# Patient Record
Sex: MALE | Race: OTHER | NOT HISPANIC OR LATINO | URBAN - METROPOLITAN AREA
[De-identification: names, ages, dates, MRNs, and addresses within clinical notes are randomized per-mention and may not be internally consistent; named-entity substitution may affect disease eponyms.]

---

## 2024-03-04 ENCOUNTER — INPATIENT (INPATIENT)
Facility: HOSPITAL | Age: 64
LOS: 21 days | Discharge: HOME HEALTH | DRG: 273 | End: 2024-03-26
Attending: HOSPITALIST | Admitting: STUDENT IN AN ORGANIZED HEALTH CARE EDUCATION/TRAINING PROGRAM
Payer: MEDICAID

## 2024-03-04 VITALS
RESPIRATION RATE: 22 BRPM | HEART RATE: 115 BPM | DIASTOLIC BLOOD PRESSURE: 56 MMHG | OXYGEN SATURATION: 100 % | SYSTOLIC BLOOD PRESSURE: 125 MMHG | WEIGHT: 113.54 LBS | TEMPERATURE: 97 F | HEIGHT: 68 IN

## 2024-03-04 DIAGNOSIS — R07.9 CHEST PAIN, UNSPECIFIED: ICD-10-CM

## 2024-03-04 LAB
ADD ON TEST-SPECIMEN IN LAB: SIGNIFICANT CHANGE UP
ALBUMIN SERPL ELPH-MCNC: 3.2 G/DL — LOW (ref 3.3–5)
ALP SERPL-CCNC: 484 U/L — HIGH (ref 40–120)
ALT FLD-CCNC: 627 U/L — HIGH (ref 10–45)
ANION GAP SERPL CALC-SCNC: 16 MMOL/L — SIGNIFICANT CHANGE UP (ref 5–17)
ANISOCYTOSIS BLD QL: SLIGHT — SIGNIFICANT CHANGE UP
APTT BLD: 35.7 SEC — HIGH (ref 24.5–35.6)
AST SERPL-CCNC: 603 U/L — HIGH (ref 10–40)
BASE EXCESS BLDMV CALC-SCNC: 1 MMOL/L — SIGNIFICANT CHANGE UP (ref -3–3)
BASE EXCESS BLDV CALC-SCNC: 0.5 MMOL/L — SIGNIFICANT CHANGE UP (ref -2–3)
BASE EXCESS BLDV CALC-SCNC: 0.8 MMOL/L — SIGNIFICANT CHANGE UP (ref -2–3)
BASOPHILS # BLD AUTO: 0 K/UL — SIGNIFICANT CHANGE UP (ref 0–0.2)
BASOPHILS NFR BLD AUTO: 0 % — SIGNIFICANT CHANGE UP (ref 0–2)
BILIRUB SERPL-MCNC: 1.2 MG/DL — SIGNIFICANT CHANGE UP (ref 0.2–1.2)
BLD GP AB SCN SERPL QL: NEGATIVE — SIGNIFICANT CHANGE UP
BUN SERPL-MCNC: 69 MG/DL — HIGH (ref 7–23)
BURR CELLS BLD QL SMEAR: PRESENT — SIGNIFICANT CHANGE UP
CA-I SERPL-SCNC: 1.09 MMOL/L — LOW (ref 1.15–1.33)
CA-I SERPL-SCNC: 1.13 MMOL/L — LOW (ref 1.15–1.33)
CALCIUM SERPL-MCNC: 8.3 MG/DL — LOW (ref 8.4–10.5)
CHLORIDE BLDV-SCNC: 101 MMOL/L — SIGNIFICANT CHANGE UP (ref 96–108)
CHLORIDE BLDV-SCNC: 101 MMOL/L — SIGNIFICANT CHANGE UP (ref 96–108)
CHLORIDE SERPL-SCNC: 99 MMOL/L — SIGNIFICANT CHANGE UP (ref 96–108)
CO2 BLDMV-SCNC: 27 MMOL/L — SIGNIFICANT CHANGE UP (ref 21–29)
CO2 BLDV-SCNC: 28 MMOL/L — HIGH (ref 22–26)
CO2 BLDV-SCNC: 28 MMOL/L — HIGH (ref 22–26)
CO2 SERPL-SCNC: 25 MMOL/L — SIGNIFICANT CHANGE UP (ref 22–31)
CREAT SERPL-MCNC: 2.15 MG/DL — HIGH (ref 0.5–1.3)
DACRYOCYTES BLD QL SMEAR: SLIGHT — SIGNIFICANT CHANGE UP
EGFR: 34 ML/MIN/1.73M2 — LOW
EOSINOPHIL # BLD AUTO: 0 K/UL — SIGNIFICANT CHANGE UP (ref 0–0.5)
EOSINOPHIL NFR BLD AUTO: 0 % — SIGNIFICANT CHANGE UP (ref 0–6)
GAS PNL BLDMV: SIGNIFICANT CHANGE UP
GAS PNL BLDV: 136 MMOL/L — SIGNIFICANT CHANGE UP (ref 136–145)
GAS PNL BLDV: 137 MMOL/L — SIGNIFICANT CHANGE UP (ref 136–145)
GAS PNL BLDV: SIGNIFICANT CHANGE UP
GLUCOSE BLDV-MCNC: 104 MG/DL — HIGH (ref 70–99)
GLUCOSE BLDV-MCNC: 109 MG/DL — HIGH (ref 70–99)
GLUCOSE SERPL-MCNC: 118 MG/DL — HIGH (ref 70–99)
HCO3 BLDMV-SCNC: 26 MMOL/L — SIGNIFICANT CHANGE UP (ref 20–28)
HCO3 BLDV-SCNC: 26 MMOL/L — SIGNIFICANT CHANGE UP (ref 22–29)
HCO3 BLDV-SCNC: 27 MMOL/L — SIGNIFICANT CHANGE UP (ref 22–29)
HCT VFR BLD CALC: 28.2 % — LOW (ref 39–50)
HCT VFR BLDA CALC: 29 % — LOW (ref 39–51)
HCT VFR BLDA CALC: 32 % — LOW (ref 39–51)
HGB BLD CALC-MCNC: 10.5 G/DL — LOW (ref 12.6–17.4)
HGB BLD CALC-MCNC: 9.5 G/DL — LOW (ref 12.6–17.4)
HGB BLD-MCNC: 9.2 G/DL — LOW (ref 13–17)
HOROWITZ INDEX BLDMV+IHG-RTO: 36 — SIGNIFICANT CHANGE UP
HOROWITZ INDEX BLDV+IHG-RTO: 32 — SIGNIFICANT CHANGE UP
HOROWITZ INDEX BLDV+IHG-RTO: 36 — SIGNIFICANT CHANGE UP
INR BLD: 4.26 RATIO — HIGH (ref 0.85–1.18)
INR BLD: 4.51 RATIO — HIGH (ref 0.85–1.18)
LACTATE BLDV-MCNC: 2.2 MMOL/L — HIGH (ref 0.5–2)
LACTATE BLDV-MCNC: 2.8 MMOL/L — HIGH (ref 0.5–2)
LACTATE BLDV-MCNC: 2.9 MMOL/L — HIGH (ref 0.5–2)
LYMPHOCYTES # BLD AUTO: 0.49 K/UL — LOW (ref 1–3.3)
LYMPHOCYTES # BLD AUTO: 6.1 % — LOW (ref 13–44)
MACROCYTES BLD QL: SLIGHT — SIGNIFICANT CHANGE UP
MAGNESIUM SERPL-MCNC: 2.8 MG/DL — HIGH (ref 1.6–2.6)
MANUAL SMEAR VERIFICATION: SIGNIFICANT CHANGE UP
MCHC RBC-ENTMCNC: 21.5 PG — LOW (ref 27–34)
MCHC RBC-ENTMCNC: 32.6 GM/DL — SIGNIFICANT CHANGE UP (ref 32–36)
MCV RBC AUTO: 65.9 FL — LOW (ref 80–100)
MICROCYTES BLD QL: SLIGHT — SIGNIFICANT CHANGE UP
MONOCYTES # BLD AUTO: 0.64 K/UL — SIGNIFICANT CHANGE UP (ref 0–0.9)
MONOCYTES NFR BLD AUTO: 7.9 % — SIGNIFICANT CHANGE UP (ref 2–14)
NEUTROPHILS # BLD AUTO: 6.95 K/UL — SIGNIFICANT CHANGE UP (ref 1.8–7.4)
NEUTROPHILS NFR BLD AUTO: 86 % — HIGH (ref 43–77)
NRBC # BLD: 2 /100 WBCS — HIGH (ref 0–0)
NT-PROBNP SERPL-SCNC: 3002 PG/ML — HIGH (ref 0–300)
O2 CT VFR BLD CALC: 41 MMHG — SIGNIFICANT CHANGE UP (ref 30–65)
OVALOCYTES BLD QL SMEAR: SLIGHT — SIGNIFICANT CHANGE UP
PCO2 BLDMV: 42 MMHG — SIGNIFICANT CHANGE UP (ref 30–65)
PCO2 BLDV: 47 MMHG — SIGNIFICANT CHANGE UP (ref 42–55)
PCO2 BLDV: 48 MMHG — SIGNIFICANT CHANGE UP (ref 42–55)
PH BLDMV: 7.4 — SIGNIFICANT CHANGE UP (ref 7.32–7.45)
PH BLDV: 7.35 — SIGNIFICANT CHANGE UP (ref 7.32–7.43)
PH BLDV: 7.36 — SIGNIFICANT CHANGE UP (ref 7.32–7.43)
PHOSPHATE SERPL-MCNC: 4.4 MG/DL — SIGNIFICANT CHANGE UP (ref 2.5–4.5)
PLAT MORPH BLD: ABNORMAL
PLATELET # BLD AUTO: 185 K/UL — SIGNIFICANT CHANGE UP (ref 150–400)
PO2 BLDV: 30 MMHG — SIGNIFICANT CHANGE UP (ref 25–45)
PO2 BLDV: 36 MMHG — SIGNIFICANT CHANGE UP (ref 25–45)
POIKILOCYTOSIS BLD QL AUTO: SLIGHT — SIGNIFICANT CHANGE UP
POLYCHROMASIA BLD QL SMEAR: SLIGHT — SIGNIFICANT CHANGE UP
POTASSIUM BLDV-SCNC: 3.3 MMOL/L — LOW (ref 3.5–5.1)
POTASSIUM BLDV-SCNC: 3.4 MMOL/L — LOW (ref 3.5–5.1)
POTASSIUM SERPL-MCNC: 3.5 MMOL/L — SIGNIFICANT CHANGE UP (ref 3.5–5.3)
POTASSIUM SERPL-SCNC: 3.5 MMOL/L — SIGNIFICANT CHANGE UP (ref 3.5–5.3)
PROT SERPL-MCNC: 6.8 G/DL — SIGNIFICANT CHANGE UP (ref 6–8.3)
PROTHROM AB SERPL-ACNC: 42.9 SEC — HIGH (ref 9.5–13)
PROTHROM AB SERPL-ACNC: 45.3 SEC — HIGH (ref 9.5–13)
RBC # BLD: 4.28 M/UL — SIGNIFICANT CHANGE UP (ref 4.2–5.8)
RBC # FLD: 14.7 % — HIGH (ref 10.3–14.5)
RBC BLD AUTO: ABNORMAL
RH IG SCN BLD-IMP: POSITIVE — SIGNIFICANT CHANGE UP
SAO2 % BLDMV: 63.6 — SIGNIFICANT CHANGE UP (ref 60–90)
SAO2 % BLDV: 37.3 % — LOW (ref 67–88)
SAO2 % BLDV: 52.1 % — LOW (ref 67–88)
SODIUM SERPL-SCNC: 140 MMOL/L — SIGNIFICANT CHANGE UP (ref 135–145)
TARGETS BLD QL SMEAR: SLIGHT — SIGNIFICANT CHANGE UP
TROPONIN T, HIGH SENSITIVITY RESULT: 93 NG/L — HIGH (ref 0–51)
WBC # BLD: 8.08 K/UL — SIGNIFICANT CHANGE UP (ref 3.8–10.5)
WBC # FLD AUTO: 8.08 K/UL — SIGNIFICANT CHANGE UP (ref 3.8–10.5)

## 2024-03-04 PROCEDURE — 36556 INSERT NON-TUNNEL CV CATH: CPT | Mod: 59

## 2024-03-04 PROCEDURE — 99291 CRITICAL CARE FIRST HOUR: CPT | Mod: 25

## 2024-03-04 PROCEDURE — 93503 INSERT/PLACE HEART CATHETER: CPT

## 2024-03-04 PROCEDURE — 71045 X-RAY EXAM CHEST 1 VIEW: CPT | Mod: 26

## 2024-03-04 RX ORDER — HYDRALAZINE HCL 50 MG
10 TABLET ORAL THREE TIMES A DAY
Refills: 0 | Status: DISCONTINUED | OUTPATIENT
Start: 2024-03-04 | End: 2024-03-05

## 2024-03-04 RX ORDER — POTASSIUM CHLORIDE 20 MEQ
40 PACKET (EA) ORAL ONCE
Refills: 0 | Status: COMPLETED | OUTPATIENT
Start: 2024-03-04 | End: 2024-03-04

## 2024-03-04 RX ORDER — ASPIRIN/CALCIUM CARB/MAGNESIUM 324 MG
81 TABLET ORAL DAILY
Refills: 0 | Status: DISCONTINUED | OUTPATIENT
Start: 2024-03-04 | End: 2024-03-07

## 2024-03-04 RX ORDER — POTASSIUM CHLORIDE 20 MEQ
40 PACKET (EA) ORAL ONCE
Refills: 0 | Status: DISCONTINUED | OUTPATIENT
Start: 2024-03-04 | End: 2024-03-04

## 2024-03-04 RX ORDER — DOBUTAMINE HCL 250MG/20ML
5 VIAL (ML) INTRAVENOUS
Qty: 500 | Refills: 0 | Status: DISCONTINUED | OUTPATIENT
Start: 2024-03-04 | End: 2024-03-05

## 2024-03-04 RX ORDER — BUMETANIDE 0.25 MG/ML
2 INJECTION INTRAMUSCULAR; INTRAVENOUS ONCE
Refills: 0 | Status: COMPLETED | OUTPATIENT
Start: 2024-03-04 | End: 2024-03-04

## 2024-03-04 RX ADMIN — Medication 10 MILLIGRAM(S): at 23:42

## 2024-03-04 RX ADMIN — Medication 40 MILLIEQUIVALENT(S): at 23:43

## 2024-03-04 RX ADMIN — Medication 7.73 MICROGRAM(S)/KG/MIN: at 21:42

## 2024-03-04 RX ADMIN — BUMETANIDE 2 MILLIGRAM(S): 0.25 INJECTION INTRAMUSCULAR; INTRAVENOUS at 23:16

## 2024-03-04 NOTE — H&P ADULT - NSHPPHYSICALEXAM_GEN_ALL_CORE
Physical Exam:   Constitutional: NAD, well-groomed, well-developed  HEENT: PERRLA, EOMI, no drainage or redness  Neck: supple,  No JVD  Respiratory: Breath Sounds equal & clear bilaterally to auscultation, no rales/rhonchi/wheezing, no accessory muscle use noted  Cardiovascular: Regular rate, regular rhythm, normal S1, S2; no murmurs or rub  Gastrointestinal: Soft, non-tender, non distended, + bowel sounds  Extremities: YEH x 4, no peripheral edema, no cyanosis, no clubbing. +periperal pulses.   Neurological: A+O x 3; speech clear and intact; no sensory, motor  deficits, normal reflexes. Nonfocal.   Skin: warm, dry, well perfused  Lines: Physical Exam:   Constitutional: in no acute distress  HEENT: PERRLA, EOMI, no drainage or redness  Neck: supple,  + JVD  Respiratory: Breath Sounds equal & clear bilaterally to auscultation  Cardiovascular: Regular rate, tachycardic, regular rhythm, normal S1, S2   Gastrointestinal: Soft, non-tender, non distended, + bowel sounds  Extremities: YEH x 4, no peripheral edema, no cyanosis, no clubbing. +peripheral pulses.   Neurological: A+O x 3; speech clear and intact; no sensory, motor  deficits, normal reflexes. Nonfocal.   Skin: warm, dry, well perfused Physical Exam:   Constitutional: in no acute distress  HEENT: PERRLA, EOMI, no drainage or redness  Neck: supple,  + JVD  Respiratory: Breath Sounds equal & clear bilaterally to auscultation  Cardiovascular: Regular rate, tachycardic, regular rhythm, normal S1, S2   Gastrointestinal: Soft, non-tender, non distended, + bowel sounds  Extremities: YEH x 4, no peripheral edema, no cyanosis, no clubbing. +peripheral pulses.   Neurological: A+O x 3; speech clear and intact; no sensory, motor  deficits, normal reflexes. Nonfocal.   Skin: warm, dry, well perfused. RLE dressing intact

## 2024-03-04 NOTE — H&P ADULT - HISTORY OF PRESENT ILLNESS
63 year old male with past medical history of HIV, CVA, HTN, HLD, prediabetes, A.fib, cocaine/heroin abuse, CAD w/ stent, CHF (EF  25-30% in 12/23, now 10 - 15 % as of TTE on 3/3),  RLE compartment syndrome s/p fasciotomy 12/23 and recent hospitalization for CHF exacerbation with bilateral pleural effusions requiring chest tube placement initially presenting [facility] on 3/1 with chest pain and shortness of breath. He states having intermittent 8/10 midsternal chest pain for the past few months since his leg operation. He was admitted for management of CHF exacerbation. 63 year old male with past medical history of HIV, CVA, HTN, HLD, prediabetes, A.fib, cocaine/heroin abuse, CAD w/ stent, CHF (EF  25-30% in 12/23),  RLE compartment syndrome s/p fasciotomy 12/23 and recent hospitalization for CHF exacerbation with bilateral pleural effusions requiring chest tube placement initially presenting [facility] on 3/1 with chest pain and shortness of breath. He states having intermittent 8/10 midsternal chest pain for the past few months since his leg operation. He was admitted to the outside hospital for management of CHF exacerbation, found to have a further reduced EF of 10 - 15% on 3/1. While admitted he was hypotensive requiring dopamine infusion with worsening perfusion indices and ELIE. He was transferred to [facility] for cardiogenic shock management.     On admission to CICU, he is A&O x3.  sinus tach, / 78, saturating well on 4L nasal cannula with dopamine gtt infusing at 10 mcg/kg/min. He denies chest pain, shortness of breath, nausea and vomiting on admission. 63 year old male with past medical history of HIV, CVA, HTN, HLD, prediabetes, A.fib, cocaine/heroin abuse, CAD w/ stent, CHF (EF  25-30% in 12/23),  RLE compartment syndrome s/p fasciotomy 12/23 and recent hospitalization for CHF exacerbation with bilateral pleural effusions requiring chest tube placement initially presenting to [facility] on 3/1 with chest pain and shortness of breath. He states having intermittent 8/10 midsternal chest pain for the past few months since his leg operation. He was admitted to the outside hospital for management of CHF exacerbation, found to have a further reduced EF of 10 - 15% on 3/1. While admitted, he was hypotensive requiring dopamine infusion with worsening perfusion indices and ELIE. He was transferred to [facility] for cardiogenic shock management.     On admission to CICU, he is A&O x3.  sinus tach, / 78, saturating well on 4L nasal cannula with dopamine gtt infusing at 10 mcg/kg/min. He denies chest pain, shortness of breath, nausea and vomiting on admission.

## 2024-03-04 NOTE — H&P ADULT - ASSESSMENT
Assessment:  63 year old male with past medical history of HIV, CVA, HTN, HLD, prediabetes, A.fib, cocaine/heroin abuse, CAD w/ stent, CHF (EF  25-30% in 12/23, now 10 - 15 % as of TTE on 3/3),  RLE compartment syndrome s/p fasciotomy 12/23    Plan:    Neuro  #CVA    Respiratory  #      Cardiovascular  #Cardiogenic shock  -       #HTN      #HLD      GI  - diet as tolerated, monitor for BM    /Renal  #ELIE  - admitted from outside hospital with mitchell catheter  - continue strict I&O, electrolyte monitoring    Endo      Heme      ID      ======================= DISPOSITION  =====================  [X] Critical   [ ] Guarded    [ ] Stable    [X] Maintain in CICU  [ ] Downgrade to Telemtry  [ ] Discharge Home    Patient requires continuous monitoring with bedside rhythm monitoring, pulse ox monitoring, and intermittent blood gas analysis. Care plan discussed with ICU care team. Patient remained critical and at risk for life threatening decompensation.  Patient seen, examined and plan discussed with CCU team during rounds.     I have personally provided 75 minutes of critical care time excluding time spent on separate procedures, in addition to initial critical care time provided by the CICU Attending, Dr. Izquierdo.     Flor Henderson, [facility] Assessment:  63 year old male with past medical history of HIV, CVA, HTN, HLD, prediabetes, A.fib, cocaine/heroin abuse, CAD w/ stent, CHF (EF  25-30% in 12/23, now 10 - 15 % as of TTE on 3/3),  RLE compartment syndrome s/p fasciotomy 12/23 presented to outside hospital with chest pain and shortness of breath, admitted for CHF exacerbation, transferred to [facility] for cardiogenic shock management     Plan:    Neuro  #hx CVA  - A&O x3, poor historian with medical history and medications  - continue to monitor per protocol    Respiratory  #hypoxia/pleural effusions  - noted to have pleural effusions at outside hospital, likely in the setting of volume overload s/t chf  - saturating well on 4L, wean as tolerated with diuresis  - continue to monitor sp02    Cardiovascular  #Cardiogenic shock  - i/s/o CHF exacerbation  - TTE 3/1 EF 10 - 15%, previously 25 - 30% 12/23  - dopamine gtt transitioned to dobutamine 5 mcg, continue  - swan to be place, f/u mv02, CVP  - diuresis as needed  - trend perfusion indices, mv02    - f/u am TTE    #CAD  - w/ history of stents in december as per patient  - previously on aspirin, continue    #HTN  - continue to trend BP    #HLD  - holding statin i/s/o liver dysfunction    GI  #transaminitis  - worsening liver ezymes at outside hospital  - hold hepatotoxins, continue to trend    - diet as tolerated, monitor for BM    /Renal  #ELIE  - admitted from outside hospital with mitchell catheter  - continue strict I&O, electrolyte monitoring    Endo  #preDM  - f.u a1c  - trend glucose, ISS while inpatient    Heme  - elevated INR report ou  - H/H and platelets stable at outside hospital  - f/u admission labs    ID  - afebrile on admission  - continue to trend wbc and fever curve    #full code  #lines: PIV      ======================= DISPOSITION  =====================  [X] Critical   [ ] Guarded    [ ] Stable    [X] Maintain in CICU  [ ] Downgrade to Telemetry  [ ] Discharge Home    Patient requires continuous monitoring with bedside rhythm monitoring, pulse ox monitoring, and intermittent blood gas analysis. Care plan discussed with ICU care team. Patient remained critical and at risk for life threatening decompensation.  Patient seen, examined and plan discussed with CCU team during rounds.     I have personally provided 75 minutes of critical care time excluding time spent on separate procedures, in addition to initial critical care time provided by the CICU Attending, Dr. Izquierdo.     Flor Henderson, [facility] Assessment:  63 year old male with past medical history of HIV, CVA, HTN, HLD, prediabetes, A.fib, cocaine/heroin abuse, CAD w/ stent, CHF (EF  25-30% in 12/23, now 10 - 15 % as of TTE on 3/1),  RLE compartment syndrome s/p fasciotomy 12/23 presented to outside hospital with chest pain and shortness of breath, admitted for CHF exacerbation, transferred to [facility] for cardiogenic shock management     Plan:    Neuro  #hx CVA  - A&O x3, poor historian with medical history and medications  - continue to monitor per protocol    Respiratory  #hypoxia/pleural effusions  - noted to have pleural effusions at outside hospital, likely in the setting of volume overload s/t chf  - saturating well on 4L, wean as tolerated with diuresis  - continue to monitor sp02    Cardiovascular  #Cardiogenic shock  - i/s/o CHF exacerbation  - TTE 3/1 EF 10 - 15%, previously 25 - 30% 12/23  - dopamine gtt transitioned to dobutamine 5 mcg, continue  - swan to be place, f/u mv02, CVP  - diuresis as needed  - trend perfusion indices, mv02    - f/u am TTE    #CAD  - w/ history of stents in december as per patient  - previously on aspirin, continue    #HTN  - continue to trend BP    #HLD  - holding statin i/s/o liver dysfunction    GI  #transaminitis  - worsening liver ezymes at outside hospital  - hold hepatotoxins, continue to trend    - diet as tolerated, monitor for BM    /Renal  #ELIE  - admitted from outside hospital with mitchell catheter  - continue strict I&O, electrolyte monitoring    Endo  #preDM  - f.u a1c  - trend glucose, ISS while inpatient    Heme  - elevated INR report ou  - H/H and platelets stable at outside hospital  - f/u admission labs    ID  - afebrile on admission  - continue to trend wbc and fever curve    #full code  #lines: PIV      ======================= DISPOSITION  =====================  [X] Critical   [ ] Guarded    [ ] Stable    [X] Maintain in CICU  [ ] Downgrade to Telemetry  [ ] Discharge Home    Patient requires continuous monitoring with bedside rhythm monitoring, pulse ox monitoring, and intermittent blood gas analysis. Care plan discussed with ICU care team. Patient remained critical and at risk for life threatening decompensation.  Patient seen, examined and plan discussed with CCU team during rounds.     I have personally provided 75 minutes of critical care time excluding time spent on separate procedures, in addition to initial critical care time provided by the CICU Attending, Dr. Izquierdo.     Flor Henderson, [facility] Assessment:  63 year old male with past medical history of HIV, CVA, HTN, HLD, prediabetes, A.fib, cocaine/heroin abuse, CAD w/ stent, CHF (EF  25-30% in 12/23, now 10 - 15 % as of TTE on 3/1),  RLE compartment syndrome s/p fasciotomy 12/23 presented to outside hospital with chest pain and shortness of breath, admitted for CHF exacerbation, transferred to [facility] for cardiogenic shock management     Plan:    Neuro  #hx CVA  - A&O x3, poor historian with medical history and medications  - continue to monitor per protocol    Respiratory  #hypoxia/pleural effusions  - noted to have pleural effusions at outside hospital, likely in the setting of volume overload s/t chf  - saturating well on 4L, wean as tolerated with diuresis  - continue to monitor sp02    Cardiovascular  #Cardiogenic shock  - i/s/o CHF exacerbation  - TTE 3/1 EF 10 - 15%, previously 25 - 30% 12/23  - dopamine gtt transitioned to dobutamine 5 mcg, continue  - swan to be place, f/u mv02, CVP  - diuresis as needed  - trend perfusion indices, mv02    - f/u am TTE    #CAD  - w/ history of stents in december as per patient  - previously on aspirin, continue    #pAF  - ? hx a.fib  - sinus on admission  - not on blood thinner or rate control agent as per patient, only aspirin & plavix  - anticoagulation on hold with INR > 2    #HTN  - continue to trend BP    #HLD  - holding statin i/s/o liver dysfunction    GI  #transaminitis  - worsening liver ezymes at outside hospital  - hold hepatotoxins, continue to trend    - diet as tolerated, monitor for BM    /Renal  #ELIE  - admitted from outside hospital with mitchell catheter  - continue strict I&O, electrolyte monitoring    Endo  #preDM  - f.u a1c  - trend glucose, ISS while inpatient    Heme  - elevated INR on admission, i/s/o liver dsyfunction, continue to trend  - H/H and platelets stable at outside hospital  - f/u admission labs    ID  - afebrile on admission  - continue to trend wbc and fever curve    #HIV  - hx of HIV, not on medication as per patient    #RLE wound  - admitted from outside hospital with ace wrap in place  - noted to have wounds on lateral and medial aspect of calf  - f/u wound consult    #full code  #lines: PIV    ======================= DISPOSITION  =====================  [X] Critical   [ ] Guarded    [ ] Stable    [X] Maintain in CICU  [ ] Downgrade to Telemetry  [ ] Discharge Home    Patient requires continuous monitoring with bedside rhythm monitoring, pulse ox monitoring, and intermittent blood gas analysis. Care plan discussed with ICU care team. Patient remained critical and at risk for life threatening decompensation.  Patient seen, examined and plan discussed with CCU team during rounds.     I have personally provided 75 minutes of critical care time excluding time spent on separate procedures, in addition to initial critical care time provided by the CICU Attending, Dr. Izquierdo.     Flor Henderson, [facility]

## 2024-03-04 NOTE — PROCEDURE NOTE - ADDITIONAL PROCEDURE DETAILS
Under sterile conditions and with proper draping of the patient, a pulmonary artery catheter was floated through the introducer catheter in the right internal jugular vein. With the balloon inflated with 1.5ml of air, the PA catheter was advanced while monitoring the pressure tracing from the central venous system to the right ventricle and to the pulmonary artery. The balloon was deflated, PA pressure tracing was noted again. The position of the catheter was verified by CXR. The initial readings are:  CVP: 11 mmHg  PA S/D: 49/19 mmHg    Complications: None

## 2024-03-04 NOTE — PATIENT PROFILE ADULT - FALL HARM RISK - HARM RISK INTERVENTIONS

## 2024-03-05 ENCOUNTER — RESULT REVIEW (OUTPATIENT)
Age: 64
End: 2024-03-05

## 2024-03-05 DIAGNOSIS — I51.3 INTRACARDIAC THROMBOSIS, NOT ELSEWHERE CLASSIFIED: ICD-10-CM

## 2024-03-05 DIAGNOSIS — Z98.890 OTHER SPECIFIED POSTPROCEDURAL STATES: ICD-10-CM

## 2024-03-05 DIAGNOSIS — I50.23 ACUTE ON CHRONIC SYSTOLIC (CONGESTIVE) HEART FAILURE: ICD-10-CM

## 2024-03-05 DIAGNOSIS — I48.0 PAROXYSMAL ATRIAL FIBRILLATION: ICD-10-CM

## 2024-03-05 DIAGNOSIS — F19.10 OTHER PSYCHOACTIVE SUBSTANCE ABUSE, UNCOMPLICATED: ICD-10-CM

## 2024-03-05 DIAGNOSIS — Z11.4 ENCOUNTER FOR SCREENING FOR HUMAN IMMUNODEFICIENCY VIRUS [HIV]: ICD-10-CM

## 2024-03-05 LAB
A1C WITH ESTIMATED AVERAGE GLUCOSE RESULT: 6.2 % — HIGH (ref 4–5.6)
ALBUMIN SERPL ELPH-MCNC: 3 G/DL — LOW (ref 3.3–5)
ALBUMIN SERPL ELPH-MCNC: 3 G/DL — LOW (ref 3.3–5)
ALBUMIN SERPL ELPH-MCNC: 3.1 G/DL — LOW (ref 3.3–5)
ALP SERPL-CCNC: 393 U/L — HIGH (ref 40–120)
ALP SERPL-CCNC: 399 U/L — HIGH (ref 40–120)
ALP SERPL-CCNC: 406 U/L — HIGH (ref 40–120)
ALT FLD-CCNC: 491 U/L — HIGH (ref 10–45)
ALT FLD-CCNC: 528 U/L — HIGH (ref 10–45)
ALT FLD-CCNC: 553 U/L — HIGH (ref 10–45)
ANION GAP SERPL CALC-SCNC: 11 MMOL/L — SIGNIFICANT CHANGE UP (ref 5–17)
ANION GAP SERPL CALC-SCNC: 12 MMOL/L — SIGNIFICANT CHANGE UP (ref 5–17)
ANION GAP SERPL CALC-SCNC: 13 MMOL/L — SIGNIFICANT CHANGE UP (ref 5–17)
APTT BLD: 29.6 SEC — SIGNIFICANT CHANGE UP (ref 24.5–35.6)
AST SERPL-CCNC: 343 U/L — HIGH (ref 10–40)
AST SERPL-CCNC: 406 U/L — HIGH (ref 10–40)
AST SERPL-CCNC: 451 U/L — HIGH (ref 10–40)
BASE EXCESS BLDMV CALC-SCNC: 2.6 MMOL/L — SIGNIFICANT CHANGE UP (ref -3–3)
BASE EXCESS BLDMV CALC-SCNC: 3.2 MMOL/L — HIGH (ref -3–3)
BASE EXCESS BLDMV CALC-SCNC: 3.2 MMOL/L — HIGH (ref -3–3)
BASE EXCESS BLDMV CALC-SCNC: 3.9 MMOL/L — HIGH (ref -3–3)
BASE EXCESS BLDMV CALC-SCNC: 4.7 MMOL/L — HIGH (ref -3–3)
BASE EXCESS BLDMV CALC-SCNC: 4.9 MMOL/L — HIGH (ref -3–3)
BASE EXCESS BLDV CALC-SCNC: -1.5 MMOL/L — SIGNIFICANT CHANGE UP (ref -2–3)
BASOPHILS # BLD AUTO: 0 K/UL — SIGNIFICANT CHANGE UP (ref 0–0.2)
BASOPHILS NFR BLD AUTO: 0 % — SIGNIFICANT CHANGE UP (ref 0–2)
BILIRUB SERPL-MCNC: 0.8 MG/DL — SIGNIFICANT CHANGE UP (ref 0.2–1.2)
BUN SERPL-MCNC: 59 MG/DL — HIGH (ref 7–23)
BUN SERPL-MCNC: 64 MG/DL — HIGH (ref 7–23)
BUN SERPL-MCNC: 66 MG/DL — HIGH (ref 7–23)
CA-I SERPL-SCNC: 1.04 MMOL/L — LOW (ref 1.15–1.33)
CALCIUM SERPL-MCNC: 8 MG/DL — LOW (ref 8.4–10.5)
CALCIUM SERPL-MCNC: 8.1 MG/DL — LOW (ref 8.4–10.5)
CALCIUM SERPL-MCNC: 8.4 MG/DL — SIGNIFICANT CHANGE UP (ref 8.4–10.5)
CHLORIDE BLDV-SCNC: 103 MMOL/L — SIGNIFICANT CHANGE UP (ref 96–108)
CHLORIDE SERPL-SCNC: 101 MMOL/L — SIGNIFICANT CHANGE UP (ref 96–108)
CHLORIDE SERPL-SCNC: 102 MMOL/L — SIGNIFICANT CHANGE UP (ref 96–108)
CHLORIDE SERPL-SCNC: 102 MMOL/L — SIGNIFICANT CHANGE UP (ref 96–108)
CHOLEST SERPL-MCNC: 138 MG/DL — SIGNIFICANT CHANGE UP
CO2 BLDMV-SCNC: 28 MMOL/L — SIGNIFICANT CHANGE UP (ref 21–29)
CO2 BLDMV-SCNC: 29 MMOL/L — SIGNIFICANT CHANGE UP (ref 21–29)
CO2 BLDMV-SCNC: 30 MMOL/L — HIGH (ref 21–29)
CO2 BLDMV-SCNC: 30 MMOL/L — HIGH (ref 21–29)
CO2 BLDV-SCNC: 25 MMOL/L — SIGNIFICANT CHANGE UP (ref 22–26)
CO2 SERPL-SCNC: 26 MMOL/L — SIGNIFICANT CHANGE UP (ref 22–31)
CO2 SERPL-SCNC: 27 MMOL/L — SIGNIFICANT CHANGE UP (ref 22–31)
CO2 SERPL-SCNC: 28 MMOL/L — SIGNIFICANT CHANGE UP (ref 22–31)
CREAT SERPL-MCNC: 1.94 MG/DL — HIGH (ref 0.5–1.3)
CREAT SERPL-MCNC: 2.1 MG/DL — HIGH (ref 0.5–1.3)
CREAT SERPL-MCNC: 2.13 MG/DL — HIGH (ref 0.5–1.3)
EGFR: 34 ML/MIN/1.73M2 — LOW
EGFR: 35 ML/MIN/1.73M2 — LOW
EGFR: 38 ML/MIN/1.73M2 — LOW
EOSINOPHIL # BLD AUTO: 0 K/UL — SIGNIFICANT CHANGE UP (ref 0–0.5)
EOSINOPHIL NFR BLD AUTO: 0 % — SIGNIFICANT CHANGE UP (ref 0–6)
ESTIMATED AVERAGE GLUCOSE: 131 MG/DL — HIGH (ref 68–114)
GAS PNL BLDMV: SIGNIFICANT CHANGE UP
GAS PNL BLDV: 135 MMOL/L — LOW (ref 136–145)
GAS PNL BLDV: SIGNIFICANT CHANGE UP
GAS PNL BLDV: SIGNIFICANT CHANGE UP
GLUCOSE BLDC GLUCOMTR-MCNC: 123 MG/DL — HIGH (ref 70–99)
GLUCOSE BLDC GLUCOMTR-MCNC: 126 MG/DL — HIGH (ref 70–99)
GLUCOSE BLDC GLUCOMTR-MCNC: 135 MG/DL — HIGH (ref 70–99)
GLUCOSE BLDC GLUCOMTR-MCNC: 137 MG/DL — HIGH (ref 70–99)
GLUCOSE BLDV-MCNC: 146 MG/DL — HIGH (ref 70–99)
GLUCOSE SERPL-MCNC: 125 MG/DL — HIGH (ref 70–99)
GLUCOSE SERPL-MCNC: 129 MG/DL — HIGH (ref 70–99)
GLUCOSE SERPL-MCNC: 161 MG/DL — HIGH (ref 70–99)
HCO3 BLDMV-SCNC: 27 MMOL/L — SIGNIFICANT CHANGE UP (ref 20–28)
HCO3 BLDMV-SCNC: 28 MMOL/L — SIGNIFICANT CHANGE UP (ref 20–28)
HCO3 BLDMV-SCNC: 29 MMOL/L — HIGH (ref 20–28)
HCO3 BLDMV-SCNC: 29 MMOL/L — HIGH (ref 20–28)
HCO3 BLDV-SCNC: 24 MMOL/L — SIGNIFICANT CHANGE UP (ref 22–29)
HCT VFR BLD CALC: 25.1 % — LOW (ref 39–50)
HCT VFR BLDA CALC: 26 % — LOW (ref 39–51)
HCV AB S/CO SERPL IA: 0.22 S/CO — SIGNIFICANT CHANGE UP (ref 0–0.99)
HCV AB SERPL-IMP: SIGNIFICANT CHANGE UP
HDLC SERPL-MCNC: 67 MG/DL — SIGNIFICANT CHANGE UP
HEPARINASE TEG R TIME: 4.3 MIN — SIGNIFICANT CHANGE UP (ref 4.3–8.3)
HGB BLD CALC-MCNC: 8.6 G/DL — LOW (ref 12.6–17.4)
HGB BLD-MCNC: 8.5 G/DL — LOW (ref 13–17)
HOROWITZ INDEX BLDMV+IHG-RTO: 21 — SIGNIFICANT CHANGE UP
HOROWITZ INDEX BLDMV+IHG-RTO: SIGNIFICANT CHANGE UP
HOROWITZ INDEX BLDV+IHG-RTO: 36 — SIGNIFICANT CHANGE UP
IMM GRANULOCYTES NFR BLD AUTO: 0.4 % — SIGNIFICANT CHANGE UP (ref 0–0.9)
INR BLD: 3.61 RATIO — HIGH (ref 0.85–1.18)
LACTATE BLDV-MCNC: 1.8 MMOL/L — SIGNIFICANT CHANGE UP (ref 0.5–2)
LACTATE BLDV-MCNC: 1.9 MMOL/L — SIGNIFICANT CHANGE UP (ref 0.5–2)
LACTATE BLDV-MCNC: 1.9 MMOL/L — SIGNIFICANT CHANGE UP (ref 0.5–2)
LACTATE BLDV-MCNC: 2.1 MMOL/L — HIGH (ref 0.5–2)
LACTATE SERPL-SCNC: 2.1 MMOL/L — HIGH (ref 0.5–2)
LIPID PNL WITH DIRECT LDL SERPL: 61 MG/DL — SIGNIFICANT CHANGE UP
LYMPHOCYTES # BLD AUTO: 0.45 K/UL — LOW (ref 1–3.3)
LYMPHOCYTES # BLD AUTO: 6.1 % — LOW (ref 13–44)
MAGNESIUM SERPL-MCNC: 2.2 MG/DL — SIGNIFICANT CHANGE UP (ref 1.6–2.6)
MAGNESIUM SERPL-MCNC: 2.4 MG/DL — SIGNIFICANT CHANGE UP (ref 1.6–2.6)
MAGNESIUM SERPL-MCNC: 2.5 MG/DL — SIGNIFICANT CHANGE UP (ref 1.6–2.6)
MCHC RBC-ENTMCNC: 21.9 PG — LOW (ref 27–34)
MCHC RBC-ENTMCNC: 33.9 GM/DL — SIGNIFICANT CHANGE UP (ref 32–36)
MCV RBC AUTO: 64.7 FL — LOW (ref 80–100)
MONOCYTES # BLD AUTO: 0.96 K/UL — HIGH (ref 0–0.9)
MONOCYTES NFR BLD AUTO: 13 % — SIGNIFICANT CHANGE UP (ref 2–14)
MRSA PCR RESULT.: SIGNIFICANT CHANGE UP
NEUTROPHILS # BLD AUTO: 5.94 K/UL — SIGNIFICANT CHANGE UP (ref 1.8–7.4)
NEUTROPHILS NFR BLD AUTO: 80.5 % — HIGH (ref 43–77)
NON HDL CHOLESTEROL: 71 MG/DL — SIGNIFICANT CHANGE UP
NRBC # BLD: 1 /100 WBCS — HIGH (ref 0–0)
O2 CT VFR BLD CALC: 31 MMHG — SIGNIFICANT CHANGE UP (ref 30–65)
O2 CT VFR BLD CALC: 31 MMHG — SIGNIFICANT CHANGE UP (ref 30–65)
O2 CT VFR BLD CALC: 32 MMHG — SIGNIFICANT CHANGE UP (ref 30–65)
O2 CT VFR BLD CALC: 35 MMHG — SIGNIFICANT CHANGE UP (ref 30–65)
O2 CT VFR BLD CALC: 38 MMHG — SIGNIFICANT CHANGE UP (ref 30–65)
O2 CT VFR BLD CALC: 38 MMHG — SIGNIFICANT CHANGE UP (ref 30–65)
PCO2 BLDMV: 38 MMHG — SIGNIFICANT CHANGE UP (ref 30–65)
PCO2 BLDMV: 41 MMHG — SIGNIFICANT CHANGE UP (ref 30–65)
PCO2 BLDMV: 41 MMHG — SIGNIFICANT CHANGE UP (ref 30–65)
PCO2 BLDMV: 42 MMHG — SIGNIFICANT CHANGE UP (ref 30–65)
PCO2 BLDV: 41 MMHG — LOW (ref 42–55)
PH BLDMV: 7.43 — SIGNIFICANT CHANGE UP (ref 7.32–7.45)
PH BLDMV: 7.45 — SIGNIFICANT CHANGE UP (ref 7.32–7.45)
PH BLDMV: 7.46 — HIGH (ref 7.32–7.45)
PH BLDMV: 7.47 — HIGH (ref 7.32–7.45)
PH BLDV: 7.37 — SIGNIFICANT CHANGE UP (ref 7.32–7.43)
PHOSPHATE SERPL-MCNC: 2.7 MG/DL — SIGNIFICANT CHANGE UP (ref 2.5–4.5)
PHOSPHATE SERPL-MCNC: 3.1 MG/DL — SIGNIFICANT CHANGE UP (ref 2.5–4.5)
PHOSPHATE SERPL-MCNC: 3.7 MG/DL — SIGNIFICANT CHANGE UP (ref 2.5–4.5)
PLATELET # BLD AUTO: 155 K/UL — SIGNIFICANT CHANGE UP (ref 150–400)
PO2 BLDV: 37 MMHG — SIGNIFICANT CHANGE UP (ref 25–45)
POTASSIUM BLDV-SCNC: 2.9 MMOL/L — CRITICAL LOW (ref 3.5–5.1)
POTASSIUM SERPL-MCNC: 3.4 MMOL/L — LOW (ref 3.5–5.3)
POTASSIUM SERPL-MCNC: 3.5 MMOL/L — SIGNIFICANT CHANGE UP (ref 3.5–5.3)
POTASSIUM SERPL-MCNC: 4 MMOL/L — SIGNIFICANT CHANGE UP (ref 3.5–5.3)
POTASSIUM SERPL-SCNC: 3.4 MMOL/L — LOW (ref 3.5–5.3)
POTASSIUM SERPL-SCNC: 3.5 MMOL/L — SIGNIFICANT CHANGE UP (ref 3.5–5.3)
POTASSIUM SERPL-SCNC: 4 MMOL/L — SIGNIFICANT CHANGE UP (ref 3.5–5.3)
PROT SERPL-MCNC: 6.1 G/DL — SIGNIFICANT CHANGE UP (ref 6–8.3)
PROT SERPL-MCNC: 6.2 G/DL — SIGNIFICANT CHANGE UP (ref 6–8.3)
PROT SERPL-MCNC: 6.5 G/DL — SIGNIFICANT CHANGE UP (ref 6–8.3)
PROTHROM AB SERPL-ACNC: 36.5 SEC — HIGH (ref 9.5–13)
RAPIDTEG MAXIMUM AMPLITUDE: 64.1 MM — SIGNIFICANT CHANGE UP (ref 52–70)
RBC # BLD: 3.88 M/UL — LOW (ref 4.2–5.8)
RBC # FLD: 14.6 % — HIGH (ref 10.3–14.5)
S AUREUS DNA NOSE QL NAA+PROBE: DETECTED
SAO2 % BLDMV: 42.5 — LOW (ref 60–90)
SAO2 % BLDMV: 45 — LOW (ref 60–90)
SAO2 % BLDMV: 49.1 — LOW (ref 60–90)
SAO2 % BLDMV: 58.3 — LOW (ref 60–90)
SAO2 % BLDMV: 59.5 — LOW (ref 60–90)
SAO2 % BLDMV: 60.9 — SIGNIFICANT CHANGE UP (ref 60–90)
SAO2 % BLDV: 56.4 % — LOW (ref 67–88)
SODIUM SERPL-SCNC: 139 MMOL/L — SIGNIFICANT CHANGE UP (ref 135–145)
SODIUM SERPL-SCNC: 141 MMOL/L — SIGNIFICANT CHANGE UP (ref 135–145)
SODIUM SERPL-SCNC: 142 MMOL/L — SIGNIFICANT CHANGE UP (ref 135–145)
T3 SERPL-MCNC: 55 NG/DL — LOW (ref 80–200)
TEG FUNCTIONAL FIBRINOGEN: 26.2 MM — SIGNIFICANT CHANGE UP (ref 15–32)
TEG MAXIMUM AMPLITUDE: 63.4 MM — SIGNIFICANT CHANGE UP (ref 52–69)
TEG REACTION TIME: 4 MIN — LOW (ref 4.6–9.1)
TRIGL SERPL-MCNC: 41 MG/DL — SIGNIFICANT CHANGE UP
TSH SERPL-MCNC: 8.44 UIU/ML — HIGH (ref 0.27–4.2)
WBC # BLD: 7.38 K/UL — SIGNIFICANT CHANGE UP (ref 3.8–10.5)
WBC # FLD AUTO: 7.38 K/UL — SIGNIFICANT CHANGE UP (ref 3.8–10.5)

## 2024-03-05 PROCEDURE — 99292 CRITICAL CARE ADDL 30 MIN: CPT

## 2024-03-05 PROCEDURE — 99291 CRITICAL CARE FIRST HOUR: CPT

## 2024-03-05 PROCEDURE — 99222 1ST HOSP IP/OBS MODERATE 55: CPT

## 2024-03-05 PROCEDURE — 93923 UPR/LXTR ART STDY 3+ LVLS: CPT | Mod: 26

## 2024-03-05 PROCEDURE — 93306 TTE W/DOPPLER COMPLETE: CPT | Mod: 26

## 2024-03-05 PROCEDURE — 93010 ELECTROCARDIOGRAM REPORT: CPT

## 2024-03-05 PROCEDURE — 93925 LOWER EXTREMITY STUDY: CPT | Mod: 26

## 2024-03-05 RX ORDER — DOBUTAMINE HCL 250MG/20ML
2.5 VIAL (ML) INTRAVENOUS
Qty: 500 | Refills: 0 | Status: DISCONTINUED | OUTPATIENT
Start: 2024-03-05 | End: 2024-03-05

## 2024-03-05 RX ORDER — INSULIN LISPRO 100/ML
VIAL (ML) SUBCUTANEOUS
Refills: 0 | Status: DISCONTINUED | OUTPATIENT
Start: 2024-03-05 | End: 2024-03-26

## 2024-03-05 RX ORDER — ROSUVASTATIN CALCIUM 5 MG/1
1 TABLET ORAL
Refills: 0 | DISCHARGE

## 2024-03-05 RX ORDER — HYDRALAZINE HCL 50 MG
10 TABLET ORAL ONCE
Refills: 0 | Status: COMPLETED | OUTPATIENT
Start: 2024-03-05 | End: 2024-03-05

## 2024-03-05 RX ORDER — LOSARTAN POTASSIUM 100 MG/1
12.5 TABLET, FILM COATED ORAL DAILY
Refills: 0 | Status: DISCONTINUED | OUTPATIENT
Start: 2024-03-05 | End: 2024-03-05

## 2024-03-05 RX ORDER — POTASSIUM CHLORIDE 20 MEQ
40 PACKET (EA) ORAL ONCE
Refills: 0 | Status: COMPLETED | OUTPATIENT
Start: 2024-03-05 | End: 2024-03-05

## 2024-03-05 RX ORDER — CHLORHEXIDINE GLUCONATE 213 G/1000ML
1 SOLUTION TOPICAL
Refills: 0 | Status: DISCONTINUED | OUTPATIENT
Start: 2024-03-05 | End: 2024-03-05

## 2024-03-05 RX ORDER — INSULIN LISPRO 100/ML
VIAL (ML) SUBCUTANEOUS AT BEDTIME
Refills: 0 | Status: DISCONTINUED | OUTPATIENT
Start: 2024-03-05 | End: 2024-03-26

## 2024-03-05 RX ORDER — PETROLATUM,WHITE
1 JELLY (GRAM) TOPICAL EVERY 12 HOURS
Refills: 0 | Status: DISCONTINUED | OUTPATIENT
Start: 2024-03-05 | End: 2024-03-26

## 2024-03-05 RX ORDER — CHLORHEXIDINE GLUCONATE 213 G/1000ML
1 SOLUTION TOPICAL
Refills: 0 | Status: DISCONTINUED | OUTPATIENT
Start: 2024-03-05 | End: 2024-03-26

## 2024-03-05 RX ORDER — BUMETANIDE 0.25 MG/ML
2 INJECTION INTRAMUSCULAR; INTRAVENOUS ONCE
Refills: 0 | Status: COMPLETED | OUTPATIENT
Start: 2024-03-05 | End: 2024-03-05

## 2024-03-05 RX ORDER — POTASSIUM CHLORIDE 20 MEQ
40 PACKET (EA) ORAL EVERY 4 HOURS
Refills: 0 | Status: COMPLETED | OUTPATIENT
Start: 2024-03-05 | End: 2024-03-06

## 2024-03-05 RX ORDER — LOSARTAN POTASSIUM 100 MG/1
12.5 TABLET, FILM COATED ORAL ONCE
Refills: 0 | Status: COMPLETED | OUTPATIENT
Start: 2024-03-05 | End: 2024-03-05

## 2024-03-05 RX ORDER — SODIUM CHLORIDE 9 MG/ML
10 INJECTION INTRAMUSCULAR; INTRAVENOUS; SUBCUTANEOUS
Refills: 0 | Status: DISCONTINUED | OUTPATIENT
Start: 2024-03-05 | End: 2024-03-26

## 2024-03-05 RX ORDER — HYDRALAZINE HCL 50 MG
25 TABLET ORAL THREE TIMES A DAY
Refills: 0 | Status: DISCONTINUED | OUTPATIENT
Start: 2024-03-05 | End: 2024-03-06

## 2024-03-05 RX ORDER — HEPARIN SODIUM 5000 [USP'U]/ML
1000 INJECTION INTRAVENOUS; SUBCUTANEOUS
Qty: 25000 | Refills: 0 | Status: DISCONTINUED | OUTPATIENT
Start: 2024-03-05 | End: 2024-03-09

## 2024-03-05 RX ORDER — CLOPIDOGREL BISULFATE 75 MG/1
1 TABLET, FILM COATED ORAL
Refills: 0 | DISCHARGE

## 2024-03-05 RX ADMIN — HEPARIN SODIUM 10 UNIT(S)/HR: 5000 INJECTION INTRAVENOUS; SUBCUTANEOUS at 20:27

## 2024-03-05 RX ADMIN — Medication 25 MILLIGRAM(S): at 13:07

## 2024-03-05 RX ADMIN — Medication 10 MILLIGRAM(S): at 08:08

## 2024-03-05 RX ADMIN — Medication 10 MILLIGRAM(S): at 05:34

## 2024-03-05 RX ADMIN — CHLORHEXIDINE GLUCONATE 1 APPLICATION(S): 213 SOLUTION TOPICAL at 22:10

## 2024-03-05 RX ADMIN — Medication 40 MILLIEQUIVALENT(S): at 04:27

## 2024-03-05 RX ADMIN — Medication 3.86 MICROGRAM(S)/KG/MIN: at 08:13

## 2024-03-05 RX ADMIN — Medication 25 MILLIGRAM(S): at 21:18

## 2024-03-05 RX ADMIN — BUMETANIDE 2 MILLIGRAM(S): 0.25 INJECTION INTRAMUSCULAR; INTRAVENOUS at 23:15

## 2024-03-05 RX ADMIN — LOSARTAN POTASSIUM 12.5 MILLIGRAM(S): 100 TABLET, FILM COATED ORAL at 20:24

## 2024-03-05 RX ADMIN — Medication 81 MILLIGRAM(S): at 11:33

## 2024-03-05 RX ADMIN — BUMETANIDE 2 MILLIGRAM(S): 0.25 INJECTION INTRAMUSCULAR; INTRAVENOUS at 06:48

## 2024-03-05 RX ADMIN — Medication 40 MILLIEQUIVALENT(S): at 06:46

## 2024-03-05 RX ADMIN — BUMETANIDE 2 MILLIGRAM(S): 0.25 INJECTION INTRAMUSCULAR; INTRAVENOUS at 14:35

## 2024-03-05 NOTE — CONSULT NOTE ADULT - ATTENDING COMMENTS
63/M with CAD s/p PCI few months back,  poly substance use alcohol (4 beers daily/ Half pint of liquor 2-3/weekly), SMoker for 30 years , cocaine use, reports he has stopped after last hospitalization, with h/o HFrEf ef 25%, with worsening EF and admitted with Cardiogenic shock to [facility] who was transferred to [facility] for further management.     Off note he has had PAD s/p procedure (pt doesn't remenber) followed by compartment syndrome s/p fasciotomy. 63/M with CAD s/p PCI few months back,  poly substance use alcohol (4 beers daily/ Half pint of liquor 2-3/weekly), SMoker for 30 years , cocaine use, reports he has stopped after last hospitalization, with h/o HFrEf ef 25%, with worsening EF and admitted with Cardiogenic shock to [facility] who was transferred to [facility] for further management.     Off note he has had PAD s/p procedure (pt doesn't remember) followed by compartment syndrome s/p fasciotomy. reports ~20lbs weight loss in last few months.    was transferred to CCU yesterday and Dopamine was changed to  5 and swan was placed. with weaning  pt dropped MVo2. was started on afterload and  weaned from 5--> 2.5 this AM.     Hemodynamics: RA 15, PA: 49/23(33), PCEP 20 , PA sat:61%  Lemuel CO/CI: 4.8/2.8   , BP: 103/58 on  2.5mcg/kg/min    will cont bumex 2mg IV BID, goal u/o >4lits  monitor MVo2, perfusion labs  cont  2.5mcg/kg/min  agree with Hydral/isordil given renal function    [facility].     discussed at length with pt regarding possible end stage HFrEF and it prognosis.   given recent polysubstance use, cardiac cachexia, deconditioning  and non complaince/multiple hospitalization, he is not a Advanced therapy candidate at this time.   Will get palliative care consult'  if unable to wean inotrope can consider palliative inotrope.

## 2024-03-05 NOTE — CONSULT NOTE ADULT - ASSESSMENT
A/P: 63 year old male with past medical history of HIV, CVA, HTN, HLD, prediabetes, A.fib, cocaine/heroin abuse, CAD w/ stent, CHF (EF  25-30% in 12/23, now 10 - 15 % as of TTE on 3/1),  RLE compartment syndrome s/p fasciotomy 12/23 presented to outside hospital with chest pain and shortness of breath, admitted for CHF exacerbation, transferred to [facility] for cardiogenic shock management     Wound Consult requested to assist w/ management of RLE wounds  BLE PAD    RLE - Medihoney + Adaptic dressing QD  BLE elevation & Compression  Arterial US BLE noted  Abx per Medicine/ ID  Moisturize intact skin w/ Aquaphor cream BID  Nutrition Consult for optimization        encourage high quality protein, jovana/ prosource, MVI & Vit C to promote wound healing  Continue turning and positioning w/ offloading assistive devices as per protocol  Buttocks/ Sacrum Hilda BID and prn soiling        Continue w/ attends under pads and Pericare w/ mitchell cath maintenance as per protocol  Waffle Cushion to chair when oob to chair  Continue w/ low air loss pressure redistribution bed surface   Pt will need Group 2 mattress on hospital bed and ROHO cushion for wheel chair upon discharge home  Care as per medicine, will follow w/ you  Upon discharge f/u as outpatient at Wound Center [address] [place] [address] [phone]  Seen w/ attng and D/w team & RN  Thank you for this consult  Bebe Bradford PA-C CWS [id number]  Nights/ Weekends/ Holidays please call:  General Surgery Consult pager ([phone]) for emergencies  Wound PT for multilayer leg wrapping or VAC issues ([phone])   I spent 55minutes face to face w/ this pt of which more than 50% of the time was spent counseling & coordinating care of this pt.  A/P: 63 year old male with past medical history of HIV, CVA, HTN, HLD, prediabetes, A.fib, cocaine/heroin abuse, CAD w/ stent, CHF (EF  25-30% in 12/23, now 10 - 15 % as of TTE on 3/1),  RLE compartment syndrome s/p fasciotomy 12/23 presented to outside hospital with chest pain and shortness of breath, admitted for CHF exacerbation, transferred to [facility] for cardiogenic shock management     Wound Consult requested to assist w/ management of:  RLE wounds  BLE PAD  xerosis    RLE - Medihoney + Adaptic dressing QD  BLE elevation & Compression  Arterial US BLE noted  Abx per Medicine/ ID  Moisturize intact skin w/ Aquaphor cream BID  Nutrition Consult for optimization        encourage high quality protein, jovana/ prosource, MVI & Vit C to promote wound healing  Continue turning and positioning w/ offloading assistive devices as per protocol  Buttocks/ Sacrum Hilda BID and prn soiling        Continue w/ attends under pads and Pericare w/ mitchell cath maintenance as per protocol  Waffle Cushion to chair when oob to chair  Continue w/ low air loss pressure redistribution bed surface   Care as per medicine, will follow w/ you  Upon discharge f/u as outpatient at Wound Center [address] [place] [address] [phone]  Seen w/ attng and D/w team & RN  Thank you for this consult  Bebe Bradford PA-C CWS [id number]  Nights/ Weekends/ Holidays please call:  General Surgery Consult pager ([phone]) for emergencies  Wound PT for multilayer leg wrapping or VAC issues ([phone])

## 2024-03-05 NOTE — PROGRESS NOTE ADULT - SUBJECTIVE AND OBJECTIVE BOX
PATIENT:  CARMENCITA GIBSON  [id number]    CHIEF COMPLAINT:  Patient is a 63y old  Male who presents with a chief complaint of cardiogenic shock (05 Mar 2024 18:45)      INTERVAL HISTORYOVERNIGHT EVENTS:      REVIEW OF SYSTEMS:    Constitutional:     [ ] negative [ ] fevers [ ] chills [ ] weight loss [ ] weight gain  HEENT:                  [ ] negative [ ] dry eyes [ ] eye irritation [ ] postnasal drip [ ] nasal congestion  CV:                         [ ] negative  [ ] chest pain [ ] orthopnea [ ] palpitations [ ] murmur  Resp:                     [ ] negative [ ] cough [ ] shortness of breath [ ] dyspnea [ ] wheezing [ ] sputum [ ] hemoptysis  GI:                          [ ] negative [ ] nausea [ ] vomiting [ ] diarrhea [ ] constipation [ ] abd pain [ ] dysphagia   :                        [ ] negative [ ] dysuria [ ] nocturia [ ] hematuria [ ] increased urinary frequency  Musculoskeletal: [ ] negative [ ] back pain [ ] myalgias [ ] arthralgias [ ] fracture  Skin:                       [ ] negative [ ] rash [ ] itch  Neurological:        [ ] negative [ ] headache [ ] dizziness [ ] syncope [ ] weakness [ ] numbness  Psychiatric:           [ ] negative [ ] anxiety [ ] depression  Endocrine:            [ ] negative [ ] diabetes [ ] thyroid problem  Heme/Lymph:      [ ] negative [ ] anemia [ ] bleeding problem  Allergic/Immune: [ ] negative [ ] itchy eyes [ ] nasal discharge [ ] hives [ ] angioedema    [ ] All other systems negative  [ ] Unable to assess ROS because ________.    MEDICATIONS:  MEDICATIONS  (STANDING):  AQUAPHOR (petrolatum Ointment) 1 Application(s) Topical every 12 hours  aspirin enteric coated 81 milliGRAM(s) Oral daily  chlorhexidine 2% Cloths 1 Application(s) Topical <User Schedule>  heparin  Infusion 1000 Unit(s)/Hr (10 mL/Hr) IV Continuous <Continuous>  hydrALAZINE 25 milliGRAM(s) Oral three times a day  insulin lispro (ADMELOG) corrective regimen sliding scale   SubCutaneous three times a day before meals  insulin lispro (ADMELOG) corrective regimen sliding scale   SubCutaneous at bedtime  losartan 12.5 milliGRAM(s) Oral once    MEDICATIONS  (PRN):  sodium chloride 0.9% lock flush 10 milliLiter(s) IV Push every 1 hour PRN Pre/post blood products, medications, blood draw, and to maintain line patency      ALLERGIES:  Allergies    No Known Allergies    Intolerances        OBJECTIVE:  ICU Vital Signs Last 24 Hrs  T(C): 36.3 (05 Mar 2024 15:00), Max: 36.3 (04 Mar 2024 19:45)  T(F): 97.3 (05 Mar 2024 15:00), Max: 97.4 (04 Mar 2024 19:45)  HR: 113 (05 Mar 2024 18:00) (106 - 115)  BP: 91/60 (05 Mar 2024 17:00) (91/60 - 133/83)  BP(mean): 71 (05 Mar 2024 17:00) (70 - 103)  ABP: --  ABP(mean): --  RR: 22 (05 Mar 2024 18:00) (15 - 29)  SpO2: 100% (05 Mar 2024 18:00) (80% - 100%)    O2 Parameters below as of 05 Mar 2024 18:00  Patient On (Oxygen Delivery Method): room air            Adult Advanced Hemodynamics Last 24 Hrs  CVP(mm Hg): 14 (05 Mar 2024 18:00) (2 - 25)  CVP(cm H2O): --  CO: 3.5 (05 Mar 2024 14:00) (3.5 - 3.5)  CI: 2.1 (05 Mar 2024 14:00) (2.1 - 2.1)  PA: 44/20 (05 Mar 2024 18:00) (35/10 - 55/25)  PA(mean): 30 (05 Mar 2024 18:00) (19 - 39)  PCWP: --  SVR: 1483 (05 Mar 2024 14:00) (1483 - 1483)  SVRI: 2473 (05 Mar 2024 14:00) (2473 - 2473)  PVR: --  PVRI: --  CAPILLARY BLOOD GLUCOSE      POCT Blood Glucose.: 123 mg/dL (05 Mar 2024 16:21)  POCT Blood Glucose.: 126 mg/dL (05 Mar 2024 10:07)  POCT Blood Glucose.: 137 mg/dL (05 Mar 2024 08:11)    CAPILLARY BLOOD GLUCOSE      POCT Blood Glucose.: 123 mg/dL (05 Mar 2024 16:21)    I&O's Summary    04 Mar 2024 07:01  -  05 Mar 2024 07:00  --------------------------------------------------------  IN: 77.4 mL / OUT: 2520 mL / NET: -2442.6 mL    05 Mar 2024 07:01  -  05 Mar 2024 19:29  --------------------------------------------------------  IN: 11.7 mL / OUT: 1300 mL / NET: -1288.3 mL      Daily Height in cm: 172.72 (04 Mar 2024 19:45)    Daily     PHYSICAL EXAMINATION:  General: WN/WD NAD  HEENT: PERRLA, EOMI, moist mucous membranes  Neurology: A&Ox3, nonfocal, YEH x 4  Respiratory: CTA B/L, normal respiratory effort, no wheezes, crackles, rales  CV: RRR, S1S2, no murmurs, rubs or gallops  Abdominal: Soft, NT, ND +BS, Last BM  Extremities: No edema, + peripheral pulses  Incisions:   Tubes:    LABS:                          8.5    7.38  )-----------( 155      ( 05 Mar 2024 04:52 )             25.1     03-05    139  |  101  |  64<H>  ----------------------------<  125<H>  4.0   |  27  |  2.10<H>    Ca    8.0<L>      05 Mar 2024 10:22  Phos  3.1     03-05  Mg     2.4     03-05    TPro  6.2  /  Alb  3.1<L>  /  TBili  0.8  /  DBili  x   /  AST  406<H>  /  ALT  528<H>  /  AlkPhos  393<H>  03-05    LIVER FUNCTIONS - ( 05 Mar 2024 10:22 )  Alb: 3.1 g/dL / Pro: 6.2 g/dL / ALK PHOS: 393 U/L / ALT: 528 U/L / AST: 406 U/L / GGT: x           PT/INR - ( 05 Mar 2024 04:52 )   PT: 36.5 sec;   INR: 3.61 ratio         PTT - ( 05 Mar 2024 04:52 )  PTT:29.6 sec        Urinalysis Basic - ( 05 Mar 2024 10:22 )    Color: x / Appearance: x / SG: x / pH: x  Gluc: 125 mg/dL / Ketone: x  / Bili: x / Urobili: x   Blood: x / Protein: x / Nitrite: x   Leuk Esterase: x / RBC: x / WBC x   Sq Epi: x / Non Sq Epi: x / Bacteria: x        TELEMETRY:     EKG:     IMAGING:    < from: TTE W or WO Ultrasound Enhancing Agent (03.05.24 @ 07:31) >  CONCLUSIONS:      1. Left ventricular cavity is severely dilated. Left ventricular systolic function is severely decreased. Global left ventricular hypokinesis.   2. Severely enlarged right ventricular cavity size and severely reduced systolic function.   3. Moderate mitral regurgitation. Mechanism of mitral regurgitation: Andrei Type IIIb (restricted leaflet motion secondary to left atrial or left ventricular dilatation).   4. Severe tricuspid regurgitation.   5. Mild to moderate pulmonary hypertension.   6. 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   7. Small pericardial effusion with no evidence of hemodynamic compromise (or echocardiographic evidence of cardiac tamponade).    < end of copied text >   PATIENT:  CARMENCITA GIBSON  [id number]    CHIEF COMPLAINT:  Patient is a 63y old  Male who presents with a chief complaint of cardiogenic shock (05 Mar 2024 18:45)      INTERVAL HISTORY: hydralazine increased, weaned off dobutamine      REVIEW OF SYSTEMS:  Constitutional:     [x ] negative [ ] fevers [ ] chills [ ] weight loss [ ] weight gain  HEENT:                  [x ] negative [ ] dry eyes [ ] eye irritation [ ] postnasal drip [ ] nasal congestion  CV:                         [ x] negative  [ ] chest pain [ ] orthopnea [ ] palpitations [ ] murmur  Resp:                     [ x] negative [ ] cough [ ] shortness of breath [ ] dyspnea [ ] wheezing [ ] sputum [ ] hemoptysis  GI:                          [x ] negative [ ] nausea [ ] vomiting [ ] diarrhea [ ] constipation [ ] abd pain [ ] dysphagia   :                        [ x] negative [ ] dysuria [ ] nocturia [ ] hematuria [ ] increased urinary frequency  Musculoskeletal: [x ] negative [ ] back pain [ ] myalgias [ ] arthralgias [ ] fracture  Skin:                       [x ] negative [ ] rash [ ] itch  Neurological:        [x ] negative [ ] headache [ ] dizziness [ ] syncope [ ] weakness [ ] numbness    MEDICATIONS:  MEDICATIONS  (STANDING):  AQUAPHOR (petrolatum Ointment) 1 Application(s) Topical every 12 hours  aspirin enteric coated 81 milliGRAM(s) Oral daily  chlorhexidine 2% Cloths 1 Application(s) Topical <User Schedule>  heparin  Infusion 1000 Unit(s)/Hr (10 mL/Hr) IV Continuous <Continuous>  hydrALAZINE 25 milliGRAM(s) Oral three times a day  insulin lispro (ADMELOG) corrective regimen sliding scale   SubCutaneous three times a day before meals  insulin lispro (ADMELOG) corrective regimen sliding scale   SubCutaneous at bedtime  losartan 12.5 milliGRAM(s) Oral once    MEDICATIONS  (PRN):  sodium chloride 0.9% lock flush 10 milliLiter(s) IV Push every 1 hour PRN Pre/post blood products, medications, blood draw, and to maintain line patency      ALLERGIES:  Allergies    No Known Allergies    Intolerances        OBJECTIVE:  ICU Vital Signs Last 24 Hrs  T(C): 36.3 (05 Mar 2024 15:00), Max: 36.3 (04 Mar 2024 19:45)  T(F): 97.3 (05 Mar 2024 15:00), Max: 97.4 (04 Mar 2024 19:45)  HR: 113 (05 Mar 2024 18:00) (106 - 115)  BP: 91/60 (05 Mar 2024 17:00) (91/60 - 133/83)  BP(mean): 71 (05 Mar 2024 17:00) (70 - 103)  ABP: --  ABP(mean): --  RR: 22 (05 Mar 2024 18:00) (15 - 29)  SpO2: 100% (05 Mar 2024 18:00) (80% - 100%)    O2 Parameters below as of 05 Mar 2024 18:00  Patient On (Oxygen Delivery Method): room air            Adult Advanced Hemodynamics Last 24 Hrs  CVP(mm Hg): 14 (05 Mar 2024 18:00) (2 - 25)  CVP(cm H2O): --  CO: 3.5 (05 Mar 2024 14:00) (3.5 - 3.5)  CI: 2.1 (05 Mar 2024 14:00) (2.1 - 2.1)  PA: 44/20 (05 Mar 2024 18:00) (35/10 - 55/25)  PA(mean): 30 (05 Mar 2024 18:00) (19 - 39)  PCWP: --  SVR: 1483 (05 Mar 2024 14:00) (1483 - 1483)  SVRI: 2473 (05 Mar 2024 14:00) (2473 - 2473)  PVR: --  PVRI: --  CAPILLARY BLOOD GLUCOSE      POCT Blood Glucose.: 123 mg/dL (05 Mar 2024 16:21)  POCT Blood Glucose.: 126 mg/dL (05 Mar 2024 10:07)  POCT Blood Glucose.: 137 mg/dL (05 Mar 2024 08:11)    CAPILLARY BLOOD GLUCOSE      POCT Blood Glucose.: 123 mg/dL (05 Mar 2024 16:21)    I&O's Summary    04 Mar 2024 07:01  -  05 Mar 2024 07:00  --------------------------------------------------------  IN: 77.4 mL / OUT: 2520 mL / NET: -2442.6 mL    05 Mar 2024 07:01  -  05 Mar 2024 19:29  --------------------------------------------------------  IN: 11.7 mL / OUT: 1300 mL / NET: -1288.3 mL      Daily Height in cm: 172.72 (04 Mar 2024 19:45)    Daily     PHYSICAL EXAMINATION:  General: WN/WD NAD  HEENT: PERRLA, EOMI, moist mucous membranes  Neurology: A&Ox3, nonfocal, YEH x 4  Respiratory: CTA B/L, normal respiratory effort, no wheezes, crackles, rales  CV: RRR, S1S2, no murmurs, rubs or gallops  Abdominal: Soft, NT, ND +BS, Last BM  Extremities: No edema, + peripheral pulses  Incisions:   Tubes:    LABS:                          8.5    7.38  )-----------( 155      ( 05 Mar 2024 04:52 )             25.1     03-05    139  |  101  |  64<H>  ----------------------------<  125<H>  4.0   |  27  |  2.10<H>    Ca    8.0<L>      05 Mar 2024 10:22  Phos  3.1     03-05  Mg     2.4     03-05    TPro  6.2  /  Alb  3.1<L>  /  TBili  0.8  /  DBili  x   /  AST  406<H>  /  ALT  528<H>  /  AlkPhos  393<H>  03-05    LIVER FUNCTIONS - ( 05 Mar 2024 10:22 )  Alb: 3.1 g/dL / Pro: 6.2 g/dL / ALK PHOS: 393 U/L / ALT: 528 U/L / AST: 406 U/L / GGT: x           PT/INR - ( 05 Mar 2024 04:52 )   PT: 36.5 sec;   INR: 3.61 ratio         PTT - ( 05 Mar 2024 04:52 )  PTT:29.6 sec        Urinalysis Basic - ( 05 Mar 2024 10:22 )    Color: x / Appearance: x / SG: x / pH: x  Gluc: 125 mg/dL / Ketone: x  / Bili: x / Urobili: x   Blood: x / Protein: x / Nitrite: x   Leuk Esterase: x / RBC: x / WBC x   Sq Epi: x / Non Sq Epi: x / Bacteria: x        TELEMETRY: reviewed    EKG: reviewed    IMAGING: reviewed    < from: TTE W or WO Ultrasound Enhancing Agent (03.05.24 @ 07:31) >  CONCLUSIONS:      1. Left ventricular cavity is severely dilated. Left ventricular systolic function is severely decreased. Global left ventricular hypokinesis.   2. Severely enlarged right ventricular cavity size and severely reduced systolic function.   3. Moderate mitral regurgitation. Mechanism of mitral regurgitation: Andrei Type IIIb (restricted leaflet motion secondary to left atrial or left ventricular dilatation).   4. Severe tricuspid regurgitation.   5. Mild to moderate pulmonary hypertension.   6. 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   7. Small pericardial effusion with no evidence of hemodynamic compromise (or echocardiographic evidence of cardiac tamponade).    < end of copied text >   PATIENT:  CARMENCITA GIBSON  [id number]    CHIEF COMPLAINT:  Patient is a 63y old  Male who presents with a chief complaint of cardiogenic shock (05 Mar 2024 18:45)      INTERVAL HISTORY: hydralazine increased, weaned off dobutamine      REVIEW OF SYSTEMS:  Constitutional:     [x ] negative [ ] fevers [ ] chills [ ] weight loss [ ] weight gain  HEENT:                  [x ] negative [ ] dry eyes [ ] eye irritation [ ] postnasal drip [ ] nasal congestion  CV:                         [ x] negative  [ ] chest pain [ ] orthopnea [ ] palpitations [ ] murmur  Resp:                     [ x] negative [ ] cough [ ] shortness of breath [ ] dyspnea [ ] wheezing [ ] sputum [ ] hemoptysis  GI:                          [x ] negative [ ] nausea [ ] vomiting [ ] diarrhea [ ] constipation [ ] abd pain [ ] dysphagia   :                        [ x] negative [ ] dysuria [ ] nocturia [ ] hematuria [ ] increased urinary frequency  Musculoskeletal: [x ] negative [ ] back pain [ ] myalgias [ ] arthralgias [ ] fracture  Skin:                       [x ] negative [ ] rash [ ] itch  Neurological:        [x ] negative [ ] headache [ ] dizziness [ ] syncope [ ] weakness [ ] numbness    MEDICATIONS:  MEDICATIONS  (STANDING):  AQUAPHOR (petrolatum Ointment) 1 Application(s) Topical every 12 hours  aspirin enteric coated 81 milliGRAM(s) Oral daily  chlorhexidine 2% Cloths 1 Application(s) Topical <User Schedule>  heparin  Infusion 1000 Unit(s)/Hr (10 mL/Hr) IV Continuous <Continuous>  hydrALAZINE 25 milliGRAM(s) Oral three times a day  insulin lispro (ADMELOG) corrective regimen sliding scale   SubCutaneous three times a day before meals  insulin lispro (ADMELOG) corrective regimen sliding scale   SubCutaneous at bedtime  losartan 12.5 milliGRAM(s) Oral once    MEDICATIONS  (PRN):  sodium chloride 0.9% lock flush 10 milliLiter(s) IV Push every 1 hour PRN Pre/post blood products, medications, blood draw, and to maintain line patency      ALLERGIES:  Allergies    No Known Allergies    Intolerances        OBJECTIVE:  ICU Vital Signs Last 24 Hrs  T(C): 36.3 (05 Mar 2024 15:00), Max: 36.3 (04 Mar 2024 19:45)  T(F): 97.3 (05 Mar 2024 15:00), Max: 97.4 (04 Mar 2024 19:45)  HR: 113 (05 Mar 2024 18:00) (106 - 115)  BP: 91/60 (05 Mar 2024 17:00) (91/60 - 133/83)  BP(mean): 71 (05 Mar 2024 17:00) (70 - 103)  ABP: --  ABP(mean): --  RR: 22 (05 Mar 2024 18:00) (15 - 29)  SpO2: 100% (05 Mar 2024 18:00) (80% - 100%)    O2 Parameters below as of 05 Mar 2024 18:00  Patient On (Oxygen Delivery Method): room air            Adult Advanced Hemodynamics Last 24 Hrs  CVP(mm Hg): 14 (05 Mar 2024 18:00) (2 - 25)  CVP(cm H2O): --  CO: 3.5 (05 Mar 2024 14:00) (3.5 - 3.5)  CI: 2.1 (05 Mar 2024 14:00) (2.1 - 2.1)  PA: 44/20 (05 Mar 2024 18:00) (35/10 - 55/25)  PA(mean): 30 (05 Mar 2024 18:00) (19 - 39)  PCWP: --  SVR: 1483 (05 Mar 2024 14:00) (1483 - 1483)  SVRI: 2473 (05 Mar 2024 14:00) (2473 - 2473)  PVR: --  PVRI: --  CAPILLARY BLOOD GLUCOSE      POCT Blood Glucose.: 123 mg/dL (05 Mar 2024 16:21)  POCT Blood Glucose.: 126 mg/dL (05 Mar 2024 10:07)  POCT Blood Glucose.: 137 mg/dL (05 Mar 2024 08:11)    CAPILLARY BLOOD GLUCOSE      POCT Blood Glucose.: 123 mg/dL (05 Mar 2024 16:21)    I&O's Summary    04 Mar 2024 07:01  -  05 Mar 2024 07:00  --------------------------------------------------------  IN: 77.4 mL / OUT: 2520 mL / NET: -2442.6 mL    05 Mar 2024 07:01  -  05 Mar 2024 19:29  --------------------------------------------------------  IN: 11.7 mL / OUT: 1300 mL / NET: -1288.3 mL      Daily Height in cm: 172.72 (04 Mar 2024 19:45)    Daily     PHYSICAL EXAMINATION:  General: WN/WD NAD  HEENT: PERRLA, EOMI, moist mucous membranes  Neurology: A&Ox3, nonfocal, YEH x 4  Respiratory: CTA B/L, normal respiratory effort, no wheezes, crackles, rales  CV: RRR, S1S2, no murmurs, rubs or gallops  Abdominal: Soft, NT, ND +BS, Last BM  Extremities: No edema, + peripheral pulses  Skin: warm, dry. RLE dressing c/d/i  [facility]    LABS:                          8.5    7.38  )-----------( 155      ( 05 Mar 2024 04:52 )             25.1     03-05    139  |  101  |  64<H>  ----------------------------<  125<H>  4.0   |  27  |  2.10<H>    Ca    8.0<L>      05 Mar 2024 10:22  Phos  3.1     03-05  Mg     2.4     03-05    TPro  6.2  /  Alb  3.1<L>  /  TBili  0.8  /  DBili  x   /  AST  406<H>  /  ALT  528<H>  /  AlkPhos  393<H>  03-05    LIVER FUNCTIONS - ( 05 Mar 2024 10:22 )  Alb: 3.1 g/dL / Pro: 6.2 g/dL / ALK PHOS: 393 U/L / ALT: 528 U/L / AST: 406 U/L / GGT: x           PT/INR - ( 05 Mar 2024 04:52 )   PT: 36.5 sec;   INR: 3.61 ratio         PTT - ( 05 Mar 2024 04:52 )  PTT:29.6 sec        Urinalysis Basic - ( 05 Mar 2024 10:22 )    Color: x / Appearance: x / SG: x / pH: x  Gluc: 125 mg/dL / Ketone: x  / Bili: x / Urobili: x   Blood: x / Protein: x / Nitrite: x   Leuk Esterase: x / RBC: x / WBC x   Sq Epi: x / Non Sq Epi: x / Bacteria: x        TELEMETRY: reviewed    EKG: reviewed    IMAGING: reviewed    < from: TTE W or WO Ultrasound Enhancing Agent (03.05.24 @ 07:31) >  CONCLUSIONS:      1. Left ventricular cavity is severely dilated. Left ventricular systolic function is severely decreased. Global left ventricular hypokinesis.   2. Severely enlarged right ventricular cavity size and severely reduced systolic function.   3. Moderate mitral regurgitation. Mechanism of mitral regurgitation: Andrei Type IIIb (restricted leaflet motion secondary to left atrial or left ventricular dilatation).   4. Severe tricuspid regurgitation.   5. Mild to moderate pulmonary hypertension.   6. 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   7. Small pericardial effusion with no evidence of hemodynamic compromise (or echocardiographic evidence of cardiac tamponade).    < end of copied text >

## 2024-03-05 NOTE — PROGRESS NOTE ADULT - ASSESSMENT
63 year old male with past medical history of HIV, CVA, HTN, HLD, prediabetes, A.fib, cocaine/heroin abuse, CAD w/ stent, CHF (EF  25-30% in 12/23, now 10 - 15 % as of TTE on 3/1),  RLE compartment syndrome s/p fasciotomy 12/23 presented to outside hospital with chest pain and shortness of breath, admitted for CHF exacerbation, transferred to [facility] for cardiogenic shock management     Plan:    Neuro  #hx CVA  - A&O x3, poor historian with medical history and medications  - continue to monitor per protocol    Respiratory  #hypoxia/pleural effusions  - noted to have pleural effusions at outside hospital, likely in the setting of volume overload s/t chf  - weaned with diuresis, saturating well on room air  - continue to monitor sp02    Cardiovascular  #Cardiogenic shock  - i/s/o CHF exacerbation  - TTE 3/1 EF 10 - 15%, previously 25 - 30% 12/23  - TTE 3/5: severely dilated LV, severely decreased function, enlarged RV and sev. reduced function, severe TR, + LV thrombus  - given bumex during the day, continue CVP trending  - dopamine gtt transitioned to dobutamine 5 mcg, weaned off during day shift  - hydralazine uptitrated to 25 for afterload reduction  - losartan added for gdmt  - f/u mv02, cvp and perfusion indices  - continue strict I&O, daily weights    #CAD/PAD  - w/ history of stents in december as per patient  - previously on aspirin, continue  - f/u LE duplex    #LV thrombus  - start heparin gtt     #pAF  - ? hx a.fib  - sinus on admission  - not on blood thinner or rate control agent as per patient, only aspirin & plavix  - anticoagulation w/ heparin gtt    #HTN  - GDMT as above, continue to trend BP    #HLD  - holding statin i/s/o liver dysfunction    GI  #transaminitis  - worsening liver ezymes at outside hospital i/s/o shock, now downtrending  - hold hepatotoxins, continue to trend    - diet as tolerated, monitor for BM    /Renal  #ELIE  - mitchell d/c, passed TOV  - continue strict I&O, electrolyte monitoring    Endo  #preDM  - f.u a1c  - trend glucose, ISS while inpatient    Heme  - elevated INR on admission, i/s/o liver dsyfunction, continue to trend  - H/H and platelets stable at outside hospital  - trend daily  DVT ppx: heparin gtt    ID  - afebrile on admission  - continue to trend wbc and fever curve    #HIV  - previously documented hx of HIV  - negative HIV CMIA on 12/4/23 and 1/16/2024 and negative HIV viral loads on 12/5/23, 1/11/2024, 3/2/2024 as per ID  - no concern for HIV at this time    #RLE wound  - admitted from outside hospital with ace wrap in place  - noted to have wounds on lateral and medial aspect of calf  - f/u wound consult recommendations    #full code  #lines: PIV  R IJ intro & swan (3/4 -    ======================= DISPOSITION  =====================  [X] Critical   [ ] Guarded    [ ] Stable    [X] Maintain in CICU  [ ] Downgrade to Telemetry  [ ] Discharge Home    Patient requires continuous monitoring with bedside rhythm monitoring, pulse ox monitoring, and intermittent blood gas analysis. Care plan discussed with ICU care team. Patient remained critical and at risk for life threatening decompensation.  Patient seen, examined and plan discussed with CCU team during rounds.     I have personally provided 30 minutes of critical care time excluding time spent on separate procedures, in addition to initial critical care time provided by the CICU Attending, Dr. Izquierdo.     Flor Henderson, [facility]     63 year old male with past medical history of HIV, CVA, HTN, HLD, prediabetes, A.fib, cocaine/heroin abuse, CAD w/ stent, CHF (EF  25-30% in 12/23, now 10 - 15 % as of TTE on 3/1),  RLE compartment syndrome s/p fasciotomy 12/23 presented to outside hospital with chest pain and shortness of breath, admitted for CHF exacerbation, transferred to [facility] for cardiogenic shock management     Plan:    Neuro  #hx CVA  - A&O x3, poor historian with medical history and medications  - continue to monitor per protocol    Respiratory  #hypoxia/pleural effusions  - noted to have pleural effusions at outside hospital, likely in the setting of volume overload s/t chf  - weaned with diuresis, saturating well on room air  - continue to monitor sp02    Cardiovascular  #Cardiogenic shock  - i/s/o CHF exacerbation  - TTE 3/1 EF 10 - 15%, previously 25 - 30% 12/23  - TTE 3/5: severely dilated LV, severely decreased function, enlarged RV and sev. reduced function, severe TR, + LV thrombus  - given bumex during the day, continue CVP trending  - dopamine gtt transitioned to dobutamine 5 mcg, weaned off during day shift  - hydralazine uptitrated to 25 for afterload reduction  - losartan added for gdmt  - f/u mv02, cvp and perfusion indices  - continue strict I&O, daily weights    #CAD/PAD  - w/ history of stents in december as per patient  - previously on aspirin and plavix, continue ASA  - f/u LE duplex    #LV thrombus  - start heparin gtt     #pAF  - ? hx a.fib, not on blood thinner or rate control agent as per patient, only aspirin & plavix  - sinus on admission  - anticoagulation w/ heparin gtt    #HTN  - GDMT as above, continue to trend BP    #HLD  - holding statin i/s/o liver dysfunction    GI  #transaminitis  - worsening liver ezymes at outside hospital i/s/o shock, now downtrending  - hold hepatotoxins, continue to trend    - diet as tolerated, monitor for BM    /Renal  #ELIE, nonoliguric, stable  - mitchell d/c, passed TOV  - continue strict I&O, electrolyte monitoring    Endo  #preDM  - f.u a1c  - trend glucose, ISS while inpatient    Heme  - elevated INR on admission, i/s/o liver dsyfunction, continue to trend  - H/H and platelets stable   - trend daily  DVT ppx: heparin gtt    ID  - afebrile on admission  - continue to trend wbc and fever curve    #HIV  - previously documented hx of HIV  - negative HIV CMIA on 12/4/23 and 1/16/2024 and negative HIV viral loads on 12/5/23, 1/11/2024, 3/2/2024 as per ID  - no concern for HIV at this time    #RLE wound  - admitted from outside hospital with ace wrap in place  - noted to have wounds on lateral and medial aspect of calf  - f/u wound consult recommendations    #full code  #lines: PIV  R IJ intro & swan (3/4 -    ======================= DISPOSITION  =====================  [X] Critical   [ ] Guarded    [ ] Stable    [X] Maintain in CICU  [ ] Downgrade to Telemetry  [ ] Discharge Home    Patient requires continuous monitoring with bedside rhythm monitoring, pulse ox monitoring, and intermittent blood gas analysis. Care plan discussed with ICU care team. Patient remained critical and at risk for life threatening decompensation.  Patient seen, examined and plan discussed with CCU team during rounds.     I have personally provided 30 minutes of critical care time excluding time spent on separate procedures, in addition to initial critical care time provided by the CICU Attending, Dr. Izquierdo.     Flor Henderson, [facility]     63 year old male with past medical history of HIV, CVA, HTN, HLD, prediabetes, A.fib, cocaine/heroin abuse, CAD w/ stent, CHF (EF  25-30% in 12/23, now 10 - 15 % as of TTE on 3/1),  RLE compartment syndrome s/p fasciotomy 12/23 presented to outside hospital with chest pain and shortness of breath, admitted for CHF exacerbation, transferred to [facility] for cardiogenic shock management     Plan:    Neuro  #hx CVA  - A&O x3, poor historian with medical history and medications  - continue to monitor per protocol    Respiratory  #hypoxia/pleural effusions  - noted to have pleural effusions at outside hospital, likely in the setting of volume overload s/t chf  - weaned with diuresis, saturating well on room air  - continue to monitor sp02    Cardiovascular  #Cardiogenic shock  - i/s/o CHF exacerbation  - TTE 3/1 EF 10 - 15%, previously 25 - 30% 12/23  - TTE 3/5: severely dilated LV, severely decreased function, enlarged RV and sev. reduced function, severe TR, + LV thrombus  - given bumex during the day, continue CVP trending  - dopamine gtt transitioned to dobutamine 5 mcg, weaned off during day shift  - hydralazine uptitrated to 25 for afterload reduction  - losartan added for gdmt  - f/u mv02, cvp and perfusion indices  - continue strict I&O, daily weights    #CAD/PAD  - w/ history of stents in december as per patient  - previously on aspirin and plavix, continue ASA  - f/u LE duplex    #LV thrombus  - start heparin gtt     #pAF  - ? hx a.fib, not on blood thinner or rate control agent as per patient, only aspirin & plavix  - sinus on admission  - anticoagulation w/ heparin gtt    #HTN  - GDMT as above, continue to trend BP    #HLD  - holding statin i/s/o liver dysfunction    GI  #transaminitis  - worsening liver ezymes at outside hospital i/s/o shock, now downtrending  - hold hepatotoxins, continue to trend    - diet as tolerated, monitor for BM    /Renal  #ELIE, nonoliguric, stable  - mitchell d/c, passed TOV  - continue strict I&O, electrolyte monitoring    Endo  #preDM  - f.u a1c  - trend glucose, ISS while inpatient    - TSH elevated, f/u t3, t4    Heme  - elevated INR on admission, i/s/o liver dsyfunction, continue to trend  - H/H and platelets stable   - trend daily  DVT ppx: heparin gtt    ID  - afebrile on admission  - continue to trend wbc and fever curve    #HIV  - previously documented hx of HIV  - negative HIV CMIA on 12/4/23 and 1/16/2024 and negative HIV viral loads on 12/5/23, 1/11/2024, 3/2/2024 as per ID  - no concern for HIV at this time    #RLE wound  - admitted from outside hospital with ace wrap in place  - noted to have wounds on lateral and medial aspect of calf  - f/u wound consult recommendations    #full code  #lines: PIV  R IJ intro & swan (3/4 -    ======================= DISPOSITION  =====================  [X] Critical   [ ] Guarded    [ ] Stable    [X] Maintain in CICU  [ ] Downgrade to Telemetry  [ ] Discharge Home    Patient requires continuous monitoring with bedside rhythm monitoring, pulse ox monitoring, and intermittent blood gas analysis. Care plan discussed with ICU care team. Patient remained critical and at risk for life threatening decompensation.  Patient seen, examined and plan discussed with CCU team during rounds.     I have personally provided 30 minutes of critical care time excluding time spent on separate procedures, in addition to initial critical care time provided by the CICU Attending, Dr. Izquierdo.     Flor Henderson, [facility]

## 2024-03-05 NOTE — CONSULT NOTE ADULT - SUBJECTIVE AND OBJECTIVE BOX
CARDIOLOGY FELLOW CONSULT NOTE    HPI:  63 year old male with past medical history of HIV, CVA, HTN, HLD, prediabetes, A.fib, cocaine/heroin abuse, CAD w/ stent, CHF (EF  25-30% in ),  RLE compartment syndrome s/p fasciotomy  and recent hospitalization for CHF exacerbation with bilateral pleural effusions requiring chest tube placement initially presenting to [facility] on 3/1 with chest pain and shortness of breath. He states having intermittent 8/10 midsternal chest pain for the past few months since his leg operation. He was admitted to the outside hospital for management of CHF exacerbation, found to have a further reduced EF of 10 - 15% on 3/1. While admitted, he was hypotensive requiring dopamine infusion with worsening perfusion indices and ELIE. He was transferred to [facility] for cardiogenic shock management.   On admission to CICU, he is A&O x3.  sinus tach, / 78, saturating well on 4L nasal cannula with dopamine gtt infusing at 10 mcg/kg/min (w/ SVR 2000s). He denies chest pain, shortness of breath, nausea and vomiting on admission. (04 Mar 2024 20:16)    AF, HR 110s, BP 90s-130s/50s-80s satting 92-94% Ra  Bedside swan numbers; CVP 15, PAP 45/21, wedge 20  XR with congestion, bilateral effusions, swan ronaldo catheter   SEen at bedside where patient is on  2.5. Net neg 688.3cc  Tele: , with pACs  139/4 | 91527 | 64/2.1 < 125   406/528 | 393 | 0.8 | 6.2/3.1  7.38 < 8.5 > 155 MCV 64.7  MvO2 45   TTE: severe RV dilation, moderate MR (3b), severe TR, LV thrombus, small effusion. EF 25%, LVID 5.2    Allergies:  No Known Allergies    Current Medications:   aspirin enteric coated 81 milliGRAM(s) Oral daily  chlorhexidine 2% Cloths 1 Application(s) Topical <User Schedule>  hydrALAZINE 25 milliGRAM(s) Oral three times a day  insulin lispro (ADMELOG) corrective regimen sliding scale   SubCutaneous three times a day before meals  insulin lispro (ADMELOG) corrective regimen sliding scale   SubCutaneous at bedtime  sodium chloride 0.9% lock flush 10 milliLiter(s) IV Push every 1 hour PRN    Social History: Lives by himself. Helps serve food to the homeless. Brother lives in [place]  Significant etoh abuse. REcent cocaine in the past month     REVIEW OF SYSTEMS:  CONSTITUTIONAL: No weakness, fevers or chills  EYES/ENT: No visual changes;  No dysphagia  NECK: No pain or stiffness  RESPIRATORY: No cough, wheezing, hemoptysis; No shortness of breath  CARDIOVASCULAR: No chest pain or palpitations; No lower extremity edema  GASTROINTESTINAL: No abdominal or epigastric pain. No nausea, vomiting, or hematemesis; No diarrhea or constipation. No melena or hematochezia.  BACK: No back pain  GENITOURINARY: No dysuria, frequency or hematuria  NEUROLOGICAL: No numbness or weakness  SKIN: No itching, burning, rashes, or lesions   All other review of systems is negative unless indicated above.    Physical Exam:  T(F): 97.4 (-), Max: 97.4 ()  HR: 110 () (106 - 115)  BP: 103/63 () (96/62 - 133/83)  RR: 26 (-)  SpO2: 94% ()  GENERAL: frail appearing, hypophonic   HEAD:  Atraumatic, Normocephalic  ENT: EOMI, PERRLA, conjunctiva and sclera clear, Neck supple, JVD to above the ear   CHEST/LUNG: Clear to auscultation bilaterally; No wheeze, equal breath sounds bilaterally   BACK: No spinal tenderness  HEART: Regular rate and rhythm; No murmurs, rubs, or gallops  ABDOMEN: Soft, Nontender, Nondistended; Bowel sounds present  EXTREMITIES:  No clubbing, cyanosis, or edema  PSYCH: Nl behavior, nl affect  NEUROLOGY: AAOx3, non-focal, cranial nerves intact  SKIN: Normal color, No rashes or lesions    Labs: Personally reviewed                        8.5    7.38  )-----------( 155      ( 05 Mar 2024 04:52 )             25.1     03-05    139  |  101  |  64<H>  ----------------------------<  125<H>  4.0   |  27  |  2.10<H>    Ca    8.0<L>      05 Mar 2024 10:22  Phos  3.1     03-05  Mg     2.4     03-05    TPro  6.2  /  Alb  3.1<L>  /  TBili  0.8  /  DBili  x   /  AST  406<H>  /  ALT  528<H>  /  AlkPhos  393<H>  03-05    PT/INR - ( 05 Mar 2024 04:52 )   PT: 36.5 sec;   INR: 3.61 ratio         PTT - ( 05 Mar 2024 04:52 )  PTT:29.6 sec    CARDIAC MARKERS ( 04 Mar 2024 22:07 )  93 ng/L / x     / x     / x     / x     / x        Total Cholesterol: 138  LDL: --  HDL: 67  T    Thyroid Stimulating Hormone, Serum: 8.44 uIU/mL ( @ 20:42)

## 2024-03-05 NOTE — CONSULT NOTE ADULT - SUBJECTIVE AND OBJECTIVE BOX
Patient seen and examined. Chart with pertinent labs and imaging reviewed.   Full note to follow  Patient with negative HIV CMIA on 12/4/23 and 1/16/2024  Patient with negative HIV viral loads on 12/5/23, 1/11/2024, 3/2/2024  No concern that this man is HIV+ at this point in time.  D/W CICU team  Thank you for the courtesy of this referral.  I'll sign off at this time.   Thank you for the courtesy of this referral.    Arthur Franco MD  Attending Physician  [facility]  Division of Infectous Diseases  [phone]  ===================  [facility]  Division of Infectious Diseases  [phone]    GIBSON CARMENCITA  63y, Male  [id number]    HPI--      PMH/PSH--      Allergies--  No Known Allergies      Medications--  Antibiotics:   Immunologic:   Other: aspirin enteric coated  buMETAnide Injectable  chlorhexidine 2% Cloths  hydrALAZINE  insulin lispro (ADMELOG) corrective regimen sliding scale  insulin lispro (ADMELOG) corrective regimen sliding scale  sodium chloride 0.9% lock flush PRN    Antimicrobials last 90 days per EMR: MEDICATIONS  (STANDING):        Social History--  EtOH: denies   Tobacco: denies   Drug Use: denies     Family/Marital History--        Travel/Environmental/Occupational History:      Review of Systems:  A >=10-point review of systems was obtained.     Pertinent positives and negatives--  Constitutional: No fevers. No Chills. No Rigors.   Eyes:  ENMT:  Cardiovascular: No chest pain. No palpitations.  Respiratory: No shortness of breath. No cough.  Gastrointestinal: No nausea or vomiting. No diarrhea or constipation.   Genitourinary:  Musculoskeletal:  Skin:  Neurologic:  Psychiatric: Pleasant. Appropriate affect.  Endocrine:  Heme/Lymphatic:  Allergy/Immunologic:    Review of systems otherwise negative except as previously noted.    Physical Exam--  Vital Signs: T(F): 97.4 (03-05-24 @ 07:30), Max: 97.4 (03-04-24 @ 19:45)  HR: 110 (03-05-24 @ 14:00)  BP: 103/63 (03-05-24 @ 14:00)  RR: 26 (03-05-24 @ 14:00)  SpO2: 94% (03-05-24 @ 14:00)  Wt(kg): --  General: Nontoxic-appearing Male in no acute distress.  HEENT: AT/NC. PERRL. EOMI. Anicteric. Conjunctiva pink and moist. Oropharynx clear. Dentition fair.  Neck: Not rigid. No sense of mass.  Nodes: None palpable.  Lungs: Clear bilaterally without rales, wheezing or rhonchi  Heart: Regular rate and rhythm. No Murmur. No rub. No gallop. No palpable thrill.  Abdomen: Bowel sounds present and normoactive. Soft. Nondistended. Nontender. No sense of mass. No organomegaly.  Back: No spinal tenderness. No costovertebral angle tenderness.   Extremities: No cyanosis or clubbing. No edema.   Skin: Warm. Dry. Good turgor. No rash. No vasculitic stigmata.  Psychiatric: Appropriate affect and mood for situation.         Laboratory & Imaging Data--  CBC                        8.5    7.38  )-----------( 155      ( 05 Mar 2024 04:52 )             25.1       Chemistries  03-05    139  |  101  |  64<H>  ----------------------------<  125<H>  4.0   |  27  |  2.10<H>    Ca    8.0<L>      05 Mar 2024 10:22  Phos  3.1     03-05  Mg     2.4     03-05    TPro  6.2  /  Alb  3.1<L>  /  TBili  0.8  /  DBili  x   /  AST  406<H>  /  ALT  528<H>  /  AlkPhos  393<H>  03-05      Culture Data       Patient seen and examined. Chart with pertinent labs and imaging reviewed.   Full note to follow  Patient with negative HIV CMIA on 12/4/23 and 1/16/2024  Patient with negative HIV viral loads on 12/5/23, 1/11/2024, 3/2/2024  No concern that this man is HIV+ at this point in time.  D/W CICU team  Thank you for the courtesy of this referral.  I'll sign off at this time.   Thank you for the courtesy of this referral.    Arthur Franco MD  Attending Physician  [facility]  Division of Infectous Diseases  [phone]  ===================  [facility]  Division of Infectious Diseases  [phone]    ARTHUR CARMENCITA  63y, Male  [id number]    HPI--  As per H&P HPI:  63 year old male with past medical history of HIV, CVA, HTN, HLD, prediabetes, A.fib, cocaine/heroin abuse, CAD w/ stent, CHF (EF  25-30% in 12/23),  RLE compartment syndrome s/p fasciotomy 12/23 and recent hospitalization for CHF exacerbation with bilateral pleural effusions requiring chest tube placement initially presenting to [facility] on 3/1 with chest pain and shortness of breath. He states having intermittent 8/10 midsternal chest pain for the past few months since his leg operation. He was admitted to the outside hospital for management of CHF exacerbation, found to have a further reduced EF of 10 - 15% on 3/1. While admitted, he was hypotensive requiring dopamine infusion with worsening perfusion indices and ELIE. He was transferred to [facility] for cardiogenic shock management.     On admission to CICU, he is A&O x3.  sinus tach, / 78, saturating well on 4L nasal cannula with dopamine gtt infusing at 10 mcg/kg/min. He denies chest pain, shortness of breath, nausea and vomiting on admission. (04 Mar 2024 20:16)    Patient at present resting comfortably in bed. He is a vague and inconsistent historian. Denies any hx HIV+. Denies any RF for HIV. Denies any medications for HIV.      PMH/PSH--  As above.    Allergies--  No Known Allergies      Medications--  Antibiotics:   Immunologic:   Other: aspirin enteric coated  buMETAnide Injectable  chlorhexidine 2% Cloths  hydrALAZINE  insulin lispro (ADMELOG) corrective regimen sliding scale  insulin lispro (ADMELOG) corrective regimen sliding scale  sodium chloride 0.9% lock flush PRN    Antimicrobials last 90 days per EMR: MEDICATIONS  (STANDING): none      Social History--  Denied toxic habits but per H&P used alcohol, tobacco and drugs until ~1 month ago    Family/Marital History--  Single. No kids.       Travel/Environmental/Occupational History:  States he lives alone, H&P states he lives in an apartment with roommates  No travel      Review of Systems:  A >=10-point review of systems was obtained.   Pertinent positives and negatives--  Constitutional: No fevers. No Chills. No Rigors.   Eyes: denies.   ENMT: denies.   Cardiovascular: Prior CP, none now  Respiratory: Prior SOB, none now  Gastrointestinal: No nausea or vomiting. No diarrhea or constipation.   Genitourinary: denies.   Musculoskeletal: Patient had RLE fasciotomy for compartment syndrome RLE, etiology not clear/   Skin: denies.   Neurologic: denies.   Psychiatric: Pleasant. Appropriate affect.  Endocrine: denies.   Heme/Lymphatic: denies.   Allergy/Immunologic: denies.   Review of systems otherwise negative except as previously noted.    Physical Exam--  Vital Signs: T(F): 97.4 (03-05-24 @ 07:30), Max: 97.4 (03-04-24 @ 19:45)  HR: 110 (03-05-24 @ 14:00)  BP: 103/63 (03-05-24 @ 14:00)  RR: 26 (03-05-24 @ 14:00)  SpO2: 94% (03-05-24 @ 14:00)  Wt(kg): --  General: Chronically ill-appearing Male in no acute distress.  HEENT: Facial and bitemporal wasting.Conjunctiva pink and moist. Oropharynx clear. Dentition poor.   Neck: Not rigid. No sense of mass. RIJ cordis with SGC C/D/I  Nodes: None palpable.  Lungs: Diminished BS B  Heart: Tachy with RR  Abdomen: Bowel sounds present and normoactive. Soft. Nondistended. Nontender.  Extremities: No cyanosis or clubbing. No edema. Wasted  Skin: Warm. Dry. Good turgor. No rash. No vasculitic stigmata.  Psychiatric: Grossly appropriate affect and mood for situation.         Laboratory & Imaging Data--  CBC                        8.5    7.38  )-----------( 155      ( 05 Mar 2024 04:52 )             25.1       Chemistries  03-05    139  |  101  |  64<H>  ----------------------------<  125<H>  4.0   |  27  |  2.10<H>    Ca    8.0<L>      05 Mar 2024 10:22  Phos  3.1     03-05  Mg     2.4     03-05    TPro  6.2  /  Alb  3.1<L>  /  TBili  0.8  /  DBili  x   /  AST  406<H>  /  ALT  528<H>  /  AlkPhos  393<H>  03-05        Prior data:  Patient with negative HIV CMIA on 12/4/23, 1/16/2024  Patient with negative HIV viral loads on 12/5/23, 1/11/2024, 3/2/2024  CD4  December: 193/49%/3.1  January 713/50%/3.3  March 399/65%/3.9    September 2023  Hepatitis B surface antibody positive, antigen negative, core antibody negative  December 2023  FTA negative  Hepatitis A positive IgG/M  Positive IAN at 1-160; all other rheumatologic serologies negative  Cryptococcal antigen negative  Hepatitis C antibody negative        Culture Data

## 2024-03-05 NOTE — CONSULT NOTE ADULT - ASSESSMENT
63 year old male with past medical history of HIV, CVA, HTN, HLD, prediabetes, A.fib, cocaine/heroin abuse, CAD w/ stent, CHF (EF  25-30% in 12/23),  RLE compartment syndrome s/p fasciotomy 12/23 and recent hospitalization for CHF exacerbation with bilateral pleural effusions requiring chest tube placement initially presenting to [facility] on 3/1 with chest pain and shortness of breath.  Transferred to [facility] for management of cardiogenic shock.    Cardiac imaging/studies:  TTE: severe RV dilation, moderate MR (3b), severe TR, LV thrombus, small effusion. EF 25%, LVID 5.2  Bedside swan numbers; CVP 15, PAP 45/21, wedge 20. MvO2 45 CO/CI 3.4/2.2 SVR 1482

## 2024-03-05 NOTE — CONSULT NOTE ADULT - SUBJECTIVE AND OBJECTIVE BOX
Wound SURGERY CONSULT NOTE    HPI:  63 year old male with past medical history of HIV, CVA, HTN, HLD, prediabetes, A.fib, cocaine/heroin abuse, CAD w/ stent, CHF (EF  25-30% in 12/23),  RLE compartment syndrome s/p fasciotomy 12/23 and recent hospitalization for CHF exacerbation with bilateral pleural effusions requiring chest tube placement initially presenting to [facility] on 3/1 with chest pain and shortness of breath. He states having intermittent 8/10 midsternal chest pain for the past few months since his leg operation. He was admitted to the outside hospital for management of CHF exacerbation, found to have a further reduced EF of 10 - 15% on 3/1. While admitted, he was hypotensive requiring dopamine infusion with worsening perfusion indices and ELIE. He was transferred to [facility] for cardiogenic shock management.     On admission to CICU, he is A&O x3.  sinus tach, / 78, saturating well on 4L nasal cannula with dopamine gtt infusing at 10 mcg/kg/min. He denies chest pain, shortness of breath, nausea and vomiting on admission. (04 Mar 2024 20:16)        N/V/D,  BM/ Flatus,   NGT,     palp/ sob/dyspnea/ cp,       F/C/S  Wound consult requested by team to assist w/ management of      wound/ pressure injury.   Pt (unable to)  c/o pain, drainage, odor, color change,  or worsening swelling. Offloading and pericare initiated upon admission as pt Increasingly sedentary 2/2 to illness. Pt is Incontinent of urine & stool. (+)mitchell/ ostomy.   No h/o bites, scratches, falls, trauma.  Pt seen by Wound RN  CAVILON Advance/  Hilda,TRIAD/ Aquacell/ medihoney/ Allevyn foam/ dakins/ Adaptic/ DSD recommended used at home/ while awaiting consult.  Appetite good/ decreased.  weight loss.  S&S / RD consult appreciated All questions asked and answered to pt's and family's expressed understanding and satisfaction.    Current Diet: Diet, DASH/TLC:   Sodium & Cholesterol Restricted  Consistent Carbohydrate No Snacks (CSTCHO) (03-05-24 @ 02:24)      PAST MEDICAL & SURGICAL HISTORY:      REVIEW OF SYSTEMS: Pt unable to offer  General/ Breast/ Skin/Vasc/ Neuro/ MSK: see HPI  All other systems negative    MEDICATIONS  (STANDING):  aspirin enteric coated 81 milliGRAM(s) Oral daily  chlorhexidine 2% Cloths 1 Application(s) Topical <User Schedule>  hydrALAZINE 25 milliGRAM(s) Oral three times a day  insulin lispro (ADMELOG) corrective regimen sliding scale   SubCutaneous three times a day before meals  insulin lispro (ADMELOG) corrective regimen sliding scale   SubCutaneous at bedtime    MEDICATIONS  (PRN):  sodium chloride 0.9% lock flush 10 milliLiter(s) IV Push every 1 hour PRN Pre/post blood products, medications, blood draw, and to maintain line patency      Allergies    No Known Allergies    Intolerances        SOCIAL HISTORY:  / /single/ ; (+)HHA/ lives in SNF; Former smoker, No current/ Denies smoking, ETOH, drugs    FAMILY HISTORY:   no h/o PVD or wound healing or skin/ significant problems    PHYSICAL EXAM:  Vital Signs Last 24 Hrs  T(C): 36.3 (05 Mar 2024 15:00), Max: 36.3 (04 Mar 2024 19:45)  T(F): 97.3 (05 Mar 2024 15:00), Max: 97.4 (04 Mar 2024 19:45)  HR: 113 (05 Mar 2024 18:00) (106 - 115)  BP: 91/60 (05 Mar 2024 17:00) (91/60 - 133/83)  BP(mean): 71 (05 Mar 2024 17:00) (70 - 103)  RR: 22 (05 Mar 2024 18:00) (15 - 29)  SpO2: 100% (05 Mar 2024 18:00) (80% - 100%)    Parameters below as of 05 Mar 2024 18:00  Patient On (Oxygen Delivery Method): room air        NAD, Guarded but stable,  A&Ox3/ Alert/ Confused  cachectic/ thin, MO/ Obese, frail,  WD/ WN/ WG,  Disheveled  Total Care Sport/ Versa Care P500 / Envella Progressa bed     HEENT:  NC/AT, PERRL, EOMI, sclera clear, mucosa moist, throat clear, trachea midline, neck supple, trach  Respiratory: nonlabored w/ equal chest rise  Gastrointestinal: soft NT/ND (+)BS  (+)PEG (+)ostomy (+)NGT  : (+)mitchell/ purewick/ condom cath  Neurology:  weakened strength & sensation grossly intact, paraesthesia  nonverbal, no follow commands, paraplegic  Psych: calm/ appropriate/ flat affect/ easily agitated/ restless/ anxious/ difficult to assess  Musculoskeletal:  limited stiff / p/FROM, no deformities/ contractures  Vascular: BLE equally warm/ cool,  no cyanosis, clubbing, edema nor acute ischemia           >LE //BLE edema equal           BLE DP/PT pulses palpable          BLE hemosiderin staining/ varicose veins  Skin:  moist w/ good turgor  thin, dry, pale, frail,  ecchymosis w/o hematoma  blistering  or serosanguinous drainage  No odor, erythema, increased warmth, tenderness, induration, fluctuance, nor crepitus    LABS/ CULTURES/ RADIOLOGY:                        8.5    7.38  )-----------( 155      ( 05 Mar 2024 04:52 )             25.1       139  |  101  |  64  ----------------------------<  125      [03-05-24 @ 10:22]  4.0   |  27  |  2.10        Ca     8.0     [03-05-24 @ 10:22]      Mg     2.4     [03-05-24 @ 10:22]      Phos  3.1     [03-05-24 @ 10:22]    TPro  6.2  /  Alb  3.1  /  TBili  0.8  /  DBili  x   /  AST  406  /  ALT  528  /  AlkPhos  393  [03-05-24 @ 10:22]    PT/INR: PT 36.5 , INR 3.61       [03-05-24 @ 04:52]  PTT: 29.6       [03-05-24 @ 04:52]        A1C with Estimated Average Glucose Result: 6.2 % (03-04-24 @ 20:42)      ACC: [id number] EXAM:  ART DUPLEX LOWER EXT BILATERAL   ORDERED BY:   ANALY COLMENARES     PROCEDURE DATE:  03/05/2024          INTERPRETATION:  Clinical information: Diminished peripheral pulses,   fasciotomy right lower extremity.    Technique: Grayscale, color and spectral Doppler imaging was performed at   the arteries of both lower extremities    Findings:    There is an irregular heart rate.    There is edema in the musculature surrounding the arteries and veins in   the distal thigh, at the level of the knee, and in the proximal calf of   the right lower extremity.    There is less edema in the deep soft tissues of the left lower extremity..    Mild to moderate atheromatous thickening and irregularity is present   along the course of the arteries supplying both the right and left lower   extremities.    There is a triphasic, high resistance pattern of unimpeded antegrade flow   through the right and left external iliac, common femoral, deep femoral   and popliteal arteries.    There is unimpeded flow through the right and left posterior tibial   peroneal trunks, the trifurcation arteries, and the dorsal pedis arteries.    Impression:    There is edema in the musculature surrounding the arteries and veins in   the distal thigh,at the level of the knee and in the proximal calf of   the right lower extremity. Less edema is present on the left.    Lower extremity arterial vascular stenotic or occlusive disease is not   identified.                           Wound SURGERY CONSULT NOTE    HPI:  63 year old male with past medical history of HIV, CVA, HTN, HLD, prediabetes, A.fib, cocaine/heroin abuse, CAD w/ stent, CHF (EF  25-30% in 12/23),  RLE compartment syndrome s/p fasciotomy 12/23 and recent hospitalization for CHF exacerbation with bilateral pleural effusions requiring chest tube placement initially presenting to [facility] on 3/1 with chest pain and shortness of breath. He states having intermittent 8/10 midsternal chest pain for the past few months since his leg operation. He was admitted to the outside hospital for management of CHF exacerbation, found to have a further reduced EF of 10 - 15% on 3/1. While admitted, he was hypotensive requiring dopamine infusion with worsening perfusion indices and ELIE. He was transferred to [facility] for cardiogenic shock management.     On admission to CICU, he is A&O x3.  sinus tach, / 78, saturating well on 4L nasal cannula with dopamine gtt infusing at 10 mcg/kg/min. He denies chest pain, shortness of breath, nausea and vomiting on admission. N/V/D, palp/ sob/dyspnea/ cp, or  F/C/S    Wound consult requested by team to assist w/ management of RLE  wound.   Pt w/o c/o pain, drainage, odor, color change,  or worsening swelling. Offloading and pericare initiated upon admission as pt Increasingly sedentary 2/2 to illness.   No h/o bites, scratches, falls, trauma..  Appetite so/so w/o  weight loss.  All questions asked and answered to pt's expressed understanding and satisfaction.    Current Diet: Diet, DASH/TLC:   Sodium & Cholesterol Restricted  Consistent Carbohydrate No Snacks (CSTCHO) (03-05-24 @ 02:24)      PAST MEDICAL & SURGICAL HISTORY:  HIV,   CVA,   HTN,   HLD  prediabetes  A.fib,   cocaine/heroin abuse,   CAD w/ stent,  CHF (EF  25-30% in 12/23)  RLE compartment syndrome s/p fasciotomy   bilateral pleural effusions    REVIEW OF SYSTEMS: General/ Skin/Vasc MSK: see HPI  All other systems negative    MEDICATIONS  (STANDING):  aspirin enteric coated 81 milliGRAM(s) Oral daily  chlorhexidine 2% Cloths 1 Application(s) Topical <User Schedule>  hydrALAZINE 25 milliGRAM(s) Oral three times a day  insulin lispro (ADMELOG) corrective regimen sliding scale   SubCutaneous three times a day before meals  insulin lispro (ADMELOG) corrective regimen sliding scale   SubCutaneous at bedtime    MEDICATIONS  (PRN):  sodium chloride 0.9% lock flush 10 milliLiter(s) IV Push every 1 hour PRN Pre/post blood products, medications, blood draw, and to maintain line patency      No Known Allergies    SOCIAL HISTORY: single; Former smoker, ETOH, drugs    FAMILY HISTORY:   no h/o significant problems    PHYSICAL EXAM:  Vital Signs Last 24 Hrs  T(C): 36.3 (05 Mar 2024 15:00), Max: 36.3 (04 Mar 2024 19:45)  T(F): 97.3 (05 Mar 2024 15:00), Max: 97.4 (04 Mar 2024 19:45)  HR: 113 (05 Mar 2024 18:00) (106 - 115)  BP: 91/60 (05 Mar 2024 17:00) (91/60 - 133/83)  BP(mean): 71 (05 Mar 2024 17:00) (70 - 103)  RR: 22 (05 Mar 2024 18:00) (15 - 29)  SpO2: 100% (05 Mar 2024 18:00) (80% - 100%)    Parameters below as of 05 Mar 2024 18:00  Patient On (Oxygen Delivery Method): room air    Guarded but stable,  A&Ox3, thin,, frail,  WD/ WN/ WG,    Total Care Sport bed  HEENT:  NC/AT,, EOMI, sclera clear, mucosa moist, throat clear, trachea midline, neck supple  Respiratory: nonlabored w/ equal chest rise  Gastrointestinal: soft NT/ND  : (+)fole cath  Neurology:  weakened strength & sensation grossly intact,  Psych: calm/ appropriate  Musculoskeletal: FROM, no deformities/ contractures  Vascular: BLE equally warm,  no cyanosis, clubbing, edema nor acute ischemia         BLE edema equal         faint BLE DP pulses palpable  RLE w/ medial & lateral compartments w/ pale granular wounds  no blistering    (+) scant serosanguinous drainage  No odor, erythema, increased warmth, tenderness, induration, fluctuance, nor crepitus  Skin: dry flakey     LABS/ CULTURES/ RADIOLOGY:                        8.5    7.38  )-----------( 155      ( 05 Mar 2024 04:52 )             25.1       139  |  101  |  64  ----------------------------<  125      [03-05-24 @ 10:22]  4.0   |  27  |  2.10        Ca     8.0     [03-05-24 @ 10:22]      Mg     2.4     [03-05-24 @ 10:22]      Phos  3.1     [03-05-24 @ 10:22]    TPro  6.2  /  Alb  3.1  /  TBili  0.8  /  DBili  x   /  AST  406  /  ALT  528  /  AlkPhos  393  [03-05-24 @ 10:22]    PT/INR: PT 36.5 , INR 3.61       [03-05-24 @ 04:52]  PTT: 29.6       [03-05-24 @ 04:52]    A1C with Estimated Average Glucose Result: 6.2 % (03-04-24 @ 20:42)      ACC: [id number] EXAM:  ART DUPLEX LOWER EXT BILATERAL   ORDERED BY:   ANALY COLMENARES     PROCEDURE DATE:  03/05/2024      INTERPRETATION:  Clinical information: Diminished peripheral pulses,   fasciotomy right lower extremity.    Technique: Grayscale, color and spectral Doppler imaging was performed at   the arteries of both lower extremities    Findings:    There is an irregular heart rate.    There is edema in the musculature surrounding the arteries and veins in   the distal thigh, at the level of the knee, and in the proximal calf of   the right lower extremity.    There is less edema in the deep soft tissues of the left lower extremity..    Mild to moderate atheromatous thickening and irregularity is present   along the course of the arteries supplying both the right and left lower   extremities.    There is a triphasic, high resistance pattern of unimpeded antegrade flow   through the right and left external iliac, common femoral, deep femoral   and popliteal arteries.    There is unimpeded flow through the right and left posterior tibial   peroneal trunks, the trifurcation arteries, and the dorsal pedis arteries.    Impression:    There is edema in the musculature surrounding the arteries and veins in   the distal thigh,at the level of the knee and in the proximal calf of   the right lower extremity. Less edema is present on the left.    Lower extremity arterial vascular stenotic or occlusive disease is not   identified.         Wound SURGERY CONSULT NOTE    HPI:  63 year old male with past medical history of HIV, CVA, HTN, HLD, prediabetes, A.fib, cocaine/heroin abuse, CAD w/ stent, CHF (EF  25-30% in 12/23),  RLE compartment syndrome s/p fasciotomy 12/23 and recent hospitalization for CHF exacerbation with bilateral pleural effusions requiring chest tube placement initially presenting to [facility] on 3/1 with chest pain and shortness of breath. He states having intermittent 8/10 midsternal chest pain for the past few months since his leg operation. He was admitted to the outside hospital for management of CHF exacerbation, found to have a further reduced EF of 10 - 15% on 3/1. While admitted, he was hypotensive requiring dopamine infusion with worsening perfusion indices and ELIE. He was transferred to [facility] for cardiogenic shock management.     On admission to CICU, he is A&O x3.  sinus tach, / 78, saturating well on 4L nasal cannula with dopamine gtt infusing at 10 mcg/kg/min. He denies chest pain, shortness of breath, nausea and vomiting on admission. N/V/D, palp/ sob/dyspnea/ cp, or  F/C/S    Wound consult requested by team to assist w/ management of RLE  wound.   Pt w/o c/o pain, drainage, odor, color change,  or worsening swelling. Offloading and pericare initiated upon admission as pt Increasingly sedentary 2/2 to illness.   No h/o bites, scratches, falls, trauma..  Appetite so/so w/o  weight loss.  All questions asked and answered to pt's expressed understanding and satisfaction.    Current Diet: Diet, DASH/TLC:   Sodium & Cholesterol Restricted  Consistent Carbohydrate No Snacks (CSTCHO) (03-05-24 @ 02:24)      PAST MEDICAL & SURGICAL HISTORY:  HIV,   CVA,   HTN,   HLD  prediabetes  A.fib,   cocaine/heroin abuse,   CAD w/ stent,  CHF (EF  25-30% in 12/23)  RLE compartment syndrome s/p fasciotomy   bilateral pleural effusions    REVIEW OF SYSTEMS: General/ Skin/Vasc MSK: see HPI  All other systems negative    MEDICATIONS  (STANDING):  aspirin enteric coated 81 milliGRAM(s) Oral daily  chlorhexidine 2% Cloths 1 Application(s) Topical <User Schedule>  hydrALAZINE 25 milliGRAM(s) Oral three times a day  insulin lispro (ADMELOG) corrective regimen sliding scale   SubCutaneous three times a day before meals  insulin lispro (ADMELOG) corrective regimen sliding scale   SubCutaneous at bedtime    MEDICATIONS  (PRN):  sodium chloride 0.9% lock flush 10 milliLiter(s) IV Push every 1 hour PRN Pre/post blood products, medications, blood draw, and to maintain line patency      No Known Allergies    SOCIAL HISTORY: single; Former smoker, ETOH, drugs    FAMILY HISTORY:   no h/o significant problems    PHYSICAL EXAM:  Vital Signs Last 24 Hrs  T(C): 36.3 (05 Mar 2024 15:00), Max: 36.3 (04 Mar 2024 19:45)  T(F): 97.3 (05 Mar 2024 15:00), Max: 97.4 (04 Mar 2024 19:45)  HR: 113 (05 Mar 2024 18:00) (106 - 115)  BP: 91/60 (05 Mar 2024 17:00) (91/60 - 133/83)  BP(mean): 71 (05 Mar 2024 17:00) (70 - 103)  RR: 22 (05 Mar 2024 18:00) (15 - 29)  SpO2: 100% (05 Mar 2024 18:00) (80% - 100%)    Parameters below as of 05 Mar 2024 18:00  Patient On (Oxygen Delivery Method): room air    Guarded but stable,  A&Ox3, thin,, frail,  WD/ WN/ WG,    Total Care Sport bed  HEENT:  NC/AT,, EOMI, sclera clear, mucosa moist, throat clear, trachea midline, neck supple  Respiratory: nonlabored w/ equal chest rise  Gastrointestinal: soft NT/ND  : (+)fole cath  Neurology:  weakened strength & sensation grossly intact,  Psych: calm/ appropriate  Musculoskeletal: FROM, no deformities/ contractures  Vascular: BLE equally warm,  no cyanosis, clubbing, edema nor acute ischemia         BLE edema equal         LLE DP pulse palpable         RLE DP pulse non palpable  RLE w/ medial & lateral compartments w/ pale granular wounds  no blistering    (+) scant serosanguinous drainage  No odor, erythema, increased warmth, tenderness, induration, fluctuance, nor crepitus  Skin: dry flakey     LABS/ CULTURES/ RADIOLOGY:                        8.5    7.38  )-----------( 155      ( 05 Mar 2024 04:52 )             25.1       139  |  101  |  64  ----------------------------<  125      [03-05-24 @ 10:22]  4.0   |  27  |  2.10        Ca     8.0     [03-05-24 @ 10:22]      Mg     2.4     [03-05-24 @ 10:22]      Phos  3.1     [03-05-24 @ 10:22]    TPro  6.2  /  Alb  3.1  /  TBili  0.8  /  DBili  x   /  AST  406  /  ALT  528  /  AlkPhos  393  [03-05-24 @ 10:22]    PT/INR: PT 36.5 , INR 3.61       [03-05-24 @ 04:52]  PTT: 29.6       [03-05-24 @ 04:52]    A1C with Estimated Average Glucose Result: 6.2 % (03-04-24 @ 20:42)      ACC: [id number] EXAM:  ART DUPLEX LOWER EXT BILATERAL   ORDERED BY:   ANALY COLMENARES     PROCEDURE DATE:  03/05/2024      INTERPRETATION:  Clinical information: Diminished peripheral pulses,   fasciotomy right lower extremity.    Technique: Grayscale, color and spectral Doppler imaging was performed at   the arteries of both lower extremities    Findings:    There is an irregular heart rate.    There is edema in the musculature surrounding the arteries and veins in   the distal thigh, at the level of the knee, and in the proximal calf of   the right lower extremity.    There is less edema in the deep soft tissues of the left lower extremity..    Mild to moderate atheromatous thickening and irregularity is present   along the course of the arteries supplying both the right and left lower   extremities.    There is a triphasic, high resistance pattern of unimpeded antegrade flow   through the right and left external iliac, common femoral, deep femoral   and popliteal arteries.    There is unimpeded flow through the right and left posterior tibial   peroneal trunks, the trifurcation arteries, and the dorsal pedis arteries.    Impression:    There is edema in the musculature surrounding the arteries and veins in   the distal thigh,at the level of the knee and in the proximal calf of   the right lower extremity. Less edema is present on the left.    Lower extremity arterial vascular stenotic or occlusive disease is not   identified.

## 2024-03-05 NOTE — CONSULT NOTE ADULT - ASSESSMENT
Patient with negative HIV CMIA on 12/4/23 and 1/16/2024  Patient with negative HIV viral loads on 12/5/23, 1/11/2024, 3/2/2024  CD4 count can drop in acute illness, preserved percentage/ratio not consistent with HIV disease.  No interval risk factors elicited, though patient is not a consistent historian at times  No concern that this man is HIV+ at this point in time.  Fasciotomy sites are healing well, without concern for any active or uncontrolled infection.  Unclear why he did not have any skin grafting as of yet to cover the defects    I see no role for antibiotics at this juncture.    There is no role for additional HIV testing at present nor treatment. Given potential for risk activity, though patient denies, would obtain follow-up test in 6 weeks in 3 months without any interval risk behavior.  D/W CICU team  I'll sign off at this time.   Thank you for the courtesy of this referral.    Arthur Franco MD  Attending Physician  [facility]  Division of Infectous Diseases  [phone]

## 2024-03-05 NOTE — PROGRESS NOTE ADULT - SUBJECTIVE AND OBJECTIVE BOX
CARMENCITA GIBSON  MRN-[id number]  Patient is a 63y old  Male who presents with a chief complaint of cardiogenic shock (04 Mar 2024 20:16)    HPI:  63 year old male with past medical history of HIV, CVA, HTN, HLD, prediabetes, A.fib, cocaine/heroin abuse, CAD w/ stent, CHF (EF  25-30% in 12/23),  RLE compartment syndrome s/p fasciotomy 12/23 and recent hospitalization for CHF exacerbation with bilateral pleural effusions requiring chest tube placement initially presenting to [facility] on 3/1 with chest pain and shortness of breath. He states having intermittent 8/10 midsternal chest pain for the past few months since his leg operation. He was admitted to the outside hospital for management of CHF exacerbation, found to have a further reduced EF of 10 - 15% on 3/1. While admitted, he was hypotensive requiring dopamine infusion with worsening perfusion indices and ELIE. He was transferred to [facility] for cardiogenic shock management.     On admission to CICU, he is A&O x3.  sinus tach, / 78, saturating well on 4L nasal cannula with dopamine gtt infusing at 10 mcg/kg/min. He denies chest pain, shortness of breath, nausea and vomiting on admission. (04 Mar 2024 20:16)      24 HOUR EVENTS:    REVIEW OF SYSTEMS:    CONSTITUTIONAL: No weakness, fevers or chills  EYES/ENT: No visual changes;  No vertigo or throat pain   NECK: No pain or stiffness  RESPIRATORY: No cough, wheezing, hemoptysis; No shortness of breath  CARDIOVASCULAR: No chest pain or palpitations  GASTROINTESTINAL: No abdominal or epigastric pain. No nausea, vomiting, or hematemesis; No diarrhea or constipation. No melena or hematochezia.  GENITOURINARY: No dysuria, frequency or hematuria  NEUROLOGICAL: No numbness or weakness  SKIN: No itching, rashes      ICU Vital Signs Last 24 Hrs  T(C): 36.3 (04 Mar 2024 19:45), Max: 36.3 (04 Mar 2024 19:45)  T(F): 97.4 (04 Mar 2024 19:45), Max: 97.4 (04 Mar 2024 19:45)  HR: 110 (05 Mar 2024 08:00) (106 - 115)  BP: 103/65 (05 Mar 2024 08:00) (97/62 - 125/56)  BP(mean): 79 (05 Mar 2024 08:00) (72 - 96)  ABP: --  ABP(mean): --  RR: 22 (05 Mar 2024 08:00) (15 - 29)  SpO2: 95% (05 Mar 2024 08:00) (80% - 100%)    O2 Parameters below as of 05 Mar 2024 08:00  Patient On (Oxygen Delivery Method): room air            CVP(mm Hg): 24 (03-05-24 @ 08:00) (2 - 24)  CO: --  CI: --  PA: 53/29 (03-05-24 @ 08:00) (35/10 - 55/25)  PA(mean): 37 (03-05-24 @ 08:00) (19 - 37)  PA(direct): --  PCWP: --  LA: --  RA: --  SVR: --  SVRI: --  PVR: --  PVRI: --  I&O's Summary    04 Mar 2024 07:01  -  05 Mar 2024 07:00  --------------------------------------------------------  IN: 77.4 mL / OUT: 2520 mL / NET: -2442.6 mL    05 Mar 2024 07:01  -  05 Mar 2024 08:50  --------------------------------------------------------  IN: 3.9 mL / OUT: 0 mL / NET: 3.9 mL        CAPILLARY BLOOD GLUCOSE    CAPILLARY BLOOD GLUCOSE      POCT Blood Glucose.: 137 mg/dL (05 Mar 2024 08:11)      PHYSICAL EXAM:  GENERAL: No acute distress, well-developed  HEAD:  Atraumatic, Normocephalic  EYES: EOMI, PERRLA, conjunctiva and sclera clear  NECK: Supple, no lymphadenopathy, no JVD  CHEST/LUNG: CTAB; No wheezes, rales, or rhonchi  HEART: Regular rate and rhythm. Normal S1/S2. No murmurs, rubs, or gallops  ABDOMEN: Soft, non-tender, non-distended; normal bowel sounds, no organomegaly  EXTREMITIES:  2+ peripheral pulses b/l, No clubbing, cyanosis, or edema  NEUROLOGY: A&O x 3, no focal deficits  SKIN: No rashes or lesions    ============================I/O===========================   I&O's Detail    04 Mar 2024 07:01  -  05 Mar 2024 07:00  --------------------------------------------------------  IN:    DOBUTamine: 7.8 mL    DOBUTamine: 69.6 mL  Total IN: 77.4 mL    OUT:    Indwelling Catheter - Urethral (mL): 470 mL    Voided (mL): 2050 mL  Total OUT: 2520 mL    Total NET: -2442.6 mL      05 Mar 2024 07:01  -  05 Mar 2024 08:50  --------------------------------------------------------  IN:    DOBUTamine: 3.9 mL  Total IN: 3.9 mL    OUT:  Total OUT: 0 mL    Total NET: 3.9 mL        ============================ LABS =========================                        8.5    7.38  )-----------( 155      ( 05 Mar 2024 04:52 )             25.1     03-05    142  |  102  |  66<H>  ----------------------------<  129<H>  3.5   |  28  |  2.13<H>    Ca    8.1<L>      05 Mar 2024 04:52  Phos  3.7     03-05  Mg     2.5     03-05    TPro  6.1  /  Alb  3.0<L>  /  TBili  0.8  /  DBili  x   /  AST  451<H>  /  ALT  553<H>  /  AlkPhos  406<H>  03-05    Troponin T, High Sensitivity Result: 93 ng/L (03-04-24 @ 22:07)              LIVER FUNCTIONS - ( 05 Mar 2024 04:52 )  Alb: 3.0 g/dL / Pro: 6.1 g/dL / ALK PHOS: 406 U/L / ALT: 553 U/L / AST: 451 U/L / GGT: x           PT/INR - ( 05 Mar 2024 04:52 )   PT: 36.5 sec;   INR: 3.61 ratio         PTT - ( 05 Mar 2024 04:52 )  PTT:29.6 sec    Lactate, Blood: 2.1 mmol/L (03-05-24 @ 04:52)  Blood Gas Venous - Lactate: 1.8 mmol/L (03-05-24 @ 00:19)  Blood Gas Venous - Lactate: 2.2 mmol/L (03-04-24 @ 22:37)  Blood Gas Venous - Lactate: 2.9 mmol/L (03-04-24 @ 20:15)  Blood Gas Venous - Lactate: 2.8 mmol/L (03-04-24 @ 20:15)    Urinalysis Basic - ( 05 Mar 2024 04:52 )    Color: x / Appearance: x / SG: x / pH: x  Gluc: 129 mg/dL / Ketone: x  / Bili: x / Urobili: x   Blood: x / Protein: x / Nitrite: x   Leuk Esterase: x / RBC: x / WBC x   Sq Epi: x / Non Sq Epi: x / Bacteria: x      ======================Micro/Rad/Cardio=================  Telemetry: Reviewed   EKG: Reviewed  CXR: Reviewed  Culture: Reviewed   Echo:   Cath:           CARMENCITA GIBSON  MRN-[id number]  Patient is a 63y old  Male who presents with a chief complaint of cardiogenic shock (04 Mar 2024 20:16)    HPI:  63 year old male with past medical history of HIV, CVA, HTN, HLD, prediabetes, A.fib, cocaine/heroin abuse, CAD w/ stent, CHF (EF  25-30% in 12/23),  RLE compartment syndrome s/p fasciotomy 12/23 and recent hospitalization for CHF exacerbation with bilateral pleural effusions requiring chest tube placement initially presenting to [facility] on 3/1 with chest pain and shortness of breath. He states having intermittent 8/10 midsternal chest pain for the past few months since his leg operation. He was admitted to the outside hospital for management of CHF exacerbation, found to have a further reduced EF of 10 - 15% on 3/1. While admitted, he was hypotensive requiring dopamine infusion with worsening perfusion indices and ELIE. He was transferred to [facility] for cardiogenic shock management.     On admission to CICU, he is A&O x3.  sinus tach, / 78, saturating well on 4L nasal cannula with dopamine gtt infusing at 10 mcg/kg/min. He denies chest pain, shortness of breath, nausea and vomiting on admission. (04 Mar 2024 20:16)      24 HOUR EVENTS:  - transferred on dopamine gtt, transitioned to dobutamine gtt  - weaned off O2  - TTE this am    REVIEW OF SYSTEMS:  CONSTITUTIONAL: No weakness, fevers or chills  EYES/ENT: No visual changes;  No vertigo or throat pain   NECK: No pain or stiffness  RESPIRATORY: No cough, wheezing, hemoptysis; No shortness of breath  CARDIOVASCULAR: No chest pain or palpitations  GASTROINTESTINAL: No abdominal or epigastric pain. No nausea, vomiting, or hematemesis; No diarrhea or constipation. No melena or hematochezia.  GENITOURINARY: No dysuria, frequency or hematuria  NEUROLOGICAL: No numbness or weakness  SKIN: (+) RLE wound      ICU Vital Signs Last 24 Hrs  T(C): 36.3 (04 Mar 2024 19:45), Max: 36.3 (04 Mar 2024 19:45)  T(F): 97.4 (04 Mar 2024 19:45), Max: 97.4 (04 Mar 2024 19:45)  HR: 110 (05 Mar 2024 08:00) (106 - 115)  BP: 103/65 (05 Mar 2024 08:00) (97/62 - 125/56)  BP(mean): 79 (05 Mar 2024 08:00) (72 - 96)  ABP: --  ABP(mean): --  RR: 22 (05 Mar 2024 08:00) (15 - 29)  SpO2: 95% (05 Mar 2024 08:00) (80% - 100%)    O2 Parameters below as of 05 Mar 2024 08:00  Patient On (Oxygen Delivery Method): room air            CVP(mm Hg): 24 (03-05-24 @ 08:00) (2 - 24)  PA: 53/29 (03-05-24 @ 08:00) (35/10 - 55/25)  PA(mean): 37 (03-05-24 @ 08:00) (19 - 37)    I&O's Summary    04 Mar 2024 07:01  -  05 Mar 2024 07:00  --------------------------------------------------------  IN: 77.4 mL / OUT: 2520 mL / NET: -2442.6 mL    05 Mar 2024 07:01  -  05 Mar 2024 08:50  --------------------------------------------------------  IN: 3.9 mL / OUT: 0 mL / NET: 3.9 mL        CAPILLARY BLOOD GLUCOSE    CAPILLARY BLOOD GLUCOSE      POCT Blood Glucose.: 137 mg/dL (05 Mar 2024 08:11)      PHYSICAL EXAM:  GENERAL: No acute distress, well-developed  HEAD:  Atraumatic, Normocephalic  EYES: EOMI, PERRLA, conjunctiva and sclera clear  NECK: Supple, no lymphadenopathy, no JVD  CHEST/LUNG: CTAB; No wheezes, rales, or rhonchi  HEART: Regular rate and rhythm. Normal S1/S2. No murmurs, rubs, or gallops  ABDOMEN: Soft, non-tender, non-distended; normal bowel sounds, no organomegaly  EXTREMITIES:  2+ peripheral pulses b/l, No clubbing, cyanosis, or edema  NEUROLOGY: A&O x 3, no focal deficits  SKIN: No rashes or lesions    ============================I/O===========================   I&O's Detail    04 Mar 2024 07:01  -  05 Mar 2024 07:00  --------------------------------------------------------  IN:    DOBUTamine: 7.8 mL    DOBUTamine: 69.6 mL  Total IN: 77.4 mL    OUT:    Indwelling Catheter - Urethral (mL): 470 mL    Voided (mL): 2050 mL  Total OUT: 2520 mL    Total NET: -2442.6 mL      05 Mar 2024 07:01  -  05 Mar 2024 08:50  --------------------------------------------------------  IN:    DOBUTamine: 3.9 mL  Total IN: 3.9 mL    OUT:  Total OUT: 0 mL    Total NET: 3.9 mL        ============================ LABS =========================                        8.5    7.38  )-----------( 155      ( 05 Mar 2024 04:52 )             25.1     03-05    142  |  102  |  66<H>  ----------------------------<  129<H>  3.5   |  28  |  2.13<H>    Ca    8.1<L>      05 Mar 2024 04:52  Phos  3.7     03-05  Mg     2.5     03-05    TPro  6.1  /  Alb  3.0<L>  /  TBili  0.8  /  DBili  x   /  AST  451<H>  /  ALT  553<H>  /  AlkPhos  406<H>  03-05    Troponin T, High Sensitivity Result: 93 ng/L (03-04-24 @ 22:07)      LIVER FUNCTIONS - ( 05 Mar 2024 04:52 )  Alb: 3.0 g/dL / Pro: 6.1 g/dL / ALK PHOS: 406 U/L / ALT: 553 U/L / AST: 451 U/L / GGT: x           PT/INR - ( 05 Mar 2024 04:52 )   PT: 36.5 sec;   INR: 3.61 ratio         PTT - ( 05 Mar 2024 04:52 )  PTT:29.6 sec    Lactate, Blood: 2.1 mmol/L (03-05-24 @ 04:52)  Blood Gas Venous - Lactate: 1.8 mmol/L (03-05-24 @ 00:19)  Blood Gas Venous - Lactate: 2.2 mmol/L (03-04-24 @ 22:37)  Blood Gas Venous - Lactate: 2.9 mmol/L (03-04-24 @ 20:15)  Blood Gas Venous - Lactate: 2.8 mmol/L (03-04-24 @ 20:15)    Urinalysis Basic - ( 05 Mar 2024 04:52 )    Color: x / Appearance: x / SG: x / pH: x  Gluc: 129 mg/dL / Ketone: x  / Bili: x / Urobili: x   Blood: x / Protein: x / Nitrite: x   Leuk Esterase: x / RBC: x / WBC x   Sq Epi: x / Non Sq Epi: x / Bacteria: x      ======================Micro/Rad/Cardio=================  Telemetry: Reviewed   EKG: Reviewed  CXR: Reviewed  Culture: Reviewed   Echo:   Cath:

## 2024-03-05 NOTE — PROGRESS NOTE ADULT - NSPROGADDITIONALINFOA_GEN_ALL_CORE
62yo HIV + with CVA HTN HLD pAF, quit EtOH/ cocaine 1 mo ago here for ADHF and c/f cardiogenic shock. Here for AT eval in cardiogenic shock.    Neuro: start thiamine, folate, not scoring for CIWA  Pulm: on 4L NC, diuresis ongoing  CV: wean 2.5mg dobutamine --> venous sat dropped from ~60% to 40%, will increase afterload and recheck. lactate improved.  ID: HIV+, therapy unclear, discuss with ID  Renal: Cr up to 2.2, 1L negative after bumex 2mg + 2mg  GI: DASH diet  Heme: INR of 3 with LV thrombus, sending TEG and Xa levels  Endo: BG controlled  Lines: ZEINA fleming (3/4)

## 2024-03-05 NOTE — PROGRESS NOTE ADULT - ASSESSMENT
Assessment:  63 year old male with past medical history of HIV, CVA, HTN, HLD, prediabetes, A.fib, cocaine/heroin abuse, CAD w/ stent, CHF (EF  25-30% in , now 10 - 15 % as of TTE on 3/1),  RLE compartment syndrome s/p fasciotomy  presented to outside hospital with chest pain and shortness of breath, admitted for CHF exacerbation, transferred to [facility] for cardiogenic shock management     Plan:  Neuro  #hx CVA  - A&O x3, poor historian with medical history and medications  - continue to monitor per protocol    Respiratory  #hypoxia/pleural effusions  - noted to have pleural effusions at outside hospital, likely in the setting of volume overload s/t chf  - saturating well on 4L, wean as tolerated with diuresis  - continue to monitor sp02    Cardiovascular  #Cardiogenic shock  - i/s/o CHF exacerbation  - TTE 3/1 EF 10 - 15%, previously 25 - 30%   - dopamine gtt transitioned to dobutamine  - swan placed overnight  - Elevated CVP with low MVo2  -  gtt 2.5, wean as tolerated  - Hydral 25 TID  - diuresis as needed  - trend perfusion indices, mv02    - f/u am TTE    #CAD  - w/ history of stents in december as per patient  - previously on aspirin, continue    #pAF  - ? hx a.fib  - sinus on admission  - not on blood thinner or rate control agent as per patient, only aspirin & plavix  - anticoagulation on hold with INR > 2    #HTN  - continue to trend BP    #HLD  - holding statin i/s/o liver dysfunction    GI  #transaminitis  - worsening liver ezymes at outside hospital  - hold hepatotoxins, continue to trend    - diet as tolerated, monitor for BM    /Renal  #ELIE  - admitted from outside hospital with mitchell catheter  - continue strict I&O, electrolyte monitoring    Endo  #preDM  - f.u a1c  - trend glucose, ISS while inpatient    Heme  - elevated INR on admission, i/s/o liver dsyfunction, continue to trend  - H/H and platelets stable at outside hospital  - f/u admission labs    ID  - afebrile on admission  - continue to trend wbc and fever curve    #HIV  - hx of HIV, not on medication as per patient    #RLE wound  - admitted from outside hospital with ace wrap in place  - noted to have wounds on lateral and medial aspect of calf  - f/u wound consult    #full code  #lines: PIV    ======================= DISPOSITION  =====================  [X] Critical   [ ] Guarded    [ ] Stable    [X] Maintain in CICU  [ ] Downgrade to Telemetry  [ ] Discharge Home   08-Oct-2019 10:42 No

## 2024-03-06 LAB
ALBUMIN SERPL ELPH-MCNC: 2.5 G/DL — LOW (ref 3.3–5)
ALBUMIN SERPL ELPH-MCNC: 3 G/DL — LOW (ref 3.3–5)
ALP SERPL-CCNC: 325 U/L — HIGH (ref 40–120)
ALP SERPL-CCNC: 376 U/L — HIGH (ref 40–120)
ALT FLD-CCNC: 388 U/L — HIGH (ref 10–45)
ALT FLD-CCNC: 462 U/L — HIGH (ref 10–45)
AMPHET UR-MCNC: NEGATIVE — SIGNIFICANT CHANGE UP
ANION GAP SERPL CALC-SCNC: 10 MMOL/L — SIGNIFICANT CHANGE UP (ref 5–17)
ANION GAP SERPL CALC-SCNC: 11 MMOL/L — SIGNIFICANT CHANGE UP (ref 5–17)
APTT BLD: 62.1 SEC — HIGH (ref 24.5–35.6)
APTT BLD: 73.5 SEC — HIGH (ref 24.5–35.6)
AST SERPL-CCNC: 234 U/L — HIGH (ref 10–40)
AST SERPL-CCNC: 302 U/L — HIGH (ref 10–40)
BARBITURATES UR SCN-MCNC: NEGATIVE — SIGNIFICANT CHANGE UP
BASE EXCESS BLDMV CALC-SCNC: 7 MMOL/L — HIGH (ref -3–3)
BASE EXCESS BLDMV CALC-SCNC: 7.8 MMOL/L — HIGH (ref -3–3)
BASE EXCESS BLDV CALC-SCNC: 8.2 MMOL/L — HIGH (ref -2–3)
BASOPHILS # BLD AUTO: 0 K/UL — SIGNIFICANT CHANGE UP (ref 0–0.2)
BASOPHILS NFR BLD AUTO: 0 % — SIGNIFICANT CHANGE UP (ref 0–2)
BENZODIAZ UR-MCNC: NEGATIVE — SIGNIFICANT CHANGE UP
BILIRUB SERPL-MCNC: 0.8 MG/DL — SIGNIFICANT CHANGE UP (ref 0.2–1.2)
BILIRUB SERPL-MCNC: 0.8 MG/DL — SIGNIFICANT CHANGE UP (ref 0.2–1.2)
BUN SERPL-MCNC: 49 MG/DL — HIGH (ref 7–23)
BUN SERPL-MCNC: 54 MG/DL — HIGH (ref 7–23)
CA-I SERPL-SCNC: 1.1 MMOL/L — LOW (ref 1.15–1.33)
CALCIUM SERPL-MCNC: 7.7 MG/DL — LOW (ref 8.4–10.5)
CALCIUM SERPL-MCNC: 8.4 MG/DL — SIGNIFICANT CHANGE UP (ref 8.4–10.5)
CHLORIDE BLDV-SCNC: 101 MMOL/L — SIGNIFICANT CHANGE UP (ref 96–108)
CHLORIDE SERPL-SCNC: 103 MMOL/L — SIGNIFICANT CHANGE UP (ref 96–108)
CHLORIDE SERPL-SCNC: 103 MMOL/L — SIGNIFICANT CHANGE UP (ref 96–108)
CO2 BLDMV-SCNC: 33 MMOL/L — HIGH (ref 21–29)
CO2 BLDMV-SCNC: 33 MMOL/L — HIGH (ref 21–29)
CO2 BLDV-SCNC: 34 MMOL/L — HIGH (ref 22–26)
CO2 SERPL-SCNC: 26 MMOL/L — SIGNIFICANT CHANGE UP (ref 22–31)
CO2 SERPL-SCNC: 28 MMOL/L — SIGNIFICANT CHANGE UP (ref 22–31)
COCAINE METAB.OTHER UR-MCNC: NEGATIVE — SIGNIFICANT CHANGE UP
CREAT SERPL-MCNC: 1.5 MG/DL — HIGH (ref 0.5–1.3)
CREAT SERPL-MCNC: 1.83 MG/DL — HIGH (ref 0.5–1.3)
EGFR: 41 ML/MIN/1.73M2 — LOW
EGFR: 52 ML/MIN/1.73M2 — LOW
EOSINOPHIL # BLD AUTO: 0.02 K/UL — SIGNIFICANT CHANGE UP (ref 0–0.5)
EOSINOPHIL NFR BLD AUTO: 0.3 % — SIGNIFICANT CHANGE UP (ref 0–6)
FERRITIN SERPL-MCNC: 102 NG/ML — SIGNIFICANT CHANGE UP (ref 30–400)
GAS PNL BLDMV: SIGNIFICANT CHANGE UP
GAS PNL BLDMV: SIGNIFICANT CHANGE UP
GAS PNL BLDV: 137 MMOL/L — SIGNIFICANT CHANGE UP (ref 136–145)
GAS PNL BLDV: SIGNIFICANT CHANGE UP
GLUCOSE BLDC GLUCOMTR-MCNC: 116 MG/DL — HIGH (ref 70–99)
GLUCOSE BLDC GLUCOMTR-MCNC: 116 MG/DL — HIGH (ref 70–99)
GLUCOSE BLDC GLUCOMTR-MCNC: 93 MG/DL — SIGNIFICANT CHANGE UP (ref 70–99)
GLUCOSE BLDC GLUCOMTR-MCNC: 98 MG/DL — SIGNIFICANT CHANGE UP (ref 70–99)
GLUCOSE BLDV-MCNC: 73 MG/DL — SIGNIFICANT CHANGE UP (ref 70–99)
GLUCOSE SERPL-MCNC: 120 MG/DL — HIGH (ref 70–99)
GLUCOSE SERPL-MCNC: 78 MG/DL — SIGNIFICANT CHANGE UP (ref 70–99)
HCO3 BLDMV-SCNC: 31 MMOL/L — HIGH (ref 20–28)
HCO3 BLDMV-SCNC: 32 MMOL/L — HIGH (ref 20–28)
HCO3 BLDV-SCNC: 33 MMOL/L — HIGH (ref 22–29)
HCT VFR BLD CALC: 27.2 % — LOW (ref 39–50)
HCT VFR BLDA CALC: 28 % — LOW (ref 39–51)
HGB BLD CALC-MCNC: 9.4 G/DL — LOW (ref 12.6–17.4)
HGB BLD-MCNC: 8.9 G/DL — LOW (ref 13–17)
HIV-1 VIRAL LOAD RESULT: SIGNIFICANT CHANGE UP
HIV1 RNA # SERPL NAA+PROBE: SIGNIFICANT CHANGE UP COPIES/ML
HIV1 RNA SER-IMP: SIGNIFICANT CHANGE UP
HIV1 RNA SERPL NAA+PROBE-ACNC: SIGNIFICANT CHANGE UP
HIV1 RNA SERPL NAA+PROBE-LOG#: SIGNIFICANT CHANGE UP LG COP/ML
HOROWITZ INDEX BLDMV+IHG-RTO: 21 — SIGNIFICANT CHANGE UP
HOROWITZ INDEX BLDMV+IHG-RTO: 21 — SIGNIFICANT CHANGE UP
HOROWITZ INDEX BLDV+IHG-RTO: 21 — SIGNIFICANT CHANGE UP
IMM GRANULOCYTES NFR BLD AUTO: 0.3 % — SIGNIFICANT CHANGE UP (ref 0–0.9)
INR BLD: 1.82 RATIO — HIGH (ref 0.85–1.18)
INR BLD: 1.9 RATIO — HIGH (ref 0.85–1.18)
IRON SATN MFR SERPL: 16 UG/DL — LOW (ref 45–165)
IRON SATN MFR SERPL: 5 % — LOW (ref 16–55)
LACTATE BLDV-MCNC: 1.1 MMOL/L — SIGNIFICANT CHANGE UP (ref 0.5–2)
LACTATE BLDV-MCNC: 1.8 MMOL/L — SIGNIFICANT CHANGE UP (ref 0.5–2)
LACTATE SERPL-SCNC: 2 MMOL/L — SIGNIFICANT CHANGE UP (ref 0.5–2)
LYMPHOCYTES # BLD AUTO: 0.88 K/UL — LOW (ref 1–3.3)
LYMPHOCYTES # BLD AUTO: 12.9 % — LOW (ref 13–44)
MAGNESIUM SERPL-MCNC: 1.7 MG/DL — SIGNIFICANT CHANGE UP (ref 1.6–2.6)
MAGNESIUM SERPL-MCNC: 2.1 MG/DL — SIGNIFICANT CHANGE UP (ref 1.6–2.6)
MCHC RBC-ENTMCNC: 21.3 PG — LOW (ref 27–34)
MCHC RBC-ENTMCNC: 32.7 GM/DL — SIGNIFICANT CHANGE UP (ref 32–36)
MCV RBC AUTO: 65.2 FL — LOW (ref 80–100)
METHADONE UR-MCNC: NEGATIVE — SIGNIFICANT CHANGE UP
MONOCYTES # BLD AUTO: 0.84 K/UL — SIGNIFICANT CHANGE UP (ref 0–0.9)
MONOCYTES NFR BLD AUTO: 12.3 % — SIGNIFICANT CHANGE UP (ref 2–14)
NEUTROPHILS # BLD AUTO: 5.07 K/UL — SIGNIFICANT CHANGE UP (ref 1.8–7.4)
NEUTROPHILS NFR BLD AUTO: 74.2 % — SIGNIFICANT CHANGE UP (ref 43–77)
NRBC # BLD: 0 /100 WBCS — SIGNIFICANT CHANGE UP (ref 0–0)
O2 CT VFR BLD CALC: 30 MMHG — SIGNIFICANT CHANGE UP (ref 30–65)
O2 CT VFR BLD CALC: 34 MMHG — SIGNIFICANT CHANGE UP (ref 30–65)
OPIATES UR-MCNC: NEGATIVE — SIGNIFICANT CHANGE UP
OXYCODONE UR-MCNC: NEGATIVE — SIGNIFICANT CHANGE UP
PCO2 BLDMV: 42 MMHG — SIGNIFICANT CHANGE UP (ref 30–65)
PCO2 BLDMV: 42 MMHG — SIGNIFICANT CHANGE UP (ref 30–65)
PCO2 BLDV: 45 MMHG — SIGNIFICANT CHANGE UP (ref 42–55)
PCP SPEC-MCNC: SIGNIFICANT CHANGE UP
PCP UR-MCNC: NEGATIVE — SIGNIFICANT CHANGE UP
PH BLDMV: 7.48 — HIGH (ref 7.32–7.45)
PH BLDMV: 7.49 — HIGH (ref 7.32–7.45)
PH BLDV: 7.47 — HIGH (ref 7.32–7.43)
PHOSPHATE SERPL-MCNC: 2.2 MG/DL — LOW (ref 2.5–4.5)
PHOSPHATE SERPL-MCNC: 2.8 MG/DL — SIGNIFICANT CHANGE UP (ref 2.5–4.5)
PLATELET # BLD AUTO: 159 K/UL — SIGNIFICANT CHANGE UP (ref 150–400)
PO2 BLDV: 32 MMHG — SIGNIFICANT CHANGE UP (ref 25–45)
POTASSIUM BLDV-SCNC: 3.7 MMOL/L — SIGNIFICANT CHANGE UP (ref 3.5–5.1)
POTASSIUM SERPL-MCNC: 3.5 MMOL/L — SIGNIFICANT CHANGE UP (ref 3.5–5.3)
POTASSIUM SERPL-MCNC: 3.8 MMOL/L — SIGNIFICANT CHANGE UP (ref 3.5–5.3)
POTASSIUM SERPL-SCNC: 3.5 MMOL/L — SIGNIFICANT CHANGE UP (ref 3.5–5.3)
POTASSIUM SERPL-SCNC: 3.8 MMOL/L — SIGNIFICANT CHANGE UP (ref 3.5–5.3)
PROT SERPL-MCNC: 5.8 G/DL — LOW (ref 6–8.3)
PROT SERPL-MCNC: 6.4 G/DL — SIGNIFICANT CHANGE UP (ref 6–8.3)
PROTHROM AB SERPL-ACNC: 18.8 SEC — HIGH (ref 9.5–13)
PROTHROM AB SERPL-ACNC: 20.5 SEC — HIGH (ref 9.5–13)
RBC # BLD: 4.17 M/UL — LOW (ref 4.2–5.8)
RBC # FLD: 14.7 % — HIGH (ref 10.3–14.5)
SAO2 % BLDMV: 53 — LOW (ref 60–90)
SAO2 % BLDMV: 54.8 — LOW (ref 60–90)
SAO2 % BLDV: 45.7 % — LOW (ref 67–88)
SODIUM SERPL-SCNC: 139 MMOL/L — SIGNIFICANT CHANGE UP (ref 135–145)
SODIUM SERPL-SCNC: 142 MMOL/L — SIGNIFICANT CHANGE UP (ref 135–145)
THC UR QL: NEGATIVE — SIGNIFICANT CHANGE UP
TIBC SERPL-MCNC: 290 UG/DL — SIGNIFICANT CHANGE UP (ref 220–430)
TRANSFERRIN SERPL-MCNC: 239 MG/DL — SIGNIFICANT CHANGE UP (ref 200–360)
UIBC SERPL-MCNC: 274 UG/DL — SIGNIFICANT CHANGE UP (ref 110–370)
WBC # BLD: 6.83 K/UL — SIGNIFICANT CHANGE UP (ref 3.8–10.5)
WBC # FLD AUTO: 6.83 K/UL — SIGNIFICANT CHANGE UP (ref 3.8–10.5)

## 2024-03-06 PROCEDURE — 99223 1ST HOSP IP/OBS HIGH 75: CPT | Mod: 25

## 2024-03-06 PROCEDURE — 99291 CRITICAL CARE FIRST HOUR: CPT

## 2024-03-06 PROCEDURE — 99497 ADVNCD CARE PLAN 30 MIN: CPT | Mod: 25

## 2024-03-06 PROCEDURE — 71045 X-RAY EXAM CHEST 1 VIEW: CPT | Mod: 26

## 2024-03-06 PROCEDURE — 93010 ELECTROCARDIOGRAM REPORT: CPT

## 2024-03-06 PROCEDURE — 99292 CRITICAL CARE ADDL 30 MIN: CPT | Mod: 25

## 2024-03-06 RX ORDER — LOSARTAN POTASSIUM 100 MG/1
12.5 TABLET, FILM COATED ORAL DAILY
Refills: 0 | Status: DISCONTINUED | OUTPATIENT
Start: 2024-03-06 | End: 2024-03-07

## 2024-03-06 RX ORDER — MAGNESIUM SULFATE 500 MG/ML
2 VIAL (ML) INJECTION
Refills: 0 | Status: COMPLETED | OUTPATIENT
Start: 2024-03-06 | End: 2024-03-06

## 2024-03-06 RX ORDER — BUMETANIDE 0.25 MG/ML
2 INJECTION INTRAMUSCULAR; INTRAVENOUS ONCE
Refills: 0 | Status: COMPLETED | OUTPATIENT
Start: 2024-03-06 | End: 2024-03-06

## 2024-03-06 RX ORDER — SPIRONOLACTONE 25 MG/1
25 TABLET, FILM COATED ORAL DAILY
Refills: 0 | Status: DISCONTINUED | OUTPATIENT
Start: 2024-03-06 | End: 2024-03-14

## 2024-03-06 RX ORDER — FOLIC ACID 0.8 MG
1 TABLET ORAL DAILY
Refills: 0 | Status: DISCONTINUED | OUTPATIENT
Start: 2024-03-06 | End: 2024-03-26

## 2024-03-06 RX ORDER — PHYTONADIONE (VIT K1) 5 MG
5 TABLET ORAL ONCE
Refills: 0 | Status: COMPLETED | OUTPATIENT
Start: 2024-03-06 | End: 2024-03-06

## 2024-03-06 RX ORDER — THIAMINE MONONITRATE (VIT B1) 100 MG
500 TABLET ORAL DAILY
Refills: 0 | Status: DISCONTINUED | OUTPATIENT
Start: 2024-03-06 | End: 2024-03-07

## 2024-03-06 RX ORDER — POTASSIUM CHLORIDE 20 MEQ
40 PACKET (EA) ORAL ONCE
Refills: 0 | Status: COMPLETED | OUTPATIENT
Start: 2024-03-06 | End: 2024-03-06

## 2024-03-06 RX ADMIN — HEPARIN SODIUM 10 UNIT(S)/HR: 5000 INJECTION INTRAVENOUS; SUBCUTANEOUS at 08:43

## 2024-03-06 RX ADMIN — Medication 25 MILLIGRAM(S): at 05:58

## 2024-03-06 RX ADMIN — Medication 40 MILLIEQUIVALENT(S): at 17:20

## 2024-03-06 RX ADMIN — Medication 40 MILLIEQUIVALENT(S): at 01:23

## 2024-03-06 RX ADMIN — Medication 1 APPLICATION(S): at 17:21

## 2024-03-06 RX ADMIN — BUMETANIDE 2 MILLIGRAM(S): 0.25 INJECTION INTRAMUSCULAR; INTRAVENOUS at 05:57

## 2024-03-06 RX ADMIN — Medication 101 MILLIGRAM(S): at 14:54

## 2024-03-06 RX ADMIN — SPIRONOLACTONE 25 MILLIGRAM(S): 25 TABLET, FILM COATED ORAL at 11:55

## 2024-03-06 RX ADMIN — Medication 81 MILLIGRAM(S): at 11:55

## 2024-03-06 RX ADMIN — HEPARIN SODIUM 10 UNIT(S)/HR: 5000 INJECTION INTRAVENOUS; SUBCUTANEOUS at 11:56

## 2024-03-06 RX ADMIN — Medication 105 MILLIGRAM(S): at 14:54

## 2024-03-06 RX ADMIN — Medication 25 GRAM(S): at 17:20

## 2024-03-06 RX ADMIN — HEPARIN SODIUM 10 UNIT(S)/HR: 5000 INJECTION INTRAVENOUS; SUBCUTANEOUS at 19:42

## 2024-03-06 RX ADMIN — LOSARTAN POTASSIUM 12.5 MILLIGRAM(S): 100 TABLET, FILM COATED ORAL at 11:55

## 2024-03-06 RX ADMIN — CHLORHEXIDINE GLUCONATE 1 APPLICATION(S): 213 SOLUTION TOPICAL at 06:14

## 2024-03-06 RX ADMIN — Medication 25 GRAM(S): at 19:44

## 2024-03-06 RX ADMIN — Medication 40 MILLIEQUIVALENT(S): at 05:57

## 2024-03-06 RX ADMIN — Medication 1 APPLICATION(S): at 05:58

## 2024-03-06 NOTE — CONSULT NOTE ADULT - CONVERSATION DETAILS
d/w the patient about his advanced illness and lack of options for DMT. He has poor medical literacy; however, after using simple terms and taking advantage of [employer], he was able to give understanding about his advanced illness, lack of options for advanced therapies, and poor prognosis.  He expressed a great deal of sadness and frustration as he has had a rough life (his son  when he was 12yo. His sister has also ) and he came out of residential (spent there 15 years) to now know that he is going to "die." He wants to see if his condition can be stabilized so his life can be prolonged. He is open to things like, if needed, leaving the hospital with an infusion to support his heat function or going to a nursing home to support his care; however, as long as he is close to his family (has two brothers and some cousins living in [place] and [place]).     We discussed about completing a HCP form and he though his brother Maty Hunter was the ideal person to be his HCP; however, he did not want to complete a HCP from until his brother was to be with him (his brother is likely coming to visit him tomorrow). I gave the patient a HCP form and started to complete it, so he can finalize it with his brother tomorrow. Will reach out to the [facility] so she can f/u on that. 16' were spent in ACP while discussing about the importance of completing a HCP form.

## 2024-03-06 NOTE — DIETITIAN INITIAL EVALUATION ADULT - PERTINENT MEDS FT
MEDICATIONS  (STANDING):  AQUAPHOR (petrolatum Ointment) 1 Application(s) Topical every 12 hours  aspirin enteric coated 81 milliGRAM(s) Oral daily  chlorhexidine 2% Cloths 1 Application(s) Topical <User Schedule>  heparin  Infusion 1000 Unit(s)/Hr (10 mL/Hr) IV Continuous <Continuous>  insulin lispro (ADMELOG) corrective regimen sliding scale   SubCutaneous three times a day before meals  insulin lispro (ADMELOG) corrective regimen sliding scale   SubCutaneous at bedtime  losartan 12.5 milliGRAM(s) Oral daily    MEDICATIONS  (PRN):  sodium chloride 0.9% lock flush 10 milliLiter(s) IV Push every 1 hour PRN Pre/post blood products, medications, blood draw, and to maintain line patency

## 2024-03-06 NOTE — DIETITIAN INITIAL EVALUATION ADULT - PERSON TAUGHT/METHOD
noted with Acute on chronic HFrEF, however education deferred at this time. Priority education on adequate PO intake considering BMI <19, malnourished status, weight loss. Preferences obtained. Pt denies questions at this time.   RD remains available for further education as neede.d/verbal instruction/patient instructed

## 2024-03-06 NOTE — PROGRESS NOTE ADULT - ATTENDING COMMENTS
63/M with CAD s/p PCI few months back,  poly substance use alcohol (4 beers daily/ Half pint of liquor 2-3/weekly), SMoker for 30 years , cocaine use, reports he has stopped after last hospitalization, with h/o HFrEf ef 25%, with worsening EF and admitted with Cardiogenic shock to [facility] who was transferred to [facility] for further management.     Off note he has had PAD s/p procedure (pt doesn't remember) followed by compartment syndrome s/p fasciotomy. reports ~20lbs weight loss in last few months.    was transferred to CCU yesterday and Dopamine was changed to  5 and swan was placed. with weaning  pt dropped MVo2. was started on afterload and  weaned from 5--> 2.5 this AM.     Hemodynamics: RA 15, PA: 49/23(33), PCEP 20 , PA sat:61%  Lemuel CO/CI: 4.8/2.8   , BP: 103/58 on  2.5mcg/kg/min  RA11, PA: 37/16(26), PCWP 16, PA sat 55%, Lemuel CI 2.3  will cont bumex 2mg IV , goal u/o  monitor MVo2, perfusion labs  off   agree with switching to losartan from  Hydral/isordil   will consider [place] next     [facility]     discussed at length with pt regarding possible end stage HFrEF and it prognosis.   given recent polysubstance use, cardiac cachexia, deconditioning  and non complaince/multiple hospitalization, he is not a Advanced therapy candidate at this time.   Will get palliative care consult'  if unable to wean inotrope can consider palliative inotrope.

## 2024-03-06 NOTE — PROGRESS NOTE ADULT - ASSESSMENT
63 year old male with past medical history of HIV, CVA, HTN, HLD, prediabetes, A.fib, cocaine/heroin abuse, CAD w/ stent, CHF (EF  25-30% in 12/23),  RLE compartment syndrome s/p fasciotomy 12/23 and recent hospitalization for CHF exacerbation with bilateral pleural effusions requiring chest tube placement initially presenting to [facility] on 3/1 with chest pain and shortness of breath.  Transferred to [facility] for management of cardiogenic shock.  Now off inotropes and titrating afterload.     Cardiac imaging/studies:  TTE: severe RV dilation, moderate MR (3b), severe TR, LV thrombus, small effusion. EF 25%, LVID 5.2  3/5 Bedside swan numbers; CVP 15, PAP 45/21, wedge 20. MvO2 45 CO/CI 3.4/2.2 SVR 1482  3/6 Bedside swan numbers: CVP 6, PAP 34/15, wedge 15. MvO2 53 CO/CI 3.7/2.4 SVR 1383

## 2024-03-06 NOTE — CONSULT NOTE ADULT - PROBLEM SELECTOR RECOMMENDATION 3
See the [facility] note above.
- [id number] 4+  - Hold on BB in the setting of shock  - Hep gtt as above

## 2024-03-06 NOTE — DIETITIAN INITIAL EVALUATION ADULT - NS FNS DIET ORDER
Diet, DASH/TLC:   Sodium & Cholesterol Restricted  Consistent Carbohydrate {No Snacks} (CSTCHO)  No Pork (03-06-24 @ 06:29)

## 2024-03-06 NOTE — CONSULT NOTE ADULT - SUBJECTIVE AND OBJECTIVE BOX
Date of Service:03-06-24 @ 19:44  HPI:  63 year old male with past medical history of HIV, CVA, HTN, HLD, prediabetes, A.fib, cocaine/heroin abuse, CAD w/ stent, CHF (EF  25-30% in 12/23),  RLE compartment syndrome s/p fasciotomy 12/23 and recent hospitalization for CHF exacerbation with bilateral pleural effusions requiring chest tube placement initially presenting to [facility] on 3/1 with chest pain and shortness of breath. He states having intermittent 8/10 midsternal chest pain for the past few months since his leg operation. He was admitted to the outside hospital for management of CHF exacerbation, found to have a further reduced EF of 10 - 15% on 3/1. While admitted, he was hypotensive requiring dopamine infusion with worsening perfusion indices and ELIE. He was transferred to [facility] for cardiogenic shock management.     On admission to CICU, he is A&O x3.  sinus tach, / 78, saturating well on 4L nasal cannula with dopamine gtt infusing at 10 mcg/kg/min. He denies chest pain, shortness of breath, nausea and vomiting on admission. (04 Mar 2024 20:16)    PERTINENT PM/SXH:       FAMILY HISTORY:    Family Hx substance abuse [ ]yes [ ]no  ITEMS NOT CHECKED ARE NOT PRESENT    SOCIAL HISTORY:   Significant other/partner[ ]  Children[ ]  Orthodox/Spirituality:  Substance hx:  [ ]   Tobacco hx:  [ ]   Alcohol hx: [ ]   Home Opioid hx:  [ ] I-Stop Reference No:  Living Situation: [ ]Home  [ ]Long term care  [ ]Rehab [ ]Other    ADVANCE DIRECTIVES:    DNR/MOLST  [ ]  Living Will  [ ]   DECISION MAKER(s):  [ ] Health Care Proxy(s)  [ ] Surrogate(s)  [ ] Guardian           Name(s): Phone Number(s):    BASELINE (I)ADL(s) (prior to admission):  [place]: [ ]Total  [ ] Moderate [ ]Dependent    Allergies    No Known Allergies    Intolerances    MEDICATIONS  (STANDING):  AQUAPHOR (petrolatum Ointment) 1 Application(s) Topical every 12 hours  aspirin enteric coated 81 milliGRAM(s) Oral daily  chlorhexidine 2% Cloths 1 Application(s) Topical <User Schedule>  folic acid 1 milliGRAM(s) Oral daily  heparin  Infusion 1000 Unit(s)/Hr (10 mL/Hr) IV Continuous <Continuous>  insulin lispro (ADMELOG) corrective regimen sliding scale   SubCutaneous three times a day before meals  insulin lispro (ADMELOG) corrective regimen sliding scale   SubCutaneous at bedtime  losartan 12.5 milliGRAM(s) Oral daily  magnesium sulfate  IVPB 2 Gram(s) IV Intermittent every 2 hours  spironolactone 25 milliGRAM(s) Oral daily  thiamine IVPB 500 milliGRAM(s) IV Intermittent daily    MEDICATIONS  (PRN):  sodium chloride 0.9% lock flush 10 milliLiter(s) IV Push every 1 hour PRN Pre/post blood products, medications, blood draw, and to maintain line patency    PRESENT SYMPTOMS: [ ]Unable to self-report  [ ] CPOT [ ] PAINADs [ ] RDOS  Source if other than patient:  [ ]Family   [ ]Team     Pain: [ ]yes [ ]no  QOL impact -   Location -                    Aggravating factors -  Quality -  Radiation -  Timing-  Severity (0-10 scale):  Minimal acceptable level (0-10 scale):     CPOT:    https://www.[facility].org/getattachment/[email or web address]/Critical-Care-Pain-Observation-Tool-(CPOT)    PAINAD Score: See PAINAD tool and score below     Dyspnea:                           [ ]Mild [ ]Moderate [ ]Severe    RDOS: See RDOS tool and score below   0 to 2  minimal or no respiratory distress   3  mild distress  4 to 6 moderate distress  >7 severe distress      Anxiety:                             [ ]Mild [ ]Moderate [ ]Severe  Fatigue:                             [ ]Mild [ ]Moderate [ ]Severe  Nausea:                             [ ]Mild [ ]Moderate [ ]Severe  Loss of appetite:              [ ]Mild [ ]Moderate [ ]Severe  Constipation:                    [ ]Mild [ ]Moderate [ ]Severe    PCSSQ[Palliative Care Spiritual Screening Question]   Severity (0-10):  Score of 4 or > indicate consideration of Chaplaincy referral.  Chaplaincy Referral: [ ] yes [ ] refused [ ] following [ ] Deferred     Caregiver [place]? : [ ] yes [ ] no [ ] Deferred [ ] Declined             Social work referral [ ] Patient & Family Centered Care Referral [ ]     Anticipatory Grief present?:  [ ] yes [ ] no  [ ] Deferred                  Social work referral [ ] Chaplaincy Referral [ ]    		  Other Symptoms:  [ ]All other review of systems negative     Palliative Performance Status Version 2:   See PPSv2 tool and score below          PHYSICAL EXAM:  Vital Signs Last 24 Hrs  T(C): 36.5 (06 Mar 2024 16:00), Max: 36.5 (06 Mar 2024 16:00)  T(F): 97.7 (06 Mar 2024 16:00), Max: 97.7 (06 Mar 2024 16:00)  HR: 120 (06 Mar 2024 18:00) (100 - 121)  BP: 97/65 (06 Mar 2024 18:00) (88/53 - 124/57)  BP(mean): 76 (06 Mar 2024 18:00) (66 - 87)  RR: 25 (06 Mar 2024 18:00) (15 - 40)  SpO2: 98% (06 Mar 2024 18:00) (91% - 100%)    Parameters below as of 06 Mar 2024 18:00  Patient On (Oxygen Delivery Method): room air     I&O's Summary    05 Mar 2024 07:01  -  06 Mar 2024 07:00  --------------------------------------------------------  IN: 431.7 mL / OUT: 3725 mL / NET: -3293.3 mL    06 Mar 2024 07:01  -  06 Mar 2024 19:44  --------------------------------------------------------  IN: 680 mL / OUT: 2400 mL / NET: -1720 mL      GENERAL: [ ]Cachexia    [ ]Alert  [ ]Oriented x   [ ]Lethargic  [ ]Unarousable  [ ]Verbal  [ ]Non-Verbal  Behavioral:   [ ] Anxiety  [ ] Delirium [ ] Agitation [ ] Other  HEENT:  [ ]Normal   [ ]Dry mouth   [ ]ET Tube/Trach  [ ]Oral lesions  PULMONARY:   [ ]Clear [ ]Tachypnea  [ ]Audible excessive secretions   [ ]Rhonchi        [ ]Right [ ]Left [ ]Bilateral  [ ]Crackles        [ ]Right [ ]Left [ ]Bilateral  [ ]Wheezing     [ ]Right [ ]Left [ ]Bilateral  [ ]Diminished breath sounds [ ]right [ ]left [ ]bilateral  CARDIOVASCULAR:    [ ]Regular [ ]Irregular [ ]Tachy  [ ]Jonathon [ ]Murmur [ ]Other  GASTROINTESTINAL:  [ ]Soft  [ ]Distended   [ ]+BS  [ ]Non tender [ ]Tender  [ ]Other [ ]PEG [ ]OGT/ NGT  Last BM:  GENITOURINARY:  [ ]Normal [ ] Incontinent   [ ]Oliguria/Anuria   [ ]Choudhury  MUSCULOSKELETAL:   [ ]Normal   [ ]Weakness  [ ]Bed/Wheelchair bound [ ]Edema  NEUROLOGIC:   [ ]No focal deficits  [ ]Cognitive impairment  [ ]Dysphagia [ ]Dysarthria [ ]Paresis [ ]Other   SKIN:   [ ]Normal  [ ]Rash  [ ]Other  [ ]Pressure ulcer(s)       Present on admission [ ]y [ ]n    CRITICAL CARE:  [ ] Shock Present  [ ]Septic [ ]Cardiogenic [ ]Neurologic [ ]Hypovolemic  [ ]  Vasopressors [ ]  Inotropes   [ ]Respiratory failure present [ ]Mechanical ventilation [ ]Non-invasive ventilatory support [ ]High flow    [ ]Acute  [ ]Chronic [ ]Hypoxic  [ ]Hypercarbic [ ]Other  [ ]Other organ failure     LABS:                        8.9    6.83  )-----------( 159      ( 06 Mar 2024 03:58 )             27.2   03-06    139  |  103  |  49<H>  ----------------------------<  120<H>  3.5   |  26  |  1.50<H>    Ca    7.7<L>      06 Mar 2024 15:20  Phos  2.2     03-06  Mg     1.7     03-06    TPro  5.8<L>  /  Alb  2.5<L>  /  TBili  0.8  /  DBili  x   /  AST  234<H>  /  ALT  388<H>  /  AlkPhos  325<H>  03-06  PT/INR - ( 06 Mar 2024 11:24 )   PT: 18.8 sec;   INR: 1.82 ratio         PTT - ( 06 Mar 2024 11:24 )  PTT:73.5 sec    Urinalysis Basic - ( 06 Mar 2024 15:20 )    Color: x / Appearance: x / SG: x / pH: x  Gluc: 120 mg/dL / Ketone: x  / Bili: x / Urobili: x   Blood: x / Protein: x / Nitrite: x   Leuk Esterase: x / RBC: x / WBC x   Sq Epi: x / Non Sq Epi: x / Bacteria: x      RADIOLOGY & ADDITIONAL STUDIES:    PROTEIN CALORIE MALNUTRITION PRESENT: [ ]mild [ ]moderate [ ]severe [ ]underweight [ ]morbid obesity  https://www.andeal.org/vault/3140/web/files/ONC/Table_Clinical%20Characteristics%20to%20Document%20Malnutrition-White%20JV%20et%20al%014971.pdf    Height (cm): 172.7 (03-04-24 @ 19:45)  Weight (kg): 51.5 (03-04-24 @ 20:54)  BMI (kg/m2): 17.3 (03-04-24 @ 20:54)    [ ]PPSV2 < or = to 30% [ ]significant weight loss  [ ]poor nutritional intake  [ ]anasarca[ ]Artificial Nutrition      Other REFERRALS:  [ ]Hospice  [ ]Child Life  [ ]Social Work  [ ]Case management [ ]Holistic Therapy     Goals of Care Document:  Date of Service:03-06-24 @ 19:44  HPI:  63 year old male with past medical history of HIV, CVA, HTN, HLD, prediabetes, A.fib, cocaine/heroin abuse, CAD w/ stent, CHF (EF  25-30% in 12/23),  RLE compartment syndrome s/p fasciotomy 12/23 and recent hospitalization for CHF exacerbation with bilateral pleural effusions requiring chest tube placement initially presenting to [facility] on 3/1 with chest pain and shortness of breath. He states having intermittent 8/10 midsternal chest pain for the past few months since his leg operation. He was admitted to the outside hospital for management of CHF exacerbation, found to have a further reduced EF of 10 - 15% on 3/1. While admitted, he was hypotensive requiring dopamine infusion with worsening perfusion indices and ELIE. He was transferred to [facility] for cardiogenic shock management.     On admission to CICU, he is A&O x3.  sinus tach, / 78, saturating well on 4L nasal cannula with dopamine gtt infusing at 10 mcg/kg/min. He denies chest pain, shortness of breath, nausea and vomiting on admission. (04 Mar 2024 20:16)    This consult was called for GOC and ACP.     3/6. The patient was seen and examined. He denied any relevant pain or respiratory distress.     PERTINENT PM/SXH:       FAMILY HISTORY:    Family Hx substance abuse [ ]yes [ ]no  ITEMS NOT CHECKED ARE NOT PRESENT    SOCIAL HISTORY:   Significant other/partner[x ] No Children[ x] No   Gnosticist/Spirituality: Jainism   Substance hx:  [ ]   Tobacco hx:  [ ]   Alcohol hx: [ ]   Home Opioid hx:  [ ] I-Stop Reference No:  Living Situation: [x ]Home  [ ]Long term care  [ ]Rehab [ ]Other  Until a few months ago, he was living in a shelter; however, during the last few months, he moved out of the shelter system and has been living in a room.   ADVANCE DIRECTIVES:    DNR/MOLST  [ ]  Living Will  [ ]   DECISION MAKER(s):  [ ] Health Care Proxy(s)  [x ] Surrogate(s)  [ ] Guardian           Name(s): Phone Number(s): Brothers. However, see my GOC note below.     BASELINE (I)ADL(s) (prior to admission):  [place]: [ x]Total  [ ] Moderate [ ]Dependent    Allergies    No Known Allergies    Intolerances    MEDICATIONS  (STANDING):  AQUAPHOR (petrolatum Ointment) 1 Application(s) Topical every 12 hours  aspirin enteric coated 81 milliGRAM(s) Oral daily  chlorhexidine 2% Cloths 1 Application(s) Topical <User Schedule>  folic acid 1 milliGRAM(s) Oral daily  heparin  Infusion 1000 Unit(s)/Hr (10 mL/Hr) IV Continuous <Continuous>  insulin lispro (ADMELOG) corrective regimen sliding scale   SubCutaneous three times a day before meals  insulin lispro (ADMELOG) corrective regimen sliding scale   SubCutaneous at bedtime  losartan 12.5 milliGRAM(s) Oral daily  magnesium sulfate  IVPB 2 Gram(s) IV Intermittent every 2 hours  spironolactone 25 milliGRAM(s) Oral daily  thiamine IVPB 500 milliGRAM(s) IV Intermittent daily    MEDICATIONS  (PRN):  sodium chloride 0.9% lock flush 10 milliLiter(s) IV Push every 1 hour PRN Pre/post blood products, medications, blood draw, and to maintain line patency    PRESENT SYMPTOMS: [ ]Unable to self-report  [ ] CPOT [ ] PAINADs [ ] RDOS  Source if other than patient:  [ ]Family   [ ]Team     Pain: [ ]yes [x ]no  QOL impact -   Location -                    Aggravating factors -  Quality -  Radiation -  Timing-  Severity (0-10 scale):  Minimal acceptable level (0-10 scale):     CPOT:    https://www.[facility].org/getattachment/[email or web address]/Critical-Care-Pain-Observation-Tool-(CPOT)    PAINAD Score: See PAINAD tool and score below     Dyspnea:                           [ ]Mild [ ]Moderate [ ]Severe    RDOS: See RDOS tool and score below   0 to 2  minimal or no respiratory distress   3  mild distress  4 to 6 moderate distress  >7 severe distress      Anxiety:                             [ ]Mild [ ]Moderate [ ]Severe  Fatigue:                             [ ]Mild [ ]Moderate [ ]Severe  Nausea:                             [ ]Mild [ ]Moderate [ ]Severe  Loss of appetite:              [ ]Mild [ ]Moderate [ ]Severe  Constipation:                    [ ]Mild [ ]Moderate [ ]Severe    PCSSQ[Palliative Care Spiritual Screening Question]   Severity (0-10):  Score of 4 or > indicate consideration of Chaplaincy referral.  Chaplaincy Referral: [x ] yes [ ] refused [ ] following [ ] Deferred     Caregiver [place]? : [ ] yes [x ] no [ ] Deferred [ ] Declined             Social work referral [ ] Patient & Family Centered Care Referral [ ]     Anticipatory Grief present?:  [ ] yes [ ] no  [x ] Deferred                  Social work referral [ ] Chaplaincy Referral [ ]    		  Other Symptoms:  [x ]All other review of systems negative     Palliative Performance Status Version 2:   See PPSv2 tool and score below          PHYSICAL EXAM:  Vital Signs Last 24 Hrs  T(C): 36.5 (06 Mar 2024 16:00), Max: 36.5 (06 Mar 2024 16:00)  T(F): 97.7 (06 Mar 2024 16:00), Max: 97.7 (06 Mar 2024 16:00)  HR: 120 (06 Mar 2024 18:00) (100 - 121)  BP: 97/65 (06 Mar 2024 18:00) (88/53 - 124/57)  BP(mean): 76 (06 Mar 2024 18:00) (66 - 87)  RR: 25 (06 Mar 2024 18:00) (15 - 40)  SpO2: 98% (06 Mar 2024 18:00) (91% - 100%)    Parameters below as of 06 Mar 2024 18:00  Patient On (Oxygen Delivery Method): room air     I&O's Summary    05 Mar 2024 07:01  -  06 Mar 2024 07:00  --------------------------------------------------------  IN: 431.7 mL / OUT: 3725 mL / NET: -3293.3 mL    06 Mar 2024 07:01  -  06 Mar 2024 19:44  --------------------------------------------------------  IN: 680 mL / OUT: 2400 mL / NET: -1720 mL      GENERAL: [x ]Cachexia    [x ]Alert  [ ]Oriented x 3  [ ]Lethargic  [ ]Unarousable  [ ]Verbal  [ ]Non-Verbal  Behavioral:   [ ] Anxiety  [ ] Delirium [ ] Agitation [ ] Other  HEENT:  [x ]Normal   [ ]Dry mouth   [ ]ET Tube/Trach  [ ]Oral lesions  PULMONARY:   [ ]Clear [ ]Tachypnea  [ ]Audible excessive secretions   [ ]Rhonchi        [ ]Right [ ]Left [ ]Bilateral  [ ]Crackles        [ ]Right [ ]Left [ ]Bilateral  [ ]Wheezing     [ ]Right [ ]Left [ ]Bilateral  [ x]Diminished breath sounds [ ]right [ ]left [ x]bilateral  [x] No labored breathing.   CARDIOVASCULAR:    [x ]Regular [ ]Irregular [x ]Tachy  [ ]Jonathon [ ]Murmur [ ]Other  GASTROINTESTINAL:  [ x]Soft  [ ]Distended   [x ]+BS  [x ]Non tender [ ]Tender  [ ]Other [ ]PEG [ ]OGT/ NGT  Last BM: 3/3  GENITOURINARY:  [ x]Normal [ ] Incontinent   [ ]Oliguria/Anuria   [ ]Choudhury  MUSCULOSKELETAL:   [ ]Normal   [ x]Weakness  [ ]Bed/Wheelchair bound [ ]Edema  NEUROLOGIC:   [x ]No focal deficits  [ ]Cognitive impairment  [ ]Dysphagia [ ]Dysarthria [ ]Paresis [ ]Other   SKIN:   [ ]Normal  [ ]Rash  [ ]Other  [ ]Pressure ulcer(s)       Present on admission [ ]y [ ]n    CRITICAL CARE:  [ ] Shock Present  [ ]Septic [ ]Cardiogenic [ ]Neurologic [ ]Hypovolemic  [ ]  Vasopressors [ ]  Inotropes   [ ]Respiratory failure present [ ]Mechanical ventilation [ ]Non-invasive ventilatory support [ ]High flow    [ ]Acute  [ ]Chronic [ ]Hypoxic  [ ]Hypercarbic [ ]Other  [ ]Other organ failure     LABS:                        8.9    6.83  )-----------( 159      ( 06 Mar 2024 03:58 )             27.2   03-06    139  |  103  |  49<H>  ----------------------------<  120<H>  3.5   |  26  |  1.50<H>    Ca    7.7<L>      06 Mar 2024 15:20  Phos  2.2     03-06  Mg     1.7     03-06    TPro  5.8<L>  /  Alb  2.5<L>  /  TBili  0.8  /  DBili  x   /  AST  234<H>  /  ALT  388<H>  /  AlkPhos  325<H>  03-06  PT/INR - ( 06 Mar 2024 11:24 )   PT: 18.8 sec;   INR: 1.82 ratio         PTT - ( 06 Mar 2024 11:24 )  PTT:73.5 sec    Urinalysis Basic - ( 06 Mar 2024 15:20 )    Color: x / Appearance: x / SG: x / pH: x  Gluc: 120 mg/dL / Ketone: x  / Bili: x / Urobili: x   Blood: x / Protein: x / Nitrite: x   Leuk Esterase: x / RBC: x / WBC x   Sq Epi: x / Non Sq Epi: x / Bacteria: x      RADIOLOGY & ADDITIONAL STUDIES:  < from: TTE W or WO Ultrasound Enhancing Agent (03.05.24 @ 07:31) >   1. Left ventricular cavity is severely dilated. Left ventricular systolic function is severely decreased. Global left ventricular hypokinesis.   2. Severely enlarged right ventricular cavity size and severely reduced systolic function.   3. Moderate mitral regurgitation. Mechanism of mitral regurgitation: Andrei Type IIIb (restricted leaflet motion secondary to left atrial or left ventricular dilatation).   4. Severe tricuspid regurgitation.   5. Mild to moderate pulmonary hypertension.   6. 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   7. Small pericardial effusion with no evidence of hemodynamic compromise (or echocardiographic evidence of cardiac tamponade).    < end of copied text >    PROTEIN CALORIE MALNUTRITION PRESENT: [ ]mild [ ]moderate [ ]severe [ ]underweight [ ]morbid obesity  https://www.andeal.org/vault/2440/web/files/ONC/Table_Clinical%20Characteristics%20to%20Document%20Malnutrition-White%20JV%20et%20al%202012.pdf    Height (cm): 172.7 (03-04-24 @ 19:45)  Weight (kg): 51.5 (03-04-24 @ 20:54)  BMI (kg/m2): 17.3 (03-04-24 @ 20:54)    [ ]PPSV2 < or = to 30% [ ]significant weight loss  [ ]poor nutritional intake  [ ]anasarca[ ]Artificial Nutrition      Other REFERRALS:  [ ]Hospice  [ ]Child Life  [ ]Social Work  [ ]Case management [ ]Holistic Therapy     Goals of Care Document:

## 2024-03-06 NOTE — CONSULT NOTE ADULT - PROBLEM SELECTOR RECOMMENDATION 2
- Hep gtt
SW to f/u for resources and support.   Understanding his h/o of heroine use, if becoming dependent on inotropics and thinking about DC on it, I will believe that only DC to a NH will be possible, as he will need supervision to make sure there is not risk of using his central access for other means.

## 2024-03-06 NOTE — PROGRESS NOTE ADULT - ASSESSMENT
Assessment:  63 year old male with past medical history of HIV, CVA, HTN, HLD, prediabetes, A.fib, cocaine/heroin abuse, CAD w/ stent, CHF (EF  25-30% in , now 10 - 15 % as of TTE on 3/1),  RLE compartment syndrome s/p fasciotomy  presented to outside hospital with chest pain and shortness of breath, admitted for CHF exacerbation, transferred to [facility] for cardiogenic shock management     Plan:  Neuro  #hx CVA  - A&O x3, poor historian with medical history and medications  - continue to monitor per protocol    Respiratory  #hypoxia/pleural effusions  - noted to have pleural effusions at outside hospital, likely in the setting of volume overload s/t chf  - saturating well on 4L, wean as tolerated with diuresis  - continue to monitor sp02    Cardiovascular  #Cardiogenic shock  - i/s/o CHF exacerbation  - TTE 3/1 EF 10 - 15%, previously 25 - 30%   - dopamine gtt transitioned to dobutamine  - swan placed overnight  - Elevated CVP with low MVo2  -  gtt 2.5, wean as tolerated  - Hydral 25 TID  - Losartan 25  - diuresis as needed  - trend perfusion indices, mv02    - TTE 3/5: EF 25%, global LV hypokinesis, rounded and protruding LV thrombus in apex, RV severely enlarged with severely reduced RVSF, mild-mod pulm HTN, severe TR, mild-mod W[place]    #CAD  - w/ history of stents in december as per patient  - previously on aspirin, continue    #pAF  - ? hx a.fib  - sinus on admission  - not on blood thinner or rate control agent as per patient, only aspirin & plavix  - anticoagulation on hold with INR > 2    #HTN  - continue to trend BP    #HLD  - holding statin i/s/o liver dysfunction    GI  #transaminitis  - worsening liver ezymes at outside hospital  - hold hepatotoxins, continue to trend    - diet as tolerated, monitor for BM    /Renal  #ELIE  - admitted from outside hospital with mitchell catheter  - continue strict I&O, electrolyte monitoring    Endo  #preDM  - f.u a1c  - trend glucose, ISS while inpatient    Heme  - elevated INR on admission, i/s/o liver dsyfunction, continue to trend  - H/H and platelets stable at outside hospital  - INR <2, started heparin gtt overnight    ID  - afebrile on admission  - continue to trend wbc and fever curve    #RLE fasciotomy  - admitted from outside hospital with ace wrap in place  - noted to have wounds on lateral and medial aspect of calf  - f/u wound consult recommendations    #full code  #lines: PIV    ======================= DISPOSITION  =====================  [X] Critical   [ ] Guarded    [ ] Stable    [X] Maintain in CICU  [ ] Downgrade to Telemetry  [ ] Discharge Home    Kellie Blakely PA-C

## 2024-03-06 NOTE — PROGRESS NOTE ADULT - SUBJECTIVE AND OBJECTIVE BOX
CARMENCITA GIBSON  MRN-[id number]  Patient is a 63y old  Male who presents with a chief complaint of cardiogenic shock (06 Mar 2024 19:44)    HPI:  63 year old male with past medical history of HIV, CVA, HTN, HLD, prediabetes, A.fib, cocaine/heroin abuse, CAD w/ stent, CHF (EF  25-30% in ),  RLE compartment syndrome s/p fasciotomy  and recent hospitalization for CHF exacerbation with bilateral pleural effusions requiring chest tube placement initially presenting to [facility] on 3/1 with chest pain and shortness of breath. He states having intermittent 8/10 midsternal chest pain for the past few months since his leg operation. He was admitted to the outside hospital for management of CHF exacerbation, found to have a further reduced EF of 10 - 15% on 3/1. While admitted, he was hypotensive requiring dopamine infusion with worsening perfusion indices and ELIE. He was transferred to [facility] for cardiogenic shock management.     On admission to CICU, he is A&O x3.  sinus tach, / 78, saturating well on 4L nasal cannula with dopamine gtt infusing at 10 mcg/kg/min. He denies chest pain, shortness of breath, nausea and vomiting on admission. (04 Mar 2024 20:16)      Hospital Course:    24 HOUR EVENTS:    REVIEW OF SYSTEMS:    CONSTITUTIONAL: No weakness, fevers or chills  EYES/ENT: No visual changes;  No vertigo or throat pain   NECK: No pain or stiffness  RESPIRATORY: No cough, wheezing, hemoptysis; No shortness of breath  CARDIOVASCULAR: No chest pain or palpitations  GASTROINTESTINAL: No abdominal or epigastric pain. No nausea, vomiting, or hematemesis; No diarrhea or constipation. No melena or hematochezia.  GENITOURINARY: No dysuria, frequency or hematuria  NEUROLOGICAL: No numbness or weakness  SKIN: No itching, rashes      ICU Vital Signs Last 24 Hrs  T(C): 36.4 (06 Mar 2024 20:00), Max: 36.5 (06 Mar 2024 16:00)  T(F): 97.5 (06 Mar 2024 20:00), Max: 97.7 (06 Mar 2024 16:00)  HR: 122 (06 Mar 2024 20:00) (100 - 122)  BP: 90/60 (06 Mar 2024 20:00) (77/52 - 124/57)  BP(mean): 72 (06 Mar 2024 20:00) (60 - 85)  ABP: --  ABP(mean): --  RR: 21 (06 Mar 2024 20:00) (15 - 40)  SpO2: 99% (06 Mar 2024 20:00) (91% - 100%)    O2 Parameters below as of 06 Mar 2024 20:00  Patient On (Oxygen Delivery Method): room air            CVP(mm Hg): 10 (24 @ 16:00) (2 - 25)  CO: 3.5 (24 @ 14:00) (3.5 - 3.5)  CI: 2.1 (24 @ 14:00) (2.1 - 2.1)  PA: 35/18 (24 @ 16:00) (28/7 - 55/25)  PA(mean): 25 (24 @ 16:00) (16 - 39)  PA(direct): --  PCWP: --  LA: --  RA: --  SVR: 1483 (24 @ 14:00) (1483 - 1483)  SVRI: 2473 (24 @ 14:00) (2473 - 2473)  PVR: --  PVRI: --  I&O's Summary    05 Mar 2024 07:01  -  06 Mar 2024 07:00  --------------------------------------------------------  IN: 431.7 mL / OUT: 3725 mL / NET: -3293.3 mL    06 Mar 2024 07:01  -  06 Mar 2024 20:19  --------------------------------------------------------  IN: 715 mL / OUT: 2650 mL / NET: -1935 mL        CAPILLARY BLOOD GLUCOSE    CAPILLARY BLOOD GLUCOSE      POCT Blood Glucose.: 116 mg/dL (06 Mar 2024 16:35)      PHYSICAL EXAM:  GENERAL: No acute distress, well-developed  HEAD:  Atraumatic, Normocephalic  EYES: EOMI, PERRLA, conjunctiva and sclera clear  NECK: Supple, no lymphadenopathy, no JVD  CHEST/LUNG: CTAB; No wheezes, rales, or rhonchi  HEART: Regular rate and rhythm. Normal S1/S2. No murmurs, rubs, or gallops  ABDOMEN: Soft, non-tender, non-distended; normal bowel sounds, no organomegaly  EXTREMITIES:  2+ peripheral pulses b/l, No clubbing, cyanosis, or edema  NEUROLOGY: A&O x 3, no focal deficits  SKIN: No rashes or lesions    ============================I/O===========================   I&O's Detail    05 Mar 2024 07:  -  06 Mar 2024 07:00  --------------------------------------------------------  IN:    DOBUTamine: 11.7 mL    Heparin: 120 mL    Oral Fluid: 300 mL  Total IN: 431.7 mL    OUT:    Voided (mL): 3725 mL  Total OUT: 3725 mL    Total NET: -3293.3 mL      06 Mar 2024 07:01  -  06 Mar 2024 20:19  --------------------------------------------------------  IN:    Heparin: 130 mL    IV PiggyBack: 225 mL    Oral Fluid: 360 mL  Total IN: 715 mL    OUT:    Voided (mL): 2650 mL  Total OUT: 2650 mL    Total NET: -1935 mL        ============================ LABS =========================                        8.9    6.83  )-----------( 159      ( 06 Mar 2024 03:58 )             27.2         139  |  103  |  49<H>  ----------------------------<  120<H>  3.5   |  26  |  1.50<H>    Ca    7.7<L>      06 Mar 2024 15:20  Phos  2.2       Mg     1.7         TPro  5.8<L>  /  Alb  2.5<L>  /  TBili  0.8  /  DBili  x   /  AST  234<H>  /  ALT  388<H>  /  AlkPhos  325<H>      Troponin T, High Sensitivity Result: 93 ng/L (24 @ 22:07)              LIVER FUNCTIONS - ( 06 Mar 2024 15:20 )  Alb: 2.5 g/dL / Pro: 5.8 g/dL / ALK PHOS: 325 U/L / ALT: 388 U/L / AST: 234 U/L / GGT: x           PT/INR - ( 06 Mar 2024 11:24 )   PT: 18.8 sec;   INR: 1.82 ratio         PTT - ( 06 Mar 2024 11:24 )  PTT:73.5 sec    Blood Gas Venous - Lactate: 1.1 mmol/L (24 @ 16:35)  Lactate, Blood: 2.0 mmol/L (24 @ 03:58)  Blood Gas Venous - Lactate: 1.8 mmol/L (24 @ 03:42)  Blood Gas Venous - Lactate: 1.9 mmol/L (24 @ 17:49)  Blood Gas Venous - Lactate: 2.1 mmol/L (24 @ 13:50)  Blood Gas Venous - Lactate: 1.9 mmol/L (24 @ 10:08)  Lactate, Blood: 2.1 mmol/L (24 @ 04:52)  Blood Gas Venous - Lactate: 1.8 mmol/L (24 @ 00:19)  Blood Gas Venous - Lactate: 2.2 mmol/L (24 @ 22:37)  Blood Gas Venous - Lactate: 2.9 mmol/L (24 @ 20:15)  Blood Gas Venous - Lactate: 2.8 mmol/L (24 @ 20:15)    Urinalysis Basic - ( 06 Mar 2024 15:20 )    Color: x / Appearance: x / SG: x / pH: x  Gluc: 120 mg/dL / Ketone: x  / Bili: x / Urobili: x   Blood: x / Protein: x / Nitrite: x   Leuk Esterase: x / RBC: x / WBC x   Sq Epi: x / Non Sq Epi: x / Bacteria: x  ======================Micro/Rad/Cardio=================  Telemtry: Reviewed   EKG: Reviewed  CXR: Reviewed  Culture: Reviewed   Echo:   Cath:   ======================================================  PAST MEDICAL & SURGICAL HISTORY:    ====================ASSESSMENT ==============  63 year old male with past medical history of HIV, CVA, HTN, HLD, prediabetes, A.fib, cocaine/heroin abuse, CAD w/ stent, CHF (EF  25-30% in , now 10 - 15 % as of TTE on 3/1),  RLE compartment syndrome s/p fasciotomy  presented to outside hospital with chest pain and shortness of breath, admitted for CHF exacerbation, transferred to [facility] for cardiogenic shock management     Plan:  Neuro  #hx CVA  - A&O x3, poor historian with medical history and medications  - continue to monitor per protocol    Respiratory  #hypoxia/pleural effusions  - noted to have pleural effusions at outside hospital, likely in the setting of volume overload s/t chf  - saturating well on 4L, wean as tolerated with diuresis  - continue to monitor sp02    Cardiovascular  #Cardiogenic shock  - i/s/o CHF exacerbation  - TTE 3/1 EF 10 - 15%, previously 25 - 30%   - dopamine gtt transitioned to dobutamine  - swan placed overnight  - Elevated CVP with low MVo2  -  gtt 2.5, wean as tolerated  - Hydral 25 TID  - Losartan 25  - diuresis as needed  - trend perfusion indices, mv02    - TTE 3/5: EF 25%, global LV hypokinesis, rounded and protruding LV thrombus in apex, RV severely enlarged with severely reduced RVSF, mild-mod pulm HTN, severe TR, mild-mod [place]    #CAD  - w/ history of stents in december as per patient  - previously on aspirin, continue    #pAF  - ? hx a.fib  - sinus on admission  - not on blood thinner or rate control agent as per patient, only aspirin & plavix  - anticoagulation on hold with INR > 2    #HTN  - continue to trend BP    #HLD  - holding statin i/s/o liver dysfunction    GI  #transaminitis  - worsening liver ezymes at outside hospital  - hold hepatotoxins, continue to trend    - diet as tolerated, monitor for BM    /Renal  #ELIE  - admitted from outside hospital with mitchell catheter  - continue strict I&O, electrolyte monitoring    Endo  #preDM  - f.u a1c  - trend glucose, ISS while inpatient    Heme  - elevated INR on admission, i/s/o liver dsyfunction, continue to trend  - H/H and platelets stable at outside hospital  - INR <2, started heparin gtt overnight    ID  - afebrile on admission  - continue to trend wbc and fever curve    #RLE fasciotomy  - admitted from outside hospital with ace wrap in place  - noted to have wounds on lateral and medial aspect of calf  - f/u wound consult recommendations    #full code  #lines: PIV    ======================= DISPOSITION  =====================  [X] Critical   [ ] Guarded    [ ] Stable    [X] Maintain in CICU  [ ] Downgrade to Telemetry  [ ] Discharge Home    Kellie Blakely PA-C      Patient requires continuous monitoring with bedside rhythm monitoring, pulse ox monitoring, and intermittent blood gas analysis. Care plan discussed with ICU care team. Patient remained critical and at risk for life threatening decompensation.  Patient seen, examined and plan discussed with CCU team during rounds.     I have personally provided ____ minutes of critical care time excluding time spent on separate procedures, in addition to initial critical care time provided by the CICU Attending, Dr. Izquierdo.     By signing my name below, I, Lu Jimenez, attest that this documentation has been prepared under the direction and in the presence of Tammi Canas NP.  Electronically signed: Vinay Vasquez, 24 @ 20:19    I, Tammi Canas , personally performed the services described in this documentation. all medical record entries made by the scribe were at my direction and in my presence. I have reviewed the chart and agree that the record reflects my personal performance and is accurate and complete  Electronically signed: Tammi Canas NP.        CARMENCITA GIBSON  MRN-[id number]  Patient is a 63y old  Male who presents with a chief complaint of cardiogenic shock (06 Mar 2024 19:44)    HPI:  63 year old male with past medical history of HIV, CVA, HTN, HLD, prediabetes, A.fib, cocaine/heroin abuse, CAD w/ stent, CHF (EF  25-30% in ),  RLE compartment syndrome s/p fasciotomy  and recent hospitalization for CHF exacerbation with bilateral pleural effusions requiring chest tube placement initially presenting to [facility] on 3/1 with chest pain and shortness of breath. He states having intermittent 8/10 midsternal chest pain for the past few months since his leg operation. He was admitted to the outside hospital for management of CHF exacerbation, found to have a further reduced EF of 10 - 15% on 3/1. While admitted, he was hypotensive requiring dopamine infusion with worsening perfusion indices and ELIE. He was transferred to [facility] for cardiogenic shock management.     On admission to CICU, he is A&O x3.  sinus tach, / 78, saturating well on 4L nasal cannula with dopamine gtt infusing at 10 mcg/kg/min. He denies chest pain, shortness of breath, nausea and vomiting on admission. (04 Mar 2024 20:16)      Hospital Course:    24 HOUR EVENTS:    REVIEW OF SYSTEMS:    CONSTITUTIONAL: No weakness, fevers or chills  EYES/ENT: No visual changes;  No vertigo or throat pain   NECK: No pain or stiffness  RESPIRATORY: No cough, wheezing, hemoptysis; No shortness of breath  CARDIOVASCULAR: No chest pain or palpitations  GASTROINTESTINAL: No abdominal or epigastric pain. No nausea, vomiting, or hematemesis; No diarrhea or constipation. No melena or hematochezia.  GENITOURINARY: No dysuria, frequency or hematuria  NEUROLOGICAL: No numbness or weakness  SKIN: No itching, rashes      ICU Vital Signs Last 24 Hrs  T(C): 36.4 (06 Mar 2024 20:00), Max: 36.5 (06 Mar 2024 16:00)  T(F): 97.5 (06 Mar 2024 20:00), Max: 97.7 (06 Mar 2024 16:00)  HR: 122 (06 Mar 2024 20:00) (100 - 122)  BP: 90/60 (06 Mar 2024 20:00) (77/52 - 124/57)  BP(mean): 72 (06 Mar 2024 20:00) (60 - 85)  ABP: --  ABP(mean): --  RR: 21 (06 Mar 2024 20:00) (15 - 40)  SpO2: 99% (06 Mar 2024 20:00) (91% - 100%)    O2 Parameters below as of 06 Mar 2024 20:00  Patient On (Oxygen Delivery Method): room air            CVP(mm Hg): 10 (24 @ 16:00) (2 - 25)  CO: 3.5 (24 @ 14:00) (3.5 - 3.5)  CI: 2.1 (24 @ 14:00) (2.1 - 2.1)  PA: 35/18 (24 @ 16:00) (28/7 - 55/25)  PA(mean): 25 (24 @ 16:00) (16 - 39)  PA(direct): --  PCWP: --  LA: --  RA: --  SVR: 1483 (24 @ 14:00) (1483 - 1483)  SVRI: 2473 (24 @ 14:00) (2473 - 2473)  PVR: --  PVRI: --  I&O's Summary    05 Mar 2024 07:01  -  06 Mar 2024 07:00  --------------------------------------------------------  IN: 431.7 mL / OUT: 3725 mL / NET: -3293.3 mL    06 Mar 2024 07:01  -  06 Mar 2024 20:19  --------------------------------------------------------  IN: 715 mL / OUT: 2650 mL / NET: -1935 mL        CAPILLARY BLOOD GLUCOSE    CAPILLARY BLOOD GLUCOSE      POCT Blood Glucose.: 116 mg/dL (06 Mar 2024 16:35)      PHYSICAL EXAM:  GENERAL: No acute distress, well-developed  HEAD:  Atraumatic, Normocephalic  EYES: EOMI, PERRLA, conjunctiva and sclera clear  NECK: Supple, no lymphadenopathy, no JVD  CHEST/LUNG: CTAB; No wheezes, rales, or rhonchi  HEART: Regular rate and rhythm. Normal S1/S2. No murmurs, rubs, or gallops  ABDOMEN: Soft, non-tender, non-distended; normal bowel sounds, no organomegaly  EXTREMITIES:  2+ peripheral pulses b/l, No clubbing, cyanosis, or edema  NEUROLOGY: A&O x 3, no focal deficits  SKIN: No rashes or lesions    ============================I/O===========================   I&O's Detail    05 Mar 2024 07:  -  06 Mar 2024 07:00  --------------------------------------------------------  IN:    DOBUTamine: 11.7 mL    Heparin: 120 mL    Oral Fluid: 300 mL  Total IN: 431.7 mL    OUT:    Voided (mL): 3725 mL  Total OUT: 3725 mL    Total NET: -3293.3 mL      06 Mar 2024 07:01  -  06 Mar 2024 20:19  --------------------------------------------------------  IN:    Heparin: 130 mL    IV PiggyBack: 225 mL    Oral Fluid: 360 mL  Total IN: 715 mL    OUT:    Voided (mL): 2650 mL  Total OUT: 2650 mL    Total NET: -1935 mL        ============================ LABS =========================                        8.9    6.83  )-----------( 159      ( 06 Mar 2024 03:58 )             27.2         139  |  103  |  49<H>  ----------------------------<  120<H>  3.5   |  26  |  1.50<H>    Ca    7.7<L>      06 Mar 2024 15:20  Phos  2.2       Mg     1.7         TPro  5.8<L>  /  Alb  2.5<L>  /  TBili  0.8  /  DBili  x   /  AST  234<H>  /  ALT  388<H>  /  AlkPhos  325<H>      Troponin T, High Sensitivity Result: 93 ng/L (24 @ 22:07)              LIVER FUNCTIONS - ( 06 Mar 2024 15:20 )  Alb: 2.5 g/dL / Pro: 5.8 g/dL / ALK PHOS: 325 U/L / ALT: 388 U/L / AST: 234 U/L / GGT: x           PT/INR - ( 06 Mar 2024 11:24 )   PT: 18.8 sec;   INR: 1.82 ratio         PTT - ( 06 Mar 2024 11:24 )  PTT:73.5 sec    Blood Gas Venous - Lactate: 1.1 mmol/L (24 @ 16:35)  Lactate, Blood: 2.0 mmol/L (24 @ 03:58)  Blood Gas Venous - Lactate: 1.8 mmol/L (24 @ 03:42)  Blood Gas Venous - Lactate: 1.9 mmol/L (24 @ 17:49)  Blood Gas Venous - Lactate: 2.1 mmol/L (24 @ 13:50)  Blood Gas Venous - Lactate: 1.9 mmol/L (24 @ 10:08)  Lactate, Blood: 2.1 mmol/L (24 @ 04:52)  Blood Gas Venous - Lactate: 1.8 mmol/L (24 @ 00:19)  Blood Gas Venous - Lactate: 2.2 mmol/L (24 @ 22:37)  Blood Gas Venous - Lactate: 2.9 mmol/L (24 @ 20:15)  Blood Gas Venous - Lactate: 2.8 mmol/L (24 @ 20:15)    Urinalysis Basic - ( 06 Mar 2024 15:20 )    Color: x / Appearance: x / SG: x / pH: x  Gluc: 120 mg/dL / Ketone: x  / Bili: x / Urobili: x   Blood: x / Protein: x / Nitrite: x   Leuk Esterase: x / RBC: x / WBC x   Sq Epi: x / Non Sq Epi: x / Bacteria: x  ======================Micro/Rad/Cardio=================  Telemtry: Reviewed   EKG: Reviewed  CXR: Reviewed  Culture: Reviewed   Echo:   Cath:   ======================================================  PAST MEDICAL & SURGICAL HISTORY:    ====================ASSESSMENT ==============  63 year old male with past medical history of HIV, CVA, HTN, HLD, prediabetes, A.fib, cocaine/heroin abuse, CAD w/ stent, CHF (EF  25-30% in , now 10 - 15 % as of TTE on 3/1),  RLE compartment syndrome s/p fasciotomy  presented to outside hospital with chest pain and shortness of breath, admitted for CHF exacerbation, transferred to [facility] for cardiogenic shock management     Plan:  Neuro  #hx CVA  - A&O x3, poor historian with medical history and medications  - continue to monitor per protocol    Respiratory  #hypoxia/pleural effusions  - noted to have pleural effusions at outside hospital, likely in the setting of volume overload s/t chf  - saturating well on 4L, wean as tolerated with diuresis  - continue to monitor sp02    Cardiovascular  #Cardiogenic shock  - i/s/o CHF exacerbation  - TTE 3/1 EF 10 - 15%, previously 25 - 30%   - dopamine gtt transitioned to dobutamine  - swan placed yesterday, now d/c   - Elevated CVP with low MVo2  -  gtt 2.5, wean off today   - Hydral 25 TID d/c due to episodes of hypotension   - Losartan 12.5 started today   - diuresis as needed  - trend perfusion indices, mv02    - TTE 3/5: EF 25%, global LV hypokinesis, rounded and protruding LV thrombus in apex, RV severely enlarged with severely reduced RVSF, mild-mod pulm HTN, severe TR, mild-mod [place]    #CAD  - w/ history of stents in december as per patient  - previously on aspirin, continue    #pAF  - ? hx a.fib  - sinus on admission  - not on blood thinner or rate control agent as per patient, only aspirin & plavix  - heparin gtt per protocol     #HTN  - continue to trend BP    #HLD  - holding statin i/s/o liver dysfunction    GI  #transaminitis  - worsening liver ezymes at outside hospital  - hold hepatotoxins, continue to trend  - diet as tolerated, monitor for BM    /Renal  #ELIE  - admitted from outside hospital with mitchell catheter  - continue strict I&O, electrolyte monitoring    Endo  #preDM  - f.u a1c  - trend glucose, ISS while inpatient    Heme  - elevated INR on admission, i/s/o liver dsyfunction, continue to trend  - H/H and platelets stable at outside hospital  - INR <2, started heparin gtt overnight    ID  - afebrile on admission  - continue to trend wbc and fever curve    #RLE fasciotomy  - admitted from outside hospital with ace wrap in place  - noted to have wounds on lateral and medial aspect of calf  - f/u wound consult recommendations    #full code  #lines: PIV    ======================= DISPOSITION  =====================  [X] Critical   [ ] Guarded    [ ] Stable    [X] Maintain in CICU  [ ] Downgrade to Telemetry  [ ] Discharge Home    Kellie Blakely PA-C      Patient requires continuous monitoring with bedside rhythm monitoring, pulse ox monitoring, and intermittent blood gas analysis. Care plan discussed with ICU care team. Patient remained critical and at risk for life threatening decompensation.  Patient seen, examined and plan discussed with CCU team during rounds.     I have personally provided __30__ minutes of critical care time excluding time spent on separate procedures, in addition to initial critical care time provided by the CICU Attending, Dr. Izquierdo.     By signing my name below, I, Lu Jimenez, attest that this documentation has been prepared under the direction and in the presence of Tammi Canas NP.  Electronically signed: Rafael Vasquez, 24 @ 20:19    I, Tammi Canas , personally performed the services described in this documentation. all medical record entries made by the rafael were at my direction and in my presence. I have reviewed the chart and agree that the record reflects my personal performance and is accurate and complete  Electronically signed: Tammi Canas NP.

## 2024-03-06 NOTE — DIETITIAN INITIAL EVALUATION ADULT - OTHER INFO
-BUN/Cr elevated, ELIE per team  -liver enzymes elevated   -insulin regimen noted to maintain glycemic control

## 2024-03-06 NOTE — CONSULT NOTE ADULT - ASSESSMENT
full note to f/u.     d/w the patient about his advanced illness and lack of options for DMT. He expressed a great deal of sadness and frustration as he has had a rough life (his son  when he was 10yo. His sister ) and he came out of custodial (spent there 15 years) to now know that he is going to "die." He wants to see if his condition can be stablized and trying to prolong his life and is open to things like going to a nursing home to support his care; however, as long as if he is close to his family (has two brothers and some cousins living in [place] and [place]). He is [place] and would like the  to visit him.     We discussed about completing a HCP form and he though his brother Maty Hunter was the ideal person to be his HCP; however, he did not want to complete a HCP from until his brother was to be with him (his brother is likely coming to visit him tomorrow). I gave the patient a HCP from and started to complete it, so he can finalize it with his brother tomorrow. Will reach out to the [facility] SW so she can f/u on that.     I will be away until Monday. When I come back, I will try to f/u regarding code status.         Stan Delarosa MD  Associate Chief Geriatrics and Palliative Care ([facility]) [facility] Director [place] [facility]   , [employer] at [facility]      Please contact me via Teams from Monday through Friday between 9am-5pm. If not answering, please call the palliative care pager [phone]    After 5pm and on weekends, please see the contact information below:    In the event of newly developing, evolving, or worsening symptoms, please contact the Palliative Medicine team via pager (if the patient is at [facility] #[phone] or if the patient is at [facility] #[phone]) The Geriatric and Palliative Medicine service has coverage 24 hours a day/ 7 days a week to provide medical recommendations regarding symptom management needs via telephone   63 year old male with past medical history of HIV, CVA, HTN, HLD, prediabetes, A.fib, cocaine/heroin abuse, CAD w/ stent, CHF (EF  25-30% in 12/23),  RLE compartment syndrome s/p fasciotomy 12/23 and recent hospitalization for CHF exacerbation with bilateral pleural effusions requiring chest tube placement initially presenting to [facility] on 3/1 with chest pain and shortness of breath.  He was admitted to the outside hospital for management of CHF exacerbation, found to have a further reduced EF of 10 - 15% on 3/1. While admitted, he was hypotensive requiring dopamine infusion with worsening perfusion indices and ELIE. He was transferred to [facility] for cardiogenic shock management. Geriatrics and Palliative Medicine ([facility]) Team was called for GOC and ACP.

## 2024-03-06 NOTE — DIETITIAN INITIAL EVALUATION ADULT - ENERGY INTAKE
visually, pt appears to be missing some teeth however does not report any issues on current diet ordered./Adequate (%)

## 2024-03-06 NOTE — CONSULT NOTE ADULT - TIME BILLING
Total time spent including the following  [x] Physical chart review and documentation   An extensive review of the physical chart, electronic health record, and documentation was conducted to obtain collateral information including but not limited to:   - Current inpatient records (ED, H&P, primary team, and consultants [Heart Failure   ])   - Inpatient values/results (CBC, CMP, Imaging)   - Current or proposed treatment plans   - Pharmacotherapy review (including I-STOP if applicable)  [x]discussion with the patient.   [x]Physical Exam and/or review of systems   [x]Formulation of assessment and plan     The 16______ minutes spent in ACP (  See GOC note above               ) were independent to the time spent in time based billing

## 2024-03-06 NOTE — PROGRESS NOTE ADULT - SUBJECTIVE AND OBJECTIVE BOX
CARMENCITA GIBSON  MRN-[id number]  Patient is a 63y old  Male who presents with a chief complaint of cardiogenic shock (05 Mar 2024 19:28)    HPI:  63 year old male with past medical history of HIV, CVA, HTN, HLD, prediabetes, A.fib, cocaine/heroin abuse, CAD w/ stent, CHF (EF  25-30% in ),  RLE compartment syndrome s/p fasciotomy  and recent hospitalization for CHF exacerbation with bilateral pleural effusions requiring chest tube placement initially presenting to [facility] on 3/1 with chest pain and shortness of breath. He states having intermittent 8/10 midsternal chest pain for the past few months since his leg operation. He was admitted to the outside hospital for management of CHF exacerbation, found to have a further reduced EF of 10 - 15% on 3/1. While admitted, he was hypotensive requiring dopamine infusion with worsening perfusion indices and ELIE. He was transferred to [facility] for cardiogenic shock management.     On admission to CICU, he is A&O x3.  sinus tach, / 78, saturating well on 4L nasal cannula with dopamine gtt infusing at 10 mcg/kg/min. He denies chest pain, shortness of breath, nausea and vomiting on admission. (04 Mar 2024 20:16)      24 HOUR EVENTS:  - weaned off  gtt yesterday  - started losartan  - hydral 25  - started heparin gtt    REVIEW OF SYSTEMS:  CONSTITUTIONAL: No weakness, fevers or chills  EYES/ENT: No visual changes;  No vertigo or throat pain   NECK: No pain or stiffness  RESPIRATORY: No cough, wheezing, hemoptysis; No shortness of breath  CARDIOVASCULAR: No chest pain or palpitations  GASTROINTESTINAL: No abdominal or epigastric pain. No nausea, vomiting, or hematemesis; No diarrhea or constipation. No melena or hematochezia.  GENITOURINARY: No dysuria, frequency or hematuria  NEUROLOGICAL: No numbness or weakness  SKIN: No itching, rashes      ICU Vital Signs Last 24 Hrs  T(C): 36.2 (06 Mar 2024 03:00), Max: 36.3 (05 Mar 2024 07:30)  T(F): 97.2 (06 Mar 2024 03:00), Max: 97.4 (05 Mar 2024 07:30)  HR: 118 (06 Mar 2024 06:00) (106 - 118)  BP: 91/66 (06 Mar 2024 06:00) (91/60 - 133/83)  BP(mean): 75 (06 Mar 2024 06:00) (70 - 103)  ABP: --  ABP(mean): --  RR: 26 (06 Mar 2024 06:00) (15 - 40)  SpO2: 96% (06 Mar 2024 06:00) (88% - 100%)    O2 Parameters below as of 06 Mar 2024 06:00  Patient On (Oxygen Delivery Method): room air            CVP(mm Hg): 14 (24 @ 06:00) (2 - 25)  CO: 3.5 (24 @ 14:00) (3.5 - 3.5)  CI: 2.1 (24 @ 14:00) (2.1 - 2.1)  PA: 44/19 (24 @ 06:00) (34/12 - 55/25)  PA(mean): 29 (24 @ 06:00) (19 - 39)  SVR: 1483 (24 @ 14:00) (1483 - 1483)  SVRI: 2473 (24 @ 14:00) (2473 - 2473)    I&O's Summary    04 Mar 2024 07:01  -  05 Mar 2024 07:00  --------------------------------------------------------  IN: 77.4 mL / OUT: 2520 mL / NET: -2442.6 mL    05 Mar 2024 07:01  -  06 Mar 2024 06:56  --------------------------------------------------------  IN: 421.7 mL / OUT: 3725 mL / NET: -3303.3 mL        CAPILLARY BLOOD GLUCOSE    CAPILLARY BLOOD GLUCOSE      POCT Blood Glucose.: 98 mg/dL (06 Mar 2024 06:20)      PHYSICAL EXAM:  GENERAL: No acute distress, well-developed  HEAD:  Atraumatic, Normocephalic  EYES: EOMI, PERRLA, conjunctiva and sclera clear  NECK: Supple, no lymphadenopathy, no JVD  CHEST/LUNG: CTAB; No wheezes, rales, or rhonchi  HEART: Regular rate and rhythm. Normal S1/S2. No murmurs, rubs, or gallops  ABDOMEN: Soft, non-tender, non-distended; normal bowel sounds, no organomegaly  EXTREMITIES:  2+ peripheral pulses b/l, No clubbing, cyanosis, or edema  NEUROLOGY: A&O x 3, no focal deficits  SKIN: (+) Fasciotomy on RLE with good healing    ============================I/O===========================   I&O's Detail    04 Mar 2024 07:01  -  05 Mar 2024 07:00  --------------------------------------------------------  IN:    DOBUTamine: 7.8 mL    DOBUTamine: 69.6 mL  Total IN: 77.4 mL    OUT:    Indwelling Catheter - Urethral (mL): 470 mL    Voided (mL): 2050 mL  Total OUT: 2520 mL    Total NET: -2442.6 mL      05 Mar 2024 07:01  -  06 Mar 2024 06:56  --------------------------------------------------------  IN:    DOBUTamine: 11.7 mL    Heparin: 110 mL    Oral Fluid: 300 mL  Total IN: 421.7 mL    OUT:    Voided (mL): 3725 mL  Total OUT: 3725 mL    Total NET: -3303.3 mL        ============================ LABS =========================                        8.9    6.83  )-----------( 159      ( 06 Mar 2024 03:58 )             27.2         142  |  103  |  54<H>  ----------------------------<  78  3.8   |  28  |  1.83<H>    Ca    8.4      06 Mar 2024 03:58  Phos  2.8       Mg     2.1         TPro  6.4  /  Alb  3.0<L>  /  TBili  0.8  /  DBili  x   /  AST  302<H>  /  ALT  462<H>  /  AlkPhos  376<H>      Troponin T, High Sensitivity Result: 93 ng/L (24 @ 22:07)              LIVER FUNCTIONS - ( 06 Mar 2024 03:58 )  Alb: 3.0 g/dL / Pro: 6.4 g/dL / ALK PHOS: 376 U/L / ALT: 462 U/L / AST: 302 U/L / GGT: x           PT/INR - ( 06 Mar 2024 03:58 )   PT: 20.5 sec;   INR: 1.90 ratio         PTT - ( 06 Mar 2024 03:58 )  PTT:62.1 sec    Lactate, Blood: 2.0 mmol/L (24 @ 03:58)  Blood Gas Venous - Lactate: 1.8 mmol/L (24 @ 03:42)  Blood Gas Venous - Lactate: 1.9 mmol/L (24 @ 17:49)  Blood Gas Venous - Lactate: 2.1 mmol/L (24 @ 13:50)  Blood Gas Venous - Lactate: 1.9 mmol/L (24 @ 10:08)  Lactate, Blood: 2.1 mmol/L (24 @ 04:52)  Blood Gas Venous - Lactate: 1.8 mmol/L (24 @ 00:19)  Blood Gas Venous - Lactate: 2.2 mmol/L (24 @ 22:37)  Blood Gas Venous - Lactate: 2.9 mmol/L (24 @ 20:15)  Blood Gas Venous - Lactate: 2.8 mmol/L (24 @ 20:15)    Urinalysis Basic - ( 06 Mar 2024 03:58 )    Color: x / Appearance: x / SG: x / pH: x  Gluc: 78 mg/dL / Ketone: x  / Bili: x / Urobili: x   Blood: x / Protein: x / Nitrite: x   Leuk Esterase: x / RBC: x / WBC x   Sq Epi: x / Non Sq Epi: x / Bacteria: x      ======================Micro/Rad/Cardio=================  Telemetry: Reviewed   EKG: Reviewed  CXR: Reviewed  Culture: Reviewed   Echo:   Cath:           CARMENCITA GIBSON  MRN-[id number]  Patient is a 63y old  Male who presents with a chief complaint of cardiogenic shock (05 Mar 2024 19:28)    HPI:  63 year old male with past medical history of HIV, CVA, HTN, HLD, prediabetes, A.fib, cocaine/heroin abuse, CAD w/ stent, CHF (EF  25-30% in ),  RLE compartment syndrome s/p fasciotomy  and recent hospitalization for CHF exacerbation with bilateral pleural effusions requiring chest tube placement initially presenting to [facility] on 3/1 with chest pain and shortness of breath. He states having intermittent 8/10 midsternal chest pain for the past few months since his leg operation. He was admitted to the outside hospital for management of CHF exacerbation, found to have a further reduced EF of 10 - 15% on 3/1. While admitted, he was hypotensive requiring dopamine infusion with worsening perfusion indices and ELIE. He was transferred to [facility] for cardiogenic shock management.     On admission to CICU, he is A&O x3.  sinus tach, / 78, saturating well on 4L nasal cannula with dopamine gtt infusing at 10 mcg/kg/min. He denies chest pain, shortness of breath, nausea and vomiting on admission. (04 Mar 2024 20:16)      24 HOUR EVENTS:  - weaned off  gtt yesterday  - started losartan  - hydral 25  - started heparin gtt    REVIEW OF SYSTEMS:  CONSTITUTIONAL: No weakness, fevers or chills  EYES/ENT: No visual changes;  No vertigo or throat pain   NECK: No pain or stiffness  RESPIRATORY: No cough, wheezing, hemoptysis; No shortness of breath  CARDIOVASCULAR: No chest pain or palpitations  GASTROINTESTINAL: No abdominal or epigastric pain. No nausea, vomiting, or hematemesis; No diarrhea or constipation. No melena or hematochezia.  GENITOURINARY: No dysuria, frequency or hematuria  NEUROLOGICAL: No numbness or weakness  SKIN: No itching, rashes      ICU Vital Signs Last 24 Hrs  T(C): 36.2 (06 Mar 2024 03:00), Max: 36.3 (05 Mar 2024 07:30)  T(F): 97.2 (06 Mar 2024 03:00), Max: 97.4 (05 Mar 2024 07:30)  HR: 118 (06 Mar 2024 06:00) (106 - 118)  BP: 91/66 (06 Mar 2024 06:00) (91/60 - 133/83)  BP(mean): 75 (06 Mar 2024 06:00) (70 - 103)  RR: 26 (06 Mar 2024 06:00) (15 - 40)  SpO2: 96% (06 Mar 2024 06:00) (88% - 100%)    O2 Parameters below as of 06 Mar 2024 06:00  Patient On (Oxygen Delivery Method): room air      CVP(mm Hg): 14 (24 @ 06:00) (2 - 25)  CO: 3.5 (24 @ 14:00) (3.5 - 3.5)  CI: 2.1 (24 @ 14:00) (2.1 - 2.1)  PA: 44/19 (24 @ 06:00) (34/12 - 55/25)  PA(mean): 29 (24 @ 06:00) (19 - 39)  SVR: 1483 (24 @ 14:00) (1483 - 1483)  SVRI: 2473 (24 @ 14:00) (2473 - 2473)    I&O's Summary    04 Mar 2024 07:01  -  05 Mar 2024 07:00  --------------------------------------------------------  IN: 77.4 mL / OUT: 2520 mL / NET: -2442.6 mL    05 Mar 2024 07:01  -  06 Mar 2024 06:56  --------------------------------------------------------  IN: 421.7 mL / OUT: 3725 mL / NET: -3303.3 mL      POCT Blood Glucose.: 98 mg/dL (06 Mar 2024 06:20)      PHYSICAL EXAM:  GENERAL: No acute distress, well-developed  HEAD:  Atraumatic, Normocephalic  EYES: EOMI, PERRLA, conjunctiva and sclera clear  NECK: Supple, no lymphadenopathy, no JVD  CHEST/LUNG: CTAB; No wheezes, rales, or rhonchi  HEART: Regular rate and rhythm. Normal S1/S2. No murmurs, rubs, or gallops  ABDOMEN: Soft, non-tender, non-distended; normal bowel sounds, no organomegaly  EXTREMITIES:  2+ peripheral pulses b/l, No clubbing, cyanosis, or edema  NEUROLOGY: A&O x 3, no focal deficits  SKIN: (+) Fasciotomy on RLE with good healing    ============================I/O===========================   I&O's Detail    04 Mar 2024 07:01  -  05 Mar 2024 07:00  --------------------------------------------------------  IN:    DOBUTamine: 7.8 mL    DOBUTamine: 69.6 mL  Total IN: 77.4 mL    OUT:    Indwelling Catheter - Urethral (mL): 470 mL    Voided (mL): 2050 mL  Total OUT: 2520 mL    Total NET: -2442.6 mL      05 Mar 2024 07:01  -  06 Mar 2024 06:56  --------------------------------------------------------  IN:    DOBUTamine: 11.7 mL    Heparin: 110 mL    Oral Fluid: 300 mL  Total IN: 421.7 mL    OUT:    Voided (mL): 3725 mL  Total OUT: 3725 mL    Total NET: -3303.3 mL        ============================ LABS =========================                        8.9    6.83  )-----------( 159      ( 06 Mar 2024 03:58 )             27.2         142  |  103  |  54<H>  ----------------------------<  78  3.8   |  28  |  1.83<H>    Ca    8.4      06 Mar 2024 03:58  Phos  2.8       Mg     2.1         TPro  6.4  /  Alb  3.0<L>  /  TBili  0.8  /  DBili  x   /  AST  302<H>  /  ALT  462<H>  /  AlkPhos  376<H>      Troponin T, High Sensitivity Result: 93 ng/L (24 @ 22:07)              LIVER FUNCTIONS - ( 06 Mar 2024 03:58 )  Alb: 3.0 g/dL / Pro: 6.4 g/dL / ALK PHOS: 376 U/L / ALT: 462 U/L / AST: 302 U/L / GGT: x           PT/INR - ( 06 Mar 2024 03:58 )   PT: 20.5 sec;   INR: 1.90 ratio         PTT - ( 06 Mar 2024 03:58 )  PTT:62.1 sec    Lactate, Blood: 2.0 mmol/L (24 @ 03:58)  Blood Gas Venous - Lactate: 1.8 mmol/L (24 @ 03:42)  Blood Gas Venous - Lactate: 1.9 mmol/L (24 @ 17:49)  Blood Gas Venous - Lactate: 2.1 mmol/L (24 @ 13:50)  Blood Gas Venous - Lactate: 1.9 mmol/L (24 @ 10:08)  Lactate, Blood: 2.1 mmol/L (24 @ 04:52)  Blood Gas Venous - Lactate: 1.8 mmol/L (24 @ 00:19)  Blood Gas Venous - Lactate: 2.2 mmol/L (24 @ 22:37)  Blood Gas Venous - Lactate: 2.9 mmol/L (24 @ 20:15)  Blood Gas Venous - Lactate: 2.8 mmol/L (24 @ 20:15)    Urinalysis Basic - ( 06 Mar 2024 03:58 )    Color: x / Appearance: x / SG: x / pH: x  Gluc: 78 mg/dL / Ketone: x  / Bili: x / Urobili: x   Blood: x / Protein: x / Nitrite: x   Leuk Esterase: x / RBC: x / WBC x   Sq Epi: x / Non Sq Epi: x / Bacteria: x      ======================Micro/Rad/Cardio=================  Telemetry: Reviewed   EKG: Reviewed  CXR: Reviewed  Culture: Reviewed   Echo:   Cath:

## 2024-03-06 NOTE — DIETITIAN INITIAL EVALUATION ADULT - PERTINENT LABORATORY DATA
03-06    142  |  103  |  54<H>  ----------------------------<  78  3.8   |  28  |  1.83<H>    Ca    8.4      06 Mar 2024 03:58  Phos  2.8     03-06  Mg     2.1     03-06    TPro  6.4  /  Alb  3.0<L>  /  TBili  0.8  /  DBili  x   /  AST  302<H>  /  ALT  462<H>  /  AlkPhos  376<H>  03-06  POCT Blood Glucose.: 98 mg/dL (03-06-24 @ 06:20)  A1C with Estimated Average Glucose Result: 6.2 % (03-04-24 @ 20:42)

## 2024-03-06 NOTE — DIETITIAN INITIAL EVALUATION ADULT - REASON FOR ADMISSION
per chart: 63 year old male with past medical history of HIV, CVA, HTN, HLD, prediabetes, A.fib, cocaine/heroin abuse, CAD w/ stent, CHF (EF  25-30% in 12/23),  RLE compartment syndrome s/p fasciotomy 12/23 and recent hospitalization for CHF exacerbation with bilateral pleural effusions requiring chest tube placement initially presenting to [facility] on 3/1 with chest pain and shortness of breath."

## 2024-03-06 NOTE — DIETITIAN INITIAL EVALUATION ADULT - CONTINUE CURRENT NUTRITION CARE PLAN
given blood glucose levels <140mg/dL, consider d/c carbohydrate restriction. Change DASH to low sodium. Continue no pork per pt preferences. Add Glucerna two times/day to aid in meeting nutrient needs.

## 2024-03-06 NOTE — DIETITIAN INITIAL EVALUATION ADULT - ORAL INTAKE PTA/DIET HISTORY
visited pt at bedside this morning. reports to be eating fine PTA, not following a therapeutic diet. confirms to not eat pork. confirms NKFA. PMHx of pre diabetes noted. Current Hgb A1c 6.2% confirming. does not report to check blood glucose levels PTA. no DM medications noted in outpatient medications.

## 2024-03-06 NOTE — PROGRESS NOTE ADULT - SUBJECTIVE AND OBJECTIVE BOX
Patricio San MD  Cardiology Fellow  [phone]  All Cardiology service information can be found  on [email or web address], password: [email or web address]    Patient seen and examined at bedside.  AF HR 110s-120s BP 80s-90s/50s-60s 94-98% Ra  CVP 6 PA 34/15 wedge ~ 15-16  Cre downtrending to 2.1 > 1.94 > 1.83   Off  since yesterday afternoon. Starting on losartan     Review Of Systems: No chest pain, shortness of breath, or palpitations            Current Meds:  AQUAPHOR (petrolatum Ointment) 1 Application(s) Topical every 12 hours  aspirin enteric coated 81 milliGRAM(s) Oral daily  chlorhexidine 2% Cloths 1 Application(s) Topical <User Schedule>  folic acid 1 milliGRAM(s) Oral daily  heparin  Infusion 1000 Unit(s)/Hr IV Continuous <Continuous>  insulin lispro (ADMELOG) corrective regimen sliding scale   SubCutaneous three times a day before meals  insulin lispro (ADMELOG) corrective regimen sliding scale   SubCutaneous at bedtime  losartan 12.5 milliGRAM(s) Oral daily  phytonadione  IVPB 5 milliGRAM(s) IV Intermittent once  sodium chloride 0.9% lock flush 10 milliLiter(s) IV Push every 1 hour PRN  spironolactone 25 milliGRAM(s) Oral daily  thiamine IVPB 500 milliGRAM(s) IV Intermittent daily    Vitals:  T(F): 97.5 (-), Max: 97.5 ()  HR: 120 () (110 - 120)  BP: 88/61 (-) (88/61 - 124/57)  RR: 15 (-)  SpO2: 94% ()  I&O's Summary    05 Mar 2024 07:  -  06 Mar 2024 07:00  --------------------------------------------------------  IN: 431.7 mL / OUT: 3725 mL / NET: -3293.3 mL    06 Mar 2024 07:01  -  06 Mar 2024 13:27  --------------------------------------------------------  IN: 170 mL / OUT: 1700 mL / NET: -1530 mL    Physical Exam:  GENERAL: frail appearing, hypophonic   HEAD:  Atraumatic, Normocephalic  ENT: EOMI, PERRLA, conjunctiva and sclera clear, Neck supple, JVD mildly elevated  CHEST/LUNG: Clear to auscultation bilaterally; No wheeze, equal breath sounds bilaterally   BACK: No spinal tenderness  HEART: Regular rate and rhythm; No murmurs, rubs, or gallops  ABDOMEN: Soft, Nontender, Nondistended; Bowel sounds present  EXTREMITIES:  No clubbing, cyanosis, or edema  PSYCH: Nl behavior, nl affect  NEUROLOGY: AAOx3, non-focal, cranial nerves intact  SKIN: Normal color, No rashes or lesions                          8.9    6.83  )-----------( 159      ( 06 Mar 2024 03:58 )             27.2     03-06    142  |  103  |  54<H>  ----------------------------<  78  3.8   |  28  |  1.83<H>    Ca    8.4      06 Mar 2024 03:58  Phos  2.8     03-06  Mg     2.1     03-06    TPro  6.4  /  Alb  3.0<L>  /  TBili  0.8  /  DBili  x   /  AST  302<H>  /  ALT  462<H>  /  AlkPhos  376<H>  03-06    PT/INR - ( 06 Mar 2024 11:24 )   PT: 18.8 sec;   INR: 1.82 ratio         PTT - ( 06 Mar 2024 11:24 )  PTT:73.5 sec  CARDIAC MARKERS ( 04 Mar 2024 22:07 )  93 ng/L / x     / x     / x     / x     / x

## 2024-03-06 NOTE — CONSULT NOTE ADULT - PROBLEM SELECTOR RECOMMENDATION 9
TTE: severe RV dilation, moderate MR (3b), severe TR, LV thrombus, small effusion. EF 25%, LVID 5.2  Congested nephropathy and hepatopathy  Etiology:   - Likely polysubstance abuse contributing (etoh, cocaine, active use as of 1 month ago). f/u utox  - Needs an ischemic evaluation  - TSH high, f/u fT3 and fT4  Inotropes/GDMT  - On  2.5, wean off in favor of afterload. Can start with hydral 25mg tid and add isordil 10mg tid   - Diurese with bumex 2mg IV bid. Cre 2.1, unknown baseline. Expect to improve with decongestion  - f/u iron studies, if ferritin < 100 or transferrin sat < 20% would benefit from IV iron   AT:  - Given recent substance use, not an advanced therapies candidate  - patient confirms at bedside that he would like to be FULL CODE
HF Rx as per the CICU and HF teams.

## 2024-03-06 NOTE — DIETITIAN INITIAL EVALUATION ADULT - PHYSCIAL ASSESSMENT
Pt reports UBW to be 130 pounds and to be stable PTA.   Dosing weight this admission 113.5 pounds. Weight loss indicated.   RD will continue to monitor trends.

## 2024-03-06 NOTE — CONSULT NOTE ADULT - PROBLEM SELECTOR RECOMMENDATION 4
- Utox
I will be on administrative duties on 3/7-8. When I come back (3/11) I will try to f/u regarding code status. If there is a need to address code status earlier and the primary team is having challenges with that discussion, please call the palliative care team ([phone] or [phone]) so one of the covering providers can assist.   The patient is Confucianism and a Chaplaincy ordered was entered.       Stan Delarosa MD  Associate Chief Geriatrics and Palliative Care (Ga[facility]) [facility] Director Ga[facility] Consult Service   , [employer] [facility]      Please contact me via Teams from Monday through Friday between 9am-5pm. If not answering, please call the palliative care pager [phone]    After 5pm and on weekends, please see the contact information below:    In the event of newly developing, evolving, or worsening symptoms, please contact the Palliative Medicine team via pager (if the patient is at [facility] #[phone] or if the patient is at [facility] #[phone]) The Geriatric and Palliative Medicine service has coverage 24 hours a day/ 7 days a week to provide medical recommendations regarding symptom management needs via telephone

## 2024-03-07 DIAGNOSIS — Z51.5 ENCOUNTER FOR PALLIATIVE CARE: ICD-10-CM

## 2024-03-07 DIAGNOSIS — Z71.89 OTHER SPECIFIED COUNSELING: ICD-10-CM

## 2024-03-07 LAB
ALBUMIN SERPL ELPH-MCNC: 2.9 G/DL — LOW (ref 3.3–5)
ALP SERPL-CCNC: 321 U/L — HIGH (ref 40–120)
ALT FLD-CCNC: 371 U/L — HIGH (ref 10–45)
AMPHET UR-MCNC: NEGATIVE NG/ML — SIGNIFICANT CHANGE UP
ANION GAP SERPL CALC-SCNC: 11 MMOL/L — SIGNIFICANT CHANGE UP (ref 5–17)
ANISOCYTOSIS BLD QL: SLIGHT — SIGNIFICANT CHANGE UP
APTT BLD: 71.6 SEC — HIGH (ref 24.5–35.6)
AST SERPL-CCNC: 212 U/L — HIGH (ref 10–40)
BARBITURATES UR QL SCN: NEGATIVE NG/ML — SIGNIFICANT CHANGE UP
BARBITURATES UR-MCNC: NEGATIVE NG/ML — SIGNIFICANT CHANGE UP
BASE EXCESS BLDV CALC-SCNC: 4.2 MMOL/L — HIGH (ref -2–3)
BASOPHILS # BLD AUTO: 0 K/UL — SIGNIFICANT CHANGE UP (ref 0–0.2)
BASOPHILS NFR BLD AUTO: 0 % — SIGNIFICANT CHANGE UP (ref 0–2)
BENZODIAZ UR-MCNC: NEGATIVE NG/ML — SIGNIFICANT CHANGE UP
BILIRUB SERPL-MCNC: 0.7 MG/DL — SIGNIFICANT CHANGE UP (ref 0.2–1.2)
BUN SERPL-MCNC: 48 MG/DL — HIGH (ref 7–23)
BURR CELLS BLD QL SMEAR: PRESENT — SIGNIFICANT CHANGE UP
CA-I SERPL-SCNC: 1.1 MMOL/L — LOW (ref 1.15–1.33)
CALCIUM SERPL-MCNC: 7.9 MG/DL — LOW (ref 8.4–10.5)
CHLORIDE BLDV-SCNC: 100 MMOL/L — SIGNIFICANT CHANGE UP (ref 96–108)
CHLORIDE SERPL-SCNC: 101 MMOL/L — SIGNIFICANT CHANGE UP (ref 96–108)
CO2 BLDV-SCNC: 31 MMOL/L — HIGH (ref 22–26)
CO2 SERPL-SCNC: 26 MMOL/L — SIGNIFICANT CHANGE UP (ref 22–31)
COCAINE METAB.OTHER UR-MCNC: NEGATIVE NG/ML — SIGNIFICANT CHANGE UP
CREAT SERPL-MCNC: 1.59 MG/DL — HIGH (ref 0.5–1.3)
CREATININE, URINE THERAPEUTIC: 12.5 MG/DL — LOW (ref 20–300)
DACRYOCYTES BLD QL SMEAR: SLIGHT — SIGNIFICANT CHANGE UP
EGFR: 48 ML/MIN/1.73M2 — LOW
EOSINOPHIL # BLD AUTO: 0 K/UL — SIGNIFICANT CHANGE UP (ref 0–0.5)
EOSINOPHIL NFR BLD AUTO: 0 % — SIGNIFICANT CHANGE UP (ref 0–6)
FENTANYL RESULT, UR: NEGATIVE NG/ML — SIGNIFICANT CHANGE UP
FENTANYL UR QL SCN: NEGATIVE NG/ML — SIGNIFICANT CHANGE UP
GAS PNL BLDV: 135 MMOL/L — LOW (ref 136–145)
GAS PNL BLDV: SIGNIFICANT CHANGE UP
GIANT PLATELETS BLD QL SMEAR: PRESENT — SIGNIFICANT CHANGE UP
GLUCOSE BLDC GLUCOMTR-MCNC: 109 MG/DL — HIGH (ref 70–99)
GLUCOSE BLDC GLUCOMTR-MCNC: 114 MG/DL — HIGH (ref 70–99)
GLUCOSE BLDC GLUCOMTR-MCNC: 124 MG/DL — HIGH (ref 70–99)
GLUCOSE BLDC GLUCOMTR-MCNC: 89 MG/DL — SIGNIFICANT CHANGE UP (ref 70–99)
GLUCOSE BLDV-MCNC: 115 MG/DL — HIGH (ref 70–99)
GLUCOSE SERPL-MCNC: 123 MG/DL — HIGH (ref 70–99)
HCO3 BLDV-SCNC: 29 MMOL/L — SIGNIFICANT CHANGE UP (ref 22–29)
HCT VFR BLD CALC: 27.4 % — LOW (ref 39–50)
HCT VFR BLDA CALC: 27 % — LOW (ref 39–51)
HGB BLD CALC-MCNC: 9.1 G/DL — LOW (ref 12.6–17.4)
HGB BLD-MCNC: 8.9 G/DL — LOW (ref 13–17)
HOROWITZ INDEX BLDV+IHG-RTO: 21 — SIGNIFICANT CHANGE UP
INR BLD: 1.5 RATIO — HIGH (ref 0.85–1.18)
LACTATE BLDV-MCNC: 1.6 MMOL/L — SIGNIFICANT CHANGE UP (ref 0.5–2)
LYMPHOCYTES # BLD AUTO: 0.57 K/UL — LOW (ref 1–3.3)
LYMPHOCYTES # BLD AUTO: 8.8 % — LOW (ref 13–44)
Lab: SIGNIFICANT CHANGE UP
MAGNESIUM SERPL-MCNC: 3.3 MG/DL — HIGH (ref 1.6–2.6)
MANUAL SMEAR VERIFICATION: SIGNIFICANT CHANGE UP
MCHC RBC-ENTMCNC: 21.6 PG — LOW (ref 27–34)
MCHC RBC-ENTMCNC: 32.5 GM/DL — SIGNIFICANT CHANGE UP (ref 32–36)
MCV RBC AUTO: 66.5 FL — LOW (ref 80–100)
METHADONE UR-MCNC: NEGATIVE NG/ML — SIGNIFICANT CHANGE UP
MICROCYTES BLD QL: SIGNIFICANT CHANGE UP
MONOCYTES # BLD AUTO: 0.57 K/UL — SIGNIFICANT CHANGE UP (ref 0–0.9)
MONOCYTES NFR BLD AUTO: 8.8 % — SIGNIFICANT CHANGE UP (ref 2–14)
NEUTROPHILS # BLD AUTO: 5.37 K/UL — SIGNIFICANT CHANGE UP (ref 1.8–7.4)
NEUTROPHILS NFR BLD AUTO: 82.4 % — HIGH (ref 43–77)
NRBC # BLD: 2 /100 WBCS — HIGH (ref 0–0)
OPIATES UR-MCNC: NEGATIVE NG/ML — SIGNIFICANT CHANGE UP
OXYCODONE UR QL SCN: NEGATIVE NG/ML — SIGNIFICANT CHANGE UP
PCO2 BLDV: 45 MMHG — SIGNIFICANT CHANGE UP (ref 42–55)
PCP UR-MCNC: NEGATIVE NG/ML — SIGNIFICANT CHANGE UP
PH BLDV: 7.42 — SIGNIFICANT CHANGE UP (ref 7.32–7.43)
PH, URINE RESULT: 5.8 — SIGNIFICANT CHANGE UP (ref 4.5–8.9)
PHOSPHATE SERPL-MCNC: 2 MG/DL — LOW (ref 2.5–4.5)
PLAT MORPH BLD: NORMAL — SIGNIFICANT CHANGE UP
PLATELET # BLD AUTO: 156 K/UL — SIGNIFICANT CHANGE UP (ref 150–400)
PO2 BLDV: 31 MMHG — SIGNIFICANT CHANGE UP (ref 25–45)
POIKILOCYTOSIS BLD QL AUTO: SLIGHT — SIGNIFICANT CHANGE UP
POLYCHROMASIA BLD QL SMEAR: SLIGHT — SIGNIFICANT CHANGE UP
POTASSIUM BLDV-SCNC: 4 MMOL/L — SIGNIFICANT CHANGE UP (ref 3.5–5.1)
POTASSIUM SERPL-MCNC: 3.9 MMOL/L — SIGNIFICANT CHANGE UP (ref 3.5–5.3)
POTASSIUM SERPL-SCNC: 3.9 MMOL/L — SIGNIFICANT CHANGE UP (ref 3.5–5.3)
PROT SERPL-MCNC: 6.4 G/DL — SIGNIFICANT CHANGE UP (ref 6–8.3)
PROTHROM AB SERPL-ACNC: 15.6 SEC — HIGH (ref 9.5–13)
RBC # BLD: 4.12 M/UL — LOW (ref 4.2–5.8)
RBC # FLD: 15.1 % — HIGH (ref 10.3–14.5)
RBC BLD AUTO: ABNORMAL
SAO2 % BLDV: 42.9 % — LOW (ref 67–88)
SCHISTOCYTES BLD QL AUTO: SLIGHT — SIGNIFICANT CHANGE UP
SODIUM SERPL-SCNC: 138 MMOL/L — SIGNIFICANT CHANGE UP (ref 135–145)
TARGETS BLD QL SMEAR: SIGNIFICANT CHANGE UP
THC UR QL: NEGATIVE NG/ML — SIGNIFICANT CHANGE UP
WBC # BLD: 6.52 K/UL — SIGNIFICANT CHANGE UP (ref 3.8–10.5)
WBC # FLD AUTO: 6.52 K/UL — SIGNIFICANT CHANGE UP (ref 3.8–10.5)

## 2024-03-07 PROCEDURE — 99222 1ST HOSP IP/OBS MODERATE 55: CPT

## 2024-03-07 PROCEDURE — 99291 CRITICAL CARE FIRST HOUR: CPT

## 2024-03-07 PROCEDURE — 93010 ELECTROCARDIOGRAM REPORT: CPT

## 2024-03-07 PROCEDURE — 99233 SBSQ HOSP IP/OBS HIGH 50: CPT

## 2024-03-07 RX ORDER — LOSARTAN POTASSIUM 100 MG/1
25 TABLET, FILM COATED ORAL DAILY
Refills: 0 | Status: DISCONTINUED | OUTPATIENT
Start: 2024-03-08 | End: 2024-03-26

## 2024-03-07 RX ORDER — IRON SUCROSE 20 MG/ML
100 INJECTION, SOLUTION INTRAVENOUS
Refills: 0 | Status: COMPLETED | OUTPATIENT
Start: 2024-03-07 | End: 2024-03-11

## 2024-03-07 RX ORDER — POTASSIUM PHOSPHATE, MONOBASIC POTASSIUM PHOSPHATE, DIBASIC 236; 224 MG/ML; MG/ML
15 INJECTION, SOLUTION INTRAVENOUS ONCE
Refills: 0 | Status: COMPLETED | OUTPATIENT
Start: 2024-03-07 | End: 2024-03-07

## 2024-03-07 RX ORDER — DAPAGLIFLOZIN 10 MG/1
10 TABLET, FILM COATED ORAL DAILY
Refills: 0 | Status: DISCONTINUED | OUTPATIENT
Start: 2024-03-07 | End: 2024-03-11

## 2024-03-07 RX ORDER — LOSARTAN POTASSIUM 100 MG/1
12.5 TABLET, FILM COATED ORAL DAILY
Refills: 0 | Status: DISCONTINUED | OUTPATIENT
Start: 2024-03-07 | End: 2024-03-07

## 2024-03-07 RX ORDER — ATORVASTATIN CALCIUM 80 MG/1
40 TABLET, FILM COATED ORAL AT BEDTIME
Refills: 0 | Status: DISCONTINUED | OUTPATIENT
Start: 2024-03-07 | End: 2024-03-26

## 2024-03-07 RX ORDER — CLOPIDOGREL BISULFATE 75 MG/1
300 TABLET, FILM COATED ORAL ONCE
Refills: 0 | Status: COMPLETED | OUTPATIENT
Start: 2024-03-07 | End: 2024-03-07

## 2024-03-07 RX ORDER — CLOPIDOGREL BISULFATE 75 MG/1
75 TABLET, FILM COATED ORAL DAILY
Refills: 0 | Status: DISCONTINUED | OUTPATIENT
Start: 2024-03-08 | End: 2024-03-26

## 2024-03-07 RX ORDER — BUMETANIDE 0.25 MG/ML
2 INJECTION INTRAMUSCULAR; INTRAVENOUS ONCE
Refills: 0 | Status: COMPLETED | OUTPATIENT
Start: 2024-03-07 | End: 2024-03-07

## 2024-03-07 RX ORDER — THIAMINE MONONITRATE (VIT B1) 100 MG
100 TABLET ORAL DAILY
Refills: 0 | Status: DISCONTINUED | OUTPATIENT
Start: 2024-03-07 | End: 2024-03-26

## 2024-03-07 RX ORDER — FERROUS SULFATE 325(65) MG
1 TABLET ORAL
Refills: 0 | DISCHARGE

## 2024-03-07 RX ADMIN — CLOPIDOGREL BISULFATE 300 MILLIGRAM(S): 75 TABLET, FILM COATED ORAL at 15:37

## 2024-03-07 RX ADMIN — LOSARTAN POTASSIUM 12.5 MILLIGRAM(S): 100 TABLET, FILM COATED ORAL at 12:26

## 2024-03-07 RX ADMIN — ATORVASTATIN CALCIUM 40 MILLIGRAM(S): 80 TABLET, FILM COATED ORAL at 21:43

## 2024-03-07 RX ADMIN — POTASSIUM PHOSPHATE, MONOBASIC POTASSIUM PHOSPHATE, DIBASIC 62.5 MILLIMOLE(S): 236; 224 INJECTION, SOLUTION INTRAVENOUS at 01:40

## 2024-03-07 RX ADMIN — Medication 1 APPLICATION(S): at 06:04

## 2024-03-07 RX ADMIN — HEPARIN SODIUM 10 UNIT(S)/HR: 5000 INJECTION INTRAVENOUS; SUBCUTANEOUS at 07:39

## 2024-03-07 RX ADMIN — DAPAGLIFLOZIN 10 MILLIGRAM(S): 10 TABLET, FILM COATED ORAL at 13:31

## 2024-03-07 RX ADMIN — IRON SUCROSE 210 MILLIGRAM(S): 20 INJECTION, SOLUTION INTRAVENOUS at 21:41

## 2024-03-07 RX ADMIN — Medication 1 MILLIGRAM(S): at 11:10

## 2024-03-07 RX ADMIN — Medication 81 MILLIGRAM(S): at 11:10

## 2024-03-07 RX ADMIN — CHLORHEXIDINE GLUCONATE 1 APPLICATION(S): 213 SOLUTION TOPICAL at 06:10

## 2024-03-07 RX ADMIN — BUMETANIDE 2 MILLIGRAM(S): 0.25 INJECTION INTRAMUSCULAR; INTRAVENOUS at 11:11

## 2024-03-07 RX ADMIN — HEPARIN SODIUM 10 UNIT(S)/HR: 5000 INJECTION INTRAVENOUS; SUBCUTANEOUS at 01:11

## 2024-03-07 RX ADMIN — Medication 1 APPLICATION(S): at 21:42

## 2024-03-07 RX ADMIN — Medication 100 MILLIGRAM(S): at 12:22

## 2024-03-07 RX ADMIN — SPIRONOLACTONE 25 MILLIGRAM(S): 25 TABLET, FILM COATED ORAL at 06:07

## 2024-03-07 NOTE — PHYSICAL THERAPY INITIAL EVALUATION ADULT - PREDICTED DURATION OF THERAPY (DAYS/WKS), PT EVAL
Call Marylou Felix at [phone] to schedule an evaluation with Pediatric Genetics.     Given Goyo Turner's visual impairment (best corrected visual acuity is 20/40 right eye and no light perception left eye), I recommend:    Early Intervention program    Uncrowded visual environment    Preferential seating toward the left side of the classroom (so teacher and room are mostly to his right)    SMART board synced with an electronic tablet or computer (enlarge font)    Self advocacy: If you cannot see something in the classroom, tell your teacher.         Frank Hickey Jr., MD    Pediatric Ophthalmology & Strabismus  Department of Ophthalmology & Visual Neurosciences  [facility]  [place] [facility], 3rd floor  [address] [place] [id number]  Office:  [phone]  Appointments:  [phone]  Fax:  [phone]     [facility] at [address] [address]  Office: [phone]  Appointments: [phone]  Fax: [phone]      2 weeks

## 2024-03-07 NOTE — PHYSICAL THERAPY INITIAL EVALUATION ADULT - NSPTDISCHREC_GEN_A_CORE
TBD pending further assessment If pt declines, pt will need home PT, rolling walker, transport wheelchair and assistance with all functional mobility/ADLs/Sub-acute Rehab

## 2024-03-07 NOTE — BH CONSULTATION LIAISON ASSESSMENT NOTE - NSBHCONSULTRECOMMENDOTHER_PSY_A_CORE FT
Ativan 0.5mg PO BID PRN anxiety/insomnia    SW for OP psych referral resources    chaplaincy and holistic RN consult

## 2024-03-07 NOTE — CHART NOTE - NSCHARTNOTEFT_GEN_A_CORE
CICU Transfer Note  ---------------------------    Transfer from: CICU   Transfer to:  (  ) Medicine    (X) Telemetry    (  ) RCU    (  ) Palliative    (  ) Stroke Unit    (  ) _______________  Accepting Physician:    ODILIA COURSE        OBJECTIVE --  Vital Signs Last 24 Hrs  T(C): 36.3 (07 Mar 2024 12:00), Max: 36.5 (06 Mar 2024 16:00)  T(F): 97.4 (07 Mar 2024 12:00), Max: 97.7 (06 Mar 2024 16:00)  HR: 115 (07 Mar 2024 12:00) (100 - 122)  BP: 96/61 (07 Mar 2024 12:00) (77/52 - 111/71)  BP(mean): 73 (07 Mar 2024 12:00) (60 - 82)  RR: 24 (07 Mar 2024 12:00) (18 - 32)  SpO2: 97% (07 Mar 2024 12:00) (91% - 100%)    Parameters below as of 07 Mar 2024 11:00  Patient On (Oxygen Delivery Method): room air        I&O's Summary    06 Mar 2024 07:01  -  07 Mar 2024 07:00  --------------------------------------------------------  IN: 1392 mL / OUT: 3100 mL / NET: -1708 mL    07 Mar 2024 07:01  -  07 Mar 2024 12:48  --------------------------------------------------------  IN: 180 mL / OUT: 325 mL / NET: -145 mL        MEDICATIONS  (STANDING):  AQUAPHOR (petrolatum Ointment) 1 Application(s) Topical every 12 hours  aspirin enteric coated 81 milliGRAM(s) Oral daily  atorvastatin 40 milliGRAM(s) Oral at bedtime  chlorhexidine 2% Cloths 1 Application(s) Topical <User Schedule>  dapagliflozin 10 milliGRAM(s) Oral daily  folic acid 1 milliGRAM(s) Oral daily  heparin  Infusion 1000 Unit(s)/Hr (10 mL/Hr) IV Continuous <Continuous>  insulin lispro (ADMELOG) corrective regimen sliding scale   SubCutaneous three times a day before meals  insulin lispro (ADMELOG) corrective regimen sliding scale   SubCutaneous at bedtime  iron sucrose IVPB 100 milliGRAM(s) IV Intermittent <User Schedule>  losartan 12.5 milliGRAM(s) Oral daily  spironolactone 25 milliGRAM(s) Oral daily  thiamine 100 milliGRAM(s) Oral daily    MEDICATIONS  (PRN):  sodium chloride 0.9% lock flush 10 milliLiter(s) IV Push every 1 hour PRN Pre/post blood products, medications, blood draw, and to maintain line patency        LABS                                            8.9                   Neurophils% (auto):   82.4   (03-07 @ 00:31):    6.52 )-----------(156          Lymphocytes% (auto):  8.8                                           27.4                   Eosinphils% (auto):   0.0      Manual%: Neutrophils x    ; Lymphocytes x    ; Eosinophils x    ; Bands%: x    ; Blasts x                                    138    |  101    |  48                  Calcium: 7.9   / iCa: x      (03-07 @ 00:31)    ----------------------------<  123       Magnesium: 3.3                              3.9     |  26     |  1.59             Phosphorous: 2.0      TPro  6.4    /  Alb  2.9    /  TBili  0.7    /  DBili  x      /  AST  212    /  ALT  371    /  AlkPhos  321    07 Mar 2024 00:31    ( 03-07 @ 00:31 )   PT: 15.6 sec;   INR: 1.50 ratio  aPTT: 71.6 sec          ASSESSMENT & PLAN:         For Follow-Up:  [ ]   [ ] CICU Transfer Note  ---------------------------    Transfer from: CICU   Transfer to:  (  ) Medicine    (X) Telemetry    (  ) RCU    (  ) Palliative    (  ) Stroke Unit    (  ) _______________  Accepting Physician:    MALIKA   63 year old male with past medical history of CVA, HTN, HLD, prediabetes, A.fib, cocaine/heroin abuse, CAD w/ stent, CHF (EF  25-30% in 12/23),  RLE compartment syndrome s/p fasciotomy 12/23 and recent hospitalization for CHF exacerbation with bilateral pleural effusions requiring chest tube placement initially presenting to [facility] on 3/1 with chest pain and shortness of breath. He states having intermittent 8/10 midsternal chest pain for the past few months since his leg operation. He was admitted to the outside hospital for management of CHF exacerbation, found to have a further reduced EF of 10 - 15% on 3/1. While admitted, he was hypotensive requiring dopamine infusion with worsening perfusion indices and ELIE. He was transferred to [facility] for cardiogenic shock management.     On admission to CICU, he is A&O x3.  sinus tach, / 78, saturating well on 4L nasal cannula with dopamine gtt infusing at 10 mcg/kg/min. He denies chest pain, shortness of breath, nausea and vomiting on admission.    CICU COURSE  While in the CICU, pt was clinically volume overloaded with signs of poor perfusion. Patient was started on Dobutamine of 5, swan was placed with elevated CVP and low MVO2, concerning for cardiogenic shock. Bumex was given 2mg BID x 2 days, and continued on  1mg bumex. Heart failure was following for medication optimization. Afterload reduction was added (hydralazine, losartan, spirolactone) which the patient tolerated. Dobutamine was able to be weaned off on 3/6, with CI > 2.0.. Heart failure noted that the patient is not a candidate for AT.   Wound Care was consulted for RLE wound (leg was wrapped when patient arrived from [place]).     3/5 Bedside swan numbers; CVP 15, PAP 45/21, wedge 20. MvO2 45 CO/CI 3.4/2.2 SVR 1482  3/6 Bedside swan numbers: CVP 6, PAP 34/15, wedge 15. MvO2 53 CO/CI 3.7/2.4 SVR 1383     OBJECTIVE --  Vital Signs Last 24 Hrs  T(C): 36.3 (07 Mar 2024 12:00), Max: 36.5 (06 Mar 2024 16:00)  T(F): 97.4 (07 Mar 2024 12:00), Max: 97.7 (06 Mar 2024 16:00)  HR: 115 (07 Mar 2024 12:00) (100 - 122)  BP: 96/61 (07 Mar 2024 12:00) (77/52 - 111/71)  BP(mean): 73 (07 Mar 2024 12:00) (60 - 82)  RR: 24 (07 Mar 2024 12:00) (18 - 32)  SpO2: 97% (07 Mar 2024 12:00) (91% - 100%)    Parameters below as of 07 Mar 2024 11:00  Patient On (Oxygen Delivery Method): room air        I&O's Summary    06 Mar 2024 07:01  -  07 Mar 2024 07:00  --------------------------------------------------------  IN: 1392 mL / OUT: 3100 mL / NET: -1708 mL    07 Mar 2024 07:01  -  07 Mar 2024 12:48  --------------------------------------------------------  IN: 180 mL / OUT: 325 mL / NET: -145 mL        MEDICATIONS  (STANDING):  AQUAPHOR (petrolatum Ointment) 1 Application(s) Topical every 12 hours  aspirin enteric coated 81 milliGRAM(s) Oral daily  atorvastatin 40 milliGRAM(s) Oral at bedtime  chlorhexidine 2% Cloths 1 Application(s) Topical <User Schedule>  dapagliflozin 10 milliGRAM(s) Oral daily  folic acid 1 milliGRAM(s) Oral daily  heparin  Infusion 1000 Unit(s)/Hr (10 mL/Hr) IV Continuous <Continuous>  insulin lispro (ADMELOG) corrective regimen sliding scale   SubCutaneous three times a day before meals  insulin lispro (ADMELOG) corrective regimen sliding scale   SubCutaneous at bedtime  iron sucrose IVPB 100 milliGRAM(s) IV Intermittent <User Schedule>  losartan 12.5 milliGRAM(s) Oral daily  spironolactone 25 milliGRAM(s) Oral daily  thiamine 100 milliGRAM(s) Oral daily    MEDICATIONS  (PRN):  sodium chloride 0.9% lock flush 10 milliLiter(s) IV Push every 1 hour PRN Pre/post blood products, medications, blood draw, and to maintain line patency        LABS                                            8.9                   Neurophils% (auto):   82.4   (03-07 @ 00:31):    6.52 )-----------(156          Lymphocytes% (auto):  8.8                                           27.4                   Eosinphils% (auto):   0.0      Manual%: Neutrophils x    ; Lymphocytes x    ; Eosinophils x    ; Bands%: x    ; Blasts x                                    138    |  101    |  48                  Calcium: 7.9   / iCa: x      (03-07 @ 00:31)    ----------------------------<  123       Magnesium: 3.3                              3.9     |  26     |  1.59             Phosphorous: 2.0      TPro  6.4    /  Alb  2.9    /  TBili  0.7    /  DBili  x      /  AST  212    /  ALT  371    /  AlkPhos  321    07 Mar 2024 00:31    ( 03-07 @ 00:31 )   PT: 15.6 sec;   INR: 1.50 ratio  aPTT: 71.6 sec          ASSESSMENT & PLAN:   63 year old male with past medical history of HIV, CVA, HTN, HLD, prediabetes, A.fib, cocaine/heroin abuse, CAD w/ stent, CHF (EF  25-30% in 12/23, now 10 - 15 % as of TTE on 3/1),  RLE compartment syndrome s/p fasciotomy 12/23 presented to outside hospital with chest pain and shortness of breath, admitted for CHF exacerbation, transferred to [facility] for cardiogenic shock management     Plan:  Neuro  #hx CVA  - A&O x3, poor historian with medical history and medications  - continue to monitor per protocol    #Alchol use   - Not in the withdrawl period   - Thiamine /folate daily,    Respiratory  #hypoxia/pleural effusions  - noted to have pleural effusions at outside hospital, likely in the setting of volume overload s/t chf  - saturating well on 4L, wean as tolerated with diuresis  - continue to monitor sp02    Cardiovascular  #Cardiogenic shock  - i/s/o CHF exacerbation  - TTE 3/1 EF 10 - 15%, previously 25 - 30% 12/23  - CICU : dopamine gtt transitioned to dobutamine  - swan placed overnight  - Elevated CVP with low MVo2 on admission, currently swan removed  - Dobutamine weaned 3/6  - s/p hydralazine 25, discontinued   > c/w atorvastatin 40 qd   > c/w Losartan 25  > c/w [place] 25  > c/w Farxiga 10mg qd   - diuresis as needed, (given 2mg BID while in the CICU)  - TTE 3/5: EF 25%, global LV hypokinesis, rounded and protruding LV thrombus in apex, RV severely enlarged with severely reduced RVSF, mild-mod pulm HTN, severe TR, mild-mod CO    #CAD  - w/ history of stents in december as per patient  - previously on aspirin, continue    #pAF  - ? hx a.fib  - sinus on admission  - not on blood thinner or rate control agent as per patient, only aspirin & plavix  - anticoagulation on hold with INR > 2    #HTN  - continue to trend BP    #HLD  - holding statin i/s/o liver dysfunction    GI  #transaminitis - likely 2/2 to congestive hepatopathy   - worsening liver ezymes at outside hospital  - hold hepatotoxins, continue to trend  - diet as tolerated, monitor for BM  - Improving, will r/s statin       /Renal  #ELIE - likely in the setting of vascular congestion   - admitted from outside hospital with mitchell catheter  - continue strict I&O, electrolyte monitoring  - Cr on admission 2.15   - Downtrending 1.5  - c/w diuresis as tolerated, monitor u/o    Endo  #preDM  - a1c 6.2  - trend glucose, ISS while inpatient    Heme  - elevated INR on admission, i/s/o liver dsyfunction, continue to trend  - H/H and platelets stable at outside hospital  - INR <2, started heparin gtt overnight  Likely in the setting of malnutrition and liver dysfunction, no signs of active bleeding.     ID  - afebrile on admission  - continue to trend wbc and fever curve    #RLE fasciotomy  - admitted from outside hospital with ace wrap in place  - noted to have wounds on lateral and medial aspect of calf  - f/u wound consult recommendations    PSYCH   - Adjustment disorder, no indications for meds while inpatient   - appreciate psych recommendations     Ethics   - Full Code   - palliative following given advanced HF   - Polysubstance use: S/w for resources and support     For Follow-Up:  [ ] Standing Diuresis dose   [ ] Titrate GDMT as tolerated   [ ] Eventual Ischemic workup for new HF, likely unable to tolerate CT angio coronary due to tachycardia. May require diagnostic LHC if ELIE resolves   [ ] Social work f/u for polysubstance use CICU Transfer Note  ---------------------------    Transfer from: CICU   Transfer to:  (  ) Medicine    (X) Telemetry    (  ) RCU    (  ) Palliative    (  ) Stroke Unit    (  ) _______________  Accepting Physician:    MALIKA   63 year old male with past medical history of CVA, HTN, HLD, prediabetes, A.fib, cocaine/heroin abuse, CAD w/ stent, CHF (EF  25-30% in 12/23),  RLE compartment syndrome s/p fasciotomy 12/23 and recent hospitalization for CHF exacerbation with bilateral pleural effusions requiring chest tube placement initially presenting to [facility] on 3/1 with chest pain and shortness of breath. He states having intermittent 8/10 midsternal chest pain for the past few months since his leg operation. He was admitted to the outside hospital for management of CHF exacerbation, found to have a further reduced EF of 10 - 15% on 3/1. While admitted, he was hypotensive requiring dopamine infusion with worsening perfusion indices and ELIE. He was transferred to [facility] for cardiogenic shock management.     On admission to CICU, he is A&O x3.  sinus tach, / 78, saturating well on 4L nasal cannula with dopamine gtt infusing at 10 mcg/kg/min. He denies chest pain, shortness of breath, nausea and vomiting on admission.    CICU COURSE  While in the CICU, pt was clinically volume overloaded with signs of poor perfusion. Patient was started on Dobutamine of 5, swan was placed with elevated CVP and low MVO2, concerning for cardiogenic shock. Bumex was given 2mg BID x 2 days, and continued on  1mg bumex. Heart failure was following for medication optimization. Afterload reduction was added (hydralazine, losartan, spirolactone) which the patient tolerated. Dobutamine was able to be weaned off on 3/6, with CI > 2.0. Heart failure noted that the patient is not a candidate for AT.   Wound Care was consulted for RLE wound (leg was wrapped when patient arrived from [place]).     Cardiac imaging/studies:  TTE: severe RV dilation, moderate MR (3b), severe TR, LV thrombus, small effusion. EF 25%, LVID 5.2  3/5 Bedside swan numbers; CVP 15, PAP 45/21, wedge 20. MvO2 45 CO/CI 3.4/2.2 SVR 1482  3/6 Bedside swan numbers: CVP 6, PAP 34/15, wedge 15. MvO2 53 CO/CI 3.7/2.4 SVR 1383   3/7 CvO2 42.9, with CO/CI estimated 3/1.9     OBJECTIVE --  Vital Signs Last 24 Hrs  T(C): 36.3 (07 Mar 2024 12:00), Max: 36.5 (06 Mar 2024 16:00)  T(F): 97.4 (07 Mar 2024 12:00), Max: 97.7 (06 Mar 2024 16:00)  HR: 115 (07 Mar 2024 12:00) (100 - 122)  BP: 96/61 (07 Mar 2024 12:00) (77/52 - 111/71)  BP(mean): 73 (07 Mar 2024 12:00) (60 - 82)  RR: 24 (07 Mar 2024 12:00) (18 - 32)  SpO2: 97% (07 Mar 2024 12:00) (91% - 100%)    Parameters below as of 07 Mar 2024 11:00  Patient On (Oxygen Delivery Method): room air        I&O's Summary    06 Mar 2024 07:01  -  07 Mar 2024 07:00  --------------------------------------------------------  IN: 1392 mL / OUT: 3100 mL / NET: -1708 mL    07 Mar 2024 07:01  -  07 Mar 2024 12:48  --------------------------------------------------------  IN: 180 mL / OUT: 325 mL / NET: -145 mL        MEDICATIONS  (STANDING):  AQUAPHOR (petrolatum Ointment) 1 Application(s) Topical every 12 hours  aspirin enteric coated 81 milliGRAM(s) Oral daily  atorvastatin 40 milliGRAM(s) Oral at bedtime  chlorhexidine 2% Cloths 1 Application(s) Topical <User Schedule>  dapagliflozin 10 milliGRAM(s) Oral daily  folic acid 1 milliGRAM(s) Oral daily  heparin  Infusion 1000 Unit(s)/Hr (10 mL/Hr) IV Continuous <Continuous>  insulin lispro (ADMELOG) corrective regimen sliding scale   SubCutaneous three times a day before meals  insulin lispro (ADMELOG) corrective regimen sliding scale   SubCutaneous at bedtime  iron sucrose IVPB 100 milliGRAM(s) IV Intermittent <User Schedule>  losartan 12.5 milliGRAM(s) Oral daily  spironolactone 25 milliGRAM(s) Oral daily  thiamine 100 milliGRAM(s) Oral daily    MEDICATIONS  (PRN):  sodium chloride 0.9% lock flush 10 milliLiter(s) IV Push every 1 hour PRN Pre/post blood products, medications, blood draw, and to maintain line patency        LABS                                            8.9                   Neurophils% (auto):   82.4   (03-07 @ 00:31):    6.52 )-----------(156          Lymphocytes% (auto):  8.8                                           27.4                   Eosinphils% (auto):   0.0      Manual%: Neutrophils x    ; Lymphocytes x    ; Eosinophils x    ; Bands%: x    ; Blasts x                                    138    |  101    |  48                  Calcium: 7.9   / iCa: x      (03-07 @ 00:31)    ----------------------------<  123       Magnesium: 3.3                              3.9     |  26     |  1.59             Phosphorous: 2.0      TPro  6.4    /  Alb  2.9    /  TBili  0.7    /  DBili  x      /  AST  212    /  ALT  371    /  AlkPhos  321    07 Mar 2024 00:31    ( 03-07 @ 00:31 )   PT: 15.6 sec;   INR: 1.50 ratio  aPTT: 71.6 sec          ASSESSMENT & PLAN:   63 year old male with past medical history of CVA, HTN, HLD, prediabetes, A.fib, cocaine/heroin abuse, CAD w/ stent, CHF (EF  25-30% in 12/23, now 10 - 15 % as of TTE on 3/1),  RLE compartment syndrome s/p fasciotomy 12/23 presented to outside hospital with chest pain and shortness of breath, admitted for CHF exacerbation, transferred to [facility] for cardiogenic shock management.        63 year old male with past medical history of CVA, HTN, HLD, prediabetes, A.fib, cocaine/heroin use CAD w/ stent, CHF (EF  25-30% in 12/23),  RLE compartment syndrome s/p fasciotomy 12/23 and recent hospitalization for CHF exacerbation with bilateral pleural effusions requiring chest tube placement initially presenting to [facility] on 3/1 with chest pain and shortness of breath. Transferred to [facility] for management of cardiogenic shock. Diuresised in the CICU, and weaned off inotropes.     Plan:  Neuro  #hx CVA  - A&O x3, poor historian with medical history and medications  - continue to monitor per protocol  - c/w ASA 81    #Alcohol use disorder   - Not in the withdrawal period   - Thiamine /folate daily    Respiratory  #hypoxia/pleural effusions  - noted to have pleural effusions at outside hospital, likely in the setting of volume overload s/t chf  - saturating well on 4L, wean as tolerated with diuresis  - continue to monitor sp02    Cardiovascular  #Cardiogenic shock  - i/s/o CHF exacerbation  - TTE 3/1 EF 10 - 15%, previously 25 - 30% 12/23  - CICU : dopamine gtt transitioned to dobutamine  - swan placed overnight  - Elevated CVP with low MVo2 on admission, currently swan removed  - Dobutamine weaned 3/6  - s/p hydralazine 25, discontinued   > c/w atorvastatin 40 qd   > c/w Losartan 25  > c/w [place] 25  > c/w Farxiga 10mg qd   - diuresis as needed, (given 2mg BID while in the CICU)  - TTE 3/5: EF 25%, global LV hypokinesis, rounded and protruding LV thrombus in apex, RV severely enlarged with severely reduced RVSF, mild-mod pulm HTN, severe TR, mild-mod RI    #CAD  - w/ history of stents in december as per patient  - previously on aspirin, continue    #pAF  - ? hx a.fib  - sinus on admission  - not on blood thinner or rate control agent as per patient, only aspirin & plavix  - anticoagulation on hold with INR > 2    #HTN  - continue to trend BP  - hypotensive in this admission 2/2 cardiogenic shock     #HLD  - holding statin i/s/o liver dysfunction  - atorvastatin restarted 3/7     GI  #transaminitis - likely 2/2 to congestive hepatopathy   - worsening liver enzymes at outside hospital  - hold hepatotoxins, continue to trend  - diet as tolerated, monitor for BM  - Improving, will r/s statin     /Renal  #ELIE - likely in the setting of vascular congestion   - admitted from outside hospital with mitchell catheter  - continue strict I&O, electrolyte monitoring  - Cr on admission 2.15   - Downtrending 1.5  - c/w diuresis as tolerated, monitor u/o    Endo  #preDM  - a1c 6.2  - trend glucose, ISS while inpatient    Heme  - elevated INR on admission, i/s/o liver dsyfunction, continue to trend  - H/H and platelets stable at outside hospital  - INR <2, started heparin gtt overnight  Likely in the setting of malnutrition and liver dysfunction, no signs of active bleeding.     ID  - afebrile on admission  - continue to trend wbc and fever curve    #RLE fasciotomy  - admitted from outside hospital with ace wrap in place  - noted to have wounds on lateral and medial aspect of calf  - f/u wound consult recommendations    PSYCH   - Adjustment disorder, no indications for meds while inpatient   - appreciate psych recommendations     Ethics   - Full Code   - palliative following given advanced HF   - Polysubstance use: S/w for resources and support     For Follow-Up:  [ ] Standing Diuresis dose   [ ] Titrate GDMT as tolerated   [ ] Eventual Ischemic workup for new HF, likely unable to tolerate CT angio coronary due to tachycardia. May require diagnostic LHC if ELIE resolves   [ ] Social work f/u for polysubstance use CICU Transfer Note  ---------------------------    Transfer from: CICU   Transfer to:  (  ) Medicine    (X) Telemetry    (  ) RCU    (  ) Palliative    (  ) Stroke Unit    (  ) _______________  Accepting Physician:    MALIKA   63 year old male with past medical history of CVA, HTN, HLD, prediabetes, A.fib, cocaine/heroin abuse, CAD w/ stent, CHF (EF  25-30% in 12/23),  RLE compartment syndrome s/p fasciotomy 12/23 and recent hospitalization for CHF exacerbation with bilateral pleural effusions requiring chest tube placement initially presenting to [facility] on 3/1 with chest pain and shortness of breath. He states having intermittent 8/10 midsternal chest pain for the past few months since his leg operation. He was admitted to the outside hospital for management of CHF exacerbation, found to have a further reduced EF of 10 - 15% on 3/1. While admitted, he was hypotensive requiring dopamine infusion with worsening perfusion indices and ELIE. He was transferred to [facility] for cardiogenic shock management.     On admission to CICU, he is A&O x3.  sinus tach, / 78, saturating well on 4L nasal cannula with dopamine gtt infusing at 10 mcg/kg/min. He denies chest pain, shortness of breath, nausea and vomiting on admission.    CICU COURSE  While in the CICU, pt was clinically volume overloaded with signs of poor perfusion. Patient was started on Dobutamine of 5, swan was placed with elevated CVP and low MVO2, concerning for cardiogenic shock. Bumex was given 2mg BID x 2 days, and continued on  1mg bumex. Heart failure was following for medication optimization. Afterload reduction was added (hydralazine, losartan, spirolactone) which the patient tolerated. Dobutamine was able to be weaned off on 3/6, with CI > 2.0. Heart failure noted that the patient is not a candidate for AT.   Wound Care was consulted for RLE wound (leg was wrapped when patient arrived from [place]).     Cardiac imaging/studies:  TTE: severe RV dilation, moderate MR (3b), severe TR, LV thrombus, small effusion. EF 25%, LVID 5.2  3/5 Bedside swan numbers; CVP 15, PAP 45/21, wedge 20. MvO2 45 CO/CI 3.4/2.2 SVR 1482  3/6 Bedside swan numbers: CVP 6, PAP 34/15, wedge 15. MvO2 53 CO/CI 3.7/2.4 SVR 1383   3/7 CvO2 42.9, with CO/CI estimated 3/1.9     OBJECTIVE --  Vital Signs Last 24 Hrs  T(C): 36.3 (07 Mar 2024 12:00), Max: 36.5 (06 Mar 2024 16:00)  T(F): 97.4 (07 Mar 2024 12:00), Max: 97.7 (06 Mar 2024 16:00)  HR: 115 (07 Mar 2024 12:00) (100 - 122)  BP: 96/61 (07 Mar 2024 12:00) (77/52 - 111/71)  BP(mean): 73 (07 Mar 2024 12:00) (60 - 82)  RR: 24 (07 Mar 2024 12:00) (18 - 32)  SpO2: 97% (07 Mar 2024 12:00) (91% - 100%)    Parameters below as of 07 Mar 2024 11:00  Patient On (Oxygen Delivery Method): room air      I&O's Summary    06 Mar 2024 07:01  -  07 Mar 2024 07:00  --------------------------------------------------------  IN: 1392 mL / OUT: 3100 mL / NET: -1708 mL    07 Mar 2024 07:01  -  07 Mar 2024 12:48  --------------------------------------------------------  IN: 180 mL / OUT: 325 mL / NET: -145 mL        MEDICATIONS  (STANDING):  AQUAPHOR (petrolatum Ointment) 1 Application(s) Topical every 12 hours  aspirin enteric coated 81 milliGRAM(s) Oral daily  atorvastatin 40 milliGRAM(s) Oral at bedtime  chlorhexidine 2% Cloths 1 Application(s) Topical <User Schedule>  dapagliflozin 10 milliGRAM(s) Oral daily  folic acid 1 milliGRAM(s) Oral daily  heparin  Infusion 1000 Unit(s)/Hr (10 mL/Hr) IV Continuous <Continuous>  insulin lispro (ADMELOG) corrective regimen sliding scale   SubCutaneous three times a day before meals  insulin lispro (ADMELOG) corrective regimen sliding scale   SubCutaneous at bedtime  iron sucrose IVPB 100 milliGRAM(s) IV Intermittent <User Schedule>  losartan 12.5 milliGRAM(s) Oral daily  spironolactone 25 milliGRAM(s) Oral daily  thiamine 100 milliGRAM(s) Oral daily    MEDICATIONS  (PRN):  sodium chloride 0.9% lock flush 10 milliLiter(s) IV Push every 1 hour PRN Pre/post blood products, medications, blood draw, and to maintain line patency    PHYSICAL EXAM:  GENERAL: pt appears melancholy   HEAD:  Atraumatic, Normocephalic  EYES: EOMI  NECK: No JVD  NERVOUS SYSTEM:  Alert & Awake.   CHEST/LUNG: B/L good air entry; No rales, rhonchi, or wheezing  HEART: S1S2 normal, no S3, Regular rate and rhythm; No murmurs  ABDOMEN: Soft, Nontender, Nondistended  EXTREMITIES:  2+ Peripheral Pulses, No clubbing, cyanosis, or edema  LYMPH: No lymphadenopathy noted  SKIN: No rashes or lesions    LABS                                            8.9                   Neurophils% (auto):   82.4   (03-07 @ 00:31):    6.52 )-----------(156          Lymphocytes% (auto):  8.8                                           27.4                   Eosinphils% (auto):   0.0      Manual%: Neutrophils x    ; Lymphocytes x    ; Eosinophils x    ; Bands%: x    ; Blasts x                                    138    |  101    |  48                  Calcium: 7.9   / iCa: x      (03-07 @ 00:31)    ----------------------------<  123       Magnesium: 3.3                              3.9     |  26     |  1.59             Phosphorous: 2.0      TPro  6.4    /  Alb  2.9    /  TBili  0.7    /  DBili  x      /  AST  212    /  ALT  371    /  AlkPhos  321    07 Mar 2024 00:31    ( 03-07 @ 00:31 )   PT: 15.6 sec;   INR: 1.50 ratio  aPTT: 71.6 sec          ASSESSMENT & PLAN:   63 year old male with past medical history of CVA, HTN, HLD, prediabetes, A.fib, cocaine/heroin use CAD w/ stent, CHF (EF  25-30% in 12/23),  RLE compartment syndrome s/p fasciotomy 12/23 and recent hospitalization for CHF exacerbation with bilateral pleural effusions requiring chest tube placement initially presenting to [facility] on 3/1 with chest pain and shortness of breath. Transferred to [facility] for management of cardiogenic shock. Diuresis in the CICU, and weaned off inotrope.     Plan:  Neuro  #hx CVA  - A&O x3, poor historian with medical history and medications  - continue to monitor per protocol  - c/w ASA 81    #Alcohol use disorder   - Not in the withdrawal period   - Thiamine /folate daily    Respiratory  #hypoxia/pleural effusions  - noted to have pleural effusions at outside hospital, likely in the setting of volume overload s/t chf  - saturating well on 4L, wean as tolerated with diuresis  - continue to monitor sp02    Cardiovascular  #Cardiogenic shock  - i/s/o CHF exacerbation  - TTE 3/1 EF 10 - 15%, previously 25 - 30% 12/23  - CICU : dopamine gtt transitioned to dobutamine  - swan placed overnight  - Elevated CVP with low MVo2 on admission, currently swan removed  - Dobutamine weaned 3/6  - s/p hydralazine 25, discontinued   > c/w atorvastatin 40 qd   > c/w Losartan 25  > c/w [place] 25  > c/w Farxiga 10mg qd   - diuresis as needed, (given 2mg BID while in the CICU), most recently 2mg 1x (3/7)  - TTE 3/5: EF 25%, global LV hypokinesis, rounded and protruding LV thrombus in apex, RV severely enlarged with severely reduced RVSF, mild-mod pulm HTN, severe TR, mild-mod IN    #CAD  - w/ history of stents in december as per patient  - previously on aspirin, continue    #pAF  - ? hx a.fib  - sinus on admission  - not on blood thinner or rate control agent as per patient, only aspirin & plavix  - anticoagulation on hold with INR > 2    #HTN  - continue to trend BP  - hypotensive in this admission 2/2 cardiogenic shock     #HLD  - holding statin i/s/o liver dysfunction  - atorvastatin restarted 3/7     GI  #transaminitis - likely 2/2 to congestive hepatopathy   - worsening liver enzymes at outside hospital  - hold hepatotoxins, continue to trend  - diet as tolerated, monitor for BM  - Improving, will r/s statin     /Renal  #ELIE - likely in the setting of vascular congestion   - admitted from outside hospital with mitchell catheter  - continue strict I&O, electrolyte monitoring  - Cr on admission 2.15   - Downtrending 1.5  - c/w diuresis as tolerated, monitor u/o    Endo  #preDM  - a1c 6.2  - trend glucose, ISS while inpatient    Heme  - elevated INR on admission, i/s/o liver dsyfunction, continue to trend  - H/H and platelets stable at outside hospital  - INR <2, started heparin gtt overnight  Likely in the setting of malnutrition and liver dysfunction, no signs of active bleeding.     #Iron def anemia   -  iron sat is low. Would recommend IV iron sucrose x 5 days as per HF     ID  - afebrile on admission  - continue to trend wbc and fever curve    #RLE fasciotomy  - admitted from outside hospital with ace wrap in place  - noted to have wounds on lateral and medial aspect of calf  - f/u wound consult recommendations    PSYCH   - Adjustment disorder, no indications for meds while inpatient   - appreciate psych recommendations     Ethics   - Full Code   - palliative following given advanced HF   - Polysubstance use: S/w for resources and support     For Follow-Up:  [ ] Standing Diuresis dose   [ ] Titrate GDMT as tolerated   [ ] Eventual Ischemic workup for new HF, likely unable to tolerate CT angio coronary due to tachycardia. May require diagnostic LHC if ELIE resolves   [ ] Social work f/u for polysubstance use CICU Transfer Note  ---------------------------    Transfer from: CICU   Transfer to:  (  ) Medicine    (X) Telemetry    (  ) RCU    (  ) Palliative    (  ) Stroke Unit    (  ) _______________  Accepting Physician:    MALIKA   63 year old male with past medical history of CVA, HTN, HLD, prediabetes, A.fib, cocaine/heroin abuse, CAD w/ stent, CHF (EF  25-30% in 12/23),  RLE compartment syndrome s/p fasciotomy 12/23 and recent hospitalization for CHF exacerbation with bilateral pleural effusions requiring chest tube placement initially presenting to [facility] on 3/1 with chest pain and shortness of breath. He states having intermittent 8/10 midsternal chest pain for the past few months since his leg operation. He was admitted to the outside hospital for management of CHF exacerbation, found to have a further reduced EF of 10 - 15% on 3/1. While admitted, he was hypotensive requiring dopamine infusion with worsening perfusion indices and ELIE. He was transferred to [facility] for cardiogenic shock management.     On admission to CICU, he is A&O x3.  sinus tach, / 78, saturating well on 4L nasal cannula with dopamine gtt infusing at 10 mcg/kg/min. He denies chest pain, shortness of breath, nausea and vomiting on admission.    CICU COURSE  While in the CICU, pt was clinically volume overloaded with signs of poor perfusion. Patient was started on Dobutamine of 5, swan was placed with elevated CVP and low MVO2, concerning for cardiogenic shock. Bumex was given 2mg BID x 2 days, and continued on  1mg bumex. Heart failure was following for medication optimization. Afterload reduction was added (hydralazine, losartan, spirolactone) which the patient tolerated. Dobutamine was able to be weaned off on 3/6, with CI > 2.0. Heart failure noted that the patient is not a candidate for AT.   Wound Care was consulted for RLE wound (leg was wrapped when patient arrived from [place]).     Cardiac imaging/studies:  TTE: severe RV dilation, moderate MR (3b), severe TR, LV thrombus, small effusion. EF 25%, LVID 5.2  3/5 Bedside swan numbers; CVP 15, PAP 45/21, wedge 20. MvO2 45 CO/CI 3.4/2.2 SVR 1482  3/6 Bedside swan numbers: CVP 6, PAP 34/15, wedge 15. MvO2 53 CO/CI 3.7/2.4 SVR 1383   3/7 CvO2 42.9, with CO/CI estimated 3/1.9     OBJECTIVE --  Vital Signs Last 24 Hrs  T(C): 36.3 (07 Mar 2024 12:00), Max: 36.5 (06 Mar 2024 16:00)  T(F): 97.4 (07 Mar 2024 12:00), Max: 97.7 (06 Mar 2024 16:00)  HR: 115 (07 Mar 2024 12:00) (100 - 122)  BP: 96/61 (07 Mar 2024 12:00) (77/52 - 111/71)  BP(mean): 73 (07 Mar 2024 12:00) (60 - 82)  RR: 24 (07 Mar 2024 12:00) (18 - 32)  SpO2: 97% (07 Mar 2024 12:00) (91% - 100%)    Parameters below as of 07 Mar 2024 11:00  Patient On (Oxygen Delivery Method): room air      I&O's Summary    06 Mar 2024 07:01  -  07 Mar 2024 07:00  --------------------------------------------------------  IN: 1392 mL / OUT: 3100 mL / NET: -1708 mL    07 Mar 2024 07:01  -  07 Mar 2024 12:48  --------------------------------------------------------  IN: 180 mL / OUT: 325 mL / NET: -145 mL        MEDICATIONS  (STANDING):  AQUAPHOR (petrolatum Ointment) 1 Application(s) Topical every 12 hours  aspirin enteric coated 81 milliGRAM(s) Oral daily  atorvastatin 40 milliGRAM(s) Oral at bedtime  chlorhexidine 2% Cloths 1 Application(s) Topical <User Schedule>  dapagliflozin 10 milliGRAM(s) Oral daily  folic acid 1 milliGRAM(s) Oral daily  heparin  Infusion 1000 Unit(s)/Hr (10 mL/Hr) IV Continuous <Continuous>  insulin lispro (ADMELOG) corrective regimen sliding scale   SubCutaneous three times a day before meals  insulin lispro (ADMELOG) corrective regimen sliding scale   SubCutaneous at bedtime  iron sucrose IVPB 100 milliGRAM(s) IV Intermittent <User Schedule>  losartan 12.5 milliGRAM(s) Oral daily  spironolactone 25 milliGRAM(s) Oral daily  thiamine 100 milliGRAM(s) Oral daily    MEDICATIONS  (PRN):  sodium chloride 0.9% lock flush 10 milliLiter(s) IV Push every 1 hour PRN Pre/post blood products, medications, blood draw, and to maintain line patency    PHYSICAL EXAM:  GENERAL: pt appears melancholy   HEAD:  Atraumatic, Normocephalic  EYES: EOMI  NECK: No JVD  NERVOUS SYSTEM:  Alert & Awake.   CHEST/LUNG: B/L good air entry; No rales, rhonchi, or wheezing  HEART: S1S2 normal, no S3, Regular rate and rhythm; No murmurs  ABDOMEN: Soft, Nontender, Nondistended  EXTREMITIES:  2+ Peripheral Pulses, No clubbing, cyanosis, or edema  LYMPH: No lymphadenopathy noted  SKIN: No rashes or lesions    LABS                                            8.9                   Neurophils% (auto):   82.4   (03-07 @ 00:31):    6.52 )-----------(156          Lymphocytes% (auto):  8.8                                           27.4                   Eosinphils% (auto):   0.0      Manual%: Neutrophils x    ; Lymphocytes x    ; Eosinophils x    ; Bands%: x    ; Blasts x                                    138    |  101    |  48                  Calcium: 7.9   / iCa: x      (03-07 @ 00:31)    ----------------------------<  123       Magnesium: 3.3                              3.9     |  26     |  1.59             Phosphorous: 2.0      TPro  6.4    /  Alb  2.9    /  TBili  0.7    /  DBili  x      /  AST  212    /  ALT  371    /  AlkPhos  321    07 Mar 2024 00:31    ( 03-07 @ 00:31 )   PT: 15.6 sec;   INR: 1.50 ratio  aPTT: 71.6 sec          ASSESSMENT & PLAN:   63 year old male with past medical history of CVA, HTN, HLD, prediabetes, A.fib, cocaine/heroin use CAD w/ stent, CHF (EF  25-30% in 12/23),  RLE compartment syndrome s/p fasciotomy 12/23 and recent hospitalization for CHF exacerbation with bilateral pleural effusions requiring chest tube placement initially presenting to [facility] on 3/1 with chest pain and shortness of breath. Transferred to [facility] for management of cardiogenic shock. Diuresis in the CICU, and weaned off inotrope.     Plan:  Neuro  #hx CVA  - A&O x3, poor historian with medical history and medications  - continue to monitor per protocol  - c/w ASA 81    #Alcohol use disorder   - Not in the withdrawal period   - Thiamine /folate daily    Respiratory  #hypoxia/pleural effusions  - noted to have pleural effusions at outside hospital, likely in the setting of volume overload s/t chf  - saturating well on 4L, wean as tolerated with diuresis  - continue to monitor sp02    Cardiovascular  #Cardiogenic shock  - i/s/o CHF exacerbation  - TTE 3/1 EF 10 - 15%, previously 25 - 30% 12/23  - CICU : dopamine gtt transitioned to dobutamine  - swan placed overnight  - Elevated CVP with low MVo2 on admission, currently swan removed  - Dobutamine weaned 3/6  - s/p hydralazine 25, discontinued   > c/w atorvastatin 40 qd   > c/w Losartan 12.5  > c/w Jordan 25  > c/w Farxiga 10mg qd   - diuresis as needed, (given 2mg BID while in the CICU), most recently 2mg 1x (3/7)  - TTE 3/5: EF 25%, global LV hypokinesis, rounded and protruding LV thrombus in apex, RV severely enlarged with severely reduced RVSF, mild-mod pulm HTN, severe TR, mild-mod PA    #CAD  - w/ history of stents in december as per patient  - previously on aspirin, continue    #pAF  - ? hx a.fib  - sinus on admission  - not on blood thinner or rate control agent as per patient, only aspirin & plavix  - anticoagulation on hold with INR > 2    #HTN  - continue to trend BP  - hypotensive in this admission 2/2 cardiogenic shock     #HLD  - holding statin i/s/o liver dysfunction  - atorvastatin restarted 3/7     GI  #transaminitis - likely 2/2 to congestive hepatopathy   - worsening liver enzymes at outside hospital  - hold hepatotoxins, continue to trend  - diet as tolerated, monitor for BM  - Improving, will r/s statin     /Renal  #ELIE - likely in the setting of vascular congestion   - admitted from outside hospital with mitchell catheter  - continue strict I&O, electrolyte monitoring  - Cr on admission 2.15   - Downtrending 1.5  - c/w diuresis as tolerated, monitor u/o    Endo  #preDM  - a1c 6.2  - trend glucose, ISS while inpatient    Heme  - elevated INR on admission, i/s/o liver dsyfunction, continue to trend  - H/H and platelets stable at outside hospital  - INR <2, started heparin gtt overnight  Likely in the setting of malnutrition and liver dysfunction, no signs of active bleeding.     #Iron def anemia   -  iron sat is low. Would recommend IV iron sucrose x 5 days as per HF     ID  - afebrile on admission  - continue to trend wbc and fever curve    #RLE fasciotomy  - admitted from outside hospital with ace wrap in place  - noted to have wounds on lateral and medial aspect of calf  - f/u wound consult recommendations    PSYCH   - Adjustment disorder, no indications for meds while inpatient   - appreciate psych recommendations     Ethics   - Full Code   - palliative following given advanced HF   - Polysubstance use: S/w for resources and support     For Follow-Up:  [ ] Standing Diuresis dose   [ ] Titrate GDMT as tolerated   [ ] Eventual Ischemic workup for new HF, likely unable to tolerate CT angio coronary due to tachycardia. May require diagnostic LHC if ELIE resolves   [ ] Social work f/u for polysubstance use CICU Transfer Note  ---------------------------    Transfer from: CICU   Transfer to:  (  ) Medicine    (X) Telemetry    (  ) RCU    (  ) Palliative    (  ) Stroke Unit    (  ) _______________  Accepting Physician: Dr. Gary Luna     HPI   63 year old male with past medical history of CVA, HTN, HLD, prediabetes, A.fib, cocaine/heroin abuse, CAD w/ stent, CHF (EF  25-30% in 12/23),  RLE compartment syndrome s/p fasciotomy 12/23 and recent hospitalization for CHF exacerbation with bilateral pleural effusions requiring chest tube placement initially presenting to [facility] on 3/1 with chest pain and shortness of breath. He states having intermittent 8/10 midsternal chest pain for the past few months since his leg operation. He was admitted to the outside hospital for management of CHF exacerbation, found to have a further reduced EF of 10 - 15% on 3/1. While admitted, he was hypotensive requiring dopamine infusion with worsening perfusion indices and ELIE. He was transferred to [facility] for cardiogenic shock management.     On admission to CICU, he is A&O x3.  sinus tach, / 78, saturating well on 4L nasal cannula with dopamine gtt infusing at 10 mcg/kg/min. He denies chest pain, shortness of breath, nausea and vomiting on admission.    CICU COURSE  While in the CICU, pt was clinically volume overloaded with signs of poor perfusion. Patient was started on Dobutamine of 5, swan was placed with elevated CVP and low MVO2, concerning for cardiogenic shock. Bumex was given 2mg BID x 2 days, and continued on  1mg bumex. Heart failure was following for medication optimization. Afterload reduction was added (hydralazine, losartan, spirolactone) which the patient tolerated. Dobutamine was able to be weaned off on 3/6, with CI > 2.0. Heart failure noted that the patient is not a candidate for AT.   Wound Care was consulted for RLE wound (leg was wrapped when patient arrived from [place]).     Cardiac imaging/studies:  TTE: severe RV dilation, moderate MR (3b), severe TR, LV thrombus, small effusion. EF 25%, LVID 5.2  3/5 Bedside swan numbers; CVP 15, PAP 45/21, wedge 20. MvO2 45 CO/CI 3.4/2.2 SVR 1482  3/6 Bedside swan numbers: CVP 6, PAP 34/15, wedge 15. MvO2 53 CO/CI 3.7/2.4 SVR 1383   3/7 CvO2 42.9, with CO/CI estimated 3/1.9     OBJECTIVE --  Vital Signs Last 24 Hrs  T(C): 36.3 (07 Mar 2024 12:00), Max: 36.5 (06 Mar 2024 16:00)  T(F): 97.4 (07 Mar 2024 12:00), Max: 97.7 (06 Mar 2024 16:00)  HR: 115 (07 Mar 2024 12:00) (100 - 122)  BP: 96/61 (07 Mar 2024 12:00) (77/52 - 111/71)  BP(mean): 73 (07 Mar 2024 12:00) (60 - 82)  RR: 24 (07 Mar 2024 12:00) (18 - 32)  SpO2: 97% (07 Mar 2024 12:00) (91% - 100%)    Parameters below as of 07 Mar 2024 11:00  Patient On (Oxygen Delivery Method): room air      I&O's Summary    06 Mar 2024 07:01  -  07 Mar 2024 07:00  --------------------------------------------------------  IN: 1392 mL / OUT: 3100 mL / NET: -1708 mL    07 Mar 2024 07:01  -  07 Mar 2024 12:48  --------------------------------------------------------  IN: 180 mL / OUT: 325 mL / NET: -145 mL        MEDICATIONS  (STANDING):  AQUAPHOR (petrolatum Ointment) 1 Application(s) Topical every 12 hours  aspirin enteric coated 81 milliGRAM(s) Oral daily  atorvastatin 40 milliGRAM(s) Oral at bedtime  chlorhexidine 2% Cloths 1 Application(s) Topical <User Schedule>  dapagliflozin 10 milliGRAM(s) Oral daily  folic acid 1 milliGRAM(s) Oral daily  heparin  Infusion 1000 Unit(s)/Hr (10 mL/Hr) IV Continuous <Continuous>  insulin lispro (ADMELOG) corrective regimen sliding scale   SubCutaneous three times a day before meals  insulin lispro (ADMELOG) corrective regimen sliding scale   SubCutaneous at bedtime  iron sucrose IVPB 100 milliGRAM(s) IV Intermittent <User Schedule>  losartan 12.5 milliGRAM(s) Oral daily  spironolactone 25 milliGRAM(s) Oral daily  thiamine 100 milliGRAM(s) Oral daily    MEDICATIONS  (PRN):  sodium chloride 0.9% lock flush 10 milliLiter(s) IV Push every 1 hour PRN Pre/post blood products, medications, blood draw, and to maintain line patency    PHYSICAL EXAM:  GENERAL: pt appears melancholy   HEAD:  Atraumatic, Normocephalic  EYES: EOMI  NECK: No JVD  NERVOUS SYSTEM:  Alert & Awake.   CHEST/LUNG: B/L good air entry; No rales, rhonchi, or wheezing  HEART: S1S2 normal, no S3, Regular rate and rhythm; No murmurs  ABDOMEN: Soft, Nontender, Nondistended  EXTREMITIES:  2+ Peripheral Pulses, No clubbing, cyanosis, or edema  LYMPH: No lymphadenopathy noted  SKIN: No rashes or lesions    LABS                                            8.9                   Neurophils% (auto):   82.4   (03-07 @ 00:31):    6.52 )-----------(156          Lymphocytes% (auto):  8.8                                           27.4                   Eosinphils% (auto):   0.0      Manual%: Neutrophils x    ; Lymphocytes x    ; Eosinophils x    ; Bands%: x    ; Blasts x                                    138    |  101    |  48                  Calcium: 7.9   / iCa: x      (03-07 @ 00:31)    ----------------------------<  123       Magnesium: 3.3                              3.9     |  26     |  1.59             Phosphorous: 2.0      TPro  6.4    /  Alb  2.9    /  TBili  0.7    /  DBili  x      /  AST  212    /  ALT  371    /  AlkPhos  321    07 Mar 2024 00:31    ( 03-07 @ 00:31 )   PT: 15.6 sec;   INR: 1.50 ratio  aPTT: 71.6 sec          ASSESSMENT & PLAN:   63 year old male with past medical history of CVA, HTN, HLD, prediabetes, A.fib, cocaine/heroin use CAD w/ stent, CHF (EF  25-30% in 12/23),  RLE compartment syndrome s/p fasciotomy 12/23 and recent hospitalization for CHF exacerbation with bilateral pleural effusions requiring chest tube placement initially presenting to [facility] on 3/1 with chest pain and shortness of breath. Transferred to [facility] for management of cardiogenic shock. Diuresis in the CICU, and weaned off inotrope.     Plan:  Neuro  #hx CVA  - A&O x3, poor historian with medical history and medications  - continue to monitor per protocol  - c/w ASA 81    #Alcohol use disorder   - Not in the withdrawal period   - Thiamine /folate daily    Respiratory  #hypoxia/pleural effusions  - noted to have pleural effusions at outside hospital, likely in the setting of volume overload s/t chf  - saturating well on 4L, wean as tolerated with diuresis  - continue to monitor sp02    Cardiovascular  #Cardiogenic shock  - i/s/o CHF exacerbation  - TTE 3/1 EF 10 - 15%, previously 25 - 30% 12/23  - CICU : dopamine gtt transitioned to dobutamine  - swan placed overnight  - Elevated CVP with low MVo2 on admission, currently swan removed  - Dobutamine weaned 3/6  - s/p hydralazine 25, discontinued   > c/w atorvastatin 40 qd   > c/w Losartan 12.5  > c/w [place] 25  > c/w Farxiga 10mg qd   - diuresis as needed, (given 2mg BID while in the CICU), most recently 2mg 1x (3/7)  - TTE 3/5: EF 25%, global LV hypokinesis, rounded and protruding LV thrombus in apex, RV severely enlarged with severely reduced RVSF, mild-mod pulm HTN, severe TR, mild-mod [place]    #CAD  - w/ history of stents in december as per patient  - previously on aspirin, continue    #pAF  - ? hx a.fib  - sinus on admission  - not on blood thinner or rate control agent as per patient, only aspirin & plavix  - anticoagulation on hold with INR > 2    #HTN  - continue to trend BP  - hypotensive in this admission 2/2 cardiogenic shock     #HLD  - holding statin i/s/o liver dysfunction  - atorvastatin restarted 3/7     GI  #transaminitis - likely 2/2 to congestive hepatopathy   - worsening liver enzymes at outside hospital  - hold hepatotoxins, continue to trend  - diet as tolerated, monitor for BM  - Improving, will r/s statin     /Renal  #ELIE - likely in the setting of vascular congestion   - admitted from outside hospital with mitchell catheter  - continue strict I&O, electrolyte monitoring  - Cr on admission 2.15   - Downtrending 1.5  - c/w diuresis as tolerated, monitor u/o    Endo  #preDM  - a1c 6.2  - trend glucose, ISS while inpatient    Heme  - elevated INR on admission, i/s/o liver dsyfunction, continue to trend  - H/H and platelets stable at outside hospital  - INR <2, started heparin gtt overnight  Likely in the setting of malnutrition and liver dysfunction, no signs of active bleeding.     #Iron def anemia   -  iron sat is low. Would recommend IV iron sucrose x 5 days as per HF     ID  - afebrile on admission  - continue to trend wbc and fever curve    #RLE fasciotomy  - admitted from outside hospital with ace wrap in place  - noted to have wounds on lateral and medial aspect of calf  - f/u wound consult recommendations    PSYCH   - Adjustment disorder, no indications for meds while inpatient   - appreciate psych recommendations     Ethics   - Full Code   - palliative following given advanced HF   - Polysubstance use: S/w for resources and support     For Follow-Up:  [ ] Standing Diuresis dose   [ ] Titrate GDMT as tolerated   [ ] Eventual Ischemic workup for new HF, likely unable to tolerate CT angio coronary due to tachycardia. May require diagnostic LHC if ELIE resolves   [ ] Social work f/u for polysubstance use CICU Transfer Note  ---------------------------    Transfer from: CICU   Transfer to:  (  ) Medicine    (X) Telemetry    (  ) RCU    (  ) Palliative    (  ) Stroke Unit    (  ) _______________  Accepting Physician: Dr. Gary Luna   Room : [address] [place] [address]    HPI   63 year old male with past medical history of CVA, HTN, HLD, prediabetes, A.fib, cocaine/heroin abuse, CAD w/ stent, CHF (EF  25-30% in 12/23),  RLE compartment syndrome s/p fasciotomy 12/23 and recent hospitalization for CHF exacerbation with bilateral pleural effusions requiring chest tube placement initially presenting to [facility] on 3/1 with chest pain and shortness of breath. He states having intermittent 8/10 midsternal chest pain for the past few months since his leg operation. He was admitted to the outside hospital for management of CHF exacerbation, found to have a further reduced EF of 10 - 15% on 3/1. While admitted, he was hypotensive requiring dopamine infusion with worsening perfusion indices and ELIE. He was transferred to [facility] for cardiogenic shock management.     On admission to CICU, he is A&O x3.  sinus tach, / 78, saturating well on 4L nasal cannula with dopamine gtt infusing at 10 mcg/kg/min. He denies chest pain, shortness of breath, nausea and vomiting on admission.    CICU COURSE  While in the CICU, pt was clinically volume overloaded with signs of poor perfusion. Patient was started on Dobutamine of 5, swan was placed with elevated CVP and low MVO2, concerning for cardiogenic shock. Bumex was given 2mg BID x 2 days, and continued on  1mg bumex. Heart failure was following for medication optimization. Afterload reduction was added (hydralazine, losartan, spirolactone) which the patient tolerated. Dobutamine was able to be weaned off on 3/6, with CI > 2.0. Heart failure noted that the patient is not a candidate for AT.   Wound Care was consulted for RLE wound (leg was wrapped when patient arrived from [place]).     Cardiac imaging/studies:  TTE: severe RV dilation, moderate MR (3b), severe TR, LV thrombus, small effusion. EF 25%, LVID 5.2  3/5 Bedside swan numbers; CVP 15, PAP 45/21, wedge 20. MvO2 45 CO/CI 3.4/2.2 SVR 1482  3/6 Bedside swan numbers: CVP 6, PAP 34/15, wedge 15. MvO2 53 CO/CI 3.7/2.4 SVR 1383   3/7 CvO2 42.9, with CO/CI estimated 3/1.9     OBJECTIVE --  Vital Signs Last 24 Hrs  T(C): 36.3 (07 Mar 2024 12:00), Max: 36.5 (06 Mar 2024 16:00)  T(F): 97.4 (07 Mar 2024 12:00), Max: 97.7 (06 Mar 2024 16:00)  HR: 115 (07 Mar 2024 12:00) (100 - 122)  BP: 96/61 (07 Mar 2024 12:00) (77/52 - 111/71)  BP(mean): 73 (07 Mar 2024 12:00) (60 - 82)  RR: 24 (07 Mar 2024 12:00) (18 - 32)  SpO2: 97% (07 Mar 2024 12:00) (91% - 100%)    Parameters below as of 07 Mar 2024 11:00  Patient On (Oxygen Delivery Method): room air      I&O's Summary    06 Mar 2024 07:01  -  07 Mar 2024 07:00  --------------------------------------------------------  IN: 1392 mL / OUT: 3100 mL / NET: -1708 mL    07 Mar 2024 07:01  -  07 Mar 2024 12:48  --------------------------------------------------------  IN: 180 mL / OUT: 325 mL / NET: -145 mL    MEDICATIONS  (STANDING):  AQUAPHOR (petrolatum Ointment) 1 Application(s) Topical every 12 hours  aspirin enteric coated 81 milliGRAM(s) Oral daily  atorvastatin 40 milliGRAM(s) Oral at bedtime  chlorhexidine 2% Cloths 1 Application(s) Topical <User Schedule>  dapagliflozin 10 milliGRAM(s) Oral daily  folic acid 1 milliGRAM(s) Oral daily  heparin  Infusion 1000 Unit(s)/Hr (10 mL/Hr) IV Continuous <Continuous>  insulin lispro (ADMELOG) corrective regimen sliding scale   SubCutaneous three times a day before meals  insulin lispro (ADMELOG) corrective regimen sliding scale   SubCutaneous at bedtime  iron sucrose IVPB 100 milliGRAM(s) IV Intermittent <User Schedule>  losartan 12.5 milliGRAM(s) Oral daily  spironolactone 25 milliGRAM(s) Oral daily  thiamine 100 milliGRAM(s) Oral daily    MEDICATIONS  (PRN):  sodium chloride 0.9% lock flush 10 milliLiter(s) IV Push every 1 hour PRN Pre/post blood products, medications, blood draw, and to maintain line patency    PHYSICAL EXAM:  GENERAL: pt appears melancholy   HEAD:  Atraumatic, Normocephalic  EYES: EOMI  NECK: No JVD  NERVOUS SYSTEM:  Alert & Awake.   CHEST/LUNG: B/L good air entry; No rales, rhonchi, or wheezing  HEART: S1S2 normal, no S3, Regular rate and rhythm; No murmurs  ABDOMEN: Soft, Nontender, Nondistended  EXTREMITIES:  2+ Peripheral Pulses, No clubbing, cyanosis, or edema  LYMPH: No lymphadenopathy noted  SKIN: No rashes or lesions    LABS                                            8.9                   Neurophils% (auto):   82.4   (03-07 @ 00:31):    6.52 )-----------(156          Lymphocytes% (auto):  8.8                                           27.4                   Eosinphils% (auto):   0.0      Manual%: Neutrophils x    ; Lymphocytes x    ; Eosinophils x    ; Bands%: x    ; Blasts x                                    138    |  101    |  48                  Calcium: 7.9   / iCa: x      (03-07 @ 00:31)    ----------------------------<  123       Magnesium: 3.3                              3.9     |  26     |  1.59             Phosphorous: 2.0      TPro  6.4    /  Alb  2.9    /  TBili  0.7    /  DBili  x      /  AST  212    /  ALT  371    /  AlkPhos  321    07 Mar 2024 00:31    ( 03-07 @ 00:31 )   PT: 15.6 sec;   INR: 1.50 ratio  aPTT: 71.6 sec          ASSESSMENT & PLAN:   63 year old male with past medical history of CVA, HTN, HLD, prediabetes, A.fib, cocaine/heroin use CAD w/ stent, CHF (EF  25-30% in 12/23),  RLE compartment syndrome s/p fasciotomy 12/23 and recent hospitalization for CHF exacerbation with bilateral pleural effusions requiring chest tube placement initially presenting to [facility] on 3/1 with chest pain and shortness of breath. Transferred to [facility] for management of cardiogenic shock. Diuresis in the CICU, and weaned off inotrope.     Plan:  Neuro  #hx CVA  - A&O x3, poor historian with medical history and medications  - continue to monitor per protocol  - c/w ASA 81    #Alcohol use disorder   - Not in the withdrawal period   - Thiamine /folate daily    Respiratory  #hypoxia/pleural effusions  - noted to have pleural effusions at outside hospital, likely in the setting of volume overload s/t chf  - saturating well on 4L, wean as tolerated with diuresis  - continue to monitor sp02    Cardiovascular  #Cardiogenic shock  - i/s/o CHF exacerbation  - TTE 3/1 EF 10 - 15%, previously 25 - 30% 12/23  - CICU : dopamine gtt transitioned to dobutamine  - swan placed overnight  - Elevated CVP with low MVo2 on admission, currently swan removed  - Dobutamine weaned 3/6  - s/p hydralazine 25, discontinued   > c/w atorvastatin 40 qd   > c/w Losartan 12.5  > c/w Jordan 25  > c/w Farxiga 10mg qd   - diuresis as needed, (given 2mg BID while in the CICU), most recently 2mg 1x (3/7)  - TTE 3/5: EF 25%, global LV hypokinesis, rounded and protruding LV thrombus in apex, RV severely enlarged with severely reduced RVSF, mild-mod pulm HTN, severe TR, mild-mod [place]    #CAD  - w/ history of stents in december as per patient  - previously on aspirin, continue    #pAF  - ? hx a.fib  - sinus on admission  - not on blood thinner or rate control agent as per patient, only aspirin & plavix  - anticoagulation on hold with INR > 2    #HTN  - continue to trend BP  - hypotensive in this admission 2/2 cardiogenic shock     #HLD  - holding statin i/s/o liver dysfunction  - atorvastatin restarted 3/7     GI  #transaminitis - likely 2/2 to congestive hepatopathy   - worsening liver enzymes at outside hospital  - hold hepatotoxins, continue to trend  - diet as tolerated, monitor for BM  - Improving, will r/s statin     /Renal  #ELIE - likely in the setting of vascular congestion   - admitted from outside hospital with mitchell catheter  - continue strict I&O, electrolyte monitoring  - Cr on admission 2.15   - Downtrending 1.5  - c/w diuresis as tolerated, monitor u/o    Endo  #preDM  - a1c 6.2  - trend glucose, ISS while inpatient    Heme  - elevated INR on admission, i/s/o liver dsyfunction, continue to trend  - H/H and platelets stable at outside hospital  - INR <2, started heparin gtt overnight  Likely in the setting of malnutrition and liver dysfunction, no signs of active bleeding.     #Iron def anemia   -  iron sat is low. Would recommend IV iron sucrose x 5 days as per HF     ID  - afebrile on admission  - continue to trend wbc and fever curve    #RLE fasciotomy  - admitted from outside hospital with ace wrap in place  - noted to have wounds on lateral and medial aspect of calf  - f/u wound consult recommendations    PSYCH   - Adjustment disorder, no indications for meds while inpatient   - appreciate psych recommendations     Ethics   - Full Code   - palliative following given advanced HF   - Polysubstance use: S/w for resources and support     For Follow-Up:  [ ] Standing Diuresis dose   [ ] Titrate GDMT as tolerated   [ ] Eventual Ischemic workup for new HF, likely unable to tolerate CT angio coronary due to tachycardia. May require diagnostic LHC if ELIE resolves   [ ] Social work f/u for polysubstance use CICU Transfer Note  ---------------------------    Transfer from: CICU   Transfer to:  (  ) Medicine    (X) Telemetry    (  ) RCU    (  ) Palliative    (  ) Stroke Unit    (  ) _______________  Accepting Physician: Dr. Gary Luna   Room : [address] [place] [address]    HPI   63 year old male with past medical history of CVA, HTN, HLD, prediabetes, A.fib, cocaine/heroin abuse, CAD w/ stent, CHF (EF  25-30% in 12/23),  RLE compartment syndrome s/p fasciotomy 12/23 and recent hospitalization for CHF exacerbation with bilateral pleural effusions requiring chest tube placement initially presenting to [facility] on 3/1 with chest pain and shortness of breath. He states having intermittent 8/10 midsternal chest pain for the past few months since his leg operation. He was admitted to the outside hospital for management of CHF exacerbation, found to have a further reduced EF of 10 - 15% on 3/1. While admitted, he was hypotensive requiring dopamine infusion with worsening perfusion indices and ELIE. He was transferred to [facility] for cardiogenic shock management.     On admission to CICU, he is A&O x3.  sinus tach, / 78, saturating well on 4L nasal cannula with dopamine gtt infusing at 10 mcg/kg/min. He denies chest pain, shortness of breath, nausea and vomiting on admission.    CICU COURSE  While in the CICU, pt was clinically volume overloaded with signs of poor perfusion. Patient was started on Dobutamine of 5, swan was placed with elevated CVP and low MVO2, concerning for cardiogenic shock. Bumex was given 2mg BID x 2 days, and continued on  1mg bumex. Heart failure was following for medication optimization. Afterload reduction was added (hydralazine, losartan, spirolactone) which the patient tolerated. Dobutamine was able to be weaned off on 3/6, with CI > 2.0. Heart failure noted that the patient is not a candidate for AT.   Wound Care was consulted for RLE wound (leg was wrapped when patient arrived from [place]).     Cardiac imaging/studies:  TTE: severe RV dilation, moderate MR (3b), severe TR, LV thrombus, small effusion. EF 25%, LVID 5.2  3/5 Bedside swan numbers; CVP 15, PAP 45/21, wedge 20. MvO2 45 CO/CI 3.4/2.2 SVR 1482  3/6 Bedside swan numbers: CVP 6, PAP 34/15, wedge 15. MvO2 53 CO/CI 3.7/2.4 SVR 1383   3/7 CvO2 42.9, with CO/CI estimated 3/1.9     OBJECTIVE --  Vital Signs Last 24 Hrs  T(C): 36.3 (07 Mar 2024 12:00), Max: 36.5 (06 Mar 2024 16:00)  T(F): 97.4 (07 Mar 2024 12:00), Max: 97.7 (06 Mar 2024 16:00)  HR: 115 (07 Mar 2024 12:00) (100 - 122)  BP: 96/61 (07 Mar 2024 12:00) (77/52 - 111/71)  BP(mean): 73 (07 Mar 2024 12:00) (60 - 82)  RR: 24 (07 Mar 2024 12:00) (18 - 32)  SpO2: 97% (07 Mar 2024 12:00) (91% - 100%)    Parameters below as of 07 Mar 2024 11:00  Patient On (Oxygen Delivery Method): room air      I&O's Summary    06 Mar 2024 07:01  -  07 Mar 2024 07:00  --------------------------------------------------------  IN: 1392 mL / OUT: 3100 mL / NET: -1708 mL    07 Mar 2024 07:01  -  07 Mar 2024 12:48  --------------------------------------------------------  IN: 180 mL / OUT: 325 mL / NET: -145 mL    MEDICATIONS  (STANDING):  AQUAPHOR (petrolatum Ointment) 1 Application(s) Topical every 12 hours  aspirin enteric coated 81 milliGRAM(s) Oral daily  atorvastatin 40 milliGRAM(s) Oral at bedtime  chlorhexidine 2% Cloths 1 Application(s) Topical <User Schedule>  dapagliflozin 10 milliGRAM(s) Oral daily  folic acid 1 milliGRAM(s) Oral daily  heparin  Infusion 1000 Unit(s)/Hr (10 mL/Hr) IV Continuous <Continuous>  insulin lispro (ADMELOG) corrective regimen sliding scale   SubCutaneous three times a day before meals  insulin lispro (ADMELOG) corrective regimen sliding scale   SubCutaneous at bedtime  iron sucrose IVPB 100 milliGRAM(s) IV Intermittent <User Schedule>  losartan 12.5 milliGRAM(s) Oral daily  spironolactone 25 milliGRAM(s) Oral daily  thiamine 100 milliGRAM(s) Oral daily    MEDICATIONS  (PRN):  sodium chloride 0.9% lock flush 10 milliLiter(s) IV Push every 1 hour PRN Pre/post blood products, medications, blood draw, and to maintain line patency    PHYSICAL EXAM:  GENERAL: pt appears melancholy   HEAD:  Atraumatic, Normocephalic  EYES: EOMI  NECK: No JVD  NERVOUS SYSTEM:  Alert & Awake.   CHEST/LUNG: B/L good air entry; No rales, rhonchi, or wheezing  HEART: S1S2 normal, no S3, Regular rate and rhythm; No murmurs  ABDOMEN: Soft, Nontender, Nondistended  EXTREMITIES:  2+ Peripheral Pulses, No clubbing, cyanosis, or edema  LYMPH: No lymphadenopathy noted  SKIN: No rashes or lesions    LABS                                            8.9                   Neurophils% (auto):   82.4   (03-07 @ 00:31):    6.52 )-----------(156          Lymphocytes% (auto):  8.8                                           27.4                   Eosinphils% (auto):   0.0      Manual%: Neutrophils x    ; Lymphocytes x    ; Eosinophils x    ; Bands%: x    ; Blasts x                                    138    |  101    |  48                  Calcium: 7.9   / iCa: x      (03-07 @ 00:31)    ----------------------------<  123       Magnesium: 3.3                              3.9     |  26     |  1.59             Phosphorous: 2.0      TPro  6.4    /  Alb  2.9    /  TBili  0.7    /  DBili  x      /  AST  212    /  ALT  371    /  AlkPhos  321    07 Mar 2024 00:31    ( 03-07 @ 00:31 )   PT: 15.6 sec;   INR: 1.50 ratio  aPTT: 71.6 sec          ASSESSMENT & PLAN:   63 year old male with past medical history of CVA, HTN, HLD, prediabetes, A.fib, cocaine/heroin use CAD w/ stent, CHF (EF  25-30% in 12/23),  RLE compartment syndrome s/p fasciotomy 12/23 and recent hospitalization for CHF exacerbation with bilateral pleural effusions requiring chest tube placement initially presenting to [facility] on 3/1 with chest pain and shortness of breath. Transferred to [facility] for management of cardiogenic shock. Diuresis in the CICU, and weaned off inotrope.     Plan:  Neuro  #hx CVA  - A&O x3, poor historian with medical history and medications  - continue to monitor per protocol  - c/w ASA 81    #Alcohol use disorder   - Not in the withdrawal period   - Thiamine /folate daily    Respiratory  #hypoxia/pleural effusions  - noted to have pleural effusions at outside hospital, likely in the setting of volume overload s/t chf  - saturating well on 4L, wean as tolerated with diuresis  - continue to monitor sp02    Cardiovascular  #Cardiogenic shock  - i/s/o CHF exacerbation  - TTE 3/1 EF 10 - 15%, previously 25 - 30% 12/23  - CICU : dopamine gtt transitioned to dobutamine  - swan placed overnight  - Elevated CVP with low MVo2 on admission, currently swan removed  - Dobutamine weaned 3/6  - s/p hydralazine 25, discontinued   > c/w atorvastatin 40 qd   > c/w Losartan 12.5  > c/w Jordan 25  > c/w Farxiga 10mg qd   - diuresis as needed, (given 2mg BID while in the CICU), most recently 2mg 1x (3/7)  - TTE 3/5: EF 25%, global LV hypokinesis, rounded and protruding LV thrombus in apex, RV severely enlarged with severely reduced RVSF, mild-mod pulm HTN, severe TR, mild-mod [place]    #CAD  - w/ history of stents in december as per patient  - previously on aspirin, continue    #pAF  - ? hx a.fib  - sinus on admission  - not on blood thinner or rate control agent as per patient, only aspirin & plavix  - anticoagulation on hold with INR > 2    #HTN  - continue to trend BP  - hypotensive in this admission 2/2 cardiogenic shock     #HLD  - holding statin i/s/o liver dysfunction  - atorvastatin restarted 3/7     GI  #transaminitis - likely 2/2 to congestive hepatopathy   - worsening liver enzymes at outside hospital  - hold hepatotoxins, continue to trend  - diet as tolerated, monitor for BM  - Improving, will r/s statin     /Renal  #ELIE - likely in the setting of vascular congestion   - admitted from outside hospital with mitchell catheter  - continue strict I&O, electrolyte monitoring  - Cr on admission 2.15   - Downtrending 1.5  - c/w diuresis as tolerated, monitor u/o    Endo  #preDM  - a1c 6.2  - trend glucose, ISS while inpatient    Heme  - elevated INR on admission, i/s/o liver dsyfunction, continue to trend  - H/H and platelets stable at outside hospital  - INR <2, started heparin gtt overnight  Likely in the setting of malnutrition and liver dysfunction, no signs of active bleeding.     #Iron def anemia   -  iron sat is low. Would recommend IV iron sucrose x 5 days as per HF     ID  - afebrile on admission  - continue to trend wbc and fever curve    #RLE fasciotomy  - admitted from outside hospital with ace wrap in place  - noted to have wounds on lateral and medial aspect of calf  - f/u wound consult recommendations    PSYCH   - Adjustment disorder, no indications for meds while inpatient   - appreciate psych recommendations     Ethics   - Full Code   - palliative following given advanced HF   - Polysubstance use: S/w for resources and support   - SBIRT consulted     For Follow-Up:  [ ] Standing Diuresis dose   [ ] Titrate GDMT as tolerated   [ ] Eventual Ischemic workup for new HF, likely unable to tolerate CT angio coronary due to tachycardia. May require diagnostic LHC if ELIE resolves   [ ] Social work f/u for polysubstance use CICU Transfer Note  ---------------------------    Transfer from: CICU   Transfer to:  (  ) Medicine    (X) Telemetry    (  ) RCU    (  ) Palliative    (  ) Stroke Unit    (  ) _______________  Accepting Physician: Dr. Gary Luna   Room : [address] [place] [address]    HPI   63 year old male with past medical history of CVA, HTN, HLD, prediabetes, A.fib, cocaine/heroin abuse, CAD w/ stent, CHF (EF  25-30% in 12/23),  RLE compartment syndrome s/p fasciotomy 12/23 and recent hospitalization for CHF exacerbation with bilateral pleural effusions requiring chest tube placement initially presenting to [facility] on 3/1 with chest pain and shortness of breath. He states having intermittent 8/10 midsternal chest pain for the past few months since his leg operation. He was admitted to the outside hospital for management of CHF exacerbation, found to have a further reduced EF of 10 - 15% on 3/1. While admitted, he was hypotensive requiring dopamine infusion with worsening perfusion indices and ELIE. He was transferred to [facility] for cardiogenic shock management.     On admission to CICU, he is A&O x3.  sinus tach, / 78, saturating well on 4L nasal cannula with dopamine gtt infusing at 10 mcg/kg/min. He denies chest pain, shortness of breath, nausea and vomiting on admission.    CICU COURSE  While in the CICU, pt was clinically volume overloaded with signs of poor perfusion. Patient was started on Dobutamine of 5, swan was placed with elevated CVP and low MVO2, concerning for cardiogenic shock. Bumex was given 2mg BID x 2 days, and continued on  1mg bumex. Heart failure was following for medication optimization. Afterload reduction was added (hydralazine, losartan, spirolactone) which the patient tolerated. Dobutamine was able to be weaned off on 3/6, with CI > 2.0. Heart failure noted that the patient is not a candidate for AT.   Wound Care was consulted for RLE wound (leg was wrapped when patient arrived from [place]).     Cardiac imaging/studies:  TTE: severe RV dilation, moderate MR (3b), severe TR, LV thrombus, small effusion. EF 25%, LVID 5.2  3/5 Bedside swan numbers; CVP 15, PAP 45/21, wedge 20. MvO2 45 CO/CI 3.4/2.2 SVR 1482  3/6 Bedside swan numbers: CVP 6, PAP 34/15, wedge 15. MvO2 53 CO/CI 3.7/2.4 SVR 1383   3/7 CvO2 42.9, with CO/CI estimated 3/1.9     OBJECTIVE --  Vital Signs Last 24 Hrs  T(C): 36.3 (07 Mar 2024 12:00), Max: 36.5 (06 Mar 2024 16:00)  T(F): 97.4 (07 Mar 2024 12:00), Max: 97.7 (06 Mar 2024 16:00)  HR: 115 (07 Mar 2024 12:00) (100 - 122)  BP: 96/61 (07 Mar 2024 12:00) (77/52 - 111/71)  BP(mean): 73 (07 Mar 2024 12:00) (60 - 82)  RR: 24 (07 Mar 2024 12:00) (18 - 32)  SpO2: 97% (07 Mar 2024 12:00) (91% - 100%)    Parameters below as of 07 Mar 2024 11:00  Patient On (Oxygen Delivery Method): room air      I&O's Summary    06 Mar 2024 07:01  -  07 Mar 2024 07:00  --------------------------------------------------------  IN: 1392 mL / OUT: 3100 mL / NET: -1708 mL    07 Mar 2024 07:01  -  07 Mar 2024 12:48  --------------------------------------------------------  IN: 180 mL / OUT: 325 mL / NET: -145 mL    MEDICATIONS  (STANDING):  AQUAPHOR (petrolatum Ointment) 1 Application(s) Topical every 12 hours  aspirin enteric coated 81 milliGRAM(s) Oral daily  atorvastatin 40 milliGRAM(s) Oral at bedtime  chlorhexidine 2% Cloths 1 Application(s) Topical <User Schedule>  dapagliflozin 10 milliGRAM(s) Oral daily  folic acid 1 milliGRAM(s) Oral daily  heparin  Infusion 1000 Unit(s)/Hr (10 mL/Hr) IV Continuous <Continuous>  insulin lispro (ADMELOG) corrective regimen sliding scale   SubCutaneous three times a day before meals  insulin lispro (ADMELOG) corrective regimen sliding scale   SubCutaneous at bedtime  iron sucrose IVPB 100 milliGRAM(s) IV Intermittent <User Schedule>  losartan 12.5 milliGRAM(s) Oral daily  spironolactone 25 milliGRAM(s) Oral daily  thiamine 100 milliGRAM(s) Oral daily    MEDICATIONS  (PRN):  sodium chloride 0.9% lock flush 10 milliLiter(s) IV Push every 1 hour PRN Pre/post blood products, medications, blood draw, and to maintain line patency    PHYSICAL EXAM:  GENERAL: pt appears melancholy   HEAD:  Atraumatic, Normocephalic  EYES: EOMI  NECK: No JVD  NERVOUS SYSTEM:  Alert & Awake.   CHEST/LUNG: B/L good air entry; No rales, rhonchi, or wheezing  HEART: S1S2 normal, no S3, Regular rate and rhythm; No murmurs  ABDOMEN: Soft, Nontender, Nondistended  EXTREMITIES:  2+ Peripheral Pulses, No clubbing, cyanosis, or edema  LYMPH: No lymphadenopathy noted  SKIN: No rashes or lesions    LABS                                            8.9                   Neurophils% (auto):   82.4   (03-07 @ 00:31):    6.52 )-----------(156          Lymphocytes% (auto):  8.8                                           27.4                   Eosinphils% (auto):   0.0      Manual%: Neutrophils x    ; Lymphocytes x    ; Eosinophils x    ; Bands%: x    ; Blasts x                                    138    |  101    |  48                  Calcium: 7.9   / iCa: x      (03-07 @ 00:31)    ----------------------------<  123       Magnesium: 3.3                              3.9     |  26     |  1.59             Phosphorous: 2.0      TPro  6.4    /  Alb  2.9    /  TBili  0.7    /  DBili  x      /  AST  212    /  ALT  371    /  AlkPhos  321    07 Mar 2024 00:31    ( 03-07 @ 00:31 )   PT: 15.6 sec;   INR: 1.50 ratio  aPTT: 71.6 sec          ASSESSMENT & PLAN:   63 year old male with past medical history of CVA, HTN, HLD, prediabetes, A.fib, cocaine/heroin use CAD w/ stent, CHF (EF  25-30% in 12/23),  RLE compartment syndrome s/p fasciotomy 12/23 and recent hospitalization for CHF exacerbation with bilateral pleural effusions requiring chest tube placement initially presenting to [facility] on 3/1 with chest pain and shortness of breath. Transferred to [facility] for management of cardiogenic shock. Diuresis in the CICU, and weaned off inotrope.     Plan:  Neuro  #hx CVA  - A&O x3, poor historian with medical history and medications  - continue to monitor per protocol  - c/w ASA 81    #Alcohol use disorder   - Not in the withdrawal period   - Thiamine /folate daily    Respiratory  #hypoxia/pleural effusions  - noted to have pleural effusions at outside hospital, likely in the setting of volume overload s/t chf  - saturating well on 4L, wean as tolerated with diuresis  - continue to monitor sp02    Cardiovascular  #Cardiogenic shock  - i/s/o CHF exacerbation  - TTE 3/1 EF 10 - 15%, previously 25 - 30% 12/23  - CICU : dopamine gtt transitioned to dobutamine  - swan placed overnight  - Elevated CVP with low MVo2 on admission, currently swan removed  - Dobutamine weaned 3/6  - s/p hydralazine 25, discontinued   > c/w atorvastatin 40 qd   > c/w Losartan 12.5  > c/w [place] 25  > c/w Farxiga 10mg qd   - diuresis as needed, (given 2mg BID while in the CICU), most recently 2mg 1x (3/7)  - TTE 3/5: EF 25%, global LV hypokinesis, rounded and protruding LV thrombus in apex, RV severely enlarged with severely reduced RVSF, mild-mod pulm HTN, severe TR, mild-mod [place]    #LV thrombus   - found this admission on TTE (above)   - on heparin ggt     #CAD  - w/ history of stents in december as per patient  - previously on aspirin, continue    #pAF  - ? hx a.fib  - sinus on admission  - not on blood thinner or rate control agent as per patient, only aspirin & plavix  - anticoagulation on hold with INR > 2    #HTN  - continue to trend BP  - hypotensive in this admission 2/2 cardiogenic shock     #HLD  - holding statin i/s/o liver dysfunction  - atorvastatin restarted 3/7     GI  #transaminitis - likely 2/2 to congestive hepatopathy   - worsening liver enzymes at outside hospital  - hold hepatotoxins, continue to trend  - diet as tolerated, monitor for BM  - Improving, will r/s statin     /Renal  #ELIE - likely in the setting of vascular congestion   - admitted from outside hospital with mitchell catheter  - continue strict I&O, electrolyte monitoring  - Cr on admission 2.15   - Downtrending 1.5  - c/w diuresis as tolerated, monitor u/o    Endo  #preDM  - a1c 6.2  - trend glucose, ISS while inpatient    Heme  - elevated INR on admission, i/s/o liver dsyfunction, continue to trend  - H/H and platelets stable at outside hospital  - INR <2, started heparin gtt overnight  Likely in the setting of malnutrition and liver dysfunction, no signs of active bleeding.     #Iron def anemia   -  iron sat is low. Would recommend IV iron sucrose x 5 days as per HF     ID  - afebrile on admission  - continue to trend wbc and fever curve    #RLE fasciotomy  - admitted from outside hospital with ace wrap in place  - noted to have wounds on lateral and medial aspect of calf  - f/u wound consult recommendations    PSYCH   - Adjustment disorder, no indications for meds while inpatient   - appreciate psych recommendations     Ethics   - Full Code   - palliative following given advanced HF   - Polysubstance use: S/w for resources and support   - SBIRT consulted     For Follow-Up:  [ ] Standing Diuresis dose   [ ] Titrate GDMT as tolerated   [ ] Eventual Ischemic workup for new HF, likely unable to tolerate CT angio coronary due to tachycardia. May require diagnostic LHC if ELIE resolves   [ ] Social work f/u for polysubstance use CICU Transfer Note  ---------------------------    Transfer from: CICU   Transfer to:  (  ) Medicine    (X) Telemetry    (  ) RCU    (  ) Palliative    (  ) Stroke Unit    (  ) _______________  Accepting Physician: Dr. Gary Luna   Room : [address] [place] [address]    HPI   63 year old male with past medical history of CVA, HTN, HLD, prediabetes, A.fib, cocaine/heroin abuse, CAD w/ stent, CHF (EF  25-30% in 12/23),  RLE compartment syndrome s/p fasciotomy 12/23 and recent hospitalization for CHF exacerbation with bilateral pleural effusions requiring chest tube placement initially presenting to [facility] on 3/1 with chest pain and shortness of breath. He states having intermittent 8/10 midsternal chest pain for the past few months since his leg operation. He was admitted to the outside hospital for management of CHF exacerbation, found to have a further reduced EF of 10 - 15% on 3/1. While admitted, he was hypotensive requiring dopamine infusion with worsening perfusion indices and ELIE. He was transferred to [facility] for cardiogenic shock management.     On admission to CICU, he is A&O x3.  sinus tach, / 78, saturating well on 4L nasal cannula with dopamine gtt infusing at 10 mcg/kg/min. He denies chest pain, shortness of breath, nausea and vomiting on admission.    CICU COURSE  While in the CICU, pt was clinically volume overloaded with signs of poor perfusion. Patient was started on Dobutamine of 5, swan was placed with elevated CVP and low MVO2, concerning for cardiogenic shock. Bumex was given 2mg BID x 2 days, and continued on  1mg bumex. Heart failure was following for medication optimization. Afterload reduction was added (hydralazine, losartan, spirolactone) which the patient tolerated. Dobutamine was able to be weaned off on 3/6, with CI > 2.0. Heart failure noted that the patient is not a candidate for AT.   Wound Care was consulted for RLE wound (leg was wrapped when patient arrived from [place]).     Cardiac imaging/studies:  TTE: severe RV dilation, moderate MR (3b), severe TR, LV thrombus, small effusion. EF 25%, LVID 5.2  3/5 Bedside swan numbers; CVP 15, PAP 45/21, wedge 20. MvO2 45 CO/CI 3.4/2.2 SVR 1482  3/6 Bedside swan numbers: CVP 6, PAP 34/15, wedge 15. MvO2 53 CO/CI 3.7/2.4 SVR 1383   3/7 CvO2 42.9, with CO/CI estimated 3/1.9     OBJECTIVE --  Vital Signs Last 24 Hrs  T(C): 36.3 (07 Mar 2024 12:00), Max: 36.5 (06 Mar 2024 16:00)  T(F): 97.4 (07 Mar 2024 12:00), Max: 97.7 (06 Mar 2024 16:00)  HR: 115 (07 Mar 2024 12:00) (100 - 122)  BP: 96/61 (07 Mar 2024 12:00) (77/52 - 111/71)  BP(mean): 73 (07 Mar 2024 12:00) (60 - 82)  RR: 24 (07 Mar 2024 12:00) (18 - 32)  SpO2: 97% (07 Mar 2024 12:00) (91% - 100%)    Parameters below as of 07 Mar 2024 11:00  Patient On (Oxygen Delivery Method): room air      I&O's Summary    06 Mar 2024 07:01  -  07 Mar 2024 07:00  --------------------------------------------------------  IN: 1392 mL / OUT: 3100 mL / NET: -1708 mL    07 Mar 2024 07:01  -  07 Mar 2024 12:48  --------------------------------------------------------  IN: 180 mL / OUT: 325 mL / NET: -145 mL    MEDICATIONS  (STANDING):  AQUAPHOR (petrolatum Ointment) 1 Application(s) Topical every 12 hours  aspirin enteric coated 81 milliGRAM(s) Oral daily  atorvastatin 40 milliGRAM(s) Oral at bedtime  chlorhexidine 2% Cloths 1 Application(s) Topical <User Schedule>  dapagliflozin 10 milliGRAM(s) Oral daily  folic acid 1 milliGRAM(s) Oral daily  heparin  Infusion 1000 Unit(s)/Hr (10 mL/Hr) IV Continuous <Continuous>  insulin lispro (ADMELOG) corrective regimen sliding scale   SubCutaneous three times a day before meals  insulin lispro (ADMELOG) corrective regimen sliding scale   SubCutaneous at bedtime  iron sucrose IVPB 100 milliGRAM(s) IV Intermittent <User Schedule>  losartan 12.5 milliGRAM(s) Oral daily  spironolactone 25 milliGRAM(s) Oral daily  thiamine 100 milliGRAM(s) Oral daily    MEDICATIONS  (PRN):  sodium chloride 0.9% lock flush 10 milliLiter(s) IV Push every 1 hour PRN Pre/post blood products, medications, blood draw, and to maintain line patency    PHYSICAL EXAM:  GENERAL: pt appears melancholy   HEAD:  Atraumatic, Normocephalic  EYES: EOMI  NECK: No JVD  NERVOUS SYSTEM:  Alert & Awake.   CHEST/LUNG: B/L good air entry; No rales, rhonchi, or wheezing  HEART: S1S2 normal, no S3, Regular rate and rhythm; No murmurs  ABDOMEN: Soft, Nontender, Nondistended  EXTREMITIES:  2+ Peripheral Pulses, No clubbing, cyanosis, or edema  LYMPH: No lymphadenopathy noted  SKIN: No rashes or lesions    LABS                                            8.9                   Neurophils% (auto):   82.4   (03-07 @ 00:31):    6.52 )-----------(156          Lymphocytes% (auto):  8.8                                           27.4                   Eosinphils% (auto):   0.0      Manual%: Neutrophils x    ; Lymphocytes x    ; Eosinophils x    ; Bands%: x    ; Blasts x                                    138    |  101    |  48                  Calcium: 7.9   / iCa: x      (03-07 @ 00:31)    ----------------------------<  123       Magnesium: 3.3                              3.9     |  26     |  1.59             Phosphorous: 2.0      TPro  6.4    /  Alb  2.9    /  TBili  0.7    /  DBili  x      /  AST  212    /  ALT  371    /  AlkPhos  321    07 Mar 2024 00:31    ( 03-07 @ 00:31 )   PT: 15.6 sec;   INR: 1.50 ratio  aPTT: 71.6 sec          ASSESSMENT & PLAN:   63 year old male with past medical history of CVA, HTN, HLD, prediabetes, A.fib, cocaine/heroin use CAD w/ stent, CHF (EF  25-30% in 12/23),  RLE compartment syndrome s/p fasciotomy 12/23 and recent hospitalization for CHF exacerbation with bilateral pleural effusions requiring chest tube placement initially presenting to [facility] on 3/1 with chest pain and shortness of breath. Transferred to [facility] for management of cardiogenic shock. Diuresis in the CICU, and weaned off inotrope.     Plan:  Neuro  #hx CVA  - A&O x3, poor historian with medical history and medications  - continue to monitor per protocol  - c/w ASA 81    #Alcohol use disorder   - Not in the withdrawal period   - Thiamine /folate daily    Respiratory  #hypoxia/pleural effusions  - noted to have pleural effusions at outside hospital, likely in the setting of volume overload s/t chf  - saturating well on 4L, wean as tolerated with diuresis  - continue to monitor sp02    Cardiovascular  #Cardiogenic shock  - i/s/o CHF exacerbation  - TTE 3/1 EF 10 - 15%, previously 25 - 30% 12/23  - CICU : dopamine gtt transitioned to dobutamine  - swan placed, removed 3/6  - Elevated CVP with low MVo2 on admission, currently swan removed  - Dobutamine weaned 3/6  - s/p hydralazine 25, discontinued   > c/w atorvastatin 40 qd   > c/w Losartan 12.5  > c/w [place] 25  > c/w Farxiga 10mg qd   - diuresis as needed, (given 2mg BID while in the CICU), most recently 2mg 1x (3/7)  - TTE 3/5: EF 25%, global LV hypokinesis, rounded and protruding LV thrombus in apex, RV severely enlarged with severely reduced RVSF, mild-mod pulm HTN, severe TR, mild-mod [place]    #LV thrombus   - found this admission on TTE (above)   - on heparin ggt   Plan to d/c either warfarin vs DOAC depending on living situation / rehab     #CAD w/ history of stents in december (year?) as per patient  - previously on aspirin, continued in CICU  [ ]  Plan to Reload Plavix and d/c aspirin 3/7     #pAF  - ? hx a.fib  - sinus on admission  - not on blood thinner or rate control agent as per patient, only aspirin & plavix  - anticoagulation on hold with INR > 2    #HTN  - continue to trend BP  - hypotensive in this admission 2/2 cardiogenic shock     #HLD  - holding statin i/s/o liver dysfunction  - atorvastatin restarted 3/7     GI  #transaminitis - likely 2/2 to congestive hepatopathy   - worsening liver enzymes at outside hospital  - hold hepatotoxins, continue to trend  - diet as tolerated, monitor for BM  - Improving, will r/s statin     /Renal  #ELIE - likely in the setting of vascular congestion   - admitted from outside hospital with mitchell catheter  - continue strict I&O, electrolyte monitoring  - Cr on admission 2.15   - Downtrending 1.5  - c/w diuresis as tolerated, monitor u/o    Endo  #preDM  - a1c 6.2  - trend glucose, ISS while inpatient    Heme  - elevated INR on admission, i/s/o liver dsyfunction, continue to trend  - H/H and platelets stable at outside hospital  - INR <2, started heparin gtt overnight  Likely in the setting of malnutrition and liver dysfunction, no signs of active bleeding.     #Iron def anemia   -  iron sat is low. Would recommend IV iron sucrose x 5 days as per HF     ID  - afebrile on admission  - continue to trend wbc and fever curve    #RLE fasciotomy  - admitted from outside hospital with ace wrap in place  - noted to have wounds on lateral and medial aspect of calf  - f/u wound consult recommendations    PSYCH   - Adjustment disorder, no indications for meds while inpatient   - appreciate psych recommendations     Ethics   - Full Code   - palliative following given advanced HF   - Polysubstance use: S/w for resources and support   - SBIRT consulted     For Follow-Up:  [ ] Standing Diuresis dose   [ ] Titrate GDMT as tolerated   [ ] Eventual Ischemic workup for new HF, likely unable to tolerate CT angio coronary due to tachycardia. May require diagnostic LHC if ELIE resolves   [ ] Social work f/u for polysubstance use CICU Transfer Note  ---------------------------    Transfer from: CICU   Transfer to:  (  ) Medicine    (X) Telemetry    (  ) RCU    (  ) Palliative    (  ) Stroke Unit    (  ) _______________  Accepting Physician: Dr. Gary Luna   Room : [address] [place] [address]    HPI   63 year old male with past medical history of CVA, HTN, HLD, prediabetes, A.fib, cocaine/heroin abuse, CAD w/ stent, CHF (EF  25-30% in 12/23),  RLE compartment syndrome s/p fasciotomy 12/23 and recent hospitalization for CHF exacerbation with bilateral pleural effusions requiring chest tube placement initially presenting to [facility] on 3/1 with chest pain and shortness of breath. He states having intermittent 8/10 midsternal chest pain for the past few months since his leg operation. He was admitted to the outside hospital for management of CHF exacerbation, found to have a further reduced EF of 10 - 15% on 3/1. While admitted, he was hypotensive requiring dopamine infusion with worsening perfusion indices and ELIE. He was transferred to [facility] for cardiogenic shock management.     On admission to CICU, he is A&O x3.  sinus tach, / 78, saturating well on 4L nasal cannula with dopamine gtt infusing at 10 mcg/kg/min. He denies chest pain, shortness of breath, nausea and vomiting on admission.    CICU COURSE  While in the CICU, pt was clinically volume overloaded with signs of poor perfusion. Patient was started on Dobutamine of 5, swan was placed with elevated CVP and low MVO2, concerning for cardiogenic shock. Bumex was given 2mg BID x 2 days, and continued on  1mg bumex. Heart failure was following for medication optimization. Afterload reduction was added (hydralazine, losartan, spirolactone) which the patient tolerated. Dobutamine was able to be weaned off on 3/6, with CI > 2.0. Heart failure noted that the patient is not a candidate for AT.   Wound Care was consulted for RLE wound (leg was wrapped when patient arrived from [place]).     Cardiac imaging/studies:  TTE: severe RV dilation, moderate MR (3b), severe TR, LV thrombus, small effusion. EF 25%, LVID 5.2  3/5 Bedside swan numbers; CVP 15, PAP 45/21, wedge 20. MvO2 45 CO/CI 3.4/2.2 SVR 1482  3/6 Bedside swan numbers: CVP 6, PAP 34/15, wedge 15. MvO2 53 CO/CI 3.7/2.4 SVR 1383   3/7 CvO2 42.9, with CO/CI estimated 3/1.9     OBJECTIVE --  Vital Signs Last 24 Hrs  T(C): 36.3 (07 Mar 2024 12:00), Max: 36.5 (06 Mar 2024 16:00)  T(F): 97.4 (07 Mar 2024 12:00), Max: 97.7 (06 Mar 2024 16:00)  HR: 115 (07 Mar 2024 12:00) (100 - 122)  BP: 96/61 (07 Mar 2024 12:00) (77/52 - 111/71)  BP(mean): 73 (07 Mar 2024 12:00) (60 - 82)  RR: 24 (07 Mar 2024 12:00) (18 - 32)  SpO2: 97% (07 Mar 2024 12:00) (91% - 100%)    Parameters below as of 07 Mar 2024 11:00  Patient On (Oxygen Delivery Method): room air      I&O's Summary    06 Mar 2024 07:01  -  07 Mar 2024 07:00  --------------------------------------------------------  IN: 1392 mL / OUT: 3100 mL / NET: -1708 mL    07 Mar 2024 07:01  -  07 Mar 2024 12:48  --------------------------------------------------------  IN: 180 mL / OUT: 325 mL / NET: -145 mL    MEDICATIONS  (STANDING):  AQUAPHOR (petrolatum Ointment) 1 Application(s) Topical every 12 hours  aspirin enteric coated 81 milliGRAM(s) Oral daily  atorvastatin 40 milliGRAM(s) Oral at bedtime  chlorhexidine 2% Cloths 1 Application(s) Topical <User Schedule>  dapagliflozin 10 milliGRAM(s) Oral daily  folic acid 1 milliGRAM(s) Oral daily  heparin  Infusion 1000 Unit(s)/Hr (10 mL/Hr) IV Continuous <Continuous>  insulin lispro (ADMELOG) corrective regimen sliding scale   SubCutaneous three times a day before meals  insulin lispro (ADMELOG) corrective regimen sliding scale   SubCutaneous at bedtime  iron sucrose IVPB 100 milliGRAM(s) IV Intermittent <User Schedule>  losartan 12.5 milliGRAM(s) Oral daily  spironolactone 25 milliGRAM(s) Oral daily  thiamine 100 milliGRAM(s) Oral daily    MEDICATIONS  (PRN):  sodium chloride 0.9% lock flush 10 milliLiter(s) IV Push every 1 hour PRN Pre/post blood products, medications, blood draw, and to maintain line patency    PHYSICAL EXAM:  GENERAL: pt appears melancholy   HEAD:  Atraumatic, Normocephalic  EYES: EOMI  NECK: No JVD  NERVOUS SYSTEM:  Alert & Awake.   CHEST/LUNG: B/L good air entry; No rales, rhonchi, or wheezing  HEART: S1S2 normal, no S3, Regular rate and rhythm; No murmurs  ABDOMEN: Soft, Nontender, Nondistended  EXTREMITIES:  2+ Peripheral Pulses, No clubbing, cyanosis, or edema  LYMPH: No lymphadenopathy noted  SKIN: No rashes or lesions    LABS                                            8.9                   Neurophils% (auto):   82.4   (03-07 @ 00:31):    6.52 )-----------(156          Lymphocytes% (auto):  8.8                                           27.4                   Eosinphils% (auto):   0.0      Manual%: Neutrophils x    ; Lymphocytes x    ; Eosinophils x    ; Bands%: x    ; Blasts x                                    138    |  101    |  48                  Calcium: 7.9   / iCa: x      (03-07 @ 00:31)    ----------------------------<  123       Magnesium: 3.3                              3.9     |  26     |  1.59             Phosphorous: 2.0      TPro  6.4    /  Alb  2.9    /  TBili  0.7    /  DBili  x      /  AST  212    /  ALT  371    /  AlkPhos  321    07 Mar 2024 00:31    ( 03-07 @ 00:31 )   PT: 15.6 sec;   INR: 1.50 ratio  aPTT: 71.6 sec          ASSESSMENT & PLAN:   63 year old male with past medical history of CVA, HTN, HLD, prediabetes, A.fib, cocaine/heroin use CAD w/ stent, CHF (EF  25-30% in 12/23),  RLE compartment syndrome s/p fasciotomy 12/23 and recent hospitalization for CHF exacerbation with bilateral pleural effusions requiring chest tube placement initially presenting to [facility] on 3/1 with chest pain and shortness of breath. Transferred to [facility] for management of cardiogenic shock. Diuresis in the CICU, and weaned off inotrope.     Plan:  Neuro  #hx CVA  - A&O x3, poor historian with medical history and medications  - continue to monitor per protocol  - c/w ASA 81    #Alcohol use disorder   - Not in the withdrawal period   - Thiamine /folate daily    Respiratory  #hypoxia/pleural effusions  - noted to have pleural effusions at outside hospital, likely in the setting of volume overload s/t chf  - saturating well on 4L, wean as tolerated with diuresis  - continue to monitor sp02    Cardiovascular  #Cardiogenic shock  - i/s/o CHF exacerbation  - TTE 3/1 EF 10 - 15%, previously 25 - 30% 12/23  - Possibly ischemic (recent cath Dec)   - CICU : dopamine gtt transitioned to dobutamine  - swan placed, removed 3/6  - Elevated CVP with low MVo2 on admission, currently swan removed  - Dobutamine weaned 3/6  - s/p hydralazine 25, discontinued   > c/w atorvastatin 40 qd   > c/w Losartan 12.5  > c/w [place] 25  > c/w Farxiga 10mg qd   - diuresis as needed, (given 2mg BID while in the CICU), most recently 2mg 1x (3/7)  - TTE 3/5: EF 25%, global LV hypokinesis, rounded and protruding LV thrombus in apex, RV severely enlarged with severely reduced RVSF, mild-mod pulm HTN, severe TR, mild-mod [place]    #LV thrombus   - found this admission on TTE (above)   - on heparin ggt   - On this admission, ASA, d/c after plavix loaded   - Reload Plavix today 3/7 and c/w 75mg qd and heparin for double therapy   > Plan to d/c either warfarin vs DOAC depending on living situation / rehab     #CAD w/ history of stents in december (year?) as per patient  - previously on aspirin, continued in CICU  [ ]  Plan to Reload Plavix and d/c aspirin 3/7     #pAF  - ? hx a.fib  - sinus on admission  - not on blood thinner or rate control agent as per patient, only aspirin & plavix  - anticoagulation on hold with INR > 2    #HTN  - continue to trend BP  - hypotensive in this admission 2/2 cardiogenic shock     #HLD  - holding statin i/s/o liver dysfunction  - atorvastatin restarted 3/7     GI  #transaminitis - likely 2/2 to congestive hepatopathy   - worsening liver enzymes at outside hospital  - hold hepatotoxins, continue to trend  - diet as tolerated, monitor for BM  - Improving, will r/s statin     /Renal  #ELIE - likely in the setting of vascular congestion   - admitted from outside hospital with mitchell catheter  - continue strict I&O, electrolyte monitoring  - Cr on admission 2.15   - Downtrending 1.5  - c/w diuresis as tolerated, monitor u/o    Endo  #preDM  - a1c 6.2  - trend glucose, ISS while inpatient    Heme  - elevated INR on admission, i/s/o liver dsyfunction, continue to trend  - H/H and platelets stable at outside hospital  - INR <2, started heparin gtt overnight  Likely in the setting of malnutrition and liver dysfunction, no signs of active bleeding.     #Iron def anemia   -  iron sat is low. Would recommend IV iron sucrose x 5 days as per HF     ID  - afebrile on admission  - continue to trend wbc and fever curve    #RLE fasciotomy  - admitted from outside hospital with ace wrap in place  - noted to have wounds on lateral and medial aspect of calf  - f/u wound consult recommendations    PSYCH   - Adjustment disorder, no indications for meds while inpatient   - appreciate psych recommendations     Ethics   - Full Code   - palliative following given advanced HF   - Polysubstance use: S/w for resources and support   - SBIRT consulted     For Follow-Up:  [ ] Standing Diuresis dose   [ ] Titrate GDMT as tolerated   [ ] Eventual Ischemic workup for new HF, likely unable to tolerate CT angio coronary due to tachycardia. May require diagnostic LHC if ELIE resolves. Can avoid re-cath if able to get Cath report from [facility].   [ ] Social work f/u for polysubstance use CICU Transfer Note  ---------------------------    Transfer from: CICU   Transfer to:  (  ) Medicine    (X) Telemetry    (  ) RCU    (  ) Palliative    (  ) Stroke Unit    (  ) _______________  Accepting Physician: Dr. Gary Luna   Room : [address] [place] [address]    HPI   63 year old male with past medical history of CVA, HTN, HLD, prediabetes, A.fib, cocaine/heroin abuse, CAD w/ stent, CHF (EF  25-30% in 12/23),  RLE compartment syndrome s/p fasciotomy 12/23 and recent hospitalization for CHF exacerbation with bilateral pleural effusions requiring chest tube placement initially presenting to [facility] on 3/1 with chest pain and shortness of breath. He states having intermittent 8/10 midsternal chest pain for the past few months since his leg operation. He was admitted to the outside hospital for management of CHF exacerbation, found to have a further reduced EF of 10 - 15% on 3/1. While admitted, he was hypotensive requiring dopamine infusion with worsening perfusion indices and ELIE. He was transferred to [facility] for cardiogenic shock management.     On admission to CICU, he is A&O x3.  sinus tach, / 78, saturating well on 4L nasal cannula with dopamine gtt infusing at 10 mcg/kg/min. He denies chest pain, shortness of breath, nausea and vomiting on admission.    CICU COURSE  While in the CICU, pt was clinically volume overloaded with signs of poor perfusion. Patient was started on Dobutamine of 5, swan was placed with elevated CVP and low MVO2, concerning for cardiogenic shock. Bumex was given 2mg BID x 2 days, and continued on  2mg bumex qd. Heart failure was following for medication optimization. Afterload reduction was added (hydralazine, losartan, spirolactone) which the patient tolerated. Dobutamine was able to be weaned off on 3/6, with CI > 2.0. Heart failure noted that the patient is not a candidate for AT.   Wound Care was consulted for RLE wound (leg was wrapped when patient arrived from [place]).     Cardiac imaging/studies:  TTE: severe RV dilation, moderate MR (3b), severe TR, LV thrombus, small effusion. EF 25%, LVID 5.2  3/5 Bedside swan numbers; CVP 15, PAP 45/21, wedge 20. MvO2 45 CO/CI 3.4/2.2 SVR 1482  3/6 Bedside swan numbers: CVP 6, PAP 34/15, wedge 15. MvO2 53 CO/CI 3.7/2.4 SVR 1383   3/7 CvO2 42.9, with CO/CI estimated 3/1.9     OBJECTIVE --  Vital Signs Last 24 Hrs  T(C): 36.3 (07 Mar 2024 12:00), Max: 36.5 (06 Mar 2024 16:00)  T(F): 97.4 (07 Mar 2024 12:00), Max: 97.7 (06 Mar 2024 16:00)  HR: 115 (07 Mar 2024 12:00) (100 - 122)  BP: 96/61 (07 Mar 2024 12:00) (77/52 - 111/71)  BP(mean): 73 (07 Mar 2024 12:00) (60 - 82)  RR: 24 (07 Mar 2024 12:00) (18 - 32)  SpO2: 97% (07 Mar 2024 12:00) (91% - 100%)    Parameters below as of 07 Mar 2024 11:00  Patient On (Oxygen Delivery Method): room air      I&O's Summary    06 Mar 2024 07:01  -  07 Mar 2024 07:00  --------------------------------------------------------  IN: 1392 mL / OUT: 3100 mL / NET: -1708 mL    07 Mar 2024 07:01  -  07 Mar 2024 12:48  --------------------------------------------------------  IN: 180 mL / OUT: 325 mL / NET: -145 mL    MEDICATIONS  (STANDING):  AQUAPHOR (petrolatum Ointment) 1 Application(s) Topical every 12 hours  aspirin enteric coated 81 milliGRAM(s) Oral daily  atorvastatin 40 milliGRAM(s) Oral at bedtime  chlorhexidine 2% Cloths 1 Application(s) Topical <User Schedule>  dapagliflozin 10 milliGRAM(s) Oral daily  folic acid 1 milliGRAM(s) Oral daily  heparin  Infusion 1000 Unit(s)/Hr (10 mL/Hr) IV Continuous <Continuous>  insulin lispro (ADMELOG) corrective regimen sliding scale   SubCutaneous three times a day before meals  insulin lispro (ADMELOG) corrective regimen sliding scale   SubCutaneous at bedtime  iron sucrose IVPB 100 milliGRAM(s) IV Intermittent <User Schedule>  losartan 12.5 milliGRAM(s) Oral daily  spironolactone 25 milliGRAM(s) Oral daily  thiamine 100 milliGRAM(s) Oral daily    MEDICATIONS  (PRN):  sodium chloride 0.9% lock flush 10 milliLiter(s) IV Push every 1 hour PRN Pre/post blood products, medications, blood draw, and to maintain line patency    PHYSICAL EXAM:  GENERAL: pt appears melancholy   HEAD:  Atraumatic, Normocephalic  EYES: EOMI  NECK: No JVD  NERVOUS SYSTEM:  Alert & Awake.   CHEST/LUNG: B/L good air entry; No rales, rhonchi, or wheezing  HEART: S1S2 normal, no S3, Regular rate and rhythm; No murmurs  ABDOMEN: Soft, Nontender, Nondistended  EXTREMITIES:  2+ Peripheral Pulses, No clubbing, cyanosis, or edema  LYMPH: No lymphadenopathy noted  SKIN: No rashes or lesions    LABS                                            8.9                   Neurophils% (auto):   82.4   (03-07 @ 00:31):    6.52 )-----------(156          Lymphocytes% (auto):  8.8                                           27.4                   Eosinphils% (auto):   0.0      Manual%: Neutrophils x    ; Lymphocytes x    ; Eosinophils x    ; Bands%: x    ; Blasts x                                    138    |  101    |  48                  Calcium: 7.9   / iCa: x      (03-07 @ 00:31)    ----------------------------<  123       Magnesium: 3.3                              3.9     |  26     |  1.59             Phosphorous: 2.0      TPro  6.4    /  Alb  2.9    /  TBili  0.7    /  DBili  x      /  AST  212    /  ALT  371    /  AlkPhos  321    07 Mar 2024 00:31    ( 03-07 @ 00:31 )   PT: 15.6 sec;   INR: 1.50 ratio  aPTT: 71.6 sec          ASSESSMENT & PLAN:   63 year old male with past medical history of CVA, HTN, HLD, prediabetes, A.fib, cocaine/heroin use CAD w/ stent, CHF (EF  25-30% in 12/23),  RLE compartment syndrome s/p fasciotomy 12/23 and recent hospitalization for CHF exacerbation with bilateral pleural effusions requiring chest tube placement initially presenting to [facility] on 3/1 with chest pain and shortness of breath. Transferred to [facility] for management of cardiogenic shock. Diuresis in the CICU, and weaned off inotrope.     Plan:  Neuro  #hx CVA  - A&O x3, poor historian with medical history and medications  - continue to monitor per protocol  - c/w ASA 81    #Alcohol use disorder   - Not in the withdrawal period   - Thiamine /folate daily    Respiratory  #hypoxia/pleural effusions  - noted to have pleural effusions at outside hospital, likely in the setting of volume overload s/t chf  - saturating well on 4L, wean as tolerated with diuresis  - continue to monitor sp02    Cardiovascular  #Cardiogenic shock  - i/s/o CHF exacerbation  - TTE 3/1 EF 10 - 15%, previously 25 - 30% 12/23  - Possibly ischemic (recent cath Dec)   - CICU : dopamine gtt transitioned to dobutamine  - swan placed, removed 3/6  - Elevated CVP with low MVo2 on admission, currently swan removed  - Dobutamine weaned 3/6  - s/p hydralazine 25, discontinued   > c/w atorvastatin 40 qd   > c/w Losartan 12.5  > c/w Jordan 25  > c/w Farxiga 10mg qd   - diuresis as needed, (given 2mg BID while in the CICU), most recently 2mg 1x (3/7)  - TTE 3/5: EF 25%, global LV hypokinesis, rounded and protruding LV thrombus in apex, RV severely enlarged with severely reduced RVSF, mild-mod pulm HTN, severe TR, mild-mod NV    #LV thrombus   - found this admission on TTE (above)   - on heparin ggt   - On this admission, ASA, d/c after plavix loaded   - Reload 300mg Plavix today 3/7 and c/w 75mg qd and heparin for double therapy   > Plan to d/c either warfarin vs DOAC depending on living situation / rehab     #CAD w/ history of stents in december (year?) as per patient  - previously on aspirin, continued in CICU  [ ]  Plan to Reload Plavix and d/c aspirin 3/7     #pAF  - ? hx a.fib  - sinus on admission  - not on blood thinner or rate control agent as per patient, only aspirin & plavix  - anticoagulation on hold with INR > 2    #HTN  - continue to trend BP  - hypotensive in this admission 2/2 cardiogenic shock     #HLD  - holding statin i/s/o liver dysfunction  - atorvastatin restarted 3/7     GI  #transaminitis - likely 2/2 to congestive hepatopathy   - worsening liver enzymes at outside hospital  - hold hepatotoxins, continue to trend  - diet as tolerated, monitor for BM  - Improving, will r/s statin     /Renal  #ELIE - likely in the setting of vascular congestion   - admitted from outside hospital with mitchell catheter  - continue strict I&O, electrolyte monitoring  - Cr on admission 2.15   - Downtrending 1.5  - c/w diuresis as tolerated, monitor u/o    Endo  #preDM  - a1c 6.2  - trend glucose, ISS while inpatient    Heme  - elevated INR on admission, i/s/o liver dysfunction, continue to trend  - H/H and platelets stable at outside hospital  - INR <2, started heparin gtt overnight  Likely in the setting of malnutrition and liver dysfunction, no signs of active bleeding.     #Iron def anemia   -  iron sat is low. Would recommend IV iron sucrose x 5 days as per HF     ID  - afebrile on admission  - continue to trend wbc and fever curve    #RLE fasciotomy  - admitted from outside hospital with ace wrap in place  - noted to have wounds on lateral and medial aspect of calf  - f/u wound consult recommendations    PSYCH   - Adjustment disorder, no indications for meds while inpatient   - appreciate psych recommendations     Ethics   - Full Code   - palliative following given advanced HF   - Polysubstance use: S/w for resources and support   - SBIRT consulted     For Follow-Up:  [ ] Plavix load 3/7 --> continue with Hep ggt and plavix 75 daily (recent stent and Lv thrombus). Aspirin to d/c 3/8  [ ] Standing Diuresis dose as per HF   [ ] Titrate GDMT as tolerated   [ ] Eventual Ischemic workup for new HF, likely unable to tolerate CT angio coronary due to tachycardia. May require diagnostic LHC if ELIE resolves. Can avoid re-cath if able to get Cath report from [facility].   [ ] Social work f/u for polysubstance use  [ ] F/u PT recommendations CICU Transfer Note  ---------------------------    Transfer from: CICU   Transfer to:  (  ) Medicine    (X) Telemetry    (  ) RCU    (  ) Palliative    (  ) Stroke Unit    (  ) _______________  Accepting Physician: Dr. Gary Luna   Room : [address] [place] [address]  Signed out to 3 DSU ACP    HPI   63 year old male with past medical history of CVA, HTN, HLD, prediabetes, A.fib, cocaine/heroin abuse, CAD w/ stent, CHF (EF  25-30% in 12/23),  RLE compartment syndrome s/p fasciotomy 12/23 and recent hospitalization for CHF exacerbation with bilateral pleural effusions requiring chest tube placement initially presenting to [facility] on 3/1 with chest pain and shortness of breath. He states having intermittent 8/10 midsternal chest pain for the past few months since his leg operation. He was admitted to the outside hospital for management of CHF exacerbation, found to have a further reduced EF of 10 - 15% on 3/1. While admitted, he was hypotensive requiring dopamine infusion with worsening perfusion indices and ELIE. He was transferred to [facility] for cardiogenic shock management.     On admission to CICU, he is A&O x3.  sinus tach, / 78, saturating well on 4L nasal cannula with dopamine gtt infusing at 10 mcg/kg/min. He denies chest pain, shortness of breath, nausea and vomiting on admission.    CICU COURSE  While in the CICU, pt was clinically volume overloaded with signs of poor perfusion. Patient was started on Dobutamine of 5, swan was placed with elevated CVP and low MVO2, concerning for cardiogenic shock. Bumex was given 2mg BID x 2 days, and continued on  2mg bumex qd. Heart failure was following for medication optimization. Afterload reduction was added (hydralazine, losartan, spirolactone) which the patient tolerated. Dobutamine was able to be weaned off on 3/6, with CI > 2.0. Heart failure noted that the patient is not a candidate for AT.   Wound Care was consulted for RLE wound (leg was wrapped when patient arrived from [place]).     Cardiac imaging/studies:  TTE: severe RV dilation, moderate MR (3b), severe TR, LV thrombus, small effusion. EF 25%, LVID 5.2  3/5 Bedside swan numbers; CVP 15, PAP 45/21, wedge 20. MvO2 45 CO/CI 3.4/2.2 SVR 1482  3/6 Bedside swan numbers: CVP 6, PAP 34/15, wedge 15. MvO2 53 CO/CI 3.7/2.4 SVR 1383   3/7 CvO2 42.9, with CO/CI estimated 3/1.9     OBJECTIVE --  Vital Signs Last 24 Hrs  T(C): 36.3 (07 Mar 2024 12:00), Max: 36.5 (06 Mar 2024 16:00)  T(F): 97.4 (07 Mar 2024 12:00), Max: 97.7 (06 Mar 2024 16:00)  HR: 115 (07 Mar 2024 12:00) (100 - 122)  BP: 96/61 (07 Mar 2024 12:00) (77/52 - 111/71)  BP(mean): 73 (07 Mar 2024 12:00) (60 - 82)  RR: 24 (07 Mar 2024 12:00) (18 - 32)  SpO2: 97% (07 Mar 2024 12:00) (91% - 100%)    Parameters below as of 07 Mar 2024 11:00  Patient On (Oxygen Delivery Method): room air      I&O's Summary    06 Mar 2024 07:01  -  07 Mar 2024 07:00  --------------------------------------------------------  IN: 1392 mL / OUT: 3100 mL / NET: -1708 mL    07 Mar 2024 07:01  -  07 Mar 2024 12:48  --------------------------------------------------------  IN: 180 mL / OUT: 325 mL / NET: -145 mL    MEDICATIONS  (STANDING):  AQUAPHOR (petrolatum Ointment) 1 Application(s) Topical every 12 hours  aspirin enteric coated 81 milliGRAM(s) Oral daily  atorvastatin 40 milliGRAM(s) Oral at bedtime  chlorhexidine 2% Cloths 1 Application(s) Topical <User Schedule>  dapagliflozin 10 milliGRAM(s) Oral daily  folic acid 1 milliGRAM(s) Oral daily  heparin  Infusion 1000 Unit(s)/Hr (10 mL/Hr) IV Continuous <Continuous>  insulin lispro (ADMELOG) corrective regimen sliding scale   SubCutaneous three times a day before meals  insulin lispro (ADMELOG) corrective regimen sliding scale   SubCutaneous at bedtime  iron sucrose IVPB 100 milliGRAM(s) IV Intermittent <User Schedule>  losartan 12.5 milliGRAM(s) Oral daily  spironolactone 25 milliGRAM(s) Oral daily  thiamine 100 milliGRAM(s) Oral daily    MEDICATIONS  (PRN):  sodium chloride 0.9% lock flush 10 milliLiter(s) IV Push every 1 hour PRN Pre/post blood products, medications, blood draw, and to maintain line patency    PHYSICAL EXAM:  GENERAL: pt appears melancholy   HEAD:  Atraumatic, Normocephalic  EYES: EOMI  NECK: No JVD  NERVOUS SYSTEM:  Alert & Awake.   CHEST/LUNG: B/L good air entry; No rales, rhonchi, or wheezing  HEART: S1S2 normal, no S3, Regular rate and rhythm; No murmurs  ABDOMEN: Soft, Nontender, Nondistended  EXTREMITIES:  2+ Peripheral Pulses, No clubbing, cyanosis, or edema  LYMPH: No lymphadenopathy noted  SKIN: No rashes or lesions    LABS                                            8.9                   Neurophils% (auto):   82.4   (03-07 @ 00:31):    6.52 )-----------(156          Lymphocytes% (auto):  8.8                                           27.4                   Eosinphils% (auto):   0.0      Manual%: Neutrophils x    ; Lymphocytes x    ; Eosinophils x    ; Bands%: x    ; Blasts x                                    138    |  101    |  48                  Calcium: 7.9   / iCa: x      (03-07 @ 00:31)    ----------------------------<  123       Magnesium: 3.3                              3.9     |  26     |  1.59             Phosphorous: 2.0      TPro  6.4    /  Alb  2.9    /  TBili  0.7    /  DBili  x      /  AST  212    /  ALT  371    /  AlkPhos  321    07 Mar 2024 00:31    ( 03-07 @ 00:31 )   PT: 15.6 sec;   INR: 1.50 ratio  aPTT: 71.6 sec          ASSESSMENT & PLAN:   63 year old male with past medical history of CVA, HTN, HLD, prediabetes, A.fib, cocaine/heroin use CAD w/ stent, CHF (EF  25-30% in 12/23),  RLE compartment syndrome s/p fasciotomy 12/23 and recent hospitalization for CHF exacerbation with bilateral pleural effusions requiring chest tube placement initially presenting to [facility] on 3/1 with chest pain and shortness of breath. Transferred to [facility] for management of cardiogenic shock. Diuresis in the CICU, and weaned off inotrope.     Plan:  Neuro  #hx CVA  - A&O x3, poor historian with medical history and medications  - continue to monitor per protocol  - c/w ASA 81    #Alcohol use disorder   - Not in the withdrawal period   - Thiamine /folate daily    Respiratory  #hypoxia/pleural effusions  - noted to have pleural effusions at outside hospital, likely in the setting of volume overload s/t chf  - saturating well on 4L, wean as tolerated with diuresis  - continue to monitor sp02    Cardiovascular  #Cardiogenic shock  - i/s/o CHF exacerbation  - TTE 3/1 EF 10 - 15%, previously 25 - 30% 12/23  - Possibly ischemic (recent cath Dec)   - CICU : dopamine gtt transitioned to dobutamine  - swan placed, removed 3/6  - Elevated CVP with low MVo2 on admission, currently swan removed  - Dobutamine weaned 3/6  - s/p hydralazine 25, discontinued   > c/w atorvastatin 40 qd   > c/w Losartan 12.5  > c/w Jordan 25  > c/w Farxiga 10mg qd   - diuresis as needed, (given 2mg BID while in the CICU), most recently 2mg 1x (3/7)  - TTE 3/5: EF 25%, global LV hypokinesis, rounded and protruding LV thrombus in apex, RV severely enlarged with severely reduced RVSF, mild-mod pulm HTN, severe TR, mild-mod PA    #LV thrombus   - found this admission on TTE (above)   - on heparin ggt   - On this admission, ASA, d/c after plavix loaded   - Reload 300mg Plavix today 3/7 and c/w 75mg qd and heparin for double therapy   > Plan to d/c either warfarin vs DOAC depending on living situation / rehab     #CAD w/ history of stents in december (year?) as per patient  - previously on aspirin, continued in CICU  [ ]  Plan to Reload Plavix and d/c aspirin 3/7     #pAF  - ? hx a.fib  - sinus on admission  - not on blood thinner or rate control agent as per patient, only aspirin & plavix  - anticoagulation on hold with INR > 2    #HTN  - continue to trend BP  - hypotensive in this admission 2/2 cardiogenic shock     #HLD  - holding statin i/s/o liver dysfunction  - atorvastatin restarted 3/7     GI  #transaminitis - likely 2/2 to congestive hepatopathy   - worsening liver enzymes at outside hospital  - hold hepatotoxins, continue to trend  - diet as tolerated, monitor for BM  - Improving, will r/s statin     /Renal  #ELIE - likely in the setting of vascular congestion   - admitted from outside hospital with mitchell catheter  - continue strict I&O, electrolyte monitoring  - Cr on admission 2.15   - Downtrending 1.5  - c/w diuresis as tolerated, monitor u/o    Endo  #preDM  - a1c 6.2  - trend glucose, ISS while inpatient    Heme  - elevated INR on admission, i/s/o liver dysfunction, continue to trend  - H/H and platelets stable at outside hospital  - INR <2, started heparin gtt overnight  Likely in the setting of malnutrition and liver dysfunction, no signs of active bleeding.     #Iron def anemia   -  iron sat is low. Would recommend IV iron sucrose x 5 days as per HF     ID  - afebrile on admission  - continue to trend wbc and fever curve    #RLE fasciotomy  - admitted from outside hospital with ace wrap in place  - noted to have wounds on lateral and medial aspect of calf  - f/u wound consult recommendations    PSYCH   - Adjustment disorder, no indications for meds while inpatient   - appreciate psych recommendations     Ethics   - Full Code   - palliative following given advanced HF   - Polysubstance use: S/w for resources and support   - SBIRT consulted     For Follow-Up:  [ ] Plavix load 3/7 --> continue with Hep ggt and plavix 75 daily (recent stent and Lv thrombus). Aspirin to d/c 3/8  [ ] Standing Diuresis dose as per HF   [ ] Titrate GDMT as tolerated   [ ] Eventual Ischemic workup for new HF, likely unable to tolerate CT angio coronary due to tachycardia. May require diagnostic LHC if ELIE resolves. Can avoid re-cath if able to get Cath report from [facility].   [ ] Social work f/u for polysubstance use  [ ] F/u PT recommendations

## 2024-03-07 NOTE — BH CONSULTATION LIAISON ASSESSMENT NOTE - CURRENT MEDICATION
MEDICATIONS  (STANDING):  AQUAPHOR (petrolatum Ointment) 1 Application(s) Topical every 12 hours  aspirin enteric coated 81 milliGRAM(s) Oral daily  chlorhexidine 2% Cloths 1 Application(s) Topical <User Schedule>  folic acid 1 milliGRAM(s) Oral daily  heparin  Infusion 1000 Unit(s)/Hr (10 mL/Hr) IV Continuous <Continuous>  insulin lispro (ADMELOG) corrective regimen sliding scale   SubCutaneous three times a day before meals  insulin lispro (ADMELOG) corrective regimen sliding scale   SubCutaneous at bedtime  losartan 12.5 milliGRAM(s) Oral daily  spironolactone 25 milliGRAM(s) Oral daily  thiamine IVPB 500 milliGRAM(s) IV Intermittent daily    MEDICATIONS  (PRN):  sodium chloride 0.9% lock flush 10 milliLiter(s) IV Push every 1 hour PRN Pre/post blood products, medications, blood draw, and to maintain line patency

## 2024-03-07 NOTE — BH CONSULTATION LIAISON ASSESSMENT NOTE - NSBHCHARTREVIEWLAB_PSY_A_CORE FT
8.9    6.52  )-----------( 156      ( 07 Mar 2024 00:31 )             27.4     03-07    138  |  101  |  48<H>  ----------------------------<  123<H>  3.9   |  26  |  1.59<H>    Ca    7.9<L>      07 Mar 2024 00:31  Phos  2.0     03-07  Mg     3.3     03-07    TPro  6.4  /  Alb  2.9<L>  /  TBili  0.7  /  DBili  x   /  AST  212<H>  /  ALT  371<H>  /  AlkPhos  321<H>  03-07    Urinalysis Basic - ( 07 Mar 2024 00:31 )    Color: x / Appearance: x / SG: x / pH: x  Gluc: 123 mg/dL / Ketone: x  / Bili: x / Urobili: x   Blood: x / Protein: x / Nitrite: x   Leuk Esterase: x / RBC: x / WBC x   Sq Epi: x / Non Sq Epi: x / Bacteria: x

## 2024-03-07 NOTE — PHYSICAL THERAPY INITIAL EVALUATION ADULT - PERTINENT HX OF CURRENT PROBLEM, REHAB EVAL
63 year old male with past medical history of HIV, CVA, HTN, HLD, prediabetes, A.fib, cocaine/heroin abuse, CAD w/ stent, CHF (EF  25-30% in ),  RLE compartment syndrome s/p fasciotomy  and recent hospitalization for CHF exacerbation with bilateral pleural effusions requiring chest tube placement initially presenting to [facility] on 3/1 with chest pain and shortness of breath. He was admitted to the outside hospital for management of CHF exacerbation, found to have a further reduced EF of 10 - 15% on 3/1. While admitted, he was hypotensive requiring dopamine infusion with worsening perfusion indices and ELIE. He was transferred to [facility] for cardiogenic shock management. Hosp Course: Adm to CICU on dopamine gtt; TTE: +severe RV dilation, moderate MR (3b), severe TR, LV thrombus, small effusion. EF 25%, LVID 5.2; started onb heparin drip for LV mural thrombus; undergoing  assisted diuresis; BLE arterial dopplers: There is edema in the musculature surrounding the arteries and veins in the distal thigh,at the level of the knee and in the proximal calf of the right lower extremity. Less edema is present on the left. Lower extremity arterial vascular stenotic or occlusive disease is not identified;

## 2024-03-07 NOTE — BH CONSULTATION LIAISON ASSESSMENT NOTE - NSBHATTESTCOMMENTATTENDFT_PSY_A_CORE
63M single, unemployed on public assistance, domiciled in boarding house, noncaregiver, with PPHx polysubstance abuse (ETOH, cocaine, heroin), no prior SA or psych admissions, not currently in tx, most recent substance use 1 month ago, +forensic hx incarceration 13.5 years for armed robbery released 1 year ago, with PMH significant for HIV, CVA, HTN, HLD, prediabetes, A.fib, CAD w/ stent, CHF (EF  25-30% in 12/23, now 10 - 15 % as of TTE on 3/1),  RLE compartment syndrome s/p fasciotomy 12/23 presented to outside hospital with chest pain and shortness of breath, admitted for CHF exacerbation, transferred to [facility] for cardiogenic shock management, psychiatry consulted to evaluate for depression.  Pt reports new onset of feelings of sadness 2/2 recent medical stressors and hospitalization, pt feels "my body is falling apart," but states he was not depressed prior to being admitted.  Denies SI/HI, AVH, paranoia.  Uses substances intermittently, last use 1 month ago, smoked crack and drank 6 pack of beers.  Has also tried heroin occasionally by sniffing.  Last use of any substance 1 month ago, denies current sx w/d.  Dx: Adjustment d/o.  Recs: Ativan PRN as above, HOlistic RN/chaplaincy.  SW for OP psych/substance referral resources.

## 2024-03-07 NOTE — PROGRESS NOTE ADULT - ATTENDING COMMENTS
63/M with CAD s/p PCI few months back,  poly substance use alcohol (4 beers daily/ Half pint of liquor 2-3/weekly), SMoker for 30 years , cocaine use, reports he has stopped after last hospitalization, with h/o HFrEf ef 25%, with worsening EF and admitted with Cardiogenic shock to [facility] who was transferred to [facility] for further management.     Off note he has had PAD s/p procedure (pt doesn't remember) followed by compartment syndrome s/p fasciotomy. reports ~20lbs weight loss in last few months.    was transferred to CCU yesterday and Dopamine was changed to  5 and swan was placed. with weaning  pt dropped MVo2. was started on afterload and  weaned from 5--> 2.5 this AM.     Hemodynamics: RA 15, PA: 49/23(33), PCEP 20 , PA sat:61%  Lemuel CO/CI: 4.8/2.8   , BP: 103/58 on  2.5mcg/kg/min  RA11, PA: 37/16(26), PCWP 16, PA sat 55%, Lemuel CI 2.3  will cont bumex 2mg IV , goal u/o  increase losartan to 25mg po daily  cont london 25mg po   will consider adding SGLT2i    renal function improving  please obtain collateral from [facility] and LE procedure.     discussed at length with pt regarding possible end stage HFrEF and it prognosis.   given recent polysubstance use, cardiac cachexia, deconditioning  and non complaince/multiple hospitalization, he is not a Advanced therapy candidate at this time.

## 2024-03-07 NOTE — PROGRESS NOTE ADULT - ASSESSMENT
63 year old male with past medical history of HIV, CVA, HTN, HLD, prediabetes, A.fib, cocaine/heroin abuse, CAD w/ stent, CHF (EF  25-30% in 12/23),  RLE compartment syndrome s/p fasciotomy 12/23 and recent hospitalization for CHF exacerbation with bilateral pleural effusions requiring chest tube placement initially presenting to [facility] on 3/1 with chest pain and shortness of breath.  Transferred to [facility] for management of cardiogenic shock.  Now off inotropes and titrating afterload.     Cardiac imaging/studies:  TTE: severe RV dilation, moderate MR (3b), severe TR, LV thrombus, small effusion. EF 25%, LVID 5.2  3/5 Bedside swan numbers; CVP 15, PAP 45/21, wedge 20. MvO2 45 CO/CI 3.4/2.2 SVR 1482  3/6 Bedside swan numbers: CVP 6, PAP 34/15, wedge 15. MvO2 53 CO/CI 3.7/2.4 SVR 1383   3/7 CvO2 42.9, with CO/CI estimated 3/1.9

## 2024-03-07 NOTE — BH CONSULTATION LIAISON ASSESSMENT NOTE - SUMMARY
63 year old  male, single, no living children (son passed), unemployed, domiciled in a boarding house in [place], with pphx cocaine/ heroine abuse (last cocaine use 1 month ago), no history of SA and SI, pmhx HIV, CVA, HTN, HLD, prediabetes, A.fib, CAD w/ stent, CHF (EF  25-30% in 12/23),  RLE compartment syndrome s/p fasciotomy 12/23 and recent hospitalization for CHF exacerbation with bilateral pleural effusions requiring chest tube placement initially presenting to [facility] on 3/1 with chest pain and shortness of breath. Pt was transferred to [facility] 03/04 for cardiogenic shock management. Psych consulted for depression and medication recommendation. On assessment, pt reports no psychiatric history and chronic history of depressed mood. Pt has only been "feeling low" since his hospital stay.  63 year old  male, single, no living children (son passed), unemployed, domiciled in a boarding house in [facility], with pphx cocaine/ heroine abuse (last cocaine use 1 month ago), no history of SA and SI, pmhx HIV, CVA, HTN, HLD, prediabetes, A.fib, CAD w/ stent, CHF (EF  25-30% in 12/23),  RLE compartment syndrome s/p fasciotomy 12/23 and recent hospitalization for CHF exacerbation with bilateral pleural effusions requiring chest tube placement initially presenting to [facility] on 3/1 with chest pain and shortness of breath. Pt was transferred to [facility] 03/04 for cardiogenic shock management. Psych consulted for depression and medication recommendation. On assessment, pt reports no psychiatric history and chronic history of depressed mood. Pt has only been "feeling low" since his hospital stay. Presentation c/w adjustment disorder.

## 2024-03-07 NOTE — BH CONSULTATION LIAISON ASSESSMENT NOTE - PATIENT'S CHIEF COMPLAINT
Procedure note : Interscalene  Staff -       CRNA: Omar Woodward APRN CRNA    Performed By: CRNA        I've been feeling very down lately"

## 2024-03-07 NOTE — PHYSICAL THERAPY INITIAL EVALUATION ADULT - ADDITIONAL COMMENTS
Pt resides alone in a house with +4 steps to enter. PTA, pt was independent with all functional mobility/ADLs. Does not own any DME

## 2024-03-07 NOTE — BH CONSULTATION LIAISON ASSESSMENT NOTE - RISK ASSESSMENT
Risk factors: substance abuse, forensic hx, acute/chronic medical problems    Protective factors: no current SIIP/HIIP, no h/o SA/SIB, no h/o psych admissions, domiciled, engaged in treatment, compliant with treatment, help-seeking behaviors    Overall, pt is at chronically elevated risk of harm mitigated by multiple protective factors and does not meet criteria for psychiatric admission at this time.

## 2024-03-07 NOTE — PROGRESS NOTE ADULT - SUBJECTIVE AND OBJECTIVE BOX
Patient is a 63y old  Male who presents with a chief complaint of cardiogenic shock (06 Mar 2024 20:18)    HPI:  63 year old male with past medical history of HIV, CVA, HTN, HLD, prediabetes, A.fib, cocaine/heroin abuse, CAD w/ stent, CHF (EF  25-30% in ),  RLE compartment syndrome s/p fasciotomy  and recent hospitalization for CHF exacerbation with bilateral pleural effusions requiring chest tube placement initially presenting to [facility] on 3/1 with chest pain and shortness of breath. He states having intermittent 8/10 midsternal chest pain for the past few months since his leg operation. He was admitted to the outside hospital for management of CHF exacerbation, found to have a further reduced EF of 10 - 15% on 3/1. While admitted, he was hypotensive requiring dopamine infusion with worsening perfusion indices and ELIE. He was transferred to [facility] for cardiogenic shock management.     On admission to CICU, he is A&O x3.  sinus tach, / 78, saturating well on 4L nasal cannula with dopamine gtt infusing at 10 mcg/kg/min. He denies chest pain, shortness of breath, nausea and vomiting on admission. (04 Mar 2024 20:16)       INTERVAL HPI/OVERNIGHT EVENTS:   No overnight events   Afebrile, hemodynamically stable     Subjective:    ICU Vital Signs Last 24 Hrs  T(C): 36.3 (07 Mar 2024 08:00), Max: 36.5 (06 Mar 2024 16:00)  T(F): 97.4 (07 Mar 2024 08:00), Max: 97.7 (06 Mar 2024 16:00)  HR: 117 (07 Mar 2024 08:00) (100 - 122)  BP: 91/63 (07 Mar 2024 08:00) (77/52 - 111/71)  BP(mean): 73 (07 Mar 2024 08:00) (60 - 82)  ABP: --  ABP(mean): --  RR: 25 (07 Mar 2024 08:00) (15 - 32)  SpO2: 98% (07 Mar 2024 08:00) (94% - 100%)    O2 Parameters below as of 07 Mar 2024 08:00  Patient On (Oxygen Delivery Method): room air          I&O's Summary    06 Mar 2024 07:01  -  07 Mar 2024 07:00  --------------------------------------------------------  IN: 1387 mL / OUT: 3100 mL / NET: -1713 mL    07 Mar 2024 07:01  -  07 Mar 2024 08:19  --------------------------------------------------------  IN: 10 mL / OUT: 0 mL / NET: 10 mL          Daily     Daily Weight in k.8 (07 Mar 2024 04:00)    Adult Advanced Hemodynamics Last 24 Hrs  CVP(mm Hg): 10 (06 Mar 2024 16:00) (8 - 12)  CVP(cm H2O): --  CO: --  CI: --  PA: 35/18 (06 Mar 2024 16:00) (28/7 - 40/19)  PA(mean): 25 (06 Mar 2024 16:00) (16 - 27)  PCWP: --  SVR: --  SVRI: --  PVR: --  PVRI: --    EKG/Telemetry Events:    MEDICATIONS  (STANDING):  AQUAPHOR (petrolatum Ointment) 1 Application(s) Topical every 12 hours  aspirin enteric coated 81 milliGRAM(s) Oral daily  chlorhexidine 2% Cloths 1 Application(s) Topical <User Schedule>  folic acid 1 milliGRAM(s) Oral daily  heparin  Infusion 1000 Unit(s)/Hr (10 mL/Hr) IV Continuous <Continuous>  insulin lispro (ADMELOG) corrective regimen sliding scale   SubCutaneous three times a day before meals  insulin lispro (ADMELOG) corrective regimen sliding scale   SubCutaneous at bedtime  losartan 12.5 milliGRAM(s) Oral daily  spironolactone 25 milliGRAM(s) Oral daily  thiamine IVPB 500 milliGRAM(s) IV Intermittent daily    MEDICATIONS  (PRN):  sodium chloride 0.9% lock flush 10 milliLiter(s) IV Push every 1 hour PRN Pre/post blood products, medications, blood draw, and to maintain line patency      PHYSICAL EXAM:  GENERAL:   HEAD:  Atraumatic, Normocephalic  EYES: EOMI, PERRLA, conjunctiva and sclera clear  NECK: Supple, No JVD, Normal thyroid, no enlarged nodes  NERVOUS SYSTEM:  Alert & Awake.   CHEST/LUNG: B/L good air entry; No rales, rhonchi, or wheezing  HEART: S1S2 normal, no S3, Regular rate and rhythm; No murmurs  ABDOMEN: Soft, Nontender, Nondistended; Bowel sounds present  EXTREMITIES:  2+ Peripheral Pulses, No clubbing, cyanosis, or edema  LYMPH: No lymphadenopathy noted  SKIN: No rashes or lesions    LABS:                        8.9    6.52  )-----------( 156      ( 07 Mar 2024 00:31 )             27.4     03-    138  |  101  |  48<H>  ----------------------------<  123<H>  3.9   |  26  |  1.59<H>    Ca    7.9<L>      07 Mar 2024 00:31  Phos  2.0     03-  Mg     3.3     03-    TPro  6.4  /  Alb  2.9<L>  /  TBili  0.7  /  DBili  x   /  AST  212<H>  /  ALT  371<H>  /  AlkPhos  321<H>      LIVER FUNCTIONS - ( 07 Mar 2024 00:31 )  Alb: 2.9 g/dL / Pro: 6.4 g/dL / ALK PHOS: 321 U/L / ALT: 371 U/L / AST: 212 U/L / GGT: x           PT/INR - ( 07 Mar 2024 00:31 )   PT: 15.6 sec;   INR: 1.50 ratio         PTT - ( 07 Mar 2024 00:31 )  PTT:71.6 sec  CAPILLARY BLOOD GLUCOSE      POCT Blood Glucose.: 114 mg/dL (07 Mar 2024 07:38)  POCT Blood Glucose.: 116 mg/dL (06 Mar 2024 21:05)  POCT Blood Glucose.: 116 mg/dL (06 Mar 2024 16:35)  POCT Blood Glucose.: 93 mg/dL (06 Mar 2024 11:45)            Urinalysis Basic - ( 07 Mar 2024 00:31 )    Color: x / Appearance: x / SG: x / pH: x  Gluc: 123 mg/dL / Ketone: x  / Bili: x / Urobili: x   Blood: x / Protein: x / Nitrite: x   Leuk Esterase: x / RBC: x / WBC x   Sq Epi: x / Non Sq Epi: x / Bacteria: x          RADIOLOGY & ADDITIONAL TESTS:  CXR:        Care Discussed with Consultants/Other Providers [ x] YES  [ ] NO           Patient is a 63y old  Male who presents with a chief complaint of cardiogenic shock (06 Mar 2024 20:18)    HPI:  63 year old male with past medical history of CVA, HTN, HLD, prediabetes, A.fib, cocaine/heroin abuse, CAD w/ stent, CHF (EF  25-30% in ),  RLE compartment syndrome s/p fasciotomy  and recent hospitalization for CHF exacerbation with bilateral pleural effusions requiring chest tube placement initially presenting to [facility] on 3/1 with chest pain and shortness of breath. He states having intermittent 8/10 midsternal chest pain for the past few months since his leg operation. He was admitted to the outside hospital for management of CHF exacerbation, found to have a further reduced EF of 10 - 15% on 3/1. While admitted, he was hypotensive requiring dopamine infusion with worsening perfusion indices and ELIE. He was transferred to [facility] for cardiogenic shock management.     On admission to CICU, he is A&O x3.  sinus tach, / 78, saturating well on 4L nasal cannula with dopamine gtt infusing at 10 mcg/kg/min. He denies chest pain, shortness of breath, nausea and vomiting on admission. (04 Mar 2024 20:16)       INTERVAL HPI/OVERNIGHT EVENTS:   No overnight events   Afebrile, hemodynamically stable     Subjective:  pt reports no new sxs   denies SOB or CP    ICU Vital Signs Last 24 Hrs  T(C): 36.3 (07 Mar 2024 08:00), Max: 36.5 (06 Mar 2024 16:00)  T(F): 97.4 (07 Mar 2024 08:00), Max: 97.7 (06 Mar 2024 16:00)  HR: 117 (07 Mar 2024 08:00) (100 - 122)  BP: 91/63 (07 Mar 2024 08:00) (77/52 - 111/71)  BP(mean): 73 (07 Mar 2024 08:00) (60 - 82)  ABP: --  ABP(mean): --  RR: 25 (07 Mar 2024 08:00) (15 - 32)  SpO2: 98% (07 Mar 2024 08:00) (94% - 100%)    O2 Parameters below as of 07 Mar 2024 08:00  Patient On (Oxygen Delivery Method): room air          I&O's Summary    06 Mar 2024 07:01  -  07 Mar 2024 07:00  --------------------------------------------------------  IN: 1387 mL / OUT: 3100 mL / NET: -1713 mL    07 Mar 2024 07:01  -  07 Mar 2024 08:19  --------------------------------------------------------  IN: 10 mL / OUT: 0 mL / NET: 10 mL          Daily     Daily Weight in k.8 (07 Mar 2024 04:00)    Adult Advanced Hemodynamics Last 24 Hrs  CVP(mm Hg): 10 (06 Mar 2024 16:00) (8 - 12)  CVP(cm H2O): --  CO: --  CI: --  PA: 35/18 (06 Mar 2024 16:00) (28/7 - 40/19)  PA(mean): 25 (06 Mar 2024 16:00) (16 - 27)  PCWP: --  SVR: --  SVRI: --  PVR: --  PVRI: --    EKG/Telemetry Events:    MEDICATIONS  (STANDING):  AQUAPHOR (petrolatum Ointment) 1 Application(s) Topical every 12 hours  aspirin enteric coated 81 milliGRAM(s) Oral daily  chlorhexidine 2% Cloths 1 Application(s) Topical <User Schedule>  folic acid 1 milliGRAM(s) Oral daily  heparin  Infusion 1000 Unit(s)/Hr (10 mL/Hr) IV Continuous <Continuous>  insulin lispro (ADMELOG) corrective regimen sliding scale   SubCutaneous three times a day before meals  insulin lispro (ADMELOG) corrective regimen sliding scale   SubCutaneous at bedtime  losartan 12.5 milliGRAM(s) Oral daily  spironolactone 25 milliGRAM(s) Oral daily  thiamine IVPB 500 milliGRAM(s) IV Intermittent daily    MEDICATIONS  (PRN):  sodium chloride 0.9% lock flush 10 milliLiter(s) IV Push every 1 hour PRN Pre/post blood products, medications, blood draw, and to maintain line patency      PHYSICAL EXAM:  GENERAL: pt appears melancholy   HEAD:  Atraumatic, Normocephalic  EYES: EOMI  NECK: No JVD  NERVOUS SYSTEM:  Alert & Awake.   CHEST/LUNG: B/L good air entry; No rales, rhonchi, or wheezing  HEART: S1S2 normal, no S3, Regular rate and rhythm; No murmurs  ABDOMEN: Soft, Nontender, Nondistended  EXTREMITIES:  2+ Peripheral Pulses, No clubbing, cyanosis, or edema  LYMPH: No lymphadenopathy noted  SKIN: No rashes or lesions    LABS:                        8.9    6.52  )-----------( 156      ( 07 Mar 2024 00:31 )             27.4     03-    138  |  101  |  48<H>  ----------------------------<  123<H>  3.9   |  26  |  1.59<H>    Ca    7.9<L>      07 Mar 2024 00:31  Phos  2.0     -  Mg     3.3     -    TPro  6.4  /  Alb  2.9<L>  /  TBili  0.7  /  DBili  x   /  AST  212<H>  /  ALT  371<H>  /  AlkPhos  321<H>      LIVER FUNCTIONS - ( 07 Mar 2024 00:31 )  Alb: 2.9 g/dL / Pro: 6.4 g/dL / ALK PHOS: 321 U/L / ALT: 371 U/L / AST: 212 U/L / GGT: x           PT/INR - ( 07 Mar 2024 00:31 )   PT: 15.6 sec;   INR: 1.50 ratio         PTT - ( 07 Mar 2024 00:31 )  PTT:71.6 sec  CAPILLARY BLOOD GLUCOSE      POCT Blood Glucose.: 114 mg/dL (07 Mar 2024 07:38)  POCT Blood Glucose.: 116 mg/dL (06 Mar 2024 21:05)  POCT Blood Glucose.: 116 mg/dL (06 Mar 2024 16:35)  POCT Blood Glucose.: 93 mg/dL (06 Mar 2024 11:45)            Urinalysis Basic - ( 07 Mar 2024 00:31 )    Color: x / Appearance: x / SG: x / pH: x  Gluc: 123 mg/dL / Ketone: x  / Bili: x / Urobili: x   Blood: x / Protein: x / Nitrite: x   Leuk Esterase: x / RBC: x / WBC x   Sq Epi: x / Non Sq Epi: x / Bacteria: x          RADIOLOGY & ADDITIONAL TESTS:  CXR:        Care Discussed with Consultants/Other Providers [ x] YES  [ ] NO

## 2024-03-07 NOTE — BH CONSULTATION LIAISON ASSESSMENT NOTE - HPI (INCLUDE ILLNESS QUALITY, SEVERITY, DURATION, TIMING, CONTEXT, MODIFYING FACTORS, ASSOCIATED SIGNS AND SYMPTOMS)
63 year old  male, single, no living children (son passed), unemployed, domiciled in a boarding house in [facility], with pphx cocaine/ heroine abuse (last cocaine use 1 month ago), no history of SA and SI, pmhx HIV, CVA, HTN, HLD, prediabetes, A.fib, CAD w/ stent, CHF (EF  25-30% in 12/23),  RLE compartment syndrome s/p fasciotomy 12/23 and recent hospitalization for CHF exacerbation with bilateral pleural effusions requiring chest tube placement initially presenting to [facility] on 3/1 with chest pain and shortness of breath. Pt was transferred to [facility] 03/04 for cardiogenic shock management. Psych consulted for depression and medication recommendation.    On assessment, pt is A&O x3, calm and cooperative. Pt reports feeling down ever since he has been hospitalized and has had trouble sleeping at night. Pt states that he wants something stronger to help him sleep because he has only been getting 2-3 hours of sleep at night for the past three nights. When asked about feelings of guilt, pt reports feeling guilty about "all the dirt" he did in the past. On further questioning, pt states that he was incarcerated for 13 and half years for attempted armed robbery and was released 12 months ago. Pt admits to having low energy levels with symptoms. Pt attributes low mood to deteriorating health condition, stating "I don't want to die yet". Pt denies change in appetite and poor concentration. Pt admit to a history if drinking 6 cans of beer daily, smoking 1 pack of cigarettes weekly and using cocaine (last use about 1 month ago). Pt denies feelings of fear, worry and seeing/ hearing things.           63 year old  male, single, no living children (son passed), unemployed, domiciled in a boarding house in [facility], with pphx cocaine/ heroin/ETOH abuse (last cocaine/ETOH use 1 month ago), no history of SA and SI, +forensic hx (incarcerated x14 yrs, released 1 year ago) pmhx HIV, CVA, HTN, HLD, prediabetes, A.fib, CAD w/ stent, CHF (EF  25-30% in 12/23),  RLE compartment syndrome s/p fasciotomy 12/23 and recent hospitalization for CHF exacerbation with bilateral pleural effusions requiring chest tube placement initially presenting to [facility] on 3/1 with chest pain and shortness of breath. Pt was transferred to [facility] 03/04 for cardiogenic shock management. Psych consulted for depression and medication recommendation.    On assessment, pt is A&O x3, calm and cooperative. Pt reports feeling down ever since he has been hospitalized and has had trouble sleeping at night. Pt states that he has only been getting 2-3 hours of sleep at night for the past three nights. When asked about feelings of guilt, pt reports feeling guilty about "all the dirt" he did in the past. On further questioning, pt states that he was incarcerated for 13 and half years for attempted armed robbery and was released 12 months ago. Pt admits to having low energy levels with symptoms. Pt attributes low mood to deteriorating health condition, stating "I don't want to die yet". Pt denies change in appetite and poor concentration. Pt admit to a history if drinking 6 cans of beer daily, smoking 1 pack of cigarettes weekly and using cocaine (last use about 1 month ago). Pt denies feelings of fear, worry and seeing/ hearing things.

## 2024-03-07 NOTE — BH CONSULTATION LIAISON ASSESSMENT NOTE - NSBHCHARTREVIEWVS_PSY_A_CORE FT
Vital Signs Last 24 Hrs  T(C): 36.3 (07 Mar 2024 08:00), Max: 36.5 (06 Mar 2024 16:00)  T(F): 97.4 (07 Mar 2024 08:00), Max: 97.7 (06 Mar 2024 16:00)  HR: 116 (07 Mar 2024 11:15) (100 - 122)  BP: 86/63 (07 Mar 2024 11:15) (77/52 - 111/71)  BP(mean): 70 (07 Mar 2024 11:15) (60 - 82)  RR: 22 (07 Mar 2024 11:15) (18 - 32)  SpO2: 95% (07 Mar 2024 11:15) (91% - 100%)    Parameters below as of 07 Mar 2024 11:00  Patient On (Oxygen Delivery Method): room air

## 2024-03-07 NOTE — SBIRT NOTE ADULT - NSSBIRTDRGPASSREFTXDET_GEN_A_CORE
Patient reports a hx of cocaine and heroin use. Patient expressed he has used cocaine for the past 30 years on and off. Patient's last use was a month ago. Patient never completed formal treatment. Patient also used marijuana in the 90s but nothing recent.

## 2024-03-07 NOTE — PROGRESS NOTE ADULT - ATTENDING COMMENTS
64yo M with CVA HTN HLD pAF, quit EtOH/ cocaine 1 mo ago here for ADHF and c/f cardiogenic shock. Here for AT eval in cardiogenic shock, now improving    Neuro: thiamine, folate. Psych eval for med mgmt/ depression  Pulm: off O2 and now on RA  CV: able to wean off dobutamine. cont afterload reduction with losartan and start SGLT2i. Cont london. Will need ischemic eval and too tachycardic for coronary CTA so likely dx coronary angio. trial low dose BB in coming days  ID: does NOT have HIV as previously reported  Renal: Cr down to 1.5, continues to improve with intermittent diuresis  GI: DASH diet  Heme: cont heparin gtt for AFib and LV thrombus. decide on DOAC vs warfarin depending on dc situation  Endo: BG controlled  Lines: no central access 64yo M with CVA HTN HLD pAF, quit EtOH/ cocaine 1 mo ago here for ADHF and c/f cardiogenic shock. Here for AT eval in cardiogenic shock, now improving    Neuro: thiamine, folate. Psych eval for med mgmt/ depression  Pulm: off O2 and now on RA  CV: able to wean off dobutamine. cont afterload reduction with losartan and start SGLT2i. Cont london. Load plavix and stop ASA, cont heparin for LV thrombus. Will need cath records from [facility] (reportedly stented in Dec of 2023).  ID: does NOT have HIV as previously reported  Renal: Cr down to 1.5, continues to improve with intermittent diuresis  GI: DASH diet  Heme: cont heparin gtt for AFib and LV thrombus. decide on DOAC vs warfarin depending on dc situation  Endo: BG controlled  Lines: no central access

## 2024-03-07 NOTE — PHYSICAL THERAPY INITIAL EVALUATION ADULT - PLANNED THERAPY INTERVENTIONS, PT EVAL
GOAL: Pt will negotiate up/down 4 steps with + handrail ascending using no device independently in 2 weeks/balance training/bed mobility training/gait training/strengthening/transfer training

## 2024-03-07 NOTE — PROGRESS NOTE ADULT - SUBJECTIVE AND OBJECTIVE BOX
Patricio San MD  Cardiology Fellow  [phone]  All Cardiology service information can be found 24/7 on [email or web address], password: [email or web address]    Patient seen and examined at bedside.  CvO2 42.9  Net neg 1.7L on am bumex 2mg yesterday, with weight improved from 111.9 > 105.3lbs  On heparin gtt for thrombus   Labs with Cre 1.59, downtrending LFts to 200s-300s AST/ALT respectively  Lactate 1.6    Review Of Systems: No chest pain, shortness of breath, or palpitations            Current Meds:  AQUAPHOR (petrolatum Ointment) 1 Application(s) Topical every 12 hours  aspirin enteric coated 81 milliGRAM(s) Oral daily  buMETAnide Injectable 2 milliGRAM(s) IV Push once  chlorhexidine 2% Cloths 1 Application(s) Topical <User Schedule>  folic acid 1 milliGRAM(s) Oral daily  heparin  Infusion 1000 Unit(s)/Hr IV Continuous <Continuous>  insulin lispro (ADMELOG) corrective regimen sliding scale   SubCutaneous three times a day before meals  insulin lispro (ADMELOG) corrective regimen sliding scale   SubCutaneous at bedtime  losartan 12.5 milliGRAM(s) Oral daily  sodium chloride 0.9% lock flush 10 milliLiter(s) IV Push every 1 hour PRN  spironolactone 25 milliGRAM(s) Oral daily  thiamine IVPB 500 milliGRAM(s) IV Intermittent daily    Vitals:  T(F): 97.4 (03-07), Max: 97.7 (03-06)  HR: 116 (03-07) (100 - 122)  BP: 104/64 (03-07) (77/52 - 111/71)  RR: 25 (03-07)  SpO2: 91% (03-07)  I&O's Summary    06 Mar 2024 07:01  -  07 Mar 2024 07:00  --------------------------------------------------------  IN: 1392 mL / OUT: 3100 mL / NET: -1708 mL    07 Mar 2024 07:01  -  07 Mar 2024 10:45  --------------------------------------------------------  IN: 165 mL / OUT: 175 mL / NET: -10 mL    Physical Exam:    GENERAL: frail appearing, hypophonic   HEAD:  Atraumatic, Normocephalic  ENT: EOMI, PERRLA, conjunctiva and sclera clear, Neck supple, JVD mildly elevated  CHEST/LUNG: Clear to auscultation bilaterally; No wheeze, equal breath sounds bilaterally   BACK: No spinal tenderness  HEART: Regular rate and rhythm; No murmurs, rubs, or gallops  ABDOMEN: Soft, Nontender, Nondistended; Bowel sounds present  EXTREMITIES:  No clubbing, cyanosis, or edema  PSYCH: Nl behavior, nl affect  NEUROLOGY: AAOx3, non-focal, cranial nerves intact  SKIN: Normal color, No rashes or lesions                          8.9    6.52  )-----------( 156      ( 07 Mar 2024 00:31 )             27.4     03-07    138  |  101  |  48<H>  ----------------------------<  123<H>  3.9   |  26  |  1.59<H>    Ca    7.9<L>      07 Mar 2024 00:31  Phos  2.0     03-07  Mg     3.3     03-07    TPro  6.4  /  Alb  2.9<L>  /  TBili  0.7  /  DBili  x   /  AST  212<H>  /  ALT  371<H>  /  AlkPhos  321<H>  03-07    PT/INR - ( 07 Mar 2024 00:31 )   PT: 15.6 sec;   INR: 1.50 ratio         PTT - ( 07 Mar 2024 00:31 )  PTT:71.6 sec  CARDIAC MARKERS ( 04 Mar 2024 22:07 )  93 ng/L / x     / x     / x     / x     / x

## 2024-03-07 NOTE — PROGRESS NOTE ADULT - ASSESSMENT
====================ASSESSMENT ==============  63 year old male with past medical history of CVA, HTN, HLD, prediabetes, A.fib, cocaine/heroin abuse, CAD w/ stent, CHF (EF  25-30% in , now 10 - 15 % as of TTE on 3/1),  RLE compartment syndrome s/p fasciotomy  presented to outside hospital with chest pain and shortness of breath, admitted for CHF exacerbation, transferred to [facility] for cardiogenic shock management     Plan:  Neuro  #hx CVA  - A&O x3, poor historian with medical history and medications  - continue to monitor per protocol    Psych  #adjustment disorder  - psych consulted, diagnosed with adjustment disorder  - recommended no medications for management at this time    Respiratory  #hypoxia/pleural effusions  - noted to have pleural effusions at outside hospital, likely in the setting of volume overload s/t chf  - saturating well on RA  - continue to monitor sp02    Cardiovascular  #Cardiogenic shock  - i/s/o CHF exacerbation  - TTE 3/1 EF 10 - 15%, previously 25 - 30%   - MVo2 43 today 3/7  -  d/c'd   - Hydral 25 TID d/c due to episodes of hypotension   - Losartan 12.5 QD  - Farxiga 10 mg QD   - diuresis as needed  - trend perfusion indices, mv02    - TTE 3/5: EF 25%, global LV hypokinesis, rounded and protruding LV thrombus in apex, RV severely enlarged with severely reduced RVSF, mild-mod pulm HTN, severe TR, mild-mod ID    #CAD  - w/ history of stents in december as per patient  - previously on aspirin, continue    #pAF  - ? hx a.fib  - sinus on admission  - not on blood thinner or rate control agent as per patient, only aspirin & plavix  - heparin gtt per protocol     #HTN  - continue to trend BP    #HLD  - resume statin i/s/o improved transaminases    GI  #transaminitis  - improving, continue to trend  - diet as tolerated, monitor for BM    /Renal  #ELIE  - improving BUN/Cr  - continue strict I&O, electrolyte monitoring    Endo  #preDM  - a1c 6.2  - trend glucose, ISS while inpatient    Heme  - elevated INR on admission, i/s/o liver dsyfunction, continue to trend  - H/H and platelets stable at outside hospital  - INR <2, started heparin gtt overnight  - Low iron saturation, begin 5 days of IV iron sucrose per HF recs (Day 1 3/7)  ID  - afebrile on admission  - continue to trend wbc and fever curve    #RLE fasciotomy  - admitted from outside hospital with ace wrap in place  - noted to have wounds on lateral and medial aspect of calf  - f/u wound consult recommendations    #full code  #lines: PIV

## 2024-03-07 NOTE — BH CONSULTATION LIAISON ASSESSMENT NOTE - NSBHCHARTREVIEWINVESTIGATE_PSY_A_CORE FT
< from: 12 Lead ECG (03.06.24 @ 05:54) >      Systolic BP 93 mmHg    Diastolic BP 65 mmHg    Ventricular Rate 108 BPM    Atrial Rate 234 BPM    QRS Duration 122 ms    Q-T Interval 388 ms    QTC Calculation(Bazett) 519 ms    R Axis -75 degrees    T Axis 94 degrees    Diagnosis Line ATRIAL FLUTTER WITH VARIABLE A-V BLOCK  RIGHT BUNDLE BRANCH BLOCK  LEFT ANTERIOR FASCICULAR BLOCK  *** BIFASCICULAR BLOCK ***  SEPTAL INFARCT , AGE UNDETERMINED  ABNORMAL ECG  WHEN COMPARED WITH ECG OF 3/5/24   NO SIGNIFICANT CHANGE WAS FOUND  Confirmed by GATO AVILES, SIL GEIGER ([id number]) on 3/6/2024 4:54:22 PM    < end of copied text >

## 2024-03-08 DIAGNOSIS — D50.9 IRON DEFICIENCY ANEMIA, UNSPECIFIED: ICD-10-CM

## 2024-03-08 DIAGNOSIS — R74.01 ELEVATION OF LEVELS OF LIVER TRANSAMINASE LEVELS: ICD-10-CM

## 2024-03-08 DIAGNOSIS — N17.9 ACUTE KIDNEY FAILURE, UNSPECIFIED: ICD-10-CM

## 2024-03-08 LAB
ALBUMIN SERPL ELPH-MCNC: 3.1 G/DL — LOW (ref 3.3–5)
ALP SERPL-CCNC: 325 U/L — HIGH (ref 40–120)
ALT FLD-CCNC: 309 U/L — HIGH (ref 10–45)
ANION GAP SERPL CALC-SCNC: 13 MMOL/L — SIGNIFICANT CHANGE UP (ref 5–17)
AST SERPL-CCNC: 147 U/L — HIGH (ref 10–40)
BASOPHILS # BLD AUTO: 0 K/UL — SIGNIFICANT CHANGE UP (ref 0–0.2)
BASOPHILS NFR BLD AUTO: 0 % — SIGNIFICANT CHANGE UP (ref 0–2)
BILIRUB SERPL-MCNC: 0.8 MG/DL — SIGNIFICANT CHANGE UP (ref 0.2–1.2)
BUN SERPL-MCNC: 49 MG/DL — HIGH (ref 7–23)
CALCIUM SERPL-MCNC: 8.2 MG/DL — LOW (ref 8.4–10.5)
CHLORIDE SERPL-SCNC: 101 MMOL/L — SIGNIFICANT CHANGE UP (ref 96–108)
CO2 SERPL-SCNC: 24 MMOL/L — SIGNIFICANT CHANGE UP (ref 22–31)
CREAT SERPL-MCNC: 1.43 MG/DL — HIGH (ref 0.5–1.3)
EGFR: 55 ML/MIN/1.73M2 — LOW
EOSINOPHIL # BLD AUTO: 0 K/UL — SIGNIFICANT CHANGE UP (ref 0–0.5)
EOSINOPHIL NFR BLD AUTO: 0 % — SIGNIFICANT CHANGE UP (ref 0–6)
GLUCOSE BLDC GLUCOMTR-MCNC: 107 MG/DL — HIGH (ref 70–99)
GLUCOSE BLDC GLUCOMTR-MCNC: 134 MG/DL — HIGH (ref 70–99)
GLUCOSE BLDC GLUCOMTR-MCNC: 140 MG/DL — HIGH (ref 70–99)
GLUCOSE BLDC GLUCOMTR-MCNC: 145 MG/DL — HIGH (ref 70–99)
GLUCOSE SERPL-MCNC: 101 MG/DL — HIGH (ref 70–99)
HCT VFR BLD CALC: 27.8 % — LOW (ref 39–50)
HGB BLD-MCNC: 9.2 G/DL — LOW (ref 13–17)
IMM GRANULOCYTES NFR BLD AUTO: 0.6 % — SIGNIFICANT CHANGE UP (ref 0–0.9)
LYMPHOCYTES # BLD AUTO: 1.02 K/UL — SIGNIFICANT CHANGE UP (ref 1–3.3)
LYMPHOCYTES # BLD AUTO: 20.4 % — SIGNIFICANT CHANGE UP (ref 13–44)
MAGNESIUM SERPL-MCNC: 2.6 MG/DL — SIGNIFICANT CHANGE UP (ref 1.6–2.6)
MCHC RBC-ENTMCNC: 21.2 PG — LOW (ref 27–34)
MCHC RBC-ENTMCNC: 33.1 GM/DL — SIGNIFICANT CHANGE UP (ref 32–36)
MCV RBC AUTO: 64.1 FL — LOW (ref 80–100)
MONOCYTES # BLD AUTO: 0.64 K/UL — SIGNIFICANT CHANGE UP (ref 0–0.9)
MONOCYTES NFR BLD AUTO: 12.8 % — SIGNIFICANT CHANGE UP (ref 2–14)
NEUTROPHILS # BLD AUTO: 3.32 K/UL — SIGNIFICANT CHANGE UP (ref 1.8–7.4)
NEUTROPHILS NFR BLD AUTO: 66.2 % — SIGNIFICANT CHANGE UP (ref 43–77)
NRBC # BLD: 1 /100 WBCS — HIGH (ref 0–0)
PHOSPHATE SERPL-MCNC: 3.3 MG/DL — SIGNIFICANT CHANGE UP (ref 2.5–4.5)
PLATELET # BLD AUTO: 159 K/UL — SIGNIFICANT CHANGE UP (ref 150–400)
POTASSIUM SERPL-MCNC: 4.1 MMOL/L — SIGNIFICANT CHANGE UP (ref 3.5–5.3)
POTASSIUM SERPL-SCNC: 4.1 MMOL/L — SIGNIFICANT CHANGE UP (ref 3.5–5.3)
PROT SERPL-MCNC: 6.9 G/DL — SIGNIFICANT CHANGE UP (ref 6–8.3)
RBC # BLD: 4.34 M/UL — SIGNIFICANT CHANGE UP (ref 4.2–5.8)
RBC # FLD: 14.9 % — HIGH (ref 10.3–14.5)
SODIUM SERPL-SCNC: 138 MMOL/L — SIGNIFICANT CHANGE UP (ref 135–145)
T4 SERPL-MCNC: 4.8 UG/DL — SIGNIFICANT CHANGE UP
WBC # BLD: 5.01 K/UL — SIGNIFICANT CHANGE UP (ref 3.8–10.5)
WBC # FLD AUTO: 5.01 K/UL — SIGNIFICANT CHANGE UP (ref 3.8–10.5)

## 2024-03-08 PROCEDURE — 99233 SBSQ HOSP IP/OBS HIGH 50: CPT

## 2024-03-08 PROCEDURE — 99223 1ST HOSP IP/OBS HIGH 75: CPT

## 2024-03-08 RX ORDER — LANOLIN ALCOHOL/MO/W.PET/CERES
5 CREAM (GRAM) TOPICAL ONCE
Refills: 0 | Status: COMPLETED | OUTPATIENT
Start: 2024-03-08 | End: 2024-03-08

## 2024-03-08 RX ORDER — BUMETANIDE 0.25 MG/ML
1 INJECTION INTRAMUSCULAR; INTRAVENOUS DAILY
Refills: 0 | Status: DISCONTINUED | OUTPATIENT
Start: 2024-03-09 | End: 2024-03-12

## 2024-03-08 RX ADMIN — ATORVASTATIN CALCIUM 40 MILLIGRAM(S): 80 TABLET, FILM COATED ORAL at 21:01

## 2024-03-08 RX ADMIN — Medication 1 APPLICATION(S): at 06:01

## 2024-03-08 RX ADMIN — SPIRONOLACTONE 25 MILLIGRAM(S): 25 TABLET, FILM COATED ORAL at 06:01

## 2024-03-08 RX ADMIN — Medication 1 APPLICATION(S): at 18:31

## 2024-03-08 RX ADMIN — LOSARTAN POTASSIUM 25 MILLIGRAM(S): 100 TABLET, FILM COATED ORAL at 06:01

## 2024-03-08 RX ADMIN — Medication 100 MILLIGRAM(S): at 12:52

## 2024-03-08 RX ADMIN — CLOPIDOGREL BISULFATE 75 MILLIGRAM(S): 75 TABLET, FILM COATED ORAL at 12:52

## 2024-03-08 RX ADMIN — Medication 1 MILLIGRAM(S): at 12:52

## 2024-03-08 RX ADMIN — Medication 5 MILLIGRAM(S): at 22:38

## 2024-03-08 RX ADMIN — IRON SUCROSE 210 MILLIGRAM(S): 20 INJECTION, SOLUTION INTRAVENOUS at 12:52

## 2024-03-08 RX ADMIN — DAPAGLIFLOZIN 10 MILLIGRAM(S): 10 TABLET, FILM COATED ORAL at 12:52

## 2024-03-08 RX ADMIN — CHLORHEXIDINE GLUCONATE 1 APPLICATION(S): 213 SOLUTION TOPICAL at 12:53

## 2024-03-08 NOTE — PROGRESS NOTE ADULT - ATTENDING COMMENTS
63/M with CAD s/p PCI few months back,  poly substance use alcohol (4 beers daily/ Half pint of liquor 2-3/weekly), SMoker for 30 years , cocaine use, reports he has stopped after last hospitalization, with h/o HFrEf ef 25%, with worsening EF and admitted with Cardiogenic shock to [facility] who was transferred to [facility] for further management.     Off note he has had PAD s/p procedure (pt doesn't remember) followed by compartment syndrome s/p fasciotomy. reports ~20lbs weight loss in last few months.  was transferred to CCU and Dopamine was changed to  5 and swan was placed. with weaning  pt dropped MVo2. was started on afterload and  weaned from 5--> 2.5 this AM.     Hemodynamics: RA 15, PA: 49/23(33), PCEP 20 , PA sat:61%  Lemuel CO/CI: 4.8/2.8   , BP: 103/58 on  2.5mcg/kg/min  RA11, PA: 37/16(26), PCWP 16, PA sat 55%, Lemuel CI 2.3      will switch to bumex 1mg po dailt ,   cont losartan to 25mg po daily  cont london 25mg po   will consider adding SGLT2i  Will not add metoprolol at this time with borderline CO/CI    renal function improving  please obtain collateral from [facility] and LE procedure.     discussed at length with pt regarding possible end stage HFrEF and it prognosis.   given recent polysubstance use, cardiac cachexia, deconditioning  and non complaince/multiple hospitalization, he is not a Advanced therapy candidate at this time.

## 2024-03-08 NOTE — CHART NOTE - NSCHARTNOTEFT_GEN_A_CORE
MEDICINE PA NOTE    CARMENCITA GIBSON     Notified by RN, patient with 3 episodes of bradycardia(aflutter) max of 50 seconds. Pt asymptomatic, asleep,  and hemodynamically stable at the time. EKG performed, HR of 115 with AFlutter, no change from prior EKG. C/w hep gtt for adequate anticoagulation.  R2 pads placed at bedside. Will follow up AM labs and replete as needed. Will continue to monitor on tele. Will endorse to AM team.     ICU Vital Signs Last 24 Hrs  T(C): 36.3 (08 Mar 2024 04:17), Max: 36.3 (07 Mar 2024 08:00)  T(F): 97.3 (08 Mar 2024 04:17), Max: 97.4 (07 Mar 2024 08:00)  HR: 118 (08 Mar 2024 04:17) (114 - 118)  BP: 100/67 (08 Mar 2024 04:17) (79/53 - 118/85)  BP(mean): 75 (07 Mar 2024 18:00) (61 - 95)  ABP: --  ABP(mean): --  RR: 18 (08 Mar 2024 04:17) (18 - 25)  SpO2: 97% (08 Mar 2024 04:17) (87% - 100%)    O2 Parameters below as of 08 Mar 2024 04:17  Patient On (Oxygen Delivery Method): room air      Sharon Westbrook PA-C  Dept of Medicine  [id number]

## 2024-03-08 NOTE — PROGRESS NOTE ADULT - PROBLEM SELECTOR PLAN 3
Likely congestive hepatopathy from cardiogenic shock, improving  - trend LFTs daily  - avoid hepatotoxic meds

## 2024-03-08 NOTE — PROGRESS NOTE ADULT - SUBJECTIVE AND OBJECTIVE BOX
Patricio San MD  Cardiology Fellow  [phone]  All Cardiology service information can be found 24/7 on [email or web address], password: [email or web address]    Patient seen and examined at bedside.  Patient refused exam this afternoon, did not want to sit up in bed   Net neg 730  Tele: persistent HR to 110s,   REfused bedside exam today   Cre downtrending from 2.15 > 1.43 ; lactate 1.6. LFTs improving     Review Of Systems: No chest pain, shortness of breath, or palpitations            Current Meds:  AQUAPHOR (petrolatum Ointment) 1 Application(s) Topical every 12 hours  atorvastatin 40 milliGRAM(s) Oral at bedtime  chlorhexidine 2% Cloths 1 Application(s) Topical <User Schedule>  clopidogrel Tablet 75 milliGRAM(s) Oral daily  dapagliflozin 10 milliGRAM(s) Oral daily  folic acid 1 milliGRAM(s) Oral daily  heparin  Infusion 1000 Unit(s)/Hr IV Continuous <Continuous>  insulin lispro (ADMELOG) corrective regimen sliding scale   SubCutaneous three times a day before meals  insulin lispro (ADMELOG) corrective regimen sliding scale   SubCutaneous at bedtime  iron sucrose IVPB 100 milliGRAM(s) IV Intermittent <User Schedule>  losartan 25 milliGRAM(s) Oral daily  sodium chloride 0.9% lock flush 10 milliLiter(s) IV Push every 1 hour PRN  spironolactone 25 milliGRAM(s) Oral daily  thiamine 100 milliGRAM(s) Oral daily    Vitals:  T(F): 97.5 (03-08), Max: 97.5 (03-08)  HR: 120 (03-08) (116 - 120)  BP: 104/72 (03-08) (97/60 - 118/85)  RR: 18 (03-08)  SpO2: 96% (03-08)  I&O's Summary    07 Mar 2024 07:01  -  08 Mar 2024 07:00  --------------------------------------------------------  IN: 215 mL / OUT: 945 mL / NET: -730 mL    08 Mar 2024 07:01  -  08 Mar 2024 14:40  --------------------------------------------------------  IN: 480 mL / OUT: 0 mL / NET: 480 mL    Physical Exam:  Patient refused exam today                         9.2    5.01  )-----------( 159      ( 08 Mar 2024 06:21 )             27.8     03-08    138  |  101  |  49<H>  ----------------------------<  101<H>  4.1   |  24  |  1.43<H>    Ca    8.2<L>      08 Mar 2024 06:21  Phos  3.3     03-08  Mg     2.6     03-08    TPro  6.9  /  Alb  3.1<L>  /  TBili  0.8  /  DBili  x   /  AST  147<H>  /  ALT  309<H>  /  AlkPhos  325<H>  03-08    PT/INR - ( 07 Mar 2024 00:31 )   PT: 15.6 sec;   INR: 1.50 ratio         PTT - ( 07 Mar 2024 00:31 )  PTT:71.6 sec  CARDIAC MARKERS ( 04 Mar 2024 22:07 )  93 ng/L / x     / x     / x     / x     / x

## 2024-03-08 NOTE — PROGRESS NOTE ADULT - ASSESSMENT
63 year old male with past medical history of HIV, CVA, HTN, HLD, prediabetes, A.fib, cocaine/heroin abuse, CAD w/ stent, CHF (EF  25-30% in 12/23),  RLE compartment syndrome s/p fasciotomy 12/23 and recent hospitalization for CHF exacerbation with bilateral pleural effusions requiring chest tube placement initially presenting to [facility] on 3/1 with chest pain and shortness of breath.  Transferred to [facility] for management of cardiogenic shock.  Now off inotropes and titrating afterload.   NOtably with regular narrow complex tachycardia, immutable rate, suggestive of SVT (ie Atach) rather than NSR     Cardiac imaging/studies:  TTE: severe RV dilation, moderate MR (3b), severe TR, LV thrombus, small effusion. EF 25%, LVID 5.2  3/5 Bedside swan numbers; CVP 15, PAP 45/21, wedge 20. MvO2 45 CO/CI 3.4/2.2 SVR 1482  3/6 Bedside swan numbers: CVP 6, PAP 34/15, wedge 15. MvO2 53 CO/CI 3.7/2.4 SVR 1383   3/7 CvO2 42.9, with CO/CI estimated 3/1.9

## 2024-03-08 NOTE — PROGRESS NOTE ADULT - PROBLEM SELECTOR PLAN 3
·  Recommendation: - Qtdel1etsc 4+  - Hold on BB for now  - Hep gtt as above.  - Tele reviewed, more consistent with SVT ie atach vs. flutter. Would obtain formal 12lead EKG  - EP consult for rhythm management in light of adHF

## 2024-03-08 NOTE — PROGRESS NOTE ADULT - ASSESSMENT
63 year old male with past medical history of CVA, HTN, HLD, prediabetes, A.fib, cocaine/heroin abuse, CAD w/ stent, CHF (EF  25-30% in 12/23),  RLE compartment syndrome s/p fasciotomy 12/23 and recent hospitalization for CHF exacerbation with bilateral pleural effusions requiring chest tube placement initially presenting to [facility] on 3/1 with chest pain and shortness of breath, transferred to [facility] for management of cardiogenic shock requiring inotrope support. Now transferred to medicine for further management

## 2024-03-08 NOTE — PROGRESS NOTE ADULT - SUBJECTIVE AND OBJECTIVE BOX
[facility] Division of Hospital Medicine  Krystal Bhandari MD  AVailable on TEAMS 8a-8p    MEDICINE ACCEPT NOTE    Patient is a 63y old  Male who presents with a chief complaint of cardiogenic shock (07 Mar 2024 10:45)    History of Present Illness:   63 year old male with past medical history of CVA, HTN, HLD, prediabetes, A.fib, cocaine/heroin abuse, CAD w/ stent, CHF (EF  25-30% in ),  RLE compartment syndrome s/p fasciotomy  and recent hospitalization for CHF exacerbation with bilateral pleural effusions requiring chest tube placement initially presenting to [facility] on 3/1 with chest pain and shortness of breath. He states having intermittent 8/10 midsternal chest pain for the past few months since his leg operation. He was admitted to the outside hospital for management of CHF exacerbation, found to have a further reduced EF of 10 - 15% on 3/1. While admitted, he was hypotensive requiring dopamine infusion with worsening perfusion indices and ELIE. He was transferred to [facility] for cardiogenic shock management.     On admission to CICU, he was started on dopamine gtt, eventually diuresed and weaned. Now transferred to medicine for further management    ROS:   CONSTITUTIONAL: No weakness, fevers or chills; no weight loss or weight gain  EYES: No visual changes; No blurry vision  ENT: No vertigo or throat pain   NECK: No pain or stiffness  RESPIRATORY: No cough, wheezing, hemoptysis; + shortness of breath  CARDIOVASCULAR: No chest pain or palpitations, + WONG, +orthopnea or PND  GASTROINTESTINAL: No abdominal or epigastric pain. No nausea, vomiting, or hematemesis; No diarrhea or constipation. No melena or hematochezia.  GENITOURINARY: No dysuria, frequency or hematuria  NEUROLOGICAL: No numbness or weakness, no syncope  SKIN: No itching, rashes  PSYCH: No insomnia, no depression  IMMUNOLOGY: No gum bleeding, no lymphadenopathy  ENDOCRINE: No polydipsia, no heat or cold intolerance  RHEUM: No joint pain or swelling, no rash     PAST MEDICAL & SURGICAL HISTORY:  Cocaine and ETOH abuse    Home Medications:  aspirin 81 mg oral tablet: orally once a day (07 Mar 2024 14:40)  ferrous sulfate 325 mg (65 mg elemental iron) oral tablet: 1 tab(s) orally once a day (07 Mar 2024 15:06)  Plavix 75 mg oral tablet: 1 tab(s) orally once a day (05 Mar 2024 05:08)  rosuvastatin 10 mg oral capsule: 1 cap(s) orally once a day (at bedtime) (05 Mar 2024 05:07)  spironolactone 25 mg oral tablet: 1 tab(s) orally once a day (05 Mar 2024 05:09)    FAMILY HISTORY:  No known hx of CAD    Social History:  lives in apartment with roomates (04 Mar 2024 20:16)    INTERVAL HISTORY:  SUBJECTIVE / OVERNIGHT EVENTS: No acute events  TELE: AF       MEDICATIONS  (STANDING):  AQUAPHOR (petrolatum Ointment) 1 Application(s) Topical every 12 hours  atorvastatin 40 milliGRAM(s) Oral at bedtime  chlorhexidine 2% Cloths 1 Application(s) Topical <User Schedule>  clopidogrel Tablet 75 milliGRAM(s) Oral daily  dapagliflozin 10 milliGRAM(s) Oral daily  folic acid 1 milliGRAM(s) Oral daily  heparin  Infusion 1000 Unit(s)/Hr (10 mL/Hr) IV Continuous <Continuous>  insulin lispro (ADMELOG) corrective regimen sliding scale   SubCutaneous three times a day before meals  insulin lispro (ADMELOG) corrective regimen sliding scale   SubCutaneous at bedtime  iron sucrose IVPB 100 milliGRAM(s) IV Intermittent <User Schedule>  losartan 25 milliGRAM(s) Oral daily  spironolactone 25 milliGRAM(s) Oral daily  thiamine 100 milliGRAM(s) Oral daily    MEDICATIONS  (PRN):  sodium chloride 0.9% lock flush 10 milliLiter(s) IV Push every 1 hour PRN Pre/post blood products, medications, blood draw, and to maintain line patency    I&O's Summary    07 Mar 2024 07:01  -  08 Mar 2024 07:00  --------------------------------------------------------  IN: 215 mL / OUT: 945 mL / NET: -730 mL    08 Mar 2024 07:01  -  08 Mar 2024 14:22  --------------------------------------------------------  IN: 480 mL / OUT: 0 mL / NET: 480 mL        PHYSICAL EXAM:  Vital Signs Last 24 Hrs  T(C): 36.4 (08 Mar 2024 12:00), Max: 36.4 (08 Mar 2024 12:00)  T(F): 97.5 (08 Mar 2024 12:00), Max: 97.5 (08 Mar 2024 12:00)  HR: 120 (08 Mar 2024 12:00) (116 - 120)  BP: 104/72 (08 Mar 2024 12:00) (97/60 - 118/85)  BP(mean): 75 (07 Mar 2024 18:00) (73 - 75)  RR: 18 (08 Mar 2024 12:00) (18 - 24)  SpO2: 96% (08 Mar 2024 12:00) (92% - 97%)    Parameters below as of 08 Mar 2024 12:00  Patient On (Oxygen Delivery Method): nasal cannula  O2 Flow (L/min): 2    Daily Weight in k.8 (08 Mar 2024 04:17)    CONSTITUTIONAL: NAD, ill appearing  EYES: PERRLA; conjunctiva and sclera clear  ENMT: Moist oral mucosa, no pharyngeal injection or exudates  NECK: Supple, no palpable masses; no thyromegaly  RESPIRATORY: Normal respiratory effort; lungs are clear to auscultation bilaterally  CARDIOVASCULAR: Regular rate and rhythm, normal S1 and S2, no murmur/rub/gallop; No lower extremity edema  ABDOMEN: Nontender to palpation, normoactive bowel sounds, no rebound/guarding  MUSCULOSKELETAL:  No clubbing or cyanosis of digits; no joint swelling or tenderness to palpation  PSYCH: A+O to person, place, and time; affect appropriate  NEUROLOGY: CN 2-12 are intact and symmetric; no gross sensory deficits   SKIN: No rashes; no palpable lesions    LABS: reviewed                        9.2    5.01  )-----------( 159      ( 08 Mar 2024 06:21 )             27.8     03-08    138  |  101  |  49<H>  ----------------------------<  101<H>  4.1   |  24  |  1.43<H>    Ca    8.2<L>      08 Mar 2024 06:21  Phos  3.3     03-08  Mg     2.6     03-08    TPro  6.9  /  Alb  3.1<L>  /  TBili  0.8  /  DBili  x   /  AST  147<H>  /  ALT  309<H>  /  AlkPhos  325<H>  03-08    PT/INR - ( 07 Mar 2024 00:31 )   PT: 15.6 sec;   INR: 1.50 ratio         PTT - ( 07 Mar 2024 00:31 )  PTT:71.6 sec      Urinalysis Basic - ( 08 Mar 2024 06:21 )    Color: x / Appearance: x / SG: x / pH: x  Gluc: 101 mg/dL / Ketone: x  / Bili: x / Urobili: x   Blood: x / Protein: x / Nitrite: x   Leuk Esterase: x / RBC: x / WBC x   Sq Epi: x / Non Sq Epi: x / Bacteria: x      < from: TTE W or WO Ultrasound Enhancing Agent (24 @ 07:31) >      1. Left ventricular cavity is severely dilated. Left ventricular systolic function is severely decreased. Global left ventricular hypokinesis.   2. Severely enlarged right ventricular cavity size and severely reduced systolic function.   3. Moderate mitral regurgitation. Mechanism of mitral regurgitation: Andrei Type IIIb (restricted leaflet motion secondary to left atrial or left ventricular dilatation).   4. Severe tricuspid regurgitation.   5. Mild to moderate pulmonary hypertension.   6. 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   7. Small pericardial effusion with no evidence of hemodynamic compromise (or echocardiographic evidence of cardiac tamponade).    < end of copied text >

## 2024-03-08 NOTE — PROGRESS NOTE ADULT - NSPROGADDITIONALINFOA_GEN_ALL_CORE
75 minutes spent on total encounter. The necessity of the time spent during the encounter on this date of service was due to:     - preparing to see the patient (eg, review of tests, documents)  - performing a medically appropriate examination and/or evaluation  - referring and communicating with other health care professionals  - documenting clinical information in the electronic or other health record  - care coordination.

## 2024-03-09 LAB
ALBUMIN SERPL ELPH-MCNC: 3 G/DL — LOW (ref 3.3–5)
ALP SERPL-CCNC: 358 U/L — HIGH (ref 40–120)
ALT FLD-CCNC: 283 U/L — HIGH (ref 10–45)
ANION GAP SERPL CALC-SCNC: 17 MMOL/L — SIGNIFICANT CHANGE UP (ref 5–17)
APTT BLD: 113.5 SEC — HIGH (ref 24.5–35.6)
APTT BLD: 159.6 SEC — CRITICAL HIGH (ref 24.5–35.6)
APTT BLD: 194.5 SEC — CRITICAL HIGH (ref 24.5–35.6)
AST SERPL-CCNC: 114 U/L — HIGH (ref 10–40)
BILIRUB SERPL-MCNC: 0.9 MG/DL — SIGNIFICANT CHANGE UP (ref 0.2–1.2)
BUN SERPL-MCNC: 48 MG/DL — HIGH (ref 7–23)
CALCIUM SERPL-MCNC: 8.7 MG/DL — SIGNIFICANT CHANGE UP (ref 8.4–10.5)
CHLORIDE SERPL-SCNC: 99 MMOL/L — SIGNIFICANT CHANGE UP (ref 96–108)
CO2 SERPL-SCNC: 19 MMOL/L — LOW (ref 22–31)
CREAT SERPL-MCNC: 1.35 MG/DL — HIGH (ref 0.5–1.3)
EGFR: 59 ML/MIN/1.73M2 — LOW
GLUCOSE BLDC GLUCOMTR-MCNC: 111 MG/DL — HIGH (ref 70–99)
GLUCOSE BLDC GLUCOMTR-MCNC: 112 MG/DL — HIGH (ref 70–99)
GLUCOSE BLDC GLUCOMTR-MCNC: 117 MG/DL — HIGH (ref 70–99)
GLUCOSE BLDC GLUCOMTR-MCNC: 138 MG/DL — HIGH (ref 70–99)
GLUCOSE SERPL-MCNC: 102 MG/DL — HIGH (ref 70–99)
HCT VFR BLD CALC: 32.2 % — LOW (ref 39–50)
HGB BLD-MCNC: 9.9 G/DL — LOW (ref 13–17)
MAGNESIUM SERPL-MCNC: 2.6 MG/DL — SIGNIFICANT CHANGE UP (ref 1.6–2.6)
MCHC RBC-ENTMCNC: 21.2 PG — LOW (ref 27–34)
MCHC RBC-ENTMCNC: 30.7 GM/DL — LOW (ref 32–36)
MCV RBC AUTO: 69 FL — LOW (ref 80–100)
NRBC # BLD: 5 /100 WBCS — HIGH (ref 0–0)
PHOSPHATE SERPL-MCNC: 3.5 MG/DL — SIGNIFICANT CHANGE UP (ref 2.5–4.5)
PLATELET # BLD AUTO: 170 K/UL — SIGNIFICANT CHANGE UP (ref 150–400)
POTASSIUM SERPL-MCNC: 4.4 MMOL/L — SIGNIFICANT CHANGE UP (ref 3.5–5.3)
POTASSIUM SERPL-SCNC: 4.4 MMOL/L — SIGNIFICANT CHANGE UP (ref 3.5–5.3)
PROT SERPL-MCNC: 7.3 G/DL — SIGNIFICANT CHANGE UP (ref 6–8.3)
RBC # BLD: 4.67 M/UL — SIGNIFICANT CHANGE UP (ref 4.2–5.8)
RBC # FLD: 17.6 % — HIGH (ref 10.3–14.5)
SODIUM SERPL-SCNC: 135 MMOL/L — SIGNIFICANT CHANGE UP (ref 135–145)
WBC # BLD: 4.96 K/UL — SIGNIFICANT CHANGE UP (ref 3.8–10.5)
WBC # FLD AUTO: 4.96 K/UL — SIGNIFICANT CHANGE UP (ref 3.8–10.5)

## 2024-03-09 PROCEDURE — 99233 SBSQ HOSP IP/OBS HIGH 50: CPT

## 2024-03-09 RX ORDER — HEPARIN SODIUM 5000 [USP'U]/ML
900 INJECTION INTRAVENOUS; SUBCUTANEOUS
Qty: 25000 | Refills: 0 | Status: DISCONTINUED | OUTPATIENT
Start: 2024-03-09 | End: 2024-03-15

## 2024-03-09 RX ADMIN — HEPARIN SODIUM 9 UNIT(S)/HR: 5000 INJECTION INTRAVENOUS; SUBCUTANEOUS at 03:15

## 2024-03-09 RX ADMIN — LOSARTAN POTASSIUM 25 MILLIGRAM(S): 100 TABLET, FILM COATED ORAL at 05:17

## 2024-03-09 RX ADMIN — BUMETANIDE 1 MILLIGRAM(S): 0.25 INJECTION INTRAMUSCULAR; INTRAVENOUS at 05:17

## 2024-03-09 RX ADMIN — CLOPIDOGREL BISULFATE 75 MILLIGRAM(S): 75 TABLET, FILM COATED ORAL at 12:22

## 2024-03-09 RX ADMIN — ATORVASTATIN CALCIUM 40 MILLIGRAM(S): 80 TABLET, FILM COATED ORAL at 21:24

## 2024-03-09 RX ADMIN — Medication 1 APPLICATION(S): at 18:35

## 2024-03-09 RX ADMIN — CHLORHEXIDINE GLUCONATE 1 APPLICATION(S): 213 SOLUTION TOPICAL at 05:16

## 2024-03-09 RX ADMIN — IRON SUCROSE 210 MILLIGRAM(S): 20 INJECTION, SOLUTION INTRAVENOUS at 12:23

## 2024-03-09 RX ADMIN — SPIRONOLACTONE 25 MILLIGRAM(S): 25 TABLET, FILM COATED ORAL at 05:17

## 2024-03-09 RX ADMIN — Medication 1 APPLICATION(S): at 05:17

## 2024-03-09 RX ADMIN — Medication 100 MILLIGRAM(S): at 12:22

## 2024-03-09 RX ADMIN — DAPAGLIFLOZIN 10 MILLIGRAM(S): 10 TABLET, FILM COATED ORAL at 12:22

## 2024-03-09 RX ADMIN — Medication 1 MILLIGRAM(S): at 12:22

## 2024-03-09 NOTE — PROGRESS NOTE ADULT - PROBLEM SELECTOR PLAN 4
Navigation Note  1040-Met with patient in Infusion room 3. Patient here for last cycle of FOLFOX chemo regime. Patient states that he is feeling good, hoping his taste changes improve. Informed patient of using plastic utensils, that help some patients with symptoms. Denies any needs at this time.   - venofer X 5 days

## 2024-03-09 NOTE — PROVIDER CONTACT NOTE (CRITICAL VALUE NOTIFICATION) - ASSESSMENT
Pt A&Ox3-4, asymptomatic, not displaying s/s of bleeding. VSS
alert and oriented, no complaints, no evidence of bleeding.

## 2024-03-09 NOTE — PROGRESS NOTE ADULT - PROBLEM SELECTOR PLAN 2
seen on TTE above  - cont hep gtt for now  - f/u with cards, re: transition to eliquis vs coumadin bridge

## 2024-03-09 NOTE — PROGRESS NOTE ADULT - NSPROGADDITIONALINFOA_GEN_ALL_CORE
52 minutes spent on total encounter. The necessity of the time spent during the encounter on this date of service was due to:     - preparing to see the patient (eg, review of tests, documents)  - performing a medically appropriate examination and/or evaluation  - referring and communicating with other health care professionals  - documenting clinical information in the electronic or other health record  - care coordination.

## 2024-03-09 NOTE — PROGRESS NOTE ADULT - ASSESSMENT
63 year old male with past medical history of CVA, HTN, HLD, prediabetes, A.fib, cocaine/heroin abuse, CAD w/ stent, CHF (EF  25-30% in 12/23),  RLE compartment syndrome s/p fasciotomy 12/23 and recent hospitalization for CHF exacerbation with bilateral pleural effusions requiring chest tube placement initially presenting to [facility] on 3/1 with chest pain and shortness of breath, transferred to [facility] for management of cardiogenic shock requiring inotrope support. Now transferred to medicine for further management

## 2024-03-09 NOTE — PROGRESS NOTE ADULT - SUBJECTIVE AND OBJECTIVE BOX
[facility] Division of Hospital Medicine  Krystal Bhandari MD  AVailable on TEAMS 8a-8p    Patient is a 63y old  Male who presents with a chief complaint of cardiogenic shock (08 Mar 2024 14:22)      SUBJECTIVE / OVERNIGHT EVENTS: No acute events  ADDITIONAL REVIEW OF SYSTEMS: negative    MEDICATIONS  (STANDING):  AQUAPHOR (petrolatum Ointment) 1 Application(s) Topical every 12 hours  atorvastatin 40 milliGRAM(s) Oral at bedtime  buMETAnide 1 milliGRAM(s) Oral daily  chlorhexidine 2% Cloths 1 Application(s) Topical <User Schedule>  clopidogrel Tablet 75 milliGRAM(s) Oral daily  dapagliflozin 10 milliGRAM(s) Oral daily  folic acid 1 milliGRAM(s) Oral daily  heparin  Infusion 900 Unit(s)/Hr (9 mL/Hr) IV Continuous <Continuous>  insulin lispro (ADMELOG) corrective regimen sliding scale   SubCutaneous three times a day before meals  insulin lispro (ADMELOG) corrective regimen sliding scale   SubCutaneous at bedtime  iron sucrose IVPB 100 milliGRAM(s) IV Intermittent <User Schedule>  losartan 25 milliGRAM(s) Oral daily  spironolactone 25 milliGRAM(s) Oral daily  thiamine 100 milliGRAM(s) Oral daily    MEDICATIONS  (PRN):  sodium chloride 0.9% lock flush 10 milliLiter(s) IV Push every 1 hour PRN Pre/post blood products, medications, blood draw, and to maintain line patency      CAPILLARY BLOOD GLUCOSE      POCT Blood Glucose.: 117 mg/dL (09 Mar 2024 12:42)  POCT Blood Glucose.: 111 mg/dL (09 Mar 2024 08:45)  POCT Blood Glucose.: 140 mg/dL (08 Mar 2024 21:34)  POCT Blood Glucose.: 134 mg/dL (08 Mar 2024 17:49)    I&O's Summary    08 Mar 2024 07:01  -  09 Mar 2024 07:00  --------------------------------------------------------  IN: 480 mL / OUT: 0 mL / NET: 480 mL    09 Mar 2024 06:01  -  09 Mar 2024 15:50  --------------------------------------------------------  IN: 360 mL / OUT: 300 mL / NET: 60 mL        PHYSICAL EXAM:  Vital Signs Last 24 Hrs  T(C): 36.3 (09 Mar 2024 12:00), Max: 36.9 (08 Mar 2024 20:40)  T(F): 97.4 (09 Mar 2024 12:00), Max: 98.5 (08 Mar 2024 20:40)  HR: 118 (09 Mar 2024 12:00) (115 - 118)  BP: 96/59 (09 Mar 2024 12:00) (96/59 - 112/79)  BP(mean): --  RR: 20 (09 Mar 2024 12:00) (18 - 20)  SpO2: 100% (09 Mar 2024 12:00) (97% - 100%)    Parameters below as of 09 Mar 2024 12:00  Patient On (Oxygen Delivery Method): nasal cannula      CONSTITUTIONAL: NAD, frail appearing  EYES: PERRLA; conjunctiva and sclera clear  ENMT: Moist oral mucosa, no pharyngeal injection or exudates  NECK: Supple, no palpable masses; no thyromegaly  RESPIRATORY: Normal respiratory effort; lungs are clear to auscultation bilaterally  CARDIOVASCULAR: Regular rate and rhythm, normal S1 and S2, no murmur/rub/gallop; No lower extremity edema  ABDOMEN: Nontender to palpation, normoactive bowel sounds, no rebound/guarding; No hepatosplenomegaly  MUSCULOSKELETAL:  No clubbing or cyanosis of digits; no joint swelling or tenderness to palpation  PSYCH: A+O to person, place, and time; affect appropriate  NEUROLOGY: CN 2-12 are intact and symmetric; no gross sensory deficits   SKIN: No rashes; no palpable lesions    LABS: reviewed                        9.9    4.96  )-----------( 170      ( 09 Mar 2024 07:26 )             32.2     03-09    135  |  99  |  48<H>  ----------------------------<  102<H>  4.4   |  19<L>  |  1.35<H>    Ca    8.7      09 Mar 2024 07:26  Phos  3.5     03-09  Mg     2.6     03-09    TPro  7.3  /  Alb  3.0<L>  /  TBili  0.9  /  DBili  x   /  AST  114<H>  /  ALT  283<H>  /  AlkPhos  358<H>  03-09    PTT - ( 09 Mar 2024 10:29 )  PTT:159.6 sec      Urinalysis Basic - ( 09 Mar 2024 07:26 )    Color: x / Appearance: x / SG: x / pH: x  Gluc: 102 mg/dL / Ketone: x  / Bili: x / Urobili: x   Blood: x / Protein: x / Nitrite: x   Leuk Esterase: x / RBC: x / WBC x   Sq Epi: x / Non Sq Epi: x / Bacteria: x

## 2024-03-09 NOTE — PROVIDER CONTACT NOTE (CRITICAL VALUE NOTIFICATION) - BACKGROUND
Pt admitted for AoCHF w/ EF 10-15% on hep gtt for LV thrombus and Afib
pt with HF, on heparin gtt @ 9 cc/hr

## 2024-03-10 LAB
ANION GAP SERPL CALC-SCNC: 14 MMOL/L — SIGNIFICANT CHANGE UP (ref 5–17)
APTT BLD: 75.8 SEC — HIGH (ref 24.5–35.6)
APTT BLD: 78.9 SEC — HIGH (ref 24.5–35.6)
APTT BLD: 79.3 SEC — HIGH (ref 24.5–35.6)
BUN SERPL-MCNC: 53 MG/DL — HIGH (ref 7–23)
CALCIUM SERPL-MCNC: 8.9 MG/DL — SIGNIFICANT CHANGE UP (ref 8.4–10.5)
CHLORIDE SERPL-SCNC: 98 MMOL/L — SIGNIFICANT CHANGE UP (ref 96–108)
CO2 SERPL-SCNC: 26 MMOL/L — SIGNIFICANT CHANGE UP (ref 22–31)
CREAT SERPL-MCNC: 1.62 MG/DL — HIGH (ref 0.5–1.3)
EGFR: 47 ML/MIN/1.73M2 — LOW
GLUCOSE BLDC GLUCOMTR-MCNC: 111 MG/DL — HIGH (ref 70–99)
GLUCOSE BLDC GLUCOMTR-MCNC: 161 MG/DL — HIGH (ref 70–99)
GLUCOSE BLDC GLUCOMTR-MCNC: 84 MG/DL — SIGNIFICANT CHANGE UP (ref 70–99)
GLUCOSE BLDC GLUCOMTR-MCNC: 94 MG/DL — SIGNIFICANT CHANGE UP (ref 70–99)
GLUCOSE SERPL-MCNC: 152 MG/DL — HIGH (ref 70–99)
HCT VFR BLD CALC: 28.4 % — LOW (ref 39–50)
HGB BLD-MCNC: 9.2 G/DL — LOW (ref 13–17)
MAGNESIUM SERPL-MCNC: 2.4 MG/DL — SIGNIFICANT CHANGE UP (ref 1.6–2.6)
MCHC RBC-ENTMCNC: 21.4 PG — LOW (ref 27–34)
MCHC RBC-ENTMCNC: 32.4 GM/DL — SIGNIFICANT CHANGE UP (ref 32–36)
MCV RBC AUTO: 66.2 FL — LOW (ref 80–100)
NRBC # BLD: 8 /100 WBCS — HIGH (ref 0–0)
PHOSPHATE SERPL-MCNC: 4.1 MG/DL — SIGNIFICANT CHANGE UP (ref 2.5–4.5)
PLATELET # BLD AUTO: 175 K/UL — SIGNIFICANT CHANGE UP (ref 150–400)
POTASSIUM SERPL-MCNC: 4.4 MMOL/L — SIGNIFICANT CHANGE UP (ref 3.5–5.3)
POTASSIUM SERPL-SCNC: 4.4 MMOL/L — SIGNIFICANT CHANGE UP (ref 3.5–5.3)
RBC # BLD: 4.29 M/UL — SIGNIFICANT CHANGE UP (ref 4.2–5.8)
RBC # FLD: 15.2 % — HIGH (ref 10.3–14.5)
SODIUM SERPL-SCNC: 138 MMOL/L — SIGNIFICANT CHANGE UP (ref 135–145)
T4 FREE SERPL-MCNC: 1.2 NG/DL — SIGNIFICANT CHANGE UP (ref 0.9–1.8)
TSH SERPL-MCNC: 13.5 UIU/ML — HIGH (ref 0.27–4.2)
WBC # BLD: 5.53 K/UL — SIGNIFICANT CHANGE UP (ref 3.8–10.5)
WBC # FLD AUTO: 5.53 K/UL — SIGNIFICANT CHANGE UP (ref 3.8–10.5)

## 2024-03-10 PROCEDURE — 99232 SBSQ HOSP IP/OBS MODERATE 35: CPT

## 2024-03-10 RX ORDER — LEVOTHYROXINE SODIUM 125 MCG
25 TABLET ORAL DAILY
Refills: 0 | Status: DISCONTINUED | OUTPATIENT
Start: 2024-03-10 | End: 2024-03-26

## 2024-03-10 RX ADMIN — Medication 1 APPLICATION(S): at 18:16

## 2024-03-10 RX ADMIN — LOSARTAN POTASSIUM 25 MILLIGRAM(S): 100 TABLET, FILM COATED ORAL at 05:24

## 2024-03-10 RX ADMIN — Medication 1 MILLIGRAM(S): at 13:19

## 2024-03-10 RX ADMIN — SPIRONOLACTONE 25 MILLIGRAM(S): 25 TABLET, FILM COATED ORAL at 05:24

## 2024-03-10 RX ADMIN — CHLORHEXIDINE GLUCONATE 1 APPLICATION(S): 213 SOLUTION TOPICAL at 05:26

## 2024-03-10 RX ADMIN — Medication 100 MILLIGRAM(S): at 13:19

## 2024-03-10 RX ADMIN — DAPAGLIFLOZIN 10 MILLIGRAM(S): 10 TABLET, FILM COATED ORAL at 13:19

## 2024-03-10 RX ADMIN — Medication 1 APPLICATION(S): at 05:27

## 2024-03-10 RX ADMIN — HEPARIN SODIUM 9 UNIT(S)/HR: 5000 INJECTION INTRAVENOUS; SUBCUTANEOUS at 01:05

## 2024-03-10 RX ADMIN — Medication 1: at 13:18

## 2024-03-10 RX ADMIN — ATORVASTATIN CALCIUM 40 MILLIGRAM(S): 80 TABLET, FILM COATED ORAL at 21:03

## 2024-03-10 RX ADMIN — HEPARIN SODIUM 7 UNIT(S)/HR: 5000 INJECTION INTRAVENOUS; SUBCUTANEOUS at 08:11

## 2024-03-10 RX ADMIN — IRON SUCROSE 210 MILLIGRAM(S): 20 INJECTION, SOLUTION INTRAVENOUS at 13:32

## 2024-03-10 RX ADMIN — HEPARIN SODIUM 7 UNIT(S)/HR: 5000 INJECTION INTRAVENOUS; SUBCUTANEOUS at 23:44

## 2024-03-10 RX ADMIN — BUMETANIDE 1 MILLIGRAM(S): 0.25 INJECTION INTRAMUSCULAR; INTRAVENOUS at 05:23

## 2024-03-10 RX ADMIN — HEPARIN SODIUM 7 UNIT(S)/HR: 5000 INJECTION INTRAVENOUS; SUBCUTANEOUS at 13:18

## 2024-03-10 RX ADMIN — CLOPIDOGREL BISULFATE 75 MILLIGRAM(S): 75 TABLET, FILM COATED ORAL at 13:19

## 2024-03-10 NOTE — CHART NOTE - NSCHARTNOTEFT_GEN_A_CORE
Reviewed repeat TFTs. Given increase in trend would recommend initiate levothyroxine 25 mcg daily given cardiac hx and repeat TFTs in 6-8 weeks as outpatient. Feel free to contact us with any additional questions.    Aroldo Jack D.O  [phone]

## 2024-03-10 NOTE — PROGRESS NOTE ADULT - NSPROGADDITIONALINFOA_GEN_ALL_CORE
Dispo: pending final cards recs, transition to oral AC; if eliquis, will need to make sure its covered    42 minutes spent on total encounter. The necessity of the time spent during the encounter on this date of service was due to:     - preparing to see the patient (eg, review of tests, documents)  - performing a medically appropriate examination and/or evaluation  - referring and communicating with other health care professionals  - documenting clinical information in the electronic or other health record  - care coordination.

## 2024-03-10 NOTE — PROGRESS NOTE ADULT - SUBJECTIVE AND OBJECTIVE BOX
[facility] Division of Hospital Medicine  Krystal Bhandari MD  Available on TEAMS 8a-8p    Patient is a 63y old  Male who presents with a chief complaint of cardiogenic shock       SUBJECTIVE / OVERNIGHT EVENTS: No acute events  ADDITIONAL REVIEW OF SYSTEMS: negative    MEDICATIONS  (STANDING):  AQUAPHOR (petrolatum Ointment) 1 Application(s) Topical every 12 hours  atorvastatin 40 milliGRAM(s) Oral at bedtime  buMETAnide 1 milliGRAM(s) Oral daily  chlorhexidine 2% Cloths 1 Application(s) Topical <User Schedule>  clopidogrel Tablet 75 milliGRAM(s) Oral daily  dapagliflozin 10 milliGRAM(s) Oral daily  folic acid 1 milliGRAM(s) Oral daily  heparin  Infusion 900 Unit(s)/Hr (9 mL/Hr) IV Continuous <Continuous>  insulin lispro (ADMELOG) corrective regimen sliding scale   SubCutaneous three times a day before meals  insulin lispro (ADMELOG) corrective regimen sliding scale   SubCutaneous at bedtime  iron sucrose IVPB 100 milliGRAM(s) IV Intermittent <User Schedule>  losartan 25 milliGRAM(s) Oral daily  spironolactone 25 milliGRAM(s) Oral daily  thiamine 100 milliGRAM(s) Oral daily    MEDICATIONS  (PRN):  sodium chloride 0.9% lock flush 10 milliLiter(s) IV Push every 1 hour PRN Pre/post blood products, medications, blood draw, and to maintain line patency    I&O's Summary    09 Mar 2024 06:01  -  10 Mar 2024 07:00  --------------------------------------------------------  IN: 360 mL / OUT: 300 mL / NET: 60 mL    10 Mar 2024 07:01  -  10 Mar 2024 12:41  --------------------------------------------------------  IN: 240 mL / OUT: 200 mL / NET: 40 mL        PHYSICAL EXAM:  Vital Signs Last 24 Hrs  T(C): 36.4 (10 Mar 2024 11:50), Max: 36.4 (10 Mar 2024 04:54)  T(F): 97.5 (10 Mar 2024 11:50), Max: 97.5 (10 Mar 2024 04:54)  HR: 113 (10 Mar 2024 11:50) (113 - 118)  BP: 102/69 (10 Mar 2024 11:50) (99/71 - 105/76)  BP(mean): --  RR: 18 (10 Mar 2024 11:50) (18 - 18)  SpO2: 100% (10 Mar 2024 11:50) (92% - 100%)    Parameters below as of 10 Mar 2024 11:50  Patient On (Oxygen Delivery Method): nasal cannula    CONSTITUTIONAL: NAD, frail appearing  EYES: PERRLA; conjunctiva and sclera clear  ENMT: Moist oral mucosa, no pharyngeal injection or exudates  NECK: Supple, no palpable masses; no thyromegaly  RESPIRATORY: Normal respiratory effort; lungs are clear to auscultation bilaterally  CARDIOVASCULAR: Regular rate and rhythm, normal S1 and S2, no murmur/rub/gallop; No lower extremity edema  ABDOMEN: Nontender to palpation, normoactive bowel sounds, no rebound/guarding; No hepatosplenomegaly  MUSCULOSKELETAL:  No clubbing or cyanosis of digits; no joint swelling or tenderness to palpation  PSYCH: A+O to person, place, and time; affect appropriate  NEUROLOGY: CN 2-12 are intact and symmetric; no gross sensory deficits   SKIN: No rashes; no palpable lesions    LABS: reviewed                        9.9    4.96  )-----------( 170      ( 09 Mar 2024 07:26 )             32.2     03-09    135  |  99  |  48<H>  ----------------------------<  102<H>  4.4   |  19<L>  |  1.35<H>    Ca    8.7      09 Mar 2024 07:26  Phos  3.5     03-09  Mg     2.6     03-09    TPro  7.3  /  Alb  3.0<L>  /  TBili  0.9  /  DBili  x   /  AST  114<H>  /  ALT  283<H>  /  AlkPhos  358<H>  03-09    PTT - ( 09 Mar 2024 10:29 )  PTT:159.6 sec      Urinalysis Basic - ( 09 Mar 2024 07:26 )    Color: x / Appearance: x / SG: x / pH: x  Gluc: 102 mg/dL / Ketone: x  / Bili: x / Urobili: x   Blood: x / Protein: x / Nitrite: x   Leuk Esterase: x / RBC: x / WBC x   Sq Epi: x / Non Sq Epi: x / Bacteria: x

## 2024-03-11 LAB
ADD ON TEST-SPECIMEN IN LAB: SIGNIFICANT CHANGE UP
ALBUMIN SERPL ELPH-MCNC: 3.2 G/DL — LOW (ref 3.3–5)
ALP SERPL-CCNC: 395 U/L — HIGH (ref 40–120)
ALT FLD-CCNC: 184 U/L — HIGH (ref 10–45)
ANION GAP SERPL CALC-SCNC: 13 MMOL/L — SIGNIFICANT CHANGE UP (ref 5–17)
ANION GAP SERPL CALC-SCNC: 13 MMOL/L — SIGNIFICANT CHANGE UP (ref 5–17)
APTT BLD: 68.7 SEC — HIGH (ref 24.5–35.6)
AST SERPL-CCNC: 64 U/L — HIGH (ref 10–40)
BILIRUB DIRECT SERPL-MCNC: 0.5 MG/DL — HIGH (ref 0–0.3)
BILIRUB INDIRECT FLD-MCNC: 0.4 MG/DL — SIGNIFICANT CHANGE UP (ref 0.2–1)
BILIRUB SERPL-MCNC: 0.9 MG/DL — SIGNIFICANT CHANGE UP (ref 0.2–1.2)
BUN SERPL-MCNC: 54 MG/DL — HIGH (ref 7–23)
BUN SERPL-MCNC: 57 MG/DL — HIGH (ref 7–23)
CALCIUM SERPL-MCNC: 8.6 MG/DL — SIGNIFICANT CHANGE UP (ref 8.4–10.5)
CALCIUM SERPL-MCNC: 8.6 MG/DL — SIGNIFICANT CHANGE UP (ref 8.4–10.5)
CHLORIDE SERPL-SCNC: 100 MMOL/L — SIGNIFICANT CHANGE UP (ref 96–108)
CHLORIDE SERPL-SCNC: 99 MMOL/L — SIGNIFICANT CHANGE UP (ref 96–108)
CO2 SERPL-SCNC: 25 MMOL/L — SIGNIFICANT CHANGE UP (ref 22–31)
CO2 SERPL-SCNC: 25 MMOL/L — SIGNIFICANT CHANGE UP (ref 22–31)
CREAT SERPL-MCNC: 1.65 MG/DL — HIGH (ref 0.5–1.3)
CREAT SERPL-MCNC: 1.65 MG/DL — HIGH (ref 0.5–1.3)
EGFR: 46 ML/MIN/1.73M2 — LOW
EGFR: 46 ML/MIN/1.73M2 — LOW
GLUCOSE BLDC GLUCOMTR-MCNC: 107 MG/DL — HIGH (ref 70–99)
GLUCOSE BLDC GLUCOMTR-MCNC: 115 MG/DL — HIGH (ref 70–99)
GLUCOSE BLDC GLUCOMTR-MCNC: 122 MG/DL — HIGH (ref 70–99)
GLUCOSE BLDC GLUCOMTR-MCNC: 86 MG/DL — SIGNIFICANT CHANGE UP (ref 70–99)
GLUCOSE SERPL-MCNC: 113 MG/DL — HIGH (ref 70–99)
GLUCOSE SERPL-MCNC: 128 MG/DL — HIGH (ref 70–99)
HCT VFR BLD CALC: 25.6 % — LOW (ref 39–50)
HGB BLD-MCNC: 8.2 G/DL — LOW (ref 13–17)
LACTATE SERPL-SCNC: 2.4 MMOL/L — HIGH (ref 0.5–2)
MAGNESIUM SERPL-MCNC: 2.2 MG/DL — SIGNIFICANT CHANGE UP (ref 1.6–2.6)
MCHC RBC-ENTMCNC: 21.3 PG — LOW (ref 27–34)
MCHC RBC-ENTMCNC: 32 GM/DL — SIGNIFICANT CHANGE UP (ref 32–36)
MCV RBC AUTO: 66.5 FL — LOW (ref 80–100)
NRBC # BLD: 5 /100 WBCS — HIGH (ref 0–0)
NT-PROBNP SERPL-SCNC: 4171 PG/ML — HIGH (ref 0–300)
PHOSPHATE SERPL-MCNC: 3.4 MG/DL — SIGNIFICANT CHANGE UP (ref 2.5–4.5)
PLATELET # BLD AUTO: 156 K/UL — SIGNIFICANT CHANGE UP (ref 150–400)
POTASSIUM SERPL-MCNC: 3.7 MMOL/L — SIGNIFICANT CHANGE UP (ref 3.5–5.3)
POTASSIUM SERPL-MCNC: 3.8 MMOL/L — SIGNIFICANT CHANGE UP (ref 3.5–5.3)
POTASSIUM SERPL-SCNC: 3.7 MMOL/L — SIGNIFICANT CHANGE UP (ref 3.5–5.3)
POTASSIUM SERPL-SCNC: 3.8 MMOL/L — SIGNIFICANT CHANGE UP (ref 3.5–5.3)
PROT SERPL-MCNC: 6.9 G/DL — SIGNIFICANT CHANGE UP (ref 6–8.3)
RBC # BLD: 3.85 M/UL — LOW (ref 4.2–5.8)
RBC # FLD: 15.3 % — HIGH (ref 10.3–14.5)
SODIUM SERPL-SCNC: 137 MMOL/L — SIGNIFICANT CHANGE UP (ref 135–145)
SODIUM SERPL-SCNC: 138 MMOL/L — SIGNIFICANT CHANGE UP (ref 135–145)
WBC # BLD: 5.78 K/UL — SIGNIFICANT CHANGE UP (ref 3.8–10.5)
WBC # FLD AUTO: 5.78 K/UL — SIGNIFICANT CHANGE UP (ref 3.8–10.5)

## 2024-03-11 PROCEDURE — 99232 SBSQ HOSP IP/OBS MODERATE 35: CPT

## 2024-03-11 PROCEDURE — 99233 SBSQ HOSP IP/OBS HIGH 50: CPT

## 2024-03-11 PROCEDURE — 93010 ELECTROCARDIOGRAM REPORT: CPT

## 2024-03-11 PROCEDURE — 99223 1ST HOSP IP/OBS HIGH 75: CPT

## 2024-03-11 RX ADMIN — Medication 100 MILLIGRAM(S): at 12:49

## 2024-03-11 RX ADMIN — BUMETANIDE 1 MILLIGRAM(S): 0.25 INJECTION INTRAMUSCULAR; INTRAVENOUS at 05:07

## 2024-03-11 RX ADMIN — SPIRONOLACTONE 25 MILLIGRAM(S): 25 TABLET, FILM COATED ORAL at 05:07

## 2024-03-11 RX ADMIN — HEPARIN SODIUM 7 UNIT(S)/HR: 5000 INJECTION INTRAVENOUS; SUBCUTANEOUS at 11:37

## 2024-03-11 RX ADMIN — LOSARTAN POTASSIUM 25 MILLIGRAM(S): 100 TABLET, FILM COATED ORAL at 05:07

## 2024-03-11 RX ADMIN — DAPAGLIFLOZIN 10 MILLIGRAM(S): 10 TABLET, FILM COATED ORAL at 12:50

## 2024-03-11 RX ADMIN — IRON SUCROSE 210 MILLIGRAM(S): 20 INJECTION, SOLUTION INTRAVENOUS at 12:49

## 2024-03-11 RX ADMIN — Medication 1 MILLIGRAM(S): at 12:50

## 2024-03-11 RX ADMIN — Medication 25 MICROGRAM(S): at 05:06

## 2024-03-11 RX ADMIN — Medication 1 APPLICATION(S): at 05:07

## 2024-03-11 RX ADMIN — CLOPIDOGREL BISULFATE 75 MILLIGRAM(S): 75 TABLET, FILM COATED ORAL at 12:50

## 2024-03-11 RX ADMIN — ATORVASTATIN CALCIUM 40 MILLIGRAM(S): 80 TABLET, FILM COATED ORAL at 21:43

## 2024-03-11 RX ADMIN — Medication 1 APPLICATION(S): at 17:21

## 2024-03-11 NOTE — PROGRESS NOTE ADULT - SUBJECTIVE AND OBJECTIVE BOX
Subjective:  - No CP or palpitations. Eager to return home.     Medications:  AQUAPHOR (petrolatum Ointment) 1 Application(s) Topical every 12 hours  atorvastatin 40 milliGRAM(s) Oral at bedtime  buMETAnide 1 milliGRAM(s) Oral daily  chlorhexidine 2% Cloths 1 Application(s) Topical <User Schedule>  clopidogrel Tablet 75 milliGRAM(s) Oral daily  dapagliflozin 10 milliGRAM(s) Oral daily  folic acid 1 milliGRAM(s) Oral daily  heparin  Infusion 900 Unit(s)/Hr IV Continuous <Continuous>  insulin lispro (ADMELOG) corrective regimen sliding scale   SubCutaneous three times a day before meals  insulin lispro (ADMELOG) corrective regimen sliding scale   SubCutaneous at bedtime  levothyroxine 25 MICROGram(s) Oral daily  losartan 25 milliGRAM(s) Oral daily  sodium chloride 0.9% lock flush 10 milliLiter(s) IV Push every 1 hour PRN  spironolactone 25 milliGRAM(s) Oral daily  thiamine 100 milliGRAM(s) Oral daily    Physical Exam:    Vitals:  Vital Signs Last 24 Hours  T(C): 36.5 (24 @ 11:48), Max: 36.5 (24 @ 11:48)  HR: 120 (24 @ 12:00) (114 - 133)  BP: 92/64 (24 @ 12:00) (85/58 - 108/80)  RR: 18 (24 @ 12:00) (18 - 18)  SpO2: 96% (24 @ 12:00) (95% - 98%)    Weight in k.8 ( @ 04:00)    I&O's Summary    10 Mar 2024 07:  -  11 Mar 2024 07:00  --------------------------------------------------------  IN: 564 mL / OUT: 850 mL / NET: -286 mL    11 Mar 2024 07:  -  11 Mar 2024 14:40  --------------------------------------------------------  IN: 514 mL / OUT: 500 mL / NET: 14 mL    Tele: -130s    General: No distress. Comfortable.  HEENT: EOM intact.  Neck: JVP 6-8 cm H2O, no HJR  Chest: Clear to auscultation bilaterally  CV: Irregularly irregular. Normal S1 and S2.  Abdomen: Soft, non-distended, non-tender  Extremities: Lukewarm peripherally. No edema  Neurology: Alert and oriented times three. Sensation intact  Psych: Affect normal    Labs:                        8.2    5.78  )-----------( 156      ( 11 Mar 2024 10:23 )             25.6     11    137  |  99  |  57<H>  ----------------------------<  113<H>  3.8   |  25  |  1.65<H>    Ca    8.6      11 Mar 2024 13:59  Phos  3.4       Mg     2.2     11      PTT - ( 11 Mar 2024 10:23 )  PTT:68.7 sec            Lactate, Blood: 2.4 mmol/L ( @ 13:59)

## 2024-03-11 NOTE — PROGRESS NOTE ADULT - SUBJECTIVE AND OBJECTIVE BOX
no events overnight.    GENERAL: No fevers, no chills.  EYES: No blurry vision,  No photophobia  ENT: No sore throat.  No dysphagia  Cardiovascular: No chest pain, palpitations, orthopnea  Pulmonary: No cough, no wheezing. No shortness of breath  Gastrointestinal: No abdominal pain, no diarrhea, no constipation.    Musculoskeletal: No weakness.  No myalgias.  Dermatology:  No rashes.  Neuro: No Headache.  No vertigo.  No dizziness.  Psych: No anxiety, no depression.  Denies suicidal thoughts.    MEDICATIONS  (STANDING):  AQUAPHOR (petrolatum Ointment) 1 Application(s) Topical every 12 hours  atorvastatin 40 milliGRAM(s) Oral at bedtime  buMETAnide 1 milliGRAM(s) Oral daily  chlorhexidine 2% Cloths 1 Application(s) Topical <User Schedule>  clopidogrel Tablet 75 milliGRAM(s) Oral daily  dapagliflozin 10 milliGRAM(s) Oral daily  folic acid 1 milliGRAM(s) Oral daily  heparin  Infusion 900 Unit(s)/Hr (7 mL/Hr) IV Continuous <Continuous>  insulin lispro (ADMELOG) corrective regimen sliding scale   SubCutaneous three times a day before meals  insulin lispro (ADMELOG) corrective regimen sliding scale   SubCutaneous at bedtime  levothyroxine 25 MICROGram(s) Oral daily  losartan 25 milliGRAM(s) Oral daily  spironolactone 25 milliGRAM(s) Oral daily  thiamine 100 milliGRAM(s) Oral daily    MEDICATIONS  (PRN):  sodium chloride 0.9% lock flush 10 milliLiter(s) IV Push every 1 hour PRN Pre/post blood products, medications, blood draw, and to maintain line patency    Vital Signs Last 24 Hrs  T(C): 36.5 (11 Mar 2024 11:48), Max: 36.5 (11 Mar 2024 11:48)  T(F): 97.7 (11 Mar 2024 11:48), Max: 97.7 (11 Mar 2024 11:48)  HR: 120 (11 Mar 2024 12:00) (114 - 133)  BP: 92/64 (11 Mar 2024 12:00) (85/58 - 108/80)  BP(mean): --  RR: 18 (11 Mar 2024 12:00) (18 - 18)  SpO2: 96% (11 Mar 2024 12:00) (95% - 98%)    Parameters below as of 11 Mar 2024 12:00  Patient On (Oxygen Delivery Method): nasal cannula  O2 Flow (L/min): 3    CONSTITUTIONAL: NAD, frail appearing  EYES: PERRLA; conjunctiva and sclera clear  ENMT: Moist oral mucosa, no pharyngeal injection or exudates  NECK: Supple, no palpable masses; no thyromegaly  RESPIRATORY: Normal respiratory effort; lungs are clear to auscultation bilaterally  CARDIOVASCULAR: Regular rate and rhythm, normal S1 and S2, no murmur/rub/gallop; No lower extremity edema  ABDOMEN: Nontender to palpation, normoactive bowel sounds, no rebound/guarding; No hepatosplenomegaly  MUSCULOSKELETAL:  No clubbing or cyanosis of digits; no joint swelling or tenderness to palpation  PSYCH: A+O to person, place, and time; affect appropriate  SKIN: No rashes; no palpable lesions    .  LABS:                         8.2    5.78  )-----------( 156      ( 11 Mar 2024 10:23 )             25.6     03-11    137  |  99  |  57<H>  ----------------------------<  113<H>  3.8   |  25  |  1.65<H>    Ca    8.6      11 Mar 2024 13:59  Phos  3.4     03-11  Mg     2.2     03-11      PTT - ( 11 Mar 2024 10:23 )  PTT:68.7 sec  Urinalysis Basic - ( 11 Mar 2024 13:59 )    Color: x / Appearance: x / SG: x / pH: x  Gluc: 113 mg/dL / Ketone: x  / Bili: x / Urobili: x   Blood: x / Protein: x / Nitrite: x   Leuk Esterase: x / RBC: x / WBC x   Sq Epi: x / Non Sq Epi: x / Bacteria: x        Lactate, Blood: 2.4 mmol/L (03-11 @ 13:59)      RADIOLOGY, EKG & ADDITIONAL TESTS: Reviewed.

## 2024-03-11 NOTE — PROGRESS NOTE ADULT - ASSESSMENT
63 year old man with history of ICM (LVIDd 5.2 cm, LVEF 25-30%), CAD s/p PCI, severe TR, HTN, AF, PAD c/b RLE compartment syndrome s/p fasciotomy (12/2023) active smoker, ETOH misuse (4 beers daily and half pint liquor 2-3x week) and cocaine use (last 1 month PTA) who was recently hospitalized for ADHF requiring chest tubes for effusions. Now presenting, initially to [facility] for recurrent ADHF and new LV thrombus. Developed cardiogenic shock prompting transfer to [facility] for further management. Initiated on inotropic support but has since been weaned with diuresis and introduction of afterload reducing agents. Prior to central line removal, off inotropic support, CO/CI was 3/1.9.     He is euvolemic and relatively compensated from a HF standpoint, but notably in AF RVR, rate 110-130s. Please engage EP for consideration of rhythm vs rate control.     Cardiac Studies  ·	3/5/24 TTE: LVIDd 5.2 cm, LVEF 25% with rounded and protruding LV thrombus in apex, severe RVE with severely reduced function (TAPSE 1.5 cm), mod NEY, trace AI, mod MR, severe TR, est PASP 46 mmHg    Bedside hemodynamics  3/5 Bedside swan numbers; CVP 15, PAP 45/21, wedge 20. MvO2 45 CO/CI 3.4/2.2 SVR 1482  3/6 Bedside swan numbers: CVP 6, PAP 34/15, wedge 15. MvO2 53 CO/CI 3.7/2.4 SVR 1383   3/7 CvO2 42.9, with CO/CI estimated 3/1.9  63 year old man with history of ICM (LVIDd 5.2 cm, LVEF 25-30%), CAD s/p PCI, severe TR, HTN, AF, PAD c/b RLE compartment syndrome s/p fasciotomy (12/2023) active smoker, ETOH misuse (4 beers daily and half pint liquor 2-3x week) and cocaine use (last 1 month PTA) who was recently hospitalized for ADHF requiring chest tubes for effusions. Now presenting, initially to [facility] for recurrent ADHF and new LV thrombus. Developed cardiogenic shock prompting transfer to [facility] for further management. Initiated on inotropic support but has since been weaned with diuresis and introduction of afterload reducing agents. Prior to central line removal, off inotropic support, CO/CI was 3/1.9.     He is euvolemic and relatively compensated from a HF standpoint, but notably in AF RVR, rate 110-130s. Hemodynamically stable, tolerating three out of four HF medical therapies. ELIE appears to have plateaued. Please engage EP for consideration of rhythm vs rate control.     Cardiac Studies  ·	3/5/24 TTE: LVIDd 5.2 cm, LVEF 25% with rounded and protruding LV thrombus in apex, severe RVE with severely reduced function (TAPSE 1.5 cm), mod NEY, trace AI, mod MR, severe TR, est PASP 46 mmHg    Bedside hemodynamics  3/5 Bedside swan numbers; CVP 15, PAP 45/21, wedge 20. MvO2 45 CO/CI 3.4/2.2 SVR 1482  3/6 Bedside swan numbers: CVP 6, PAP 34/15, wedge 15. MvO2 53 CO/CI 3.7/2.4 SVR 1383   3/7 CvO2 42.9, with CO/CI estimated 3/1.9

## 2024-03-11 NOTE — PROGRESS NOTE ADULT - PROBLEM SELECTOR PLAN 2
- On AC with heparin infusion - Defer BB as above  - On AC with heparin infusion  - Consider Digoxin   - Please consult EP for AF -130s

## 2024-03-11 NOTE — CONSULT NOTE ADULT - NS ATTEND AMEND GEN_ALL_CORE FT
Pt seen and examined with ACP.  Assessment and plan reviewed and discussed.  Agree with above.  D/w Primary team ACP    Status of wounds and treatment recommendations d/w  pt.  All questions answered.   Pt expressed understanding.     I spent  55 minutes face to face w/ this pt of which more than 50% of the time was spent counseling & coordinating care of this pt.
Will consider for ablation when optimized.  Try beta blocker for rate control

## 2024-03-11 NOTE — CONSULT NOTE ADULT - ASSESSMENT
63 year old man with history of ICM (LVIDd 5.2 cm, LVEF 25-30%), CAD s/p PCI 12/2023, severe TR, HTN, AF, PAD c/b RLE compartment syndrome s/p fasciotomy (12/2023) active smoker, ETOH misuse (4 beers daily and half pint liquor 2-3x week) and cocaine use (last 1 month PTA) who was recently hospitalized for ADHF requiring chest tubes for effusions. Now presenting, initially to [facility] for recurrent ADHF. Developed cardiogenic shock prompting transfer to [facility] for further management. Initiated on inotropic support but has since been weaned with diuresis and introduction of afterload reducing agents. Echo with LV thrombus and EF ~25%. EP consulted for AFL w/ RVR for rhythm control management.     #ICM/HFrEF EF 25%  #LV thrombus on heparin gtt  #AFL - typical, AF  #CAD s/p PCI 12/2023  #PAD s/p prior RLE compartment syndrome s/p fasciotomy    - Tele last couple days with AFL RVR rates ~115-120s, briefly HR down to ~40-50s in AF. Pt asymptomatic during those times, likely vagal during sleep.   - AV luisa blockers limited, pt with low BP.  - Discussed options of management with patient including catheter ablation. As pt is in typical AFL, would offer ablation to reduce future recurrence. Pt is agreeable.   - SGLT2 inhibitors will need to be held 3 days prior to anesthesia requiring procedures. Pt received Farxiga today, please hold from now.   - LFTs elevated / , downtrending.   - On AC with heparin gtt, has been therapeutic since 3/6. Pt last ECG in sinus rhythm 3/7 at 1714. Please continue and keep ptt therapeutic.   -- Will eventually need transition to oral AC.   - GDMT per cardiology. Recs appreciated. Per cards, not AT candidate d/t recent substance use.   - Please optimize for ablation later this week. Pt still requiring 3L oxygen and reports SOB at rest.   - Keep on tele. Keep K>4, Mg>2  - Discussed with EP attending and primary team.  63 year old man with history of ICM (LVIDd 5.2 cm, LVEF 25-30%), CAD s/p PCI 12/2023, severe TR, HTN, AF, PAD c/b RLE compartment syndrome s/p fasciotomy (12/2023) active smoker, ETOH misuse (4 beers daily and half pint liquor 2-3x week) and cocaine use (last 1 month PTA) who was recently hospitalized for ADHF requiring chest tubes for effusions. Now presenting, initially to [facility] for recurrent ADHF. Developed cardiogenic shock prompting transfer to [facility] for further management. Initiated on inotropic support but has since been weaned with diuresis and introduction of afterload reducing agents. Echo with LV thrombus and EF ~25%. EP consulted for AFL w/ RVR for rhythm control management.     #ICM/HFrEF EF 25%  #LV thrombus on heparin gtt  #AFL - typical  #CAD s/p PCI 12/2023  #PAD s/p prior RLE compartment syndrome s/p fasciotomy    - Tele last couple days with AFL RVR rates ~115-120s, briefly HR down to ~40s. Pt asymptomatic during those times, likely vagal during sleep.   - AV luisa blockers limited, pt with low BP.  - Discussed options of management with patient including catheter ablation. As pt is in typical AFL, would offer ablation to reduce future recurrence. Pt is agreeable.   - SGLT2 inhibitors will need to be held 3 days prior to anesthesia requiring procedures. Pt received Farxiga today, please hold from now.   - LFTs elevated / , downtrending.   - On AC with heparin gtt, has been therapeutic since 3/6. Pt last ECG in sinus rhythm 3/7 at 1714. Please continue and keep ptt therapeutic.   -- Will eventually need transition to oral AC.   - GDMT per cardiology. Recs appreciated. Per cards, not AT candidate d/t recent substance use.   - Please optimize for ablation later this week. Pt still requiring 3L oxygen and reports SOB at rest.   - Keep on tele. Keep K>4, Mg>2  - Discussed with EP attending and primary team.  63 year old man with history of ICM (LVIDd 5.2 cm, LVEF 25-30%), CAD s/p PCI 12/2023, severe TR, HTN, AF, PAD c/b RLE compartment syndrome s/p fasciotomy (12/2023) active smoker, ETOH misuse (4 beers daily and half pint liquor 2-3x week) and cocaine use (last 1 month PTA) who was recently hospitalized for ADHF requiring chest tubes for effusions. Now presenting, initially to [facility] for recurrent ADHF. Developed cardiogenic shock prompting transfer to [facility] for further management. Initiated on inotropic support but has since been weaned with diuresis and introduction of afterload reducing agents. Echo with LV thrombus and EF ~25%. EP consulted for AFL w/ RVR for rhythm control management.     #ICM/HFrEF EF 25%  #LV apical thrombus on heparin gtt  #AFL - typical  #CAD s/p PCI 12/2023  #PAD s/p prior RLE compartment syndrome s/p fasciotomy    - Tele last couple days with AFL RVR rates ~115-120s, briefly HR down to ~30-40s. Pt asymptomatic during those times, likely vagal during sleep.   - AV luisa blockers limited, pt with low BP.  - TTE with EF 25%, LV apical thrombus, Severe TR, mod MR, small pericardial effusion.   - Discussed options of management with patient including catheter ablation. As pt is in typical AFL, would offer ablation to reduce future recurrence. Pt is agreeable.   - SGLT2 inhibitors will need to be held 3 days prior to anesthesia requiring procedures. Pt received Farxiga today, please hold from now.   - LFTs elevated / , downtrending.   - On AC with heparin gtt, has been therapeutic since 3/6. Pt last ECG in sinus rhythm 3/7 at 1714. Please continue and keep ptt therapeutic.   -- Will eventually need transition to oral AC.   -- TTE with trace pericardial effusion  - GDMT per cardiology. Recs appreciated. Per cards, not AT candidate d/t recent substance use.   - Please optimize for ablation later this week. Pt still requiring 3L oxygen and reports SOB at rest.   - Keep on tele. Keep K>4, Mg>2  - Discussed with EP attending and primary team.

## 2024-03-11 NOTE — PROGRESS NOTE ADULT - PROBLEM SELECTOR PLAN 5
- rates uncontrolled-- Ep consulted  - [id number] 4+  - Hold on BB for now, no CCB  - Hep gtt as above

## 2024-03-11 NOTE — PROGRESS NOTE ADULT - ASSESSMENT
63 year old male with past medical history of CVA, HTN, HLD, prediabetes, A.fib, cocaine/heroin abuse, CAD w/ stent, CHF (EF  25-30% in 12/23),  RLE compartment syndrome s/p fasciotomy 12/23 and recent hospitalization for CHF exacerbation with bilateral pleural effusions requiring chest tube placement initially presenting to [facility] on 3/1 with chest pain and shortness of breath, transferred to [facility] for management of cardiogenic shock requiring inotrope support. Now transferred to medicine for further management

## 2024-03-11 NOTE — PROGRESS NOTE ADULT - NSPROGADDITIONALINFOA_GEN_ALL_CORE
Dispo: EP consult pending, for rate control and AC recs    Carline Mitchell D.O.  Division of Hospital Medicine  Available on MS Teams

## 2024-03-11 NOTE — PROGRESS NOTE ADULT - PROBLEM SELECTOR PLAN 3
- Defer BB as above  - On AC with heparin infusion  - Consider Digoxin   - Please consult EP for AF -130s - Unclear prior baseline but Cr on admission was 2.1 and natered down to 1.3  - Cr now 1.6  - Continue to trend

## 2024-03-11 NOTE — PROGRESS NOTE ADULT - PROBLEM SELECTOR PLAN 3
Patient monitored closely throughout course of hospital stay by hospital medicine team. Urology consulted on admission. Started on IV rocephin and urine culture obtained and pending. Urology recommended proceeding with right ureteroscopy with stone removal and resection of bladder tumor while inpatient.  Patient completed course of antibiotics.  Urine culture resulted negative.  Patient underwent TURP, right ureteroscopy, and stent placement with Dr. Franco on 08/11.  Patient tolerated procedure well.  Patient's pain was well controlled medications.  Marrero remained in place postprocedure.  Patient's hemoglobin and hematocrit levels remain stable.  Patient was seen and discharge.  Patient was cleared for discharge by Urology.  Patient to follow-up with urology and PCP.  Return precautions discussed.  All questions answered.   Likely congestive hepatopathy from cardiogenic shock, improving  - add on LFTS for today

## 2024-03-11 NOTE — CHART NOTE - NSCHARTNOTEFT_GEN_A_CORE
Medicine ACP Event Note  Date of Service: 24 @ 05:29    Subjective: Notified by RN patient sustaining in 120's on tele. Patient seen and examined at bedside. Patient denies any chest pain, palpitations, fever, cough, sob, dizziness, lightheadness at this time. VSS        Objective Findings:  T(C): 36.2 (24 @ 04:00), Max: 36.4 (03-10-24 @ 11:50)  HR: 133 (24 @ 04:00) (113 - 133)  BP: 108/80 (24 @ 04:00) (102/69 - 108/80)  RR: 18 (24 @ 04:00) (18 - 18)  SpO2: 98% (24 @ 04:00) (95% - 100%)  Wt(kg): --  Daily     Daily Weight in k.8 (11 Mar 2024 04:00)      Physical Exam:  Gen: NAD; Patient resting comfortably  HEENT: EOMI, Normocephalic/ atraumatic  CV: irregular, irregular  Lungs:  Normal respiratory effort; clear to auscultation bilaterally  Abd: soft, non-tender; bowel sounds present  Ext: No edema; warm and well perfused    Telemetry:     Laboratory Data:                        9.2    5.53  )-----------( 175      ( 10 Mar 2024 11:08 )             28.4     03-10    138  |  98  |  53<H>  ----------------------------<  152<H>  4.4   |  26  |  1.62<H>    Ca    8.9      10 Mar 2024 11:08  Phos  4.1     03-10  Mg     2.4     03-10    TPro  7.3  /  Alb  3.0<L>  /  TBili  0.9  /  DBili  x   /  AST  114<H>  /  ALT  283<H>  /  AlkPhos  358<H>  03-09    PTT - ( 10 Mar 2024 20:57 )  PTT:79.3 sec          Inpatient Medications:  MEDICATIONS  (STANDING):  AQUAPHOR (petrolatum Ointment) 1 Application(s) Topical every 12 hours  atorvastatin 40 milliGRAM(s) Oral at bedtime  buMETAnide 1 milliGRAM(s) Oral daily  chlorhexidine 2% Cloths 1 Application(s) Topical <User Schedule>  clopidogrel Tablet 75 milliGRAM(s) Oral daily  dapagliflozin 10 milliGRAM(s) Oral daily  folic acid 1 milliGRAM(s) Oral daily  heparin  Infusion 900 Unit(s)/Hr (7 mL/Hr) IV Continuous <Continuous>  insulin lispro (ADMELOG) corrective regimen sliding scale   SubCutaneous three times a day before meals  insulin lispro (ADMELOG) corrective regimen sliding scale   SubCutaneous at bedtime  iron sucrose IVPB 100 milliGRAM(s) IV Intermittent <User Schedule>  levothyroxine 25 MICROGram(s) Oral daily  losartan 25 milliGRAM(s) Oral daily  spironolactone 25 milliGRAM(s) Oral daily  thiamine 100 milliGRAM(s) Oral daily      Assessment/Plan of Care:  63 year old male with past medical history of CVA, HTN, HLD, prediabetes, A.fib, cocaine/heroin abuse, CAD w/ stent, CHF (EF  25-30% in ),  RLE compartment syndrome s/p fasciotomy  and recent hospitalization for CHF exacerbation with bilateral pleural effusions requiring chest tube placement initially presenting to [facility] on 3/1 with chest pain and shortness of breath, transferred to [facility] for management of cardiogenic shock requiring inotrope support. Now transferred to medicine for further management    #AFIB  - EKG STAT  - Follow up am labs  - EP consult in am   - HF f/u in am   - Discussed plan with patient who understands and agrees   - Will monitor patient closely overnight  - Will endorse to day team in AM           Kota Collins PA-C  Medicine ACP

## 2024-03-11 NOTE — CONSULT NOTE ADULT - SUBJECTIVE AND OBJECTIVE BOX
HISTORY OF PRESENT ILLNESS:  63 year old male with past medical history of HIV, CVA, HTN, HLD, prediabetes, ?A.fib, cocaine/heroin abuse, CAD w/ stent x1 12/2023, CHF (EF  25-30% in 12/23),  RLE compartment syndrome s/p fasciotomy 12/23 and recent hospitalization for CHF exacerbation with bilateral pleural effusions requiring chest tube placement initially presenting to [facility] on 3/1 with chest pain and shortness of breath. He states having intermittent 8/10 midsternal chest pain for the past few months since his leg operation. He was admitted to the outside hospital for management of CHF exacerbation, found to have a further reduced EF of 10 - 15% on 3/1. While admitted, he was hypotensive requiring dopamine infusion with worsening perfusion indices and ELIE. He was transferred to [facility] for cardiogenic shock management.   On admission to CICU, he is A&O x3.  sinus tach, / 78, saturating well on 4L nasal cannula with dopamine gtt infusing at 10 mcg/kg/min (w/ SVR 2000s). He was started on Dobutamine and swan with concern for cardiogenic shock. Pt was diuresed w/ Bumex, afterload reduction added. Pt was able to weaned off Dobutamine 3/6 and transferred to floor. EP consulted for AFL w/ RVR.     Pt currently denies any symptoms other than SOB, denies palpitations/dizziness. States has no prior hx of AF/AFL and was not on a blood thinner outpatient.       Allergies    No Known Allergies    Intolerances    	    MEDICATIONS:  buMETAnide 1 milliGRAM(s) Oral daily  clopidogrel Tablet 75 milliGRAM(s) Oral daily  heparin  Infusion 900 Unit(s)/Hr IV Continuous <Continuous>  losartan 25 milliGRAM(s) Oral daily  spironolactone 25 milliGRAM(s) Oral daily  atorvastatin 40 milliGRAM(s) Oral at bedtime  insulin lispro (ADMELOG) corrective regimen sliding scale   SubCutaneous three times a day before meals  insulin lispro (ADMELOG) corrective regimen sliding scale   SubCutaneous at bedtime  levothyroxine 25 MICROGram(s) Oral daily  AQUAPHOR (petrolatum Ointment) 1 Application(s) Topical every 12 hours  chlorhexidine 2% Cloths 1 Application(s) Topical <User Schedule>  folic acid 1 milliGRAM(s) Oral daily  sodium chloride 0.9% lock flush 10 milliLiter(s) IV Push every 1 hour PRN  thiamine 100 milliGRAM(s) Oral daily    PAST MEDICAL & SURGICAL HISTORY:      SOCIAL HISTORY:    Tobacco use (discontinued ~1 month ago per patient): 5-6 cigs/day  EtOH use (discontinued 12/2023 per patient): 1-1/2 pint liquor every ~4-5 days  Reports prior crack/cocaine use     REVIEW OF SYSTEMS:  See HPI. Otherwise, 12 point ROS done and otherwise negative.    PHYSICAL EXAM:  T(C): 36.5 (03-11-24 @ 11:48), Max: 36.5 (03-11-24 @ 11:48)  HR: 120 (03-11-24 @ 12:00) (114 - 133)  BP: 92/64 (03-11-24 @ 12:00) (85/58 - 108/80)  RR: 18 (03-11-24 @ 12:00) (18 - 18)  SpO2: 96% (03-11-24 @ 12:00) (95% - 98%)  Wt(kg): --  I&O's Summary    10 Mar 2024 07:01  -  11 Mar 2024 07:00  --------------------------------------------------------  IN: 564 mL / OUT: 850 mL / NET: -286 mL    11 Mar 2024 07:01  -  11 Mar 2024 15:03  --------------------------------------------------------  IN: 514 mL / OUT: 500 mL / NET: 14 mL        Appearance: Normal	  HEENT: NC/AT  Cardiovascular: tachycardic, systolic murmur  Respiratory: crackles, some accessory muscle use  Psychiatry: A & O x 3, Mood & affect appropriate  Neurologic: Non-focal  Extremities: RLE with SCD in place, LLE no pitting edema    LABS:	 	    CBC Full  -  ( 11 Mar 2024 10:23 )  WBC Count : 5.78 K/uL  Hemoglobin : 8.2 g/dL  Hematocrit : 25.6 %  Platelet Count - Automated : 156 K/uL  Mean Cell Volume : 66.5 fl  Mean Cell Hemoglobin : 21.3 pg  Mean Cell Hemoglobin Concentration : 32.0 gm/dL    03-11    137  |  99  |  57<H>  ----------------------------<  113<H>  3.8   |  25  |  1.65<H>  03-11    138  |  100  |  54<H>  ----------------------------<  128<H>  3.7   |  25  |  1.65<H>    Ca    8.6      11 Mar 2024 13:59  Ca    8.6      11 Mar 2024 10:23  Phos  3.4     03-11  Phos  4.1     03-10  Mg     2.2     03-11  Mg     2.4     03-10    proBNP: Pro-Brain Natriuretic Peptide (03.11.24 @ 13:59)    Pro-Brain Natriuretic Peptide: 4171 pg/mL    TSH: Free Thyroxine, Serum in AM (03.10.24 @ 11:09)    Free Thyroxine, Serum: 1.2 ng/dL    CARDIAC MARKERS:Troponin T, High Sensitivity (03.04.24 @ 22:07)    Troponin T, High Sensitivity Result: 93: *  *  Rapid upward or downward changes in high-sensitivity troponin levels  suggest acute myocardial injury. Renal impairment may cause sustained  troponin elevations.  Normal: <6 - 14 ng/L  Indeterminate: 15-51 ng/L  Elevated: > 51 ng/L  See http://labs/test/TROPTHS on the [facility] intranet for more  information ng/L     	  RADIOLOGY: < from: Xray Chest 1 View- PORTABLE-Routine (Xray Chest 1 View- PORTABLE-Routine in AM.) (03.06.24 @ 02:50) >    Frontal expiratory view of the chest shows the heart to be similar in   size. Right jugular [place] catheter is in similar position in the   right pulmonary artery.    The lungs show less pulmonary congestion with smaller right effusion and   there is no evidence of pneumothorax nor left pleural effusion.    IMPRESSION:  Decreased congestion. Catheter as noted.    Thank you for the courtesy of this referral.    < end of copied text >    PREVIOUS DIAGNOSTIC TESTING:    [ ] Echocardiogram: < from: TTE W or WO Ultrasound Enhancing Agent (03.05.24 @ 07:31) >     CONCLUSIONS:      1. Left ventricular cavity is severely dilated. Left ventricular systolic function is severely decreased. Global left ventricular hypokinesis.   2. Severely enlarged right ventricular cavity size and severely reduced systolic function.   3. Moderate mitral regurgitation. Mechanism of mitral regurgitation: Andrei Type IIIb (restricted leaflet motion secondary to left atrial or left ventricular dilatation).   4. Severe tricuspid regurgitation.   5. Mild to moderate pulmonary hypertension.   6. 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   7. Small pericardial effusion with no evidence of hemodynamic compromise (or echocardiographic evidence of cardiac tamponade).    < from: TTE W or WO Ultrasound Enhancing Agent (03.05.24 @ 07:31) >  FINDINGS:     Left Ventricle:  After obtaining consent, [place] ultrasound enhancing agent was given for enhanced left ventricular opacification and improved delineation of the left ventricular endocardial borders. The left ventricular cavity is severely dilated. Left ventricular systolic function is severely decreased with an ejection fraction visually estimated at 25%. There is global left ventricular hypokinesis. 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     Right Ventricle:  The right ventricular cavity is severely enlarged in size and severely reduced systolic function. Tricuspid annular plane systolic excursion (TAPSE) is 1.5 cm (normal >=1.7 cm).     Left Atrium:  The left atrium is moderately dilated with an indexed volume of 45.89 ml/m².     Right Atrium:  The right atrium is moderately dilated.     Aortic Valve:  Normal appearing aortic valve. There is trace aortic regurgitation.     Mitral Valve:  There is moderate mitral regurgitation. The mechanism of mitral regurgitation is due to restricted leaflet motion secondary to left atrial or left ventricular dilatation (Andrei Type IIIb).     Tricuspid Valve:  There is severe tricuspid regurgitation. Estimated pulmonary artery systolic pressure is 46 mmHg, consistent with mild to moderate pulmonary hypertension.     Pulmonic Valve:  There is mild to moderate pulmonic regurgitation.     Aorta:  The aortic annulus and aortic root appear normal in size.     Pericardium:  There is a small pericardial effusion with no evidence of hemodynamic compromise (or echocardiographic evidence of cardiac tamponade).     Pleura:  Bilateral pleural effusion noted.     Systemic Veins:  The inferior vena cava is dilated (dilated >2.1cm) with abnormal inspiratory collapse (abnormal <50%) consistent with elevated right atrial pressure (~15, range 10-20mmHg).    < end of copied text >

## 2024-03-12 DIAGNOSIS — R06.00 DYSPNEA, UNSPECIFIED: ICD-10-CM

## 2024-03-12 DIAGNOSIS — R41.0 DISORIENTATION, UNSPECIFIED: ICD-10-CM

## 2024-03-12 DIAGNOSIS — Z65.8 OTHER SPECIFIED PROBLEMS RELATED TO PSYCHOSOCIAL CIRCUMSTANCES: ICD-10-CM

## 2024-03-12 LAB
ALBUMIN SERPL ELPH-MCNC: 3 G/DL — LOW (ref 3.3–5)
ALP SERPL-CCNC: 368 U/L — HIGH (ref 40–120)
ALT FLD-CCNC: 154 U/L — HIGH (ref 10–45)
ANION GAP SERPL CALC-SCNC: 15 MMOL/L — SIGNIFICANT CHANGE UP (ref 5–17)
APTT BLD: 45.5 SEC — HIGH (ref 24.5–35.6)
APTT BLD: 66.6 SEC — HIGH (ref 24.5–35.6)
AST SERPL-CCNC: 52 U/L — HIGH (ref 10–40)
BILIRUB SERPL-MCNC: 0.8 MG/DL — SIGNIFICANT CHANGE UP (ref 0.2–1.2)
BUN SERPL-MCNC: 53 MG/DL — HIGH (ref 7–23)
CALCIUM SERPL-MCNC: 8.7 MG/DL — SIGNIFICANT CHANGE UP (ref 8.4–10.5)
CHLORIDE SERPL-SCNC: 99 MMOL/L — SIGNIFICANT CHANGE UP (ref 96–108)
CO2 SERPL-SCNC: 22 MMOL/L — SIGNIFICANT CHANGE UP (ref 22–31)
CREAT SERPL-MCNC: 1.6 MG/DL — HIGH (ref 0.5–1.3)
EGFR: 48 ML/MIN/1.73M2 — LOW
GLUCOSE BLDC GLUCOMTR-MCNC: 107 MG/DL — HIGH (ref 70–99)
GLUCOSE BLDC GLUCOMTR-MCNC: 111 MG/DL — HIGH (ref 70–99)
GLUCOSE BLDC GLUCOMTR-MCNC: 112 MG/DL — HIGH (ref 70–99)
GLUCOSE BLDC GLUCOMTR-MCNC: 121 MG/DL — HIGH (ref 70–99)
GLUCOSE SERPL-MCNC: 66 MG/DL — LOW (ref 70–99)
HCT VFR BLD CALC: 27.4 % — LOW (ref 39–50)
HGB BLD-MCNC: 8.7 G/DL — LOW (ref 13–17)
MCHC RBC-ENTMCNC: 21.4 PG — LOW (ref 27–34)
MCHC RBC-ENTMCNC: 31.8 GM/DL — LOW (ref 32–36)
MCV RBC AUTO: 67.3 FL — LOW (ref 80–100)
NRBC # BLD: 3 /100 WBCS — HIGH (ref 0–0)
PLATELET # BLD AUTO: 172 K/UL — SIGNIFICANT CHANGE UP (ref 150–400)
POTASSIUM SERPL-MCNC: 3.8 MMOL/L — SIGNIFICANT CHANGE UP (ref 3.5–5.3)
POTASSIUM SERPL-SCNC: 3.8 MMOL/L — SIGNIFICANT CHANGE UP (ref 3.5–5.3)
PROT SERPL-MCNC: 6.5 G/DL — SIGNIFICANT CHANGE UP (ref 6–8.3)
RBC # BLD: 4.07 M/UL — LOW (ref 4.2–5.8)
RBC # FLD: 15.4 % — HIGH (ref 10.3–14.5)
SODIUM SERPL-SCNC: 136 MMOL/L — SIGNIFICANT CHANGE UP (ref 135–145)
WBC # BLD: 7.44 K/UL — SIGNIFICANT CHANGE UP (ref 3.8–10.5)
WBC # FLD AUTO: 7.44 K/UL — SIGNIFICANT CHANGE UP (ref 3.8–10.5)

## 2024-03-12 PROCEDURE — 99233 SBSQ HOSP IP/OBS HIGH 50: CPT

## 2024-03-12 PROCEDURE — 99232 SBSQ HOSP IP/OBS MODERATE 35: CPT

## 2024-03-12 RX ORDER — DIGOXIN 250 MCG
62.5 TABLET ORAL EVERY 6 HOURS
Refills: 0 | Status: DISCONTINUED | OUTPATIENT
Start: 2024-03-12 | End: 2024-03-12

## 2024-03-12 RX ORDER — DIGOXIN 250 MCG
250 TABLET ORAL DAILY
Refills: 0 | Status: DISCONTINUED | OUTPATIENT
Start: 2024-03-12 | End: 2024-03-12

## 2024-03-12 RX ORDER — DIGOXIN 250 MCG
125 TABLET ORAL ONCE
Refills: 0 | Status: COMPLETED | OUTPATIENT
Start: 2024-03-13 | End: 2024-03-13

## 2024-03-12 RX ORDER — DIGOXIN 250 MCG
125 TABLET ORAL ONCE
Refills: 0 | Status: DISCONTINUED | OUTPATIENT
Start: 2024-03-12 | End: 2024-03-12

## 2024-03-12 RX ORDER — DIGOXIN 250 MCG
125 TABLET ORAL EVERY 6 HOURS
Refills: 0 | Status: DISCONTINUED | OUTPATIENT
Start: 2024-03-12 | End: 2024-03-12

## 2024-03-12 RX ORDER — METOPROLOL TARTRATE 50 MG
12.5 TABLET ORAL
Refills: 0 | Status: DISCONTINUED | OUTPATIENT
Start: 2024-03-12 | End: 2024-03-12

## 2024-03-12 RX ORDER — DIGOXIN 250 MCG
62.5 TABLET ORAL DAILY
Refills: 0 | Status: DISCONTINUED | OUTPATIENT
Start: 2024-03-13 | End: 2024-03-26

## 2024-03-12 RX ORDER — BUMETANIDE 0.25 MG/ML
1 INJECTION INTRAMUSCULAR; INTRAVENOUS EVERY 12 HOURS
Refills: 0 | Status: DISCONTINUED | OUTPATIENT
Start: 2024-03-12 | End: 2024-03-13

## 2024-03-12 RX ORDER — DIGOXIN 250 MCG
125 TABLET ORAL ONCE
Refills: 0 | Status: COMPLETED | OUTPATIENT
Start: 2024-03-12 | End: 2024-03-12

## 2024-03-12 RX ADMIN — Medication 125 MICROGRAM(S): at 21:42

## 2024-03-12 RX ADMIN — HEPARIN SODIUM 8.5 UNIT(S)/HR: 5000 INJECTION INTRAVENOUS; SUBCUTANEOUS at 08:39

## 2024-03-12 RX ADMIN — Medication 1 MILLIGRAM(S): at 11:58

## 2024-03-12 RX ADMIN — Medication 100 MILLIGRAM(S): at 11:57

## 2024-03-12 RX ADMIN — Medication 1 APPLICATION(S): at 05:10

## 2024-03-12 RX ADMIN — Medication 1 APPLICATION(S): at 17:28

## 2024-03-12 RX ADMIN — ATORVASTATIN CALCIUM 40 MILLIGRAM(S): 80 TABLET, FILM COATED ORAL at 21:42

## 2024-03-12 RX ADMIN — Medication 25 MICROGRAM(S): at 05:13

## 2024-03-12 RX ADMIN — BUMETANIDE 1 MILLIGRAM(S): 0.25 INJECTION INTRAMUSCULAR; INTRAVENOUS at 05:13

## 2024-03-12 RX ADMIN — BUMETANIDE 1 MILLIGRAM(S): 0.25 INJECTION INTRAMUSCULAR; INTRAVENOUS at 17:28

## 2024-03-12 RX ADMIN — SPIRONOLACTONE 25 MILLIGRAM(S): 25 TABLET, FILM COATED ORAL at 05:13

## 2024-03-12 RX ADMIN — LOSARTAN POTASSIUM 25 MILLIGRAM(S): 100 TABLET, FILM COATED ORAL at 05:13

## 2024-03-12 RX ADMIN — CHLORHEXIDINE GLUCONATE 1 APPLICATION(S): 213 SOLUTION TOPICAL at 05:11

## 2024-03-12 RX ADMIN — CLOPIDOGREL BISULFATE 75 MILLIGRAM(S): 75 TABLET, FILM COATED ORAL at 11:57

## 2024-03-12 NOTE — PROGRESS NOTE ADULT - PROBLEM SELECTOR PLAN 3
- Unclear prior baseline but Cr on admission was 2.1 and natered down to 1.3  - Cr now stable at 1.6  - Continue to trend

## 2024-03-12 NOTE — PROGRESS NOTE ADULT - PROBLEM SELECTOR PLAN 2
Unclear etiology; however, it may be 2/2 to HF and other cardiac issues +/- Delirium associated to hospitalization.   Work up and Rx as per the primary team.   Will also advise:   -Frequent reassurance and verbal orientation   -Delusions and hallucinations should be neither endorsed nor challenged.   -Physical restraints should be avoided. Alternatives to restraint use, such as constant observation (preferably by someone familiar to the patient such as a family member), may be more effective.  -PT eval and early ambulation if possible  -Move to a room with a window   -Update the calendar date in the room.

## 2024-03-12 NOTE — PROGRESS NOTE ADULT - SUBJECTIVE AND OBJECTIVE BOX
Date of Service: 03-12-24 @ 20:53    SUBJECTIVE AND OBJECTIVE:  Indication for Geriatrics and Palliative Care Services/INTERVAL HPI:    OVERNIGHT EVENTS:    DNR on chart:  Allergies    No Known Allergies    Intolerances    MEDICATIONS  (STANDING):  AQUAPHOR (petrolatum Ointment) 1 Application(s) Topical every 12 hours  atorvastatin 40 milliGRAM(s) Oral at bedtime  buMETAnide 1 milliGRAM(s) Oral every 12 hours  chlorhexidine 2% Cloths 1 Application(s) Topical <User Schedule>  clopidogrel Tablet 75 milliGRAM(s) Oral daily  digoxin     Tablet 125 MICROGram(s) Oral once  folic acid 1 milliGRAM(s) Oral daily  heparin  Infusion 900 Unit(s)/Hr (8.5 mL/Hr) IV Continuous <Continuous>  insulin lispro (ADMELOG) corrective regimen sliding scale   SubCutaneous three times a day before meals  insulin lispro (ADMELOG) corrective regimen sliding scale   SubCutaneous at bedtime  levothyroxine 25 MICROGram(s) Oral daily  losartan 25 milliGRAM(s) Oral daily  spironolactone 25 milliGRAM(s) Oral daily  thiamine 100 milliGRAM(s) Oral daily    MEDICATIONS  (PRN):  sodium chloride 0.9% lock flush 10 milliLiter(s) IV Push every 1 hour PRN Pre/post blood products, medications, blood draw, and to maintain line patency      ITEMS UNCHECKED ARE NOT PRESENT    PRESENT SYMPTOMS: [ ]Unable to self-report - see [ ] CPOT [ ] PAINADS [ ] RDOS  Source if other than patient:  [ ]Family   [ ]Team     Pain:  [ ]yes [ ]no  QOL impact -   Location -                    Aggravating factors -  Quality -  Radiation -  Timing-  Severity (0-10 scale):  Minimal acceptable level (0-10 scale):     CPOT:    https://www.sccm.org/getattachment/qpt59t70-0v5h-8m1e-4p1j-1158f6450u1a/Critical-Care-Pain-Observation-Tool-(CPOT)    PAINAD Score: See PAINAD tool and score below       Dyspnea:                           [ ]Mild [ ]Moderate [ ]Severe    RDOS: See RDOS tool and score below   0 to 2  minimal or no respiratory distress   3  mild distress  4 to 6 moderate distress  >7 severe distress      Anxiety:                             [ ]Mild [ ]Moderate [ ]Severe  Fatigue:                             [ ]Mild [ ]Moderate [ ]Severe  Nausea:                             [ ]Mild [ ]Moderate [ ]Severe  Loss of appetite:              [ ]Mild [ ]Moderate [ ]Severe  Constipation:                    [ ]Mild [ ]Moderate [ ]Severe    PCSSQ[Palliative Care Spiritual Screening Question]   Severity (0-10):  Score of 4 or > indicate consideration of Chaplaincy referral.  Chaplaincy Referral: [ ] yes [ ] refused [ ] following [ ] Deferred     Caregiver [place]? : [ ] yes [ ] no [ ] Deferred [ ] Declined             Social work referral [ ] Patient & Family Centered Care Referral [ ]     Anticipatory Grief present?:  [ ] yes [ ] no  [ ] Deferred                  Social work referral [ ] Chaplaincy Referral [ ]    		  Other Symptoms:  [ ]All other review of systems negative     Palliative Performance Status Version 2:   See PPSv2 tool and score below         PHYSICAL EXAM:  Vital Signs Last 24 Hrs  T(C): 36.3 (12 Mar 2024 12:01), Max: 37.1 (12 Mar 2024 06:24)  T(F): 97.3 (12 Mar 2024 12:01), Max: 98.7 (12 Mar 2024 06:24)  HR: 118 (12 Mar 2024 16:57) (106 - 118)  BP: 109/74 (12 Mar 2024 16:57) (99/61 - 111/76)  BP(mean): --  RR: 18 (12 Mar 2024 12:01) (18 - 18)  SpO2: 96% (12 Mar 2024 16:57) (95% - 100%)    Parameters below as of 12 Mar 2024 16:57  Patient On (Oxygen Delivery Method): room air     I&O's Summary    11 Mar 2024 07:01  -  12 Mar 2024 07:00  --------------------------------------------------------  IN: 754 mL / OUT: 1600 mL / NET: -846 mL    12 Mar 2024 07:01  -  12 Mar 2024 20:53  --------------------------------------------------------  IN: 300 mL / OUT: 1300 mL / NET: -1000 mL       GENERAL: [ ]Cachexia    [ ]Alert  [ ]Oriented x   [ ]Lethargic  [ ]Unarousable  [ ]Verbal  [ ]Non-Verbal  Behavioral:   [ ]Anxiety  [ ]Delirium [ ]Agitation [ ]Other  HEENT:  [ ]Normal   [ ]Dry mouth   [ ]ET Tube/Trach  [ ]Oral lesions  PULMONARY:   [ ]Clear [ ]Tachypnea  [ ]Audible excessive secretions   [ ]Rhonchi        [ ]Right [ ]Left [ ]Bilateral  [ ]Crackles        [ ]Right [ ]Left [ ]Bilateral  [ ]Wheezing     [ ]Right [ ]Left [ ]Bilateral  [ ]Diminished BS [ ] Right [ ]Left [ ]Bilateral  CARDIOVASCULAR:    [ ]Regular [ ]Irregular [ ]Tachy  [ ]Jonathon [ ]Murmur [ ]Other  GASTROINTESTINAL:  [ ]Soft  [ ]Distended   [ ]+BS  [ ]Non tender [ ]Tender  [ ]Other [ ]PEG [ ]OGT/ NGT   Last BM:   GENITOURINARY:  [ ]Normal [ ]Incontinent   [ ]Oliguria/Anuria   [ ]Choudhury  MUSCULOSKELETAL:   [ ]Normal   [ ]Weakness  [ ]Bed/Wheelchair bound [ ]Edema  NEUROLOGIC:   [ ]No focal deficits  [ ] Cognitive impairment  [ ] Dysphagia [ ]Dysarthria [ ] Paresis [ ]Other   SKIN:   [ ]Normal  [ ]Rash  [ ]Other  [ ]Pressure ulcer(s) [ ]y [ ]n present on admission    CRITICAL CARE:  [ ]Shock Present  [ ]Septic [ ]Cardiogenic [ ]Neurologic [ ]Hypovolemic  [ ]Vasopressors [ ]Inotropes  [ ]Respiratory failure present [ ]Mechanical Ventilation [ ]Non-invasive ventilatory support [ ]High-Flow   [ ]Acute  [ ]Chronic [ ]Hypoxic  [ ]Hypercarbic [ ]Other  [ ]Other organ failure     LABS:                        8.7    7.44  )-----------( 172      ( 12 Mar 2024 07:25 )             27.4   03-12    136  |  99  |  53<H>  ----------------------------<  66<L>  3.8   |  22  |  1.60<H>    Ca    8.7      12 Mar 2024 07:25  Phos  3.4     03-11  Mg     2.2     03-11    TPro  6.5  /  Alb  3.0<L>  /  TBili  0.8  /  DBili  x   /  AST  52<H>  /  ALT  154<H>  /  AlkPhos  368<H>  03-12  PTT - ( 12 Mar 2024 16:52 )  PTT:66.6 sec    Urinalysis Basic - ( 12 Mar 2024 07:25 )    Color: x / Appearance: x / SG: x / pH: x  Gluc: 66 mg/dL / Ketone: x  / Bili: x / Urobili: x   Blood: x / Protein: x / Nitrite: x   Leuk Esterase: x / RBC: x / WBC x   Sq Epi: x / Non Sq Epi: x / Bacteria: x      RADIOLOGY & ADDITIONAL STUDIES:    Protein Calorie Malnutrition Present: [ ]mild [ ]moderate [ ]severe [ ]underweight [ ]morbid obesity  https://www.andeal.org/vault/2440/web/files/ONC/Table_Clinical%20Characteristics%20to%20Document%20Malnutrition-White%20JV%20et%20al%202012.pdf    Height (cm): 172.7 (03-04-24 @ 19:45)  Weight (kg): 51.5 (03-04-24 @ 20:54)  BMI (kg/m2): 17.3 (03-04-24 @ 20:54)    [ ]PPSV2 < or = 30%  [ ]significant weight loss [ ]poor nutritional intake [ ]anasarca[ ]Artificial Nutrition    Other REFERRALS:  [ ]Hospice  [ ]Child Life  [ ]Social Work  [ ]Case management [ ]Holistic Therapy     Goals of Care Document: Date of Service: 03-12-24 @ 20:53    SUBJECTIVE AND OBJECTIVE: The EMR was reviewed. As per medicine notes, intermittently confused. As per HF notes, no chest pain or shortness of breath. Current plan is for possible ablation for rhythm control.   Indication for Geriatrics and Palliative Care Services/INTERVAL HPI: Goals of care and advanced care planning.     OVERNIGHT EVENTS: No new events.     DNR on chart: No   Allergies    No Known Allergies    Intolerances    MEDICATIONS  (STANDING):  AQUAPHOR (petrolatum Ointment) 1 Application(s) Topical every 12 hours  atorvastatin 40 milliGRAM(s) Oral at bedtime  buMETAnide 1 milliGRAM(s) Oral every 12 hours  chlorhexidine 2% Cloths 1 Application(s) Topical <User Schedule>  clopidogrel Tablet 75 milliGRAM(s) Oral daily  digoxin     Tablet 125 MICROGram(s) Oral once  folic acid 1 milliGRAM(s) Oral daily  heparin  Infusion 900 Unit(s)/Hr (8.5 mL/Hr) IV Continuous <Continuous>  insulin lispro (ADMELOG) corrective regimen sliding scale   SubCutaneous three times a day before meals  insulin lispro (ADMELOG) corrective regimen sliding scale   SubCutaneous at bedtime  levothyroxine 25 MICROGram(s) Oral daily  losartan 25 milliGRAM(s) Oral daily  spironolactone 25 milliGRAM(s) Oral daily  thiamine 100 milliGRAM(s) Oral daily    MEDICATIONS  (PRN):  sodium chloride 0.9% lock flush 10 milliLiter(s) IV Push every 1 hour PRN Pre/post blood products, medications, blood draw, and to maintain line patency    Chaplaincy Referral: [x ] yes [ ] refused [ ] following [ ] Deferred     Caregiver [place]? : [ ] yes [x ] no [ ] Deferred [ ] Declined             Social work referral [ ] Patient & Family Centered Care Referral [ ]     Anticipatory Grief present?:  [ ] yes [x ] no  [ ] Deferred                  Social work referral [ ] Chaplaincy Referral [ ]  	       PHYSICAL EXAM:  Vital Signs Last 24 Hrs  T(C): 36.3 (12 Mar 2024 12:01), Max: 37.1 (12 Mar 2024 06:24)  T(F): 97.3 (12 Mar 2024 12:01), Max: 98.7 (12 Mar 2024 06:24)  HR: 118 (12 Mar 2024 16:57) (106 - 118)  BP: 109/74 (12 Mar 2024 16:57) (99/61 - 111/76)  BP(mean): --  RR: 18 (12 Mar 2024 12:01) (18 - 18)  SpO2: 96% (12 Mar 2024 16:57) (95% - 100%)    Parameters below as of 12 Mar 2024 16:57  Patient On (Oxygen Delivery Method): room air     I&O's Summary    11 Mar 2024 07:01  -  12 Mar 2024 07:00  --------------------------------------------------------  IN: 754 mL / OUT: 1600 mL / NET: -846 mL    12 Mar 2024 07:01  -  12 Mar 2024 20:53  --------------------------------------------------------  IN: 300 mL / OUT: 1300 mL / NET: -1000 mL    CRITICAL CARE:  [ ]Shock Present  [ ]Septic [ ]Cardiogenic [ ]Neurologic [ ]Hypovolemic  [ ]Vasopressors [ ]Inotropes  [ ]Respiratory failure present [ ]Mechanical Ventilation [ ]Non-invasive ventilatory support [ ]High-Flow   [ ]Acute  [ ]Chronic [ ]Hypoxic  [ ]Hypercarbic [ ]Other  [ ]Other organ failure     LABS:                        8.7    7.44  )-----------( 172      ( 12 Mar 2024 07:25 )             27.4   03-12    136  |  99  |  53<H>  ----------------------------<  66<L>  3.8   |  22  |  1.60<H>    Ca    8.7      12 Mar 2024 07:25  Phos  3.4     03-11  Mg     2.2     03-11    TPro  6.5  /  Alb  3.0<L>  /  TBili  0.8  /  DBili  x   /  AST  52<H>  /  ALT  154<H>  /  AlkPhos  368<H>  03-12  PTT - ( 12 Mar 2024 16:52 )  PTT:66.6 sec    Urinalysis Basic - ( 12 Mar 2024 07:25 )    Color: x / Appearance: x / SG: x / pH: x  Gluc: 66 mg/dL / Ketone: x  / Bili: x / Urobili: x   Blood: x / Protein: x / Nitrite: x   Leuk Esterase: x / RBC: x / WBC x   Sq Epi: x / Non Sq Epi: x / Bacteria: x      RADIOLOGY & ADDITIONAL STUDIES:  Chest X ray.   IMPRESSION:  Decreased congestion. Catheter as noted.    Thank you for the courtesy of this referral.    --- End of Report ---            ANA CHAKRABORTY MD; Attending Interventional Radiologist  This document has been electronically signed. Mar  6 2024  3:21PM    Protein Calorie Malnutrition Present: [ ]mild [ ]moderate [ ]severe [ ]underweight [ ]morbid obesity  https://www.andeal.org/vault/2440/web/files/ONC/Table_Clinical%20Characteristics%20to%20Document%20Malnutrition-White%20JV%20et%20al%202012.pdf    Height (cm): 172.7 (03-04-24 @ 19:45)  Weight (kg): 51.5 (03-04-24 @ 20:54)  BMI (kg/m2): 17.3 (03-04-24 @ 20:54)    [ ]PPSV2 < or = 30%  [ ]significant weight loss [ ]poor nutritional intake [ ]anasarca[ ]Artificial Nutrition    Other REFERRALS:  [ ]Hospice  [ ]Child Life  [ ]Social Work  [ ]Case management [ ]Holistic Therapy     Goals of Care Document:

## 2024-03-12 NOTE — PROGRESS NOTE ADULT - PROBLEM SELECTOR PLAN 3
As per HF's most recent notes: "Was supported with Dopamine then Dobutamine but was able to be weaned off with afterload reduction. Currently on HF medical therapy with Losartan, Aldactone, Farxiga. Euvolemic on Bumex 1mg PO daily. Remains in AFib with RVR. Recommend EP consult."

## 2024-03-12 NOTE — PROGRESS NOTE ADULT - ASSESSMENT
63 year old male with past medical history of CVA, HTN, HLD, prediabetes, A.fib, cocaine/heroin abuse, CAD w/ stent, CHF (EF  25-30% in 12/23),  RLE compartment syndrome s/p fasciotomy 12/23 and recent hospitalization for CHF exacerbation with bilateral pleural effusions requiring chest tube placement initially presenting to [facility] on 3/1 with chest pain and shortness of breath, transferred to [facility] for management of cardiogenic shock requiring inotrope support. Now transferred to medicine for further management

## 2024-03-12 NOTE — PROGRESS NOTE ADULT - PROBLEM SELECTOR PLAN 3
As per HF's most recent notes: "Was supported with Dopamine then Dobutamine but was able to be weaned off with afterload reduction. Currently on HF medical therapy with Losartan, Aldactone, Farxiga. AFlutter with tachycardia. Appreciate EP recommendations, start Digoxin, plan for AFlutter ablation later this week."

## 2024-03-12 NOTE — PROGRESS NOTE ADULT - PROBLEM SELECTOR PLAN 5
I am concerned that with his poor social support,  advanced illness, poor nutritional status, h/o polysubstance abuse, weak state, and limited medical literacy, without appropriate supervision he may not do well if sent home without appropriate services. [facility] may be an option; however, this will only be a temporary solution. I feel that NH may be a better fit for this patient.   On 3/12, I will reach out to his floor SW to see if the idea of NH or [facility] to NH can be re-visited.

## 2024-03-12 NOTE — PROGRESS NOTE ADULT - SUBJECTIVE AND OBJECTIVE BOX
Date of Service: 03-11-24 @ 19:30  LATE NOTE FOR 3/11/2024     SUBJECTIVE AND OBJECTIVE: The patient was seen and examined. He appeared weak and slightly confused. He was c/o mild dyspnea at rest. He was slightly lethargic.   Indication for Geriatrics and Palliative Care Services/INTERVAL HPI: Goals of care and advanced care planning.     OVERNIGHT EVENTS: As per the EMR, with Aflutter with RVR.     DNR on chart: No   Allergies    No Known Allergies    Intolerances    MEDICATIONS  (STANDING):  AQUAPHOR (petrolatum Ointment) 1 Application(s) Topical every 12 hours  atorvastatin 40 milliGRAM(s) Oral at bedtime  buMETAnide 1 milliGRAM(s) Oral daily  chlorhexidine 2% Cloths 1 Application(s) Topical <User Schedule>  clopidogrel Tablet 75 milliGRAM(s) Oral daily  dapagliflozin 10 milliGRAM(s) Oral daily  folic acid 1 milliGRAM(s) Oral daily  heparin  Infusion 900 Unit(s)/Hr (7 mL/Hr) IV Continuous <Continuous>  insulin lispro (ADMELOG) corrective regimen sliding scale   SubCutaneous three times a day before meals  insulin lispro (ADMELOG) corrective regimen sliding scale   SubCutaneous at bedtime  levothyroxine 25 MICROGram(s) Oral daily  losartan 25 milliGRAM(s) Oral daily  spironolactone 25 milliGRAM(s) Oral daily  thiamine 100 milliGRAM(s) Oral daily    MEDICATIONS  (PRN):  sodium chloride 0.9% lock flush 10 milliLiter(s) IV Push every 1 hour PRN Pre/post blood products, medications, blood draw, and to maintain line patency    ITEMS UNCHECKED ARE NOT PRESENT    PRESENT SYMPTOMS: [ ]Unable to self-report - see [ ] CPOT [ ] PAINADS [ ] RDOS  Source if other than patient:  [ ]Family   [ ]Team     Pain:  [ ]yes [x ]no  QOL impact -   Location -                    Aggravating factors -  Quality -  Radiation -  Timing-  Severity (0-10 scale):  Minimal acceptable level (0-10 scale):     CPOT:    https://www.sccm.org/getattachment/cnz72y01-0o6g-2e6l-8k7q-8002x8434u7p/Critical-Care-Pain-Observation-Tool-(CPOT)    PAINAD Score: See PAINAD tool and score below       Dyspnea:                           [x ]Mild at rest  [ ]Moderate [ ]Severe    RDOS: See RDOS tool and score below   0 to 2  minimal or no respiratory distress   3  mild distress  4 to 6 moderate distress  >7 severe distress      Anxiety:                             [ ]Mild [ ]Moderate [ ]Severe  Fatigue:                             [ ]Mild [x ]Moderate [ ]Severe  Nausea:                             [ ]Mild [ ]Moderate [ ]Severe  Loss of appetite:              [ ]Mild [ ]Moderate [ ]Severe  Constipation:                    [ ]Mild [ ]Moderate [ ]Severe    PCSSQ[Palliative Care Spiritual Screening Question]   Severity (0-10):  Score of 4 or > indicate consideration of Chaplaincy referral.  Chaplaincy Referral: [x ] yes [ ] refused [ ] following [ ] Deferred     Caregiver [place]? : [ ] yes [x ] no [ ] Deferred [ ] Declined             Social work referral [ ] Patient & Family Centered Care Referral [ ]     Anticipatory Grief present?:  [ ] yes [ ] no  [x ] Deferred                  Social work referral [ ] Chaplaincy Referral [ ]    		  Other Symptoms:  [x ]All other review of systems negative but as per subjective and objective      Palliative Performance Status Version 2:   See PPSv2 tool and score below         PHYSICAL EXAM:  Vital Signs Last 24 Hrs  T(C): 36.5 (11 Mar 2024 11:48), Max: 36.5 (11 Mar 2024 11:48)  T(F): 97.7 (11 Mar 2024 11:48), Max: 97.7 (11 Mar 2024 11:48)  HR: 120 (11 Mar 2024 12:00) (114 - 133)  BP: 92/64 (11 Mar 2024 12:00) (85/58 - 108/80)  BP(mean): --  RR: 18 (11 Mar 2024 12:00) (18 - 18)  SpO2: 96% (11 Mar 2024 12:00) (95% - 98%)    Parameters below as of 11 Mar 2024 12:00  Patient On (Oxygen Delivery Method): nasal cannula  O2 Flow (L/min): 3       GENERAL: [x ]Cachexia    [ ]Alert  [x ]Oriented x 2 (person and, after being re-directed to place)   [x ]Lethargic  [ ]Unarousable  [ ]Verbal  [ ]Non-Verbal  Behavioral:   [ ]Anxiety  [ ]Delirium [ ]Agitation [ ]Other  HEENT:  [ ]Normal   [x ]Dry mouth   [ ]ET Tube/Trach  [ ]Oral lesions  PULMONARY:   [ ]Clear [ ]Tachypnea  [ ]Audible excessive secretions   [ ]Rhonchi        [ ]Right [ ]Left [ ]Bilateral  [ ]Crackles        [ ]Right [ ]Left [ ]Bilateral  [ ]Wheezing     [ ]Right [ ]Left [ ]Bilateral  [x ]Diminished BS [ ] Right [ ]Left [x]Bilateral  CARDIOVASCULAR:    [ ]Regular [ ]Irregular [x ]Tachy  [ ]Jonathon [ ]Murmur [ ]Other  GASTROINTESTINAL:  [ ]Soft  [ ]Distended   [ ]+BS  [ ]Non tender [ ]Tender  [ ]Other [ ]PEG [ ]OGT/ NGT   Last BM: 3/11  GENITOURINARY:  [ ]Normal [ ]Incontinent   [ ]Oliguria/Anuria   [ ]Choudhury  MUSCULOSKELETAL:   [ ]Normal   [X ]Weakness  [ ]Bed/Wheelchair bound [ ]Edema  NEUROLOGIC:   [ ]No focal deficits  [ ] Cognitive impairment  [ ] Dysphagia [ ]Dysarthria [ ] Paresis [ X]Other: Slightly lethargic and with mild confusion; however, being able to be re-directed.   SKIN:   [ ]Normal  [ ]Rash  [ ]Other  [ ]Pressure ulcer(s) [ ]y [ ]n present on admission    CRITICAL CARE:  [ ]Shock Present  [ ]Septic [ ]Cardiogenic [ ]Neurologic [ ]Hypovolemic  [ ]Vasopressors [ ]Inotropes  [ ]Respiratory failure present [ ]Mechanical Ventilation [ ]Non-invasive ventilatory support [ ]High-Flow   [ ]Acute  [ ]Chronic [ ]Hypoxic  [ ]Hypercarbic [ ]Other  [ ]Other organ failure     LABS:                        8.2    5.78  )-----------( 156      ( 11 Mar 2024 10:23 )             25.6   03-11    137  |  99  |  57<H>  ----------------------------<  113<H>  3.8   |  25  |  1.65<H>    Ca    8.6      11 Mar 2024 13:59  Phos  3.4     03-11  Mg     2.2     03-11    TPro  6.9  /  Alb  3.2<L>  /  TBili  0.9  /  DBili  0.5<H>  /  AST  64<H>  /  ALT  184<H>  /  AlkPhos  395<H>  03-11  PTT - ( 11 Mar 2024 10:23 )  PTT:68.7 sec    Urinalysis Basic - ( 11 Mar 2024 13:59 )    Color: x / Appearance: x / SG: x / pH: x  Gluc: 113 mg/dL / Ketone: x  / Bili: x / Urobili: x   Blood: x / Protein: x / Nitrite: x   Leuk Esterase: x / RBC: x / WBC x   Sq Epi: x / Non Sq Epi: x / Bacteria: x      RADIOLOGY & ADDITIONAL STUDIES:  No new studies.   Protein Calorie Malnutrition Present: [ ]mild [ ]moderate [ ]severe [ ]underweight [ ]morbid obesity  https://www.andeal.org/vault/2440/web/files/ONC/Table_Clinical%20Characteristics%20to%20Document%20Malnutrition-White%20JV%20et%20al%202012.pdf    Height (cm): 172.7 (03-04-24 @ 19:45)  Weight (kg): 51.5 (03-04-24 @ 20:54)  BMI (kg/m2): 17.3 (03-04-24 @ 20:54)    [ ]PPSV2 < or = 30%  [ ]significant weight loss [ ]poor nutritional intake [ ]anasarca[ ]Artificial Nutrition    Other REFERRALS:  [ ]Hospice  [ ]Child Life  [ ]Social Work  [ ]Case management [ ]Holistic Therapy     Goals of Care Document:

## 2024-03-12 NOTE — PROGRESS NOTE ADULT - ASSESSMENT
63 year old man with history of ICM (LVIDd 5.2 cm, LVEF 25-30%), CAD s/p PCI 12/2023, severe TR, HTN, AF, PAD c/b RLE compartment syndrome s/p fasciotomy (12/2023) active smoker, ETOH misuse (4 beers daily and half pint liquor 2-3x week) and cocaine use (last 1 month PTA) who was recently hospitalized for ADHF requiring chest tubes for effusions. Now presenting, initially to [facility] for recurrent ADHF. Developed cardiogenic shock prompting transfer to [facility] for further management. Initiated on inotropic support but has since been weaned with diuresis and introduction of afterload reducing agents. Echo with LV thrombus and EF ~25%. EP consulted for AFL w/ RVR for rhythm control management.     #ICM/HFrEF EF 25%  #LV apical thrombus on heparin gtt  #AFL - typical  #CAD s/p PCI 12/2023  #PAD s/p prior RLE compartment syndrome s/p fasciotomy    - Tele with AFL rates ~100-130s. Pt asymptomatic.   - BP WNL, please trial Metoprolol as BP can tolerate.   - TTE with EF 25%, LV apical thrombus, Severe TR, mod MR, small pericardial effusion.   - Discussed options of management with patient including catheter ablation. As pt is in typical AFL, would offer ablation to reduce future recurrence. Pt is agreeable.   - SGLT2 inhibitors will need to be held 3 days prior to anesthesia requiring procedures. Pt received Farxiga yesterday, please hold from now.   - LFTs elevated today AST 52/ , downtrending.   - On AC with heparin gtt, has been therapeutic since 3/6. Pt last ECG in sinus rhythm 3/7 at 1714. Please continue and keep ptt therapeutic.   -- Will eventually need transition to oral AC.   -- TTE with trace pericardial effusion  - GDMT per cardiology. Recs appreciated. Per cards, not AT candidate d/t recent substance use.   - Please optimize for ablation later this week. Pt still requiring 3L oxygen and reports SOB at rest.   - Keep on tele. Keep K>4, Mg>2  - Discussed with EP attending and primary team.    63 year old man with history of ICM (LVIDd 5.2 cm, LVEF 25-30%), CAD s/p PCI 12/2023, severe TR, HTN, AF, PAD c/b RLE compartment syndrome s/p fasciotomy (12/2023) active smoker, ETOH misuse (4 beers daily and half pint liquor 2-3x week) and cocaine use (last 1 month PTA) who was recently hospitalized for ADHF requiring chest tubes for effusions. Now presenting, initially to [facility] for recurrent ADHF. Developed cardiogenic shock prompting transfer to [facility] for further management. Initiated on inotropic support but has since been weaned with diuresis and introduction of afterload reducing agents. Echo with LV thrombus and EF ~25%. EP consulted for AFL w/ RVR for rhythm control management.     #ICM/HFrEF EF 25%  #LV apical thrombus on heparin gtt  #AFL - typical  #CAD s/p PCI 12/2023  #PAD s/p prior RLE compartment syndrome s/p fasciotomy    - Tele with AFL rates ~100-130s. Pt asymptomatic.   - BP WNL, please trial Metoprolol as BP can tolerate.   - TTE with EF 25%, LV apical thrombus, Severe TR, mod MR, small pericardial effusion.   - Discussed options of management with patient including catheter ablation. As pt is in typical AFL, would offer ablation to reduce future recurrence. Pt is agreeable.   - SGLT2 inhibitors will need to be held 3 days prior to anesthesia requiring procedures. Pt received Farxiga yesterday, please hold from now.   - LFTs elevated today AST 52/ , downtrending.   - On AC with heparin gtt, has been therapeutic since 3/6. Pt last ECG in sinus rhythm 3/7 at 1714. Please continue and keep ptt therapeutic >=59.   -- Will eventually need transition to oral AC.   -- TTE with trace pericardial effusion  - GDMT per cardiology. Recs appreciated. Per cards, not AT candidate d/t recent substance use.   - Please optimize for ablation later this week. Pt still requiring 3L oxygen and reports SOB at rest.   - Keep on tele. Keep K>4, Mg>2  - Discussed with EP attending and primary team.    63 year old man with history of ICM (LVIDd 5.2 cm, LVEF 25-30%), CAD s/p PCI 12/2023, severe TR, HTN, AF, PAD c/b RLE compartment syndrome s/p fasciotomy (12/2023) active smoker, ETOH misuse (4 beers daily and half pint liquor 2-3x week) and cocaine use (last 1 month PTA) who was recently hospitalized for ADHF requiring chest tubes for effusions. Now presenting, initially to [facility] for recurrent ADHF. Developed cardiogenic shock prompting transfer to [facility] for further management. Initiated on inotropic support but has since been weaned with diuresis and introduction of afterload reducing agents. Echo with LV thrombus and EF ~25%. EP consulted for AFL w/ RVR for rhythm control management.     #ICM/HFrEF EF 25%  #LV apical thrombus on heparin gtt  #AFL - typical  #CAD s/p PCI 12/2023  #PAD s/p prior RLE compartment syndrome s/p fasciotomy    - Tele with AFL rates ~100-130s. Pt asymptomatic.   - BP WNL, please trial Metoprolol as BP can tolerate.   - TTE with EF 25%, LV apical thrombus, Severe TR, mod MR, small pericardial effusion.   - Discussed options of management with patient including catheter ablation. As pt is in typical AFL, would offer ablation to reduce future recurrence. Pt is agreeable.   - SGLT2 inhibitors will need to be held 3 days prior to anesthesia requiring procedures. Pt received Farxiga yesterday, please hold from now.   - LFTs elevated today AST 52/ , downtrending.   - On AC with heparin gtt, has been therapeutic since 3/6. Pt last ECG in sinus rhythm 3/7 at 1714. Please continue and keep ptt therapeutic >=60.   -- Will eventually need transition to oral AC.   -- TTE with trace pericardial effusion  - GDMT per cardiology. Recs appreciated. Per cards, not AT candidate d/t recent substance use.   - Please optimize for ablation later this week. Pt still requiring 3L oxygen and reports SOB at rest.   - Keep on tele. Keep K>4, Mg>2  - Discussed with EP attending and primary team.

## 2024-03-12 NOTE — PROGRESS NOTE ADULT - ASSESSMENT
Full note to f/u:      Psychoeducation Group Note    PATIENT'S NAME: Chris Stockton  MRN:   [id number]  :   1959  ACCT. NUMBER: [id number]  DATE OF SERVICE: 20  START TIME:  1:00 PM  END TIME:  1:50 PM  FACILITATOR: Christina Roman RN  TOPIC: ANNA MARIE RN Group: Medication Education and Management  Adult Partial Hospitalization Program  TRACK: 1    NUMBER OF PARTICIPANTS: 7    Summary of Group / Topics Discussed:  Medication Educations and Management:  Medication Categories: Patient were provided with a brief overview of four major psychotropic medication categories. Expected effects, potential side effects, adverse reactions, and contraindications were discussed. Patients learned about how their medications work to treat their illness and reviewed safe medication management, handling, and disposal of medications.     Patient Session Goals / Objectives:  ? Listed the four major categories of psychotropic medications (antidepressants, antipsychotics, mood stabilizers, anti-anxiety)  ? Identified medications in each category and important adverse reactions/contraindications for use  ? Explained how the medications they take work to treat their illness   Telemedicine Visit: The patient's condition can be safely assessed and treated via synchronous audio and visual telemedicine encounter.      Reason for Telemedicine Visit: Services only offered telehealth    Originating Site (Patient Location): Patient's home    Distant Site (Provider Location): Provider Remote Setting    Consent:  The patient/guardian has verbally consented to: the potential risks and benefits of telemedicine (video visit) versus in person care; bill my insurance or make self-payment for services provided; and responsibility for payment of non-covered services.     Mode of Communication:  Video Conference via 3 Four 5 Group    As the provider I attest to compliance with applicable laws and regulations related to telemedicine.       Patient Participation /  Response:  Fully participated with the group by sharing personal reflections / insights and openly received / provided feedback with other participants.     Demonstrated understanding of topics discussed through group discussion and participation    Treatment Plan:  Patient has an initial individualized treatment plan that was created as part of their diagnostic assessment / admission process.  A master individualized treatment plan is in the process of being developed with the patient and multi-disciplinary care team.    Christina Roman RN   Full note to f/u.    Full note to f/u.     As requested by the patient. I reached out to his brother, Maty.  phone number: [phone]. I d/w Maty about the patients advanced illness and lack of options for advanced therapies. I also d/w him about the importance for the patient to complete a HCP form. His brother expressed, in the past, he had discussions with the patient about code status and that; at that time, he did not seem to be willing to have CPR. I will  f/u with the patient and see if he can be re-engaged in a discussion about ACP and resources.      63 year old male with past medical history of HIV, CVA, HTN, HLD, prediabetes, A.fib, cocaine/heroin abuse, CAD w/ stent, CHF (EF  25-30% in 12/23),  RLE compartment syndrome s/p fasciotomy 12/23 and recent hospitalization for CHF exacerbation with bilateral pleural effusions requiring chest tube placement initially presenting to [facility] on 3/1 with chest pain and shortness of breath.  He was admitted to the outside hospital for management of CHF exacerbation, found to have a further reduced EF of 10 - 15% on 3/1. While admitted, he was hypotensive requiring dopamine infusion with worsening perfusion indices and ELIE. He was transferred to [facility] for cardiogenic shock management. Geriatrics and Palliative Medicine (GaP) Team was called for GOC and ACP.

## 2024-03-12 NOTE — PROGRESS NOTE ADULT - SURROGATE PHONE NUMBER
He only remembers one hi brother's (Maty) phone number: [phone]. However, as per care coordination notes, the phone number may be [phone]
[phone] (Maty)

## 2024-03-12 NOTE — PROGRESS NOTE ADULT - PROBLEM SELECTOR PLAN 5
I am concerned that as I confirmed with the patient's brother (see [facility] note above) with the patient's poor social support,  advanced illness, poor nutritional status, h/o polysubstance abuse, weak state, and limited medical literacy, without appropriate supervision he may not do well if sent home without appropriate services. J[facility]ERSON may be an option; however, this will only be a temporary solution. I feel that NH may be a better fit for this patient.   On 3/13, I will reach out to his floor SW to see if the idea of NH or JEFERSON to NH can be re-visited.

## 2024-03-12 NOTE — PROGRESS NOTE ADULT - PROBLEM SELECTOR PLAN 2
- slightly overloaded, saturating on 98% on 3L  - TTE with LVEF 25%, severe RV dilation, moderate MR, severe TR, LV thrombus  - HF following, f/u recs  GDMT  - increase bumex to 1 mg po BID  - Cont losartan 25mg daily  - Cont spironolactone 25mg daily  - HOLDING farxiga 10mg daily for ablation on 3/14  - BB on hold for now-- per dr lewis, do not resume   - Per cards, Given recent substance use, not an advanced therapies candidate  - strict Is/Os, daily weights

## 2024-03-12 NOTE — PROGRESS NOTE ADULT - ASSESSMENT
63 year old male with past medical history of HIV, CVA, HTN, HLD, prediabetes, A.fib, cocaine/heroin abuse, CAD w/ stent, CHF (EF  25-30% in 12/23),  RLE compartment syndrome s/p fasciotomy 12/23 and recent hospitalization for CHF exacerbation with bilateral pleural effusions requiring chest tube placement initially presenting to [facility] on 3/1 with chest pain and shortness of breath.  He was admitted to the outside hospital for management of CHF exacerbation, found to have a further reduced EF of 10 - 15% on 3/1. While admitted, he was hypotensive requiring dopamine infusion with worsening perfusion indices and ELIE. He was transferred to [facility] for cardiogenic shock management. Geriatrics and Palliative Medicine (GaP) Team was called for GOC and ACP.

## 2024-03-12 NOTE — PROGRESS NOTE ADULT - SUBJECTIVE AND OBJECTIVE BOX
ADVANCED HEART FAILURE & TRANSPLANT  - PROGRESS NOTE  *To reach the NS2 Team from 8am to 5pm, please call [phone]   ___________________________________________________________________________  Subjective:  - Has not been out of bed today, resting flat comfortably without SOB or orthopnea  - denies CP, lightheadedness      Medications:  AQUAPHOR (petrolatum Ointment) 1 Application(s) Topical every 12 hours  atorvastatin 40 milliGRAM(s) Oral at bedtime  buMETAnide 1 milliGRAM(s) Oral every 12 hours  chlorhexidine 2% Cloths 1 Application(s) Topical <User Schedule>  clopidogrel Tablet 75 milliGRAM(s) Oral daily  digoxin     Tablet 250 MICROGram(s) Oral daily  folic acid 1 milliGRAM(s) Oral daily  heparin  Infusion 900 Unit(s)/Hr IV Continuous <Continuous>  insulin lispro (ADMELOG) corrective regimen sliding scale   SubCutaneous three times a day before meals  insulin lispro (ADMELOG) corrective regimen sliding scale   SubCutaneous at bedtime  levothyroxine 25 MICROGram(s) Oral daily  losartan 25 milliGRAM(s) Oral daily  sodium chloride 0.9% lock flush 10 milliLiter(s) IV Push every 1 hour PRN  spironolactone 25 milliGRAM(s) Oral daily  thiamine 100 milliGRAM(s) Oral daily      Physical Exam:    Vitals:  Vital Signs Last 24 Hours  T(C): 36.3 (24 @ 12:01), Max: 37.1 (24 @ 06:24)  HR: 114 (24 @ 12:01) (106 - 114)  BP: 99/61 (24 @ 12:01) (99/61 - 111/76)  RR: 18 (24 @ 12:01) (18 - 18)  SpO2: 95% (24 @ 12:01) (95% - 100%)    Weight in k.2 ( @ 07:00)    I&O's Summary    11 Mar 2024 07:01  -  12 Mar 2024 07:00  --------------------------------------------------------  IN: 754 mL / OUT: 1600 mL / NET: -846 mL    12 Mar 2024 07:01  -  12 Mar 2024 16:14  --------------------------------------------------------  IN: 300 mL / OUT: 1300 mL / NET: -1000 mL    Tele: aflutter 100-120s    General: No distress. Comfortable.  HEENT: EOM intact.  Neck: Neck supple. JVP ~10cm H2O No masses  Chest: Clear to auscultation bilaterally  CV: Tachycardic. Normal S1 and S2. No murmurs, rub, or gallops. Radial pulses normal. No LE edema  Abdomen: Soft, non-distended, non-tender  Skin: No rashes or skin breakdown  Neurology: Alert and oriented times three. Sensation intact  Psych: Affect normal    Labs:                        8.7    7.44  )-----------( 172      ( 12 Mar 2024 07:25 )             27.4     03-12    136  |  99  |  53<H>  ----------------------------<  66<L>  3.8   |  22  |  1.60<H>    Ca    8.7      12 Mar 2024 07:25  Phos  3.4     03-11  Mg     2.2     -11    TPro  6.5  /  Alb  3.0<L>  /  TBili  0.8  /  DBili  x   /  AST  52<H>  /  ALT  154<H>  /  AlkPhos  368<H>  0312    PTT - ( 12 Mar 2024 07:25 )  PTT:45.5 sec    Lactate, Blood: 2.4 mmol/L ( @ 13:59)

## 2024-03-12 NOTE — PROGRESS NOTE ADULT - PROBLEM SELECTOR PLAN 2
- Defer BB as above  - On AC with heparin infusion  - Per discussion with EP, recommend starting Digoxin   - Appreciate EP consult, plan for aflutter ablation later this week once off farxiga for 3 days

## 2024-03-12 NOTE — PROGRESS NOTE ADULT - SUBJECTIVE AND OBJECTIVE BOX
24H hour events: tele with AFL rates ~100-130s. Pt asymptomatic, SOB similar to yesterday.     MEDICATIONS:  buMETAnide 1 milliGRAM(s) Oral daily  clopidogrel Tablet 75 milliGRAM(s) Oral daily  heparin  Infusion 900 Unit(s)/Hr IV Continuous <Continuous>  losartan 25 milliGRAM(s) Oral daily  spironolactone 25 milliGRAM(s) Oral daily  atorvastatin 40 milliGRAM(s) Oral at bedtime  insulin lispro (ADMELOG) corrective regimen sliding scale   SubCutaneous three times a day before meals  insulin lispro (ADMELOG) corrective regimen sliding scale   SubCutaneous at bedtime  levothyroxine 25 MICROGram(s) Oral daily  AQUAPHOR (petrolatum Ointment) 1 Application(s) Topical every 12 hours  chlorhexidine 2% Cloths 1 Application(s) Topical <User Schedule>  folic acid 1 milliGRAM(s) Oral daily  sodium chloride 0.9% lock flush 10 milliLiter(s) IV Push every 1 hour PRN  thiamine 100 milliGRAM(s) Oral daily      REVIEW OF SYSTEMS:  Complete 12point ROS negative.    PHYSICAL EXAM:  T(C): 37.1 (03-12-24 @ 06:24), Max: 37.1 (03-12-24 @ 06:24)  HR: 106 (03-12-24 @ 05:19) (106 - 127)  BP: 111/76 (03-12-24 @ 05:19) (85/58 - 111/76)  RR: 18 (03-12-24 @ 05:19) (18 - 18)  SpO2: 98% (03-12-24 @ 05:19) (95% - 100%)  Wt(kg): --  I&O's Summary    11 Mar 2024 07:01  -  12 Mar 2024 07:00  --------------------------------------------------------  IN: 754 mL / OUT: 1600 mL / NET: -846 mL        Appearance: Normal	  HEENT:  NC/AT  Cardiovascular: tachycardic  Respiratory: dec lung sounds	  Psychiatry: A & O x 3, Mood & affect appropriate  Neurologic: Non-focal  Extremities: RLE wrapped, LLE no pitting edema.       LABS:	 	    CBC Full  -  ( 12 Mar 2024 07:25 )  WBC Count : 7.44 K/uL  Hemoglobin : 8.7 g/dL  Hematocrit : 27.4 %  Platelet Count - Automated : 172 K/uL  Mean Cell Volume : 67.3 fl  Mean Cell Hemoglobin : 21.4 pg  Mean Cell Hemoglobin Concentration : 31.8 gm/dL  03-12    136  |  99  |  53<H>  ----------------------------<  66<L>  3.8   |  22  |  1.60<H>  03-11    137  |  99  |  57<H>  ----------------------------<  113<H>  3.8   |  25  |  1.65<H>    Ca    8.7      12 Mar 2024 07:25  Ca    8.6      11 Mar 2024 13:59  Phos  3.4     03-11  Phos  4.1     03-10  Mg     2.2     03-11  Mg     2.4     03-10    TPro  6.5  /  Alb  3.0<L>  /  TBili  0.8  /  DBili  x   /  AST  52<H>  /  ALT  154<H>  /  AlkPhos  368<H>  03-12  TPro  6.9  /  Alb  3.2<L>  /  TBili  0.9  /  DBili  0.5<H>  /  AST  64<H>  /  ALT  184<H>  /  AlkPhos  395<H>  03-11      proBNP: Pro-Brain Natriuretic Peptide (03.11.24 @ 13:59)    Pro-Brain Natriuretic Peptide: 4171 pg/mL    TSH: Free Thyroxine, Serum in AM (03.10.24 @ 11:09)    Free Thyroxine, Serum: 1.2 ng/dL    CARDIAC MARKERS:Troponin T, High Sensitivity (03.04.24 @ 22:07)    Troponin T, High Sensitivity Result: 93: *  *  Rapid upward or downward changes in high-sensitivity troponin levels  suggest acute myocardial injury. Renal impairment may cause sustained  troponin elevations.  Normal: <6 - 14 ng/L  Indeterminate: 15-51 ng/L  Elevated: > 51 ng/L  See http://labs/test/TROPTHS on the [facility] intranet for more  information ng/L  RADIOLOGY: < from: Xray Chest 1 View- PORTABLE-Routine (Xray Chest 1 View- PORTABLE-Routine in AM.) (03.06.24 @ 02:50) >  Frontal expiratory view of the chest shows the heart to be similar in   size. Right jugular [place] catheter is in similar position in the   right pulmonary artery.    The lungs show less pulmonary congestion with smaller right effusion and   there is no evidence of pneumothorax nor left pleural effusion.    IMPRESSION:  Decreased congestion. Catheter as noted.    Thank you for the courtesy of this referral.    < end of copied text >    PREVIOUS DIAGNOSTIC TESTING:    [ ] Echocardiogram: < from: TTE W or WO Ultrasound Enhancing Agent (03.05.24 @ 07:31) >     CONCLUSIONS:      1. Left ventricular cavity is severely dilated. Left ventricular systolic function is severely decreased. Global left ventricular hypokinesis.   2. Severely enlarged right ventricular cavity size and severely reduced systolic function.   3. Moderate mitral regurgitation. Mechanism of mitral regurgitation: Andrei Type IIIb (restricted leaflet motion secondary to left atrial or left ventricular dilatation).   4. Severe tricuspid regurgitation.   5. Mild to moderate pulmonary hypertension.   6. 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   7. Small pericardial effusion with no evidence of hemodynamic compromise (or echocardiographic evidence of cardiac tamponade).    ________________________________________________________________________________________    < end of copied text >

## 2024-03-12 NOTE — PROGRESS NOTE ADULT - ASSESSMENT
63 year old man with history of ICM (LVIDd 5.2 cm, LVEF 25-30%), CAD s/p PCI, severe TR, HTN, AF, PAD c/b RLE compartment syndrome s/p fasciotomy (12/2023) active smoker, ETOH misuse (4 beers daily and half pint liquor 2-3x week) and cocaine use (last 1 month PTA) who was recently hospitalized for ADHF requiring chest tubes for effusions. Now presenting, initially to [facility] for recurrent ADHF and new LV thrombus. Developed cardiogenic shock prompting transfer to [facility] for further management. Initiated on inotropic support but has since been weaned with diuresis and introduction of afterload reducing agents. Prior to central line removal, off inotropic support, CO/CI was 3/1.9.     He is close to euvolemic, with mildly elevated JVP off farxiga today. He's relatively well compensated from a HF standpoint, but notably in atrial flutter which is not well rate controlled with -120s. Hemodynamically stable, tolerating current GDMT. ELIE appears to have plateaued. Agree with plan for rhythm control strategy with plan for ablation later this week per discussion with EP.     Cardiac Studies  ·	3/5/24 TTE: LVIDd 5.2 cm, LVEF 25% with rounded and protruding LV thrombus in apex, severe RVE with severely reduced function (TAPSE 1.5 cm), mod NEY, trace AI, mod MR, severe TR, est PASP 46 mmHg    Bedside hemodynamics  3/5 Bedside swan numbers; CVP 15, PAP 45/21, wedge 20. MvO2 45 CO/CI 3.4/2.2 SVR 1482  3/6 Bedside swan numbers: CVP 6, PAP 34/15, wedge 15. MvO2 53 CO/CI 3.7/2.4 SVR 1383   3/7 CvO2 42.9, with CO/CI estimated 3/1.9

## 2024-03-12 NOTE — PROGRESS NOTE ADULT - NSPROGADDITIONALINFOA_GEN_ALL_CORE
Dispo: ablation 3/14-- NH/[facility]EFERSON once ready  discussed with dr joshua Mitchell D.O.  Division of Hospital Medicine  Available on MS Teams Within functional limits

## 2024-03-12 NOTE — PROGRESS NOTE ADULT - SUBJECTIVE AND OBJECTIVE BOX
intermittently confused, no complaints.     GENERAL: No fevers, no chills.  EYES: No blurry vision,  No photophobia  ENT: No sore throat.  No dysphagia  Cardiovascular: No chest pain, palpitations, orthopnea  Pulmonary: No cough, no wheezing. No shortness of breath  Gastrointestinal: No abdominal pain, no diarrhea, no constipation.    Musculoskeletal: No weakness.  No myalgias.  Dermatology:  No rashes.  Neuro: No Headache.  No vertigo.  No dizziness.  Psych: No anxiety, no depression.  Denies suicidal thoughts.    MEDICATIONS  (STANDING):  AQUAPHOR (petrolatum Ointment) 1 Application(s) Topical every 12 hours  atorvastatin 40 milliGRAM(s) Oral at bedtime  buMETAnide 1 milliGRAM(s) Oral every 12 hours  chlorhexidine 2% Cloths 1 Application(s) Topical <User Schedule>  clopidogrel Tablet 75 milliGRAM(s) Oral daily  digoxin     Tablet 250 MICROGram(s) Oral daily  folic acid 1 milliGRAM(s) Oral daily  heparin  Infusion 900 Unit(s)/Hr (8.5 mL/Hr) IV Continuous <Continuous>  insulin lispro (ADMELOG) corrective regimen sliding scale   SubCutaneous three times a day before meals  insulin lispro (ADMELOG) corrective regimen sliding scale   SubCutaneous at bedtime  levothyroxine 25 MICROGram(s) Oral daily  losartan 25 milliGRAM(s) Oral daily  spironolactone 25 milliGRAM(s) Oral daily  thiamine 100 milliGRAM(s) Oral daily    MEDICATIONS  (PRN):  sodium chloride 0.9% lock flush 10 milliLiter(s) IV Push every 1 hour PRN Pre/post blood products, medications, blood draw, and to maintain line patency    Vital Signs Last 24 Hrs  T(C): 36.3 (12 Mar 2024 12:01), Max: 37.1 (12 Mar 2024 06:24)  T(F): 97.3 (12 Mar 2024 12:01), Max: 98.7 (12 Mar 2024 06:24)  HR: 114 (12 Mar 2024 12:01) (106 - 114)  BP: 99/61 (12 Mar 2024 12:01) (99/61 - 111/76)  BP(mean): --  RR: 18 (12 Mar 2024 12:01) (18 - 18)  SpO2: 95% (12 Mar 2024 12:01) (95% - 100%)    Parameters below as of 12 Mar 2024 12:01  Patient On (Oxygen Delivery Method): room air    CONSTITUTIONAL: NAD, frail appearing  EYES: PERRLA; conjunctiva and sclera clear  ENMT: Moist oral mucosa, no pharyngeal injection or exudates  NECK: Supple, no palpable masses; no thyromegaly  RESPIRATORY: Normal respiratory effort; lungs are clear to auscultation bilaterally  CARDIOVASCULAR: Regular rate and rhythm, normal S1 and S2, no murmur/rub/gallop; No lower extremity edema  ABDOMEN: Nontender to palpation, normoactive bowel sounds, no rebound/guarding; No hepatosplenomegaly  MUSCULOSKELETAL:  No clubbing or cyanosis of digits; no joint swelling or tenderness to palpation  PSYCH: A+O to person, place, and time; affect appropriate  SKIN: No rashes; no palpable lesions    .  LABS:                         8.7    7.44  )-----------( 172      ( 12 Mar 2024 07:25 )             27.4     03-12    136  |  99  |  53<H>  ----------------------------<  66<L>  3.8   |  22  |  1.60<H>    Ca    8.7      12 Mar 2024 07:25  Phos  3.4     03-11  Mg     2.2     03-11    TPro  6.5  /  Alb  3.0<L>  /  TBili  0.8  /  DBili  x   /  AST  52<H>  /  ALT  154<H>  /  AlkPhos  368<H>  03-12    PTT - ( 12 Mar 2024 07:25 )  PTT:45.5 sec  Urinalysis Basic - ( 12 Mar 2024 07:25 )    Color: x / Appearance: x / SG: x / pH: x  Gluc: 66 mg/dL / Ketone: x  / Bili: x / Urobili: x   Blood: x / Protein: x / Nitrite: x   Leuk Esterase: x / RBC: x / WBC x   Sq Epi: x / Non Sq Epi: x / Bacteria: x            RADIOLOGY, EKG & ADDITIONAL TESTS: Reviewed.

## 2024-03-13 DIAGNOSIS — R05.9 COUGH, UNSPECIFIED: ICD-10-CM

## 2024-03-13 LAB
ALBUMIN SERPL ELPH-MCNC: 2.8 G/DL — LOW (ref 3.3–5)
ALP SERPL-CCNC: 342 U/L — HIGH (ref 40–120)
ALT FLD-CCNC: 131 U/L — HIGH (ref 10–45)
ANION GAP SERPL CALC-SCNC: 13 MMOL/L — SIGNIFICANT CHANGE UP (ref 5–17)
APTT BLD: 57 SEC — HIGH (ref 24.5–35.6)
AST SERPL-CCNC: 47 U/L — HIGH (ref 10–40)
BILIRUB SERPL-MCNC: 0.7 MG/DL — SIGNIFICANT CHANGE UP (ref 0.2–1.2)
BUN SERPL-MCNC: 47 MG/DL — HIGH (ref 7–23)
CALCIUM SERPL-MCNC: 8.5 MG/DL — SIGNIFICANT CHANGE UP (ref 8.4–10.5)
CHLORIDE SERPL-SCNC: 101 MMOL/L — SIGNIFICANT CHANGE UP (ref 96–108)
CO2 SERPL-SCNC: 23 MMOL/L — SIGNIFICANT CHANGE UP (ref 22–31)
CREAT SERPL-MCNC: 1.43 MG/DL — HIGH (ref 0.5–1.3)
DIGOXIN SERPL-MCNC: 0.7 NG/ML — LOW (ref 0.8–2)
DIGOXIN SERPL-MCNC: <0.5 NG/ML — LOW (ref 0.8–2)
EGFR: 55 ML/MIN/1.73M2 — LOW
FLUAV AG NPH QL: SIGNIFICANT CHANGE UP
FLUBV AG NPH QL: SIGNIFICANT CHANGE UP
GLUCOSE BLDC GLUCOMTR-MCNC: 107 MG/DL — HIGH (ref 70–99)
GLUCOSE BLDC GLUCOMTR-MCNC: 108 MG/DL — HIGH (ref 70–99)
GLUCOSE BLDC GLUCOMTR-MCNC: 108 MG/DL — HIGH (ref 70–99)
GLUCOSE BLDC GLUCOMTR-MCNC: 116 MG/DL — HIGH (ref 70–99)
GLUCOSE SERPL-MCNC: 101 MG/DL — HIGH (ref 70–99)
HCT VFR BLD CALC: 28.6 % — LOW (ref 39–50)
HGB BLD-MCNC: 9 G/DL — LOW (ref 13–17)
LACTATE SERPL-SCNC: 1.6 MMOL/L — SIGNIFICANT CHANGE UP (ref 0.5–2)
MCHC RBC-ENTMCNC: 21.3 PG — LOW (ref 27–34)
MCHC RBC-ENTMCNC: 31.5 GM/DL — LOW (ref 32–36)
MCV RBC AUTO: 67.6 FL — LOW (ref 80–100)
NRBC # BLD: 2 /100 WBCS — HIGH (ref 0–0)
PLATELET # BLD AUTO: 168 K/UL — SIGNIFICANT CHANGE UP (ref 150–400)
POTASSIUM SERPL-MCNC: 3.3 MMOL/L — LOW (ref 3.5–5.3)
POTASSIUM SERPL-SCNC: 3.3 MMOL/L — LOW (ref 3.5–5.3)
PROT SERPL-MCNC: 6.7 G/DL — SIGNIFICANT CHANGE UP (ref 6–8.3)
RBC # BLD: 4.23 M/UL — SIGNIFICANT CHANGE UP (ref 4.2–5.8)
RBC # FLD: 16.7 % — HIGH (ref 10.3–14.5)
RSV RNA NPH QL NAA+NON-PROBE: SIGNIFICANT CHANGE UP
SARS-COV-2 RNA SPEC QL NAA+PROBE: SIGNIFICANT CHANGE UP
SODIUM SERPL-SCNC: 137 MMOL/L — SIGNIFICANT CHANGE UP (ref 135–145)
WBC # BLD: 5.43 K/UL — SIGNIFICANT CHANGE UP (ref 3.8–10.5)
WBC # FLD AUTO: 5.43 K/UL — SIGNIFICANT CHANGE UP (ref 3.8–10.5)

## 2024-03-13 PROCEDURE — 99232 SBSQ HOSP IP/OBS MODERATE 35: CPT

## 2024-03-13 PROCEDURE — 99233 SBSQ HOSP IP/OBS HIGH 50: CPT

## 2024-03-13 PROCEDURE — 71045 X-RAY EXAM CHEST 1 VIEW: CPT | Mod: 26

## 2024-03-13 RX ORDER — POTASSIUM CHLORIDE 20 MEQ
40 PACKET (EA) ORAL ONCE
Refills: 0 | Status: COMPLETED | OUTPATIENT
Start: 2024-03-13 | End: 2024-03-13

## 2024-03-13 RX ORDER — BUMETANIDE 0.25 MG/ML
1 INJECTION INTRAMUSCULAR; INTRAVENOUS EVERY 12 HOURS
Refills: 0 | Status: DISCONTINUED | OUTPATIENT
Start: 2024-03-14 | End: 2024-03-18

## 2024-03-13 RX ORDER — BUMETANIDE 0.25 MG/ML
2 INJECTION INTRAMUSCULAR; INTRAVENOUS ONCE
Refills: 0 | Status: COMPLETED | OUTPATIENT
Start: 2024-03-13 | End: 2024-03-13

## 2024-03-13 RX ADMIN — Medication 40 MILLIEQUIVALENT(S): at 06:22

## 2024-03-13 RX ADMIN — Medication 25 MICROGRAM(S): at 05:42

## 2024-03-13 RX ADMIN — HEPARIN SODIUM 9 UNIT(S)/HR: 5000 INJECTION INTRAVENOUS; SUBCUTANEOUS at 05:44

## 2024-03-13 RX ADMIN — CLOPIDOGREL BISULFATE 75 MILLIGRAM(S): 75 TABLET, FILM COATED ORAL at 12:00

## 2024-03-13 RX ADMIN — SPIRONOLACTONE 25 MILLIGRAM(S): 25 TABLET, FILM COATED ORAL at 05:41

## 2024-03-13 RX ADMIN — BUMETANIDE 1 MILLIGRAM(S): 0.25 INJECTION INTRAMUSCULAR; INTRAVENOUS at 05:41

## 2024-03-13 RX ADMIN — ATORVASTATIN CALCIUM 40 MILLIGRAM(S): 80 TABLET, FILM COATED ORAL at 21:20

## 2024-03-13 RX ADMIN — BUMETANIDE 2 MILLIGRAM(S): 0.25 INJECTION INTRAMUSCULAR; INTRAVENOUS at 15:50

## 2024-03-13 RX ADMIN — HEPARIN SODIUM 9 UNIT(S)/HR: 5000 INJECTION INTRAVENOUS; SUBCUTANEOUS at 08:52

## 2024-03-13 RX ADMIN — Medication 40 MILLIEQUIVALENT(S): at 08:53

## 2024-03-13 RX ADMIN — Medication 62.5 MICROGRAM(S): at 11:59

## 2024-03-13 RX ADMIN — Medication 1 APPLICATION(S): at 17:11

## 2024-03-13 RX ADMIN — LOSARTAN POTASSIUM 25 MILLIGRAM(S): 100 TABLET, FILM COATED ORAL at 05:42

## 2024-03-13 RX ADMIN — Medication 125 MICROGRAM(S): at 04:33

## 2024-03-13 RX ADMIN — Medication 100 MILLIGRAM(S): at 12:00

## 2024-03-13 RX ADMIN — HEPARIN SODIUM 8.5 UNIT(S)/HR: 5000 INJECTION INTRAVENOUS; SUBCUTANEOUS at 00:11

## 2024-03-13 RX ADMIN — Medication 1 MILLIGRAM(S): at 11:59

## 2024-03-13 NOTE — PROGRESS NOTE ADULT - SUBJECTIVE AND OBJECTIVE BOX
no events overnight.    GENERAL: No fevers, no chills.  EYES: No blurry vision,  No photophobia  ENT: No sore throat.  No dysphagia  Cardiovascular: No chest pain, palpitations, orthopnea  Pulmonary: No cough, no wheezing. No shortness of breath  Gastrointestinal: No abdominal pain, no diarrhea, no constipation.    Musculoskeletal: No weakness.  No myalgias.  Dermatology:  No rashes.  Neuro: No Headache.  No vertigo.  No dizziness.  Psych: No anxiety, no depression.  Denies suicidal thoughts.    MEDICATIONS  (STANDING):  AQUAPHOR (petrolatum Ointment) 1 Application(s) Topical every 12 hours  atorvastatin 40 milliGRAM(s) Oral at bedtime  buMETAnide 2 milliGRAM(s) Oral once  chlorhexidine 2% Cloths 1 Application(s) Topical <User Schedule>  clopidogrel Tablet 75 milliGRAM(s) Oral daily  digoxin     Tablet 62.5 MICROGram(s) Oral daily  folic acid 1 milliGRAM(s) Oral daily  heparin  Infusion 900 Unit(s)/Hr (9 mL/Hr) IV Continuous <Continuous>  insulin lispro (ADMELOG) corrective regimen sliding scale   SubCutaneous three times a day before meals  insulin lispro (ADMELOG) corrective regimen sliding scale   SubCutaneous at bedtime  levothyroxine 25 MICROGram(s) Oral daily  losartan 25 milliGRAM(s) Oral daily  spironolactone 25 milliGRAM(s) Oral daily  thiamine 100 milliGRAM(s) Oral daily    MEDICATIONS  (PRN):  sodium chloride 0.9% lock flush 10 milliLiter(s) IV Push every 1 hour PRN Pre/post blood products, medications, blood draw, and to maintain line patency      Vital Signs Last 24 Hrs  T(C): 36.6 (13 Mar 2024 11:29), Max: 36.6 (13 Mar 2024 11:29)  T(F): 97.8 (13 Mar 2024 11:29), Max: 97.8 (13 Mar 2024 11:29)  HR: 118 (13 Mar 2024 11:29) (103 - 118)  BP: 110/74 (13 Mar 2024 11:29) (91/58 - 110/74)  BP(mean): --  RR: 18 (13 Mar 2024 11:29) (18 - 18)  SpO2: 97% (13 Mar 2024 11:29) (94% - 98%)    Parameters below as of 13 Mar 2024 11:29  Patient On (Oxygen Delivery Method): room air        GENERAL: NAD, frail appearing  HEAD:  Atraumatic, Normocephalic  EYES: EOMI, PERRLA, conjunctiva and sclera clear  ENT: Pharynx not erythematous  PULMONARY: Clear to auscultation bilaterally; No wheeze  CARDIOVASCULAR: Regular rate and rhythm; No murmurs, rubs, or gallops  ABDOMEN: Soft, Nontender, Nondistended; Bowel sounds present  EXTREMITIES:  2+ Peripheral Pulses, No clubbing, cyanosis, or edema  MUSCULOSKELETAL: No calf tenderness  PSYCH: AAOx3, normal affect  SKIN: warm and dry, No rashes or lesions    .  LABS:                         9.0    5.43  )-----------( 168      ( 13 Mar 2024 04:40 )             28.6     03-13    137  |  101  |  47<H>  ----------------------------<  101<H>  3.3<L>   |  23  |  1.43<H>    Ca    8.5      13 Mar 2024 04:40    TPro  6.7  /  Alb  2.8<L>  /  TBili  0.7  /  DBili  x   /  AST  47<H>  /  ALT  131<H>  /  AlkPhos  342<H>  03-13    PTT - ( 13 Mar 2024 04:40 )  PTT:57.0 sec  Urinalysis Basic - ( 13 Mar 2024 04:40 )    Color: x / Appearance: x / SG: x / pH: x  Gluc: 101 mg/dL / Ketone: x  / Bili: x / Urobili: x   Blood: x / Protein: x / Nitrite: x   Leuk Esterase: x / RBC: x / WBC x   Sq Epi: x / Non Sq Epi: x / Bacteria: x        Lactate, Blood: 1.6 mmol/L (03-13 @ 04:40)      RADIOLOGY, EKG & ADDITIONAL TESTS: Reviewed.

## 2024-03-13 NOTE — PROGRESS NOTE ADULT - ASSESSMENT
63 year old male with past medical history of CVA, HTN, HLD, prediabetes, A.fib, cocaine/heroin abuse, CAD w/ stent, CHF (EF  25-30% in 12/23),  RLE compartment syndrome s/p fasciotomy 12/23 and recent hospitalization for CHF exacerbation with bilateral pleural effusions requiring chest tube placement initially presenting to [facility] on 3/1 with chest pain and shortness of breath, transferred to [facility] for management of cardiogenic shock requiring inotrope support. Now transferred to medicine for further management

## 2024-03-13 NOTE — PROGRESS NOTE ADULT - ASSESSMENT
63 year old man with history of ICM (LVIDd 5.2 cm, LVEF 25-30%), CAD s/p PCI, severe TR, HTN, AF, PAD c/b RLE compartment syndrome s/p fasciotomy (12/2023) active smoker, ETOH misuse (4 beers daily and half pint liquor 2-3x week) and cocaine use (last 1 month PTA) who was recently hospitalized for ADHF requiring chest tubes for effusions. Now presenting, initially to [facility] for recurrent ADHF and new LV thrombus. Developed cardiogenic shock prompting transfer to [facility] for further management. Initiated on inotropic support but has since been weaned with diuresis and introduction of afterload reducing agents. Prior to central line removal, off inotropic support, CO/CI was 3/1.9.     He appears volume overloaded today with worsening cough. He is net negative, however with downtrending weight and improving Cr and LFTs. He's afebrile. He received digoxin 3/12 without much improvement in his HR. He remains in atrial flutter which is not well rate controlled with -110s. His lactate has cleared. Hemodynamically stable, tolerating current GDMT.  Agree with plan for rhythm control strategy with plan for ablation later this week per discussion with EP.     Cardiac Studies  ·	3/5/24 TTE: LVIDd 5.2 cm, LVEF 25% with rounded and protruding LV thrombus in apex, severe RVE with severely reduced function (TAPSE 1.5 cm), mod NEY, trace AI, mod MR, severe TR, est PASP 46 mmHg    Bedside hemodynamics  3/5 Bedside swan numbers; CVP 15, PAP 45/21, wedge 20. MvO2 45 CO/CI 3.4/2.2 SVR 1482  3/6 Bedside swan numbers: CVP 6, PAP 34/15, wedge 15. MvO2 53 CO/CI 3.7/2.4 SVR 1383   3/7 CvO2 42.9, with CO/CI estimated 3/1.9

## 2024-03-13 NOTE — PROGRESS NOTE ADULT - ASSESSMENT
63 year old man with history of ICM (LVIDd 5.2 cm, LVEF 25-30%), CAD s/p PCI 12/2023, severe TR, HTN, AF, PAD c/b RLE compartment syndrome s/p fasciotomy (12/2023) active smoker, ETOH misuse (4 beers daily and half pint liquor 2-3x week) and cocaine use (last 1 month PTA) who was recently hospitalized for ADHF requiring chest tubes for effusions. Now presenting, initially to [facility] for recurrent ADHF. Developed cardiogenic shock prompting transfer to [facility] for further management. Initiated on inotropic support but has since been weaned with diuresis and introduction of afterload reducing agents. Echo with LV thrombus and EF ~25%. EP consulted for AFL w/ RVR for rhythm control management.     #ICM/HFrEF EF 25%  #LV apical thrombus on heparin gtt  #AFL - typical  #CAD s/p PCI 12/2023  #PAD s/p prior RLE compartment syndrome s/p fasciotomy    - Tele with AFL rates ~100-120s. Pt asymptomatic.   - Now on Dig.   - TTE with EF 25%, LV apical thrombus, Severe TR, mod MR, small pericardial effusion.   - Discussed options of management with patient including catheter ablation. As pt is in typical AFL, would offer ablation to reduce future recurrence. Pt is agreeable, will plan for Friday.   - SGLT2 inhibitors will need to be held 3 days prior to anesthesia requiring procedures. Pt received Farxiga yesterday, please hold from now.   - LFTs elevated today AST 52/ , downtrending.   - On AC with heparin gtt. Pt last ECG in sinus rhythm 3/7 at 1714. Please continue and keep ptt therapeutic >=60.   -- Will eventually need transition to oral AC.   -- TTE with trace pericardial effusion  - GDMT per cardiology. Recs appreciated. Per cards, not AT candidate d/t recent substance use.   - Please optimize for ablation later this week. Pt still requiring 3L oxygen and reports SOB at rest.   - Keep on tele. Keep K>4, Mg>2  - Discussed with EP attending and primary team.    63 year old man with history of ICM (LVIDd 5.2 cm, LVEF 25-30%), CAD s/p PCI 12/2023, severe TR, HTN, AF, PAD c/b RLE compartment syndrome s/p fasciotomy (12/2023) active smoker, ETOH misuse (4 beers daily and half pint liquor 2-3x week) and cocaine use (last 1 month PTA) who was recently hospitalized for ADHF requiring chest tubes for effusions. Now presenting, initially to [facility] for recurrent ADHF. Developed cardiogenic shock prompting transfer to [facility] for further management. Initiated on inotropic support but has since been weaned with diuresis and introduction of afterload reducing agents. Echo with LV thrombus and EF ~25%. EP consulted for AFL w/ RVR for rhythm control management.     #ICM/HFrEF EF 25%  #LV apical thrombus on heparin gtt  #AFL - typical  #CAD s/p PCI 12/2023  #PAD s/p prior RLE compartment syndrome s/p fasciotomy    - Tele with AFL rates ~100-120s. Pt asymptomatic.   - Now on Dig.   - TTE with EF 25%, LV apical thrombus, Severe TR, mod MR, small pericardial effusion.   - Discussed options of management with patient including catheter ablation. As pt is in typical AFL, would offer ablation to reduce future recurrence. Pt is agreeable, will plan for Friday.   - SGLT2 inhibitors will need to be held 3 days prior to anesthesia requiring procedures. Pt received Farxiga Monday, please hold from now.   - LFTs elevated today AST 52/ , downtrending.   - On AC with heparin gtt. Pt last ECG in sinus rhythm 3/7 at 1714. Please continue and keep ptt therapeutic >=60.   -- Will eventually need transition to oral AC.   -- TTE with trace pericardial effusion  - GDMT per cardiology. Recs appreciated. Per cards, not AT candidate d/t recent substance use.   - Please optimize for ablation later this week. Pt still requiring 3L oxygen and reports SOB at rest.   - Keep on tele. Keep K>4, Mg>2  - Discussed with EP attending and primary team.

## 2024-03-13 NOTE — PROGRESS NOTE ADULT - ASSESSMENT
A/P: 63 year old male with past medical history of HIV, CVA, HTN, HLD, prediabetes, A.fib, cocaine/heroin abuse, CAD w/ stent, CHF (EF  25-30% in 12/23, now 10 - 15 % as of TTE on 3/1),  RLE compartment syndrome s/p fasciotomy 12/23 presented to outside hospital with chest pain and shortness of breath, admitted for CHF exacerbation, transferred to [facility] for cardiogenic shock management     Wound Consult requested to assist w/ management of:  RLE wounds  BLE PAD  xerosis    RLE - Medihoney + Adaptic dressing QD  BLE elevation & Compression  Arterial US BLE noted  Abx per Medicine/ ID  Moisturize intact skin w/ Aquaphor cream BID  Nutrition Consult for optimization        encourage high quality protein, jovana/ prosource, MVI & Vit C to promote wound healing  Continue turning and positioning w/ offloading assistive devices as per protocol  Buttocks/ Sacrum Hilda BID and prn soiling        Continue w/ attends under pads and Pericare w/ mitchell cath maintenance as per protocol  Waffle Cushion to chair when oob to chair  Continue w/ low air loss pressure redistribution bed surface   Care as per medicine, will follow w/ you  Upon discharge f/u as outpatient at [facility] [address] [facility] [phone]  D/w team & RN &attng  Bebe Bradford PA-C CWS [id number]  Nights/ Weekends/ Holidays please call:  General Surgery Consult pager ([phone]) for emergencies  Wound PT for multilayer leg wrapping or VAC issues ([phone])   I spent 35 minutes face to face w/ this pt of which more than 50% of the time was spent counseling & coordinating care of this pt.

## 2024-03-13 NOTE — PROGRESS NOTE ADULT - SUBJECTIVE AND OBJECTIVE BOX
[facility]-- WOUND TEAM -- FOLLOW UP NOTE  --------------------------------------------------------------------------------    24 hour events/subjective:          Diet:  Diet, DASH/TLC:   Sodium & Cholesterol Restricted  Consistent Carbohydrate No Snacks (CSTCHO)  No Pork (03-06-24 @ 06:29)      ROS: General/ SKIN/ MSK/ Neuro/ GI see HPI  all other systems negative  pt unable to offer    ALLERGIES & MEDICATIONS  --------------------------------------------------------------------------------  Allergies    No Known Allergies    Intolerances          STANDING INPATIENT MEDICATIONS    AQUAPHOR (petrolatum Ointment) 1 Application(s) Topical every 12 hours  atorvastatin 40 milliGRAM(s) Oral at bedtime  chlorhexidine 2% Cloths 1 Application(s) Topical <User Schedule>  clopidogrel Tablet 75 milliGRAM(s) Oral daily  digoxin     Tablet 62.5 MICROGram(s) Oral daily  folic acid 1 milliGRAM(s) Oral daily  heparin  Infusion 900 Unit(s)/Hr IV Continuous <Continuous>  insulin lispro (ADMELOG) corrective regimen sliding scale   SubCutaneous three times a day before meals  insulin lispro (ADMELOG) corrective regimen sliding scale   SubCutaneous at bedtime  levothyroxine 25 MICROGram(s) Oral daily  losartan 25 milliGRAM(s) Oral daily  spironolactone 25 milliGRAM(s) Oral daily  thiamine 100 milliGRAM(s) Oral daily      PRN INPATIENT MEDICATION  sodium chloride 0.9% lock flush 10 milliLiter(s) IV Push every 1 hour PRN        VITALS/PHYSICAL EXAM  --------------------------------------------------------------------------------  T(C): 36.6 (03-13-24 @ 11:29), Max: 36.6 (03-13-24 @ 11:29)  HR: 118 (03-13-24 @ 11:29) (103 - 118)  BP: 110/74 (03-13-24 @ 11:29) (91/58 - 110/74)  RR: 18 (03-13-24 @ 11:29) (18 - 18)  SpO2: 97% (03-13-24 @ 11:29) (94% - 98%)  Wt(kg): --        03-12-24 @ 07:01  -  03-13-24 @ 07:00  --------------------------------------------------------  IN: 420 mL / OUT: 1600 mL / NET: -1180 mL    03-13-24 @ 07:01  -  03-13-24 @ 16:56  --------------------------------------------------------  IN: 588 mL / OUT: 1600 mL / NET: -1012 mL            LABS/ CULTURES/ RADIOLOGY:              9.0    5.43  >-----------<  168      [03-13-24 @ 04:40]              28.6     137  |  101  |  47  ----------------------------<  101      [03-13-24 @ 04:40]  3.3   |  23  |  1.43        Ca     8.5     [03-13-24 @ 04:40]    TPro  6.7  /  Alb  2.8  /  TBili  0.7  /  DBili  x   /  AST  47  /  ALT  131  /  AlkPhos  342  [03-13-24 @ 04:40]      PTT: 57.0       [03-13-24 @ 04:40]          CAPILLARY BLOOD GLUCOSE      POCT Blood Glucose.: 116 mg/dL (13 Mar 2024 12:40)  POCT Blood Glucose.: 107 mg/dL (13 Mar 2024 08:28)  POCT Blood Glucose.: 112 mg/dL (12 Mar 2024 21:31)  POCT Blood Glucose.: 107 mg/dL (12 Mar 2024 17:45)        Triglycerides, Serum: 41 mg/dL (03-04-24 @ 20:42)                A1C with Estimated Average Glucose Result: 6.2 % (03-04-24 @ 20:42)   [facility]-- WOUND TEAM -- FOLLOW UP NOTE  --------------------------------------------------------------------------------    24 hour events/subjective:          Diet:  Diet, DASH/TLC:   Sodium & Cholesterol Restricted  Consistent Carbohydrate No Snacks (CSTCHO)  No Pork (03-06-24 @ 06:29)      ROS: General/ SKIN/ MSK/ Neuro/ GI see HPI  all other systems negative  pt unable to offer    ALLERGIES & MEDICATIONS  --------------------------------------------------------------------------------      No Known Allergies      STANDING INPATIENT MEDICATIONS  AQUAPHOR (petrolatum Ointment) 1 Application(s) Topical every 12 hours  atorvastatin 40 milliGRAM(s) Oral at bedtime  chlorhexidine 2% Cloths 1 Application(s) Topical <User Schedule>  clopidogrel Tablet 75 milliGRAM(s) Oral daily  digoxin     Tablet 62.5 MICROGram(s) Oral daily  folic acid 1 milliGRAM(s) Oral daily  heparin  Infusion 900 Unit(s)/Hr IV Continuous <Continuous>  insulin lispro (ADMELOG) corrective regimen sliding scale   SubCutaneous three times a day before meals  insulin lispro (ADMELOG) corrective regimen sliding scale   SubCutaneous at bedtime  levothyroxine 25 MICROGram(s) Oral daily  losartan 25 milliGRAM(s) Oral daily  spironolactone 25 milliGRAM(s) Oral daily  thiamine 100 milliGRAM(s) Oral daily      PRN INPATIENT MEDICATION  sodium chloride 0.9% lock flush 10 milliLiter(s) IV Push every 1 hour PRN        VITALS/PHYSICAL EXAM  --------------------------------------------------------------------------------  T(C): 36.6 (03-13-24 @ 11:29), Max: 36.6 (03-13-24 @ 11:29)  HR: 118 (03-13-24 @ 11:29) (103 - 118)  BP: 110/74 (03-13-24 @ 11:29) (91/58 - 110/74)  RR: 18 (03-13-24 @ 11:29) (18 - 18)  SpO2: 97% (03-13-24 @ 11:29) (94% - 98%)  Wt(kg): --      NAD  A&Ox3, thin,, frail,  WD/ WN/ WG,    Versa Care p500 bed  HEENT:  NC/AT,, EOMI, sclera clear, mucosa moist, throat clear, trachea midline, neck supple  Neurology:  weakened strength & sensation grossly intact,  Psych: calm/ appropriate  Musculoskeletal: FROM, no deformities/ contractures  Vascular: BLE equally warm,  no cyanosis, clubbing, edema nor acute ischemia         BLE edema equal         LLE DP pulse palpable         RLE DP pulse non palpable  RLE w/ medial & lateral compartments w/  granular wounds  no blistering    (+) scant serosanguinous drainage  No odor, erythema, increased warmth, tenderness, induration, fluctuance, nor crepitus  Skin: dry flakey       LABS/ CULTURES/ RADIOLOGY:              9.0    5.43  >-----------<  168      [03-13-24 @ 04:40]              28.6     137  |  101  |  47  ----------------------------<  101      [03-13-24 @ 04:40]  3.3   |  23  |  1.43        Ca     8.5     [03-13-24 @ 04:40]    TPro  6.7  /  Alb  2.8  /  TBili  0.7  /  DBili  x   /  AST  47  /  ALT  131  /  AlkPhos  342  [03-13-24 @ 04:40]        CAPILLARY BLOOD GLUCOSE  POCT Blood Glucose.: 116 mg/dL (13 Mar 2024 12:40)  POCT Blood Glucose.: 107 mg/dL (13 Mar 2024 08:28)  POCT Blood Glucose.: 112 mg/dL (12 Mar 2024 21:31)  POCT Blood Glucose.: 107 mg/dL (12 Mar 2024 17:45)      A1C with Estimated Average Glucose Result: 6.2 % (03-04-24 @ 20:42)

## 2024-03-13 NOTE — PROGRESS NOTE ADULT - PROBLEM SELECTOR PLAN 2
- slightly overloaded, saturating on 98% on 3L  - TTE with LVEF 25%, severe RV dilation, moderate MR, severe TR, LV thrombus  - HF following, f/u recs  GDMT  - c/w bumex to 1 mg po BID  - c/w losartan 25mg daily  - c/w spironolactone 25mg daily  - HOLDING farxiga 10mg daily for ablation on 3/15  - BB on hold for now-- per dr lewis, do not resume   - Per cards, Given recent substance use, not an advanced therapies candidate  - strict Is/Os, daily weights

## 2024-03-13 NOTE — PROGRESS NOTE ADULT - NSPROGADDITIONALINFOA_GEN_ALL_CORE
Dispo: ablation 3/15, then coumadin bridging- [facility]/JEFERSON once ready  discussed with dr joshua Mitchell D.O.  Division of Hospital Medicine  Available on MS Teams

## 2024-03-13 NOTE — PROGRESS NOTE ADULT - PROBLEM SELECTOR PLAN 2
- Defer BB as above  - On AC with heparin infusion  - continue digoxin  - Appreciate EP consult, plan for aflutter ablation later this week once off farxiga for 3 days (last dose 3/11)

## 2024-03-13 NOTE — PROGRESS NOTE ADULT - SUBJECTIVE AND OBJECTIVE BOX
ADVANCED HEART FAILURE & TRANSPLANT  - PROGRESS NOTE  *To reach the NS2 Team from 8am to 5pm, please call [phone]   ___________________________________________________________________________  Subjective:  - increased cough with paroxysmal coughing fits. at times cough worse when lying down  - notes SOB with minimal exertion. Has not been OOB walking    Medications:  AQUAPHOR (petrolatum Ointment) 1 Application(s) Topical every 12 hours  atorvastatin 40 milliGRAM(s) Oral at bedtime  buMETAnide 2 milliGRAM(s) Oral once  chlorhexidine 2% Cloths 1 Application(s) Topical <User Schedule>  clopidogrel Tablet 75 milliGRAM(s) Oral daily  digoxin     Tablet 62.5 MICROGram(s) Oral daily  folic acid 1 milliGRAM(s) Oral daily  heparin  Infusion 900 Unit(s)/Hr IV Continuous <Continuous>  insulin lispro (ADMELOG) corrective regimen sliding scale   SubCutaneous three times a day before meals  insulin lispro (ADMELOG) corrective regimen sliding scale   SubCutaneous at bedtime  levothyroxine 25 MICROGram(s) Oral daily  losartan 25 milliGRAM(s) Oral daily  sodium chloride 0.9% lock flush 10 milliLiter(s) IV Push every 1 hour PRN  spironolactone 25 milliGRAM(s) Oral daily  thiamine 100 milliGRAM(s) Oral daily      Physical Exam:    Vitals:  Vital Signs Last 24 Hours  T(C): 36.6 (24 @ 11:29), Max: 36.6 (24 @ 11:29)  HR: 118 (24 @ 11:29) (103 - 118)  BP: 110/74 (24 @ 11:29) (91/58 - 110/74)  RR: 18 (24 @ 11:29) (18 - 18)  SpO2: 97% (24 @ 11:29) (94% - 98%)    Weight in k.3 ( @ 05:05)    I&O's Summary    12 Mar 2024 07:01  -  13 Mar 2024 07:00  --------------------------------------------------------  IN: 420 mL / OUT: 1600 mL / NET: -1180 mL    13 Mar 2024 07:01  -  13 Mar 2024 14:33  --------------------------------------------------------  IN: 588 mL / OUT: 800 mL / NET: -212 mL    Tele: aflutter 100-110    General: frail, No acute distress. Comfortable.  HEENT: EOM intact.  Neck: Neck supple. JVP moderately elevated. No masses  Chest: dry cough, rhonchi  CV: tachycardic, irregular, no LE edema  Abdomen: Soft, non-distended, non-tender  Skin: No rashes or skin breakdown. warm peripherally  Neurology: Alert and oriented times three. Sensation intact  Psych: Affect normal    Labs:                        9.0    5.43  )-----------( 168      ( 13 Mar 2024 04:40 )             28.6         137  |  101  |  47<H>  ----------------------------<  101<H>  3.3<L>   |  23  |  1.43<H>    Ca    8.5      13 Mar 2024 04:40    TPro  6.7  /  Alb  2.8<L>  /  TBili  0.7  /  DBili  x   /  AST  47<H>  /  ALT  131<H>  /  AlkPhos  342<H>      PTT - ( 13 Mar 2024 04:40 )  PTT:57.0 sec    Lactate, Blood: 1.6 mmol/L ( @ 04:40)  Lactate, Blood: 2.4 mmol/L ( @ 13:59)

## 2024-03-13 NOTE — PROGRESS NOTE ADULT - SUBJECTIVE AND OBJECTIVE BOX
24H hour events: tele with AFL rates ~100-120s with intermittent PVCs. Pt feeling well, denies any complaints at this time.     MEDICATIONS:  buMETAnide 1 milliGRAM(s) Oral every 12 hours  clopidogrel Tablet 75 milliGRAM(s) Oral daily  digoxin     Tablet 62.5 MICROGram(s) Oral daily  heparin  Infusion 900 Unit(s)/Hr IV Continuous <Continuous>  losartan 25 milliGRAM(s) Oral daily  spironolactone 25 milliGRAM(s) Oral daily  atorvastatin 40 milliGRAM(s) Oral at bedtime  insulin lispro (ADMELOG) corrective regimen sliding scale   SubCutaneous three times a day before meals  insulin lispro (ADMELOG) corrective regimen sliding scale   SubCutaneous at bedtime  levothyroxine 25 MICROGram(s) Oral daily  AQUAPHOR (petrolatum Ointment) 1 Application(s) Topical every 12 hours  chlorhexidine 2% Cloths 1 Application(s) Topical <User Schedule>  folic acid 1 milliGRAM(s) Oral daily  sodium chloride 0.9% lock flush 10 milliLiter(s) IV Push every 1 hour PRN  thiamine 100 milliGRAM(s) Oral daily    REVIEW OF SYSTEMS:  Complete 12point ROS negative.    PHYSICAL EXAM:  T(C): 36.3 (03-13-24 @ 05:05), Max: 36.3 (03-12-24 @ 12:01)  HR: 117 (03-13-24 @ 05:05) (103 - 118)  BP: 107/73 (03-13-24 @ 05:05) (91/58 - 109/74)  RR: 18 (03-13-24 @ 05:05) (18 - 18)  SpO2: 98% (03-13-24 @ 05:05) (94% - 98%)  Wt(kg): --  I&O's Summary    12 Mar 2024 07:01  -  13 Mar 2024 07:00  --------------------------------------------------------  IN: 420 mL / OUT: 1600 mL / NET: -1180 mL    Appearance: Normal	  HEENT: NC/AT  Cardiovascular: tachycardic  Respiratory: crackles right  Psychiatry: A & O x 3, Mood & affect appropriate  Neurologic: Non-focal  Extremities: No BLE edema    LABS:	 	    CBC Full  -  ( 13 Mar 2024 04:40 )  WBC Count : 5.43 K/uL  Hemoglobin : 9.0 g/dL  Hematocrit : 28.6 %  Platelet Count - Automated : 168 K/uL  Mean Cell Volume : 67.6 fl  Mean Cell Hemoglobin : 21.3 pg  Mean Cell Hemoglobin Concentration : 31.5 gm/dL    03-13    137  |  101  |  47<H>  ----------------------------<  101<H>  3.3<L>   |  23  |  1.43<H>  03-12    136  |  99  |  53<H>  ----------------------------<  66<L>  3.8   |  22  |  1.60<H>    Ca    8.5      13 Mar 2024 04:40  Ca    8.7      12 Mar 2024 07:25  Phos  3.4     03-11  Mg     2.2     03-11    TPro  6.7  /  Alb  2.8<L>  /  TBili  0.7  /  DBili  x   /  AST  47<H>  /  ALT  131<H>  /  AlkPhos  342<H>  03-13  TPro  6.5  /  Alb  3.0<L>  /  TBili  0.8  /  DBili  x   /  AST  52<H>  /  ALT  154<H>  /  AlkPhos  368<H>  03-12    proBNP: Pro-Brain Natriuretic Peptide (03.11.24 @ 13:59)    Pro-Brain Natriuretic Peptide: 4171 pg/mL    TSH: Free Thyroxine, Serum in AM (03.10.24 @ 11:09)    Free Thyroxine, Serum: 1.2 ng/dL    CARDIAC MARKERS: Troponin T, High Sensitivity (03.04.24 @ 22:07)    Troponin T, High Sensitivity Result: 93: *  *  Rapid upward or downward changes in high-sensitivity troponin levels  suggest acute myocardial injury. Renal impairment may cause sustained  troponin elevations.  Normal: <6 - 14 ng/L  Indeterminate: 15-51 ng/L  Elevated: > 51 ng/L  See [email or web address]/test/[email or web address] on the [facility] for more  information ng/L    RADIOLOGY: < from: Xray Chest 1 View- PORTABLE-Routine (Xray Chest 1 View- PORTABLE-Routine in AM.) (03.06.24 @ 02:50) >    Frontal expiratory view of the chest shows the heart to be similar in   size. Right jugular [place] catheter is in similar position in the   right pulmonary artery.    The lungs show less pulmonary congestion with smaller right effusion and   there is no evidence of pneumothorax nor left pleural effusion.    IMPRESSION:  Decreased congestion. Catheter as noted.    Thank you for the courtesy of this referral.    < end of copied text >    PREVIOUS DIAGNOSTIC TESTING:    [ ] Echocardiogram: < from: TTE W or WO Ultrasound Enhancing Agent (03.05.24 @ 07:31) >  CONCLUSIONS:      1. Left ventricular cavity is severely dilated. Left ventricular systolic function is severely decreased. Global left ventricular hypokinesis.   2. Severely enlarged right ventricular cavity size and severely reduced systolic function.   3. Moderate mitral regurgitation. Mechanism of mitral regurgitation: Andrei Type IIIb (restricted leaflet motion secondary to left atrial or left ventricular dilatation).   4. Severe tricuspid regurgitation.   5. Mild to moderate pulmonary hypertension.   6. 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   7. Small pericardial effusion with no evidence of hemodynamic compromise (or echocardiographic evidence of cardiac tamponade).    < end of copied text >

## 2024-03-13 NOTE — PROGRESS NOTE ADULT - PROBLEM SELECTOR PLAN 3
- Unclear prior baseline but Cr on admission was 2.1 and natered down to 1.3  - Cr downtrending, now 1.4  - Continue to trend

## 2024-03-14 LAB
ALBUMIN SERPL ELPH-MCNC: 3 G/DL — LOW (ref 3.3–5)
ALP SERPL-CCNC: 311 U/L — HIGH (ref 40–120)
ALT FLD-CCNC: 112 U/L — HIGH (ref 10–45)
ANION GAP SERPL CALC-SCNC: 15 MMOL/L — SIGNIFICANT CHANGE UP (ref 5–17)
APTT BLD: 86.2 SEC — HIGH (ref 24.5–35.6)
APTT BLD: 93.3 SEC — HIGH (ref 24.5–35.6)
AST SERPL-CCNC: 45 U/L — HIGH (ref 10–40)
BILIRUB SERPL-MCNC: 0.8 MG/DL — SIGNIFICANT CHANGE UP (ref 0.2–1.2)
BUN SERPL-MCNC: 35 MG/DL — HIGH (ref 7–23)
CALCIUM SERPL-MCNC: 8.9 MG/DL — SIGNIFICANT CHANGE UP (ref 8.4–10.5)
CHLORIDE SERPL-SCNC: 101 MMOL/L — SIGNIFICANT CHANGE UP (ref 96–108)
CO2 SERPL-SCNC: 23 MMOL/L — SIGNIFICANT CHANGE UP (ref 22–31)
CREAT SERPL-MCNC: 1.15 MG/DL — SIGNIFICANT CHANGE UP (ref 0.5–1.3)
EGFR: 72 ML/MIN/1.73M2 — SIGNIFICANT CHANGE UP
GLUCOSE BLDC GLUCOMTR-MCNC: 103 MG/DL — HIGH (ref 70–99)
GLUCOSE BLDC GLUCOMTR-MCNC: 114 MG/DL — HIGH (ref 70–99)
GLUCOSE BLDC GLUCOMTR-MCNC: 131 MG/DL — HIGH (ref 70–99)
GLUCOSE BLDC GLUCOMTR-MCNC: 80 MG/DL — SIGNIFICANT CHANGE UP (ref 70–99)
GLUCOSE SERPL-MCNC: 62 MG/DL — LOW (ref 70–99)
LACTATE SERPL-SCNC: 1.4 MMOL/L — SIGNIFICANT CHANGE UP (ref 0.5–2)
POTASSIUM SERPL-MCNC: 3.8 MMOL/L — SIGNIFICANT CHANGE UP (ref 3.5–5.3)
POTASSIUM SERPL-SCNC: 3.8 MMOL/L — SIGNIFICANT CHANGE UP (ref 3.5–5.3)
PROT SERPL-MCNC: 6.7 G/DL — SIGNIFICANT CHANGE UP (ref 6–8.3)
SODIUM SERPL-SCNC: 139 MMOL/L — SIGNIFICANT CHANGE UP (ref 135–145)

## 2024-03-14 PROCEDURE — 99233 SBSQ HOSP IP/OBS HIGH 50: CPT

## 2024-03-14 PROCEDURE — 99233 SBSQ HOSP IP/OBS HIGH 50: CPT | Mod: 25

## 2024-03-14 PROCEDURE — 99232 SBSQ HOSP IP/OBS MODERATE 35: CPT

## 2024-03-14 PROCEDURE — 99497 ADVNCD CARE PLAN 30 MIN: CPT | Mod: 25

## 2024-03-14 RX ORDER — SPIRONOLACTONE 25 MG/1
25 TABLET, FILM COATED ORAL EVERY 12 HOURS
Refills: 0 | Status: DISCONTINUED | OUTPATIENT
Start: 2024-03-14 | End: 2024-03-26

## 2024-03-14 RX ADMIN — BUMETANIDE 1 MILLIGRAM(S): 0.25 INJECTION INTRAMUSCULAR; INTRAVENOUS at 05:37

## 2024-03-14 RX ADMIN — SPIRONOLACTONE 25 MILLIGRAM(S): 25 TABLET, FILM COATED ORAL at 05:37

## 2024-03-14 RX ADMIN — BUMETANIDE 1 MILLIGRAM(S): 0.25 INJECTION INTRAMUSCULAR; INTRAVENOUS at 17:42

## 2024-03-14 RX ADMIN — HEPARIN SODIUM 8 UNIT(S)/HR: 5000 INJECTION INTRAVENOUS; SUBCUTANEOUS at 19:47

## 2024-03-14 RX ADMIN — Medication 1 APPLICATION(S): at 05:39

## 2024-03-14 RX ADMIN — Medication 25 MICROGRAM(S): at 05:38

## 2024-03-14 RX ADMIN — SPIRONOLACTONE 25 MILLIGRAM(S): 25 TABLET, FILM COATED ORAL at 17:42

## 2024-03-14 RX ADMIN — LOSARTAN POTASSIUM 25 MILLIGRAM(S): 100 TABLET, FILM COATED ORAL at 05:37

## 2024-03-14 RX ADMIN — CHLORHEXIDINE GLUCONATE 1 APPLICATION(S): 213 SOLUTION TOPICAL at 05:39

## 2024-03-14 RX ADMIN — ATORVASTATIN CALCIUM 40 MILLIGRAM(S): 80 TABLET, FILM COATED ORAL at 21:59

## 2024-03-14 RX ADMIN — Medication 1 APPLICATION(S): at 17:42

## 2024-03-14 NOTE — PROGRESS NOTE ADULT - PROBLEM SELECTOR PLAN 4
As per HF's most recent notes: "Was supported with Dopamine then Dobutamine but was able to be weaned off with afterload reduction. Currently on HF medical therapy with Losartan, Aldactone, Farxiga. AFlutter with tachycardia. Appreciate EP recommendations, continue Digoxin, plan for AFlutter ablation tomorrow."

## 2024-03-14 NOTE — PROGRESS NOTE ADULT - ASSESSMENT
Full note to f/u:     Met with the patient and the [place]  and d/w him about his advanced illness and limited options for DMT.   He agreed with his borther being his HCP  He wanted to continue to be full code; however, does not want to continue to live if there is not hope for recovery or if depending on life support.   He agreed with a trial of antidepressants.   He was not opposed to considering NH placement.       Stan Delarosa MD  Associate Chief Geriatrics and Palliative Care ([place]) [facility]   [place] Consult Service   , [employer] at [facility]      Please contact me via Teams from Monday through Friday between 9am-5pm. If not answering, please call the palliative care pager [phone]    After 5pm and on weekends, please see the contact information below:    In the event of newly developing, evolving, or worsening symptoms, please contact the Palliative Medicine team via pager (if the patient is at [facility] #[phone] or if the patient is at [facility] #[phone]) The Geriatric and Palliative Medicine service has coverage 24 hours a day/ 7 days a week to provide medical recommendations regarding symptom management needs via telephone 63 year old male with past medical history of HIV, CVA, HTN, HLD, prediabetes, A.fib, cocaine/heroin abuse, CAD w/ stent, CHF (EF  25-30% in 12/23),  RLE compartment syndrome s/p fasciotomy 12/23 and recent hospitalization for CHF exacerbation with bilateral pleural effusions requiring chest tube placement initially presenting to [facility] on 3/1 with chest pain and shortness of breath.  He was admitted to the outside hospital for management of CHF exacerbation, found to have a further reduced EF of 10 - 15% on 3/1. While admitted, he was hypotensive requiring dopamine infusion with worsening perfusion indices and ELIE. He was transferred to [facility] for cardiogenic shock management. Currently he is off inotropics and off the ICU.  Geriatrics and Palliative Medicine ([facility]) Team was called for GOC and ACP.

## 2024-03-14 NOTE — PROGRESS NOTE ADULT - PROBLEM SELECTOR PLAN 8
Primary team to consider Psych eval (see adjustment disorder above)   Since GOC, ACP, and recommendations about resources are defined, the [facility] team will be signing off.       Stan Delarosa MD  Associate Chief Geriatrics and Palliative Care ([place]) [facility]   [place] Consult Service   , [employer] at [facility]      Please contact me via Teams from Monday through Friday between 9am-5pm. If not answering, please call the palliative care pager [phone]    After 5pm and on weekends, please see the contact information below:    In the event of newly developing, evolving, or worsening symptoms, please contact the Palliative Medicine team via pager (if the patient is at [facility] #[phone] or if the patient is at [facility] #[phone]) The Geriatric and Palliative Medicine service has coverage 24 hours a day/ 7 days a week to provide medical recommendations regarding symptom management needs via telephone

## 2024-03-14 NOTE — PROGRESS NOTE ADULT - PROBLEM SELECTOR PLAN 3
Improved today.   Unclear etiology; however, it may be 2/2 to HF and other cardiac issues +/- Delirium associated to hospitalization.   Work up and Rx as per the primary team.   Will also advise:   -Frequent reassurance and verbal orientation   -Delusions and hallucinations should be neither endorsed nor challenged.   -Physical restraints should be avoided. Alternatives to restraint use, such as constant observation (preferably by someone familiar to the patient such as a family member), may be more effective.  -PT eval and early ambulation if possible  -Move to a room with a window   -Update the calendar date in the room.

## 2024-03-14 NOTE — PROGRESS NOTE ADULT - CONVERSATION DETAILS
For details about goals of care, please see the [facility] note entered by the [place] LCSW, Donis Edward.   Met with the patient and the [place]  and d/w him about his advanced illness and limited options for DMT.   He agreed with his brother being his HCP and a HCP form was completed.   He wanted to continue to be full code; however, he does not want to continue to live if there is not a hope for functional recovery ("I do not want to be a burden to anyone." He recognized he values independency) and if he becomes depending on life support.     30' were spent in ACP while discussing about code status and completing a HCP form. However, the entire [facility] meeting lasted 60'
Initially, he thought he was at a different hospital; however, he was able to be re-directed and recognizing he was at a hospital near [place]. He remembered he was hospitalized because his heart was weak and that it was affecting his kidney function. He also knew he was out of the ICU and now on the medical floors. He recalled, his heart was running fast and that, due to it, doctors were concerned. I tried to engage the patient in a discussion about his illness and treatment options; however, he indicated to be tired and was not able to focus, falling sleep through the conversation. Hence, it was not possible to address GOC.     I also tried to get him to complete a HCP form. However, though he re-stated he wanted one of his brothers, Maty ([phone] or [phone]), to be the HCP, he was not able to focus on completing a HCP form and ask me to, once again, leave a HCP form for his review. Also, he indicate that, a week ago, a HCP form was not completed because his brother had and stroke and had to be hospitalized, so he was not able to come to the hospital and visit him. However, he said was well and recovering. On 3/12, I will try to call his brother to get some collateral info and see if he can actually act as a HCP.
As requested by the patient. I reached out to his brother, Maty.  phone number: [phone]. I d/w Maty about the patient's advanced illness (Advanced HF with ELIE) and lack of options for advanced therapies (due to recent history of substance abuse and lack of social support, he is not a candidate for LVAD or transplant). I explained to his brother that Jered's condition is irreversible and that current medications are trying to prevent any further deterioration of his heart function or the function of other organs. Furthermore, current medical therapies are trying to help the patient, so he is not uncomfortable. I indicated that without current treatments and level of care, the patient was to rapidly deteriorate and then putting him at risk of a life threatening event. Though having some sense about the patient's illness, his brother was taken by surprise by the irreversible nature of the patient's illness and the severity of the situation.     Maty indicated that, unfortunately, after his brother came out of [facility] (where he spent about 14 years)  he acted in ways that impacted his health and that are putting him into the situation he is currently facing. He felt as his brother was trying to "catch up." When his brother went to [facility] he was "like 45, and after he came out off [facility], he was trying to behave as if he was still 45," drinking and smoking heavily. His brother let me know the patient did not understand the time when he was 45 years old was gone and that there was not a way to take that time back. After the patient came out of [facility], Maty used to live close by him, in the [facility]. When they were closer, he tried to check on him and provide him with advise. However, the patient did not listen and ended leaving to [place], "to live his own life." His brother stated that even after the patient knew he was becoming sick, he was not taking his medications or following 's advise. At things were evolving, Maty noticed how the patient was progressively deteriorating and losing his energy and capacity to do things. Nowadays, when talking to the patient, Maty believes he may be starting to understand what is happening but he is not sure if he is fully grasping the actual situation and is concerned that without the right monitoring, the patient may not follow up on taking his meds or follow up with his medical appointments. Currently, the patient only has 2 people that look behind him, his brother and his cousin; however, no one of them is in a position to care for the patient or to take him to his appointments. Maty confirmed that before being hospitalized, the patient was living in a room. Understanding all this, I indicated to Maty I have d/w the patient about the possibility for nursing home placement and that, as long as he was to be close to his family, that the patient was open to considering that option. Maty thought that option was reasonable and he was going to talk to the patient to advise him towards it.     I also d/w him about the importance for the patient to complete a HCP form and Maty said that on 3/13 AM, he was going to call the patient, so he was to complete such form. When it comes to code status, his brother expressed, in the past, he had discussions with the patient about it and that; at that time, he did not seem to be willing to have CPR; hence, he believed the patient's preference was for DNR.      On 3/14, I will try to  f/u with the patient and see if he can be re-engaged into a discussion about ACP and resources.    Finally, ironically, the patient's brother let me know that, last week, as the patient told me, he was hospitalized due to a mild stroke and atrial fibrillation for which he had an ablation.

## 2024-03-14 NOTE — PROGRESS NOTE ADULT - PROBLEM SELECTOR PLAN 5
Dapsone Pregnancy And Lactation Text: This medication is Pregnancy Category C and is not considered safe during pregnancy or breast feeding. Include Pregnancy/Lactation Warning?: No - hb stable Topical Sulfur Applications Counseling: Topical Sulfur Counseling: Patient counseled that this medication may cause skin irritation or allergic reactions.  In the event of skin irritation, the patient was advised to reduce the amount of the drug applied or use it less frequently.   The patient verbalized understanding of the proper use and possible adverse effects of topical sulfur application.  All of the patient's questions and concerns were addressed. Isotretinoin Counseling: Patient should get monthly blood tests, not donate blood, not drive at night if vision affected, not share medication, and not undergo elective surgery for 6 months after tx completed. Side effects reviewed, pt to contact office should one occur. Tetracycline Pregnancy And Lactation Text: This medication is Pregnancy Category D and not consider safe during pregnancy. It is also excreted in breast milk. Doxycycline Counseling:  Patient counseled regarding possible photosensitivity and increased risk for sunburn.  Patient instructed to avoid sunlight, if possible.  When exposed to sunlight, patients should wear protective clothing, sunglasses, and sunscreen.  The patient was instructed to call the office immediately if the following severe adverse effects occur:  hearing changes, easy bruising/bleeding, severe headache, or vision changes.  The patient verbalized understanding of the proper use and possible adverse effects of doxycycline.  All of the patient's questions and concerns were addressed. Bactrim Pregnancy And Lactation Text: This medication is Pregnancy Category D and is known to cause fetal risk.  It is also excreted in breast milk. Topical Sulfur Applications Pregnancy And Lactation Text: This medication is Pregnancy Category C and has an unknown safety profile during pregnancy. It is unknown if this topical medication is excreted in breast milk. Tazorac Counseling:  Patient advised that medication is irritating and drying.  Patient may need to apply sparingly and wash off after an hour before eventually leaving it on overnight.  The patient verbalized understanding of the proper use and possible adverse effects of tazorac.  All of the patient's questions and concerns were addressed. Benzoyl Peroxide Counseling: Patient counseled that medicine may cause skin irritation and bleach clothing.  In the event of skin irritation, the patient was advised to reduce the amount of the drug applied or use it less frequently.   The patient verbalized understanding of the proper use and possible adverse effects of benzoyl peroxide.  All of the patient's questions and concerns were addressed. Birth Control Pills Counseling: Birth Control Pill Counseling: I discussed with the patient the potential side effects of OCPs including but not limited to increased risk of stroke, heart attack, thrombophlebitis, deep venous thrombosis, hepatic adenomas, breast changes, GI upset, headaches, and depression.  The patient verbalized understanding of the proper use and possible adverse effects of OCPs. All of the patient's questions and concerns were addressed. Detail Level: Zone Isotretinoin Pregnancy And Lactation Text: This medication is Pregnancy Category X and is considered extremely dangerous during pregnancy. It is unknown if it is excreted in breast milk. Tazorac Pregnancy And Lactation Text: This medication is not safe during pregnancy. It is unknown if this medication is excreted in breast milk. Azithromycin Counseling:  I discussed with the patient the risks of azithromycin including but not limited to GI upset, allergic reaction, drug rash, diarrhea, and yeast infections. Doxycycline Pregnancy And Lactation Text: This medication is Pregnancy Category D and not consider safe during pregnancy. It is also excreted in breast milk but is considered safe for shorter treatment courses. Benzoyl Peroxide Pregnancy And Lactation Text: This medication is Pregnancy Category C. It is unknown if benzoyl peroxide is excreted in breast milk. Spironolactone Counseling: Patient advised regarding risks of diarrhea, abdominal pain, hyperkalemia, birth defects (for female patients), liver toxicity and renal toxicity. The patient may need blood work to monitor liver and kidney function and potassium levels while on therapy. The patient verbalized understanding of the proper use and possible adverse effects of spironolactone.  All of the patient's questions and concerns were addressed. Birth Control Pills Pregnancy And Lactation Text: This medication should be avoided if pregnant and for the first 30 days post-partum. High Dose Vitamin A Counseling: Side effects reviewed, pt to contact office should one occur. Topical Clindamycin Counseling: Patient counseled that this medication may cause skin irritation or allergic reactions.  In the event of skin irritation, the patient was advised to reduce the amount of the drug applied or use it less frequently.   The patient verbalized understanding of the proper use and possible adverse effects of clindamycin.  All of the patient's questions and concerns were addressed. Erythromycin Counseling:  I discussed with the patient the risks of erythromycin including but not limited to GI upset, allergic reaction, drug rash, diarrhea, increase in liver enzymes, and yeast infections. Azithromycin Pregnancy And Lactation Text: This medication is considered safe during pregnancy and is also secreted in breast milk. Spironolactone Pregnancy And Lactation Text: This medication can cause feminization of the male fetus and should be avoided during pregnancy. The active metabolite is also found in breast milk. High Dose Vitamin A Pregnancy And Lactation Text: High dose vitamin A therapy is contraindicated during pregnancy and breast feeding. Topical Retinoid counseling:  Patient advised to apply a pea-sized amount only at bedtime and wait 30 minutes after washing their face before applying.  If too drying, patient may add a non-comedogenic moisturizer. The patient verbalized understanding of the proper use and possible adverse effects of retinoids.  All of the patient's questions and concerns were addressed. Dapsone Counseling: I discussed with the patient the risks of dapsone including but not limited to hemolytic anemia, agranulocytosis, rashes, methemoglobinemia, kidney failure, peripheral neuropathy, headaches, GI upset, and liver toxicity.  Patients who start dapsone require monitoring including baseline LFTs and weekly CBCs for the first month, then every month thereafter.  The patient verbalized understanding of the proper use and possible adverse effects of dapsone.  All of the patient's questions and concerns were addressed. Tetracycline Counseling: Patient counseled regarding possible photosensitivity and increased risk for sunburn.  Patient instructed to avoid sunlight, if possible.  When exposed to sunlight, patients should wear protective clothing, sunglasses, and sunscreen.  The patient was instructed to call the office immediately if the following severe adverse effects occur:  hearing changes, easy bruising/bleeding, severe headache, or vision changes.  The patient verbalized understanding of the proper use and possible adverse effects of tetracycline.  All of the patient's questions and concerns were addressed. Patient understands to avoid pregnancy while on therapy due to potential birth defects. Bactrim Counseling:  I discussed with the patient the risks of sulfa antibiotics including but not limited to GI upset, allergic reaction, drug rash, diarrhea, dizziness, photosensitivity, and yeast infections.  Rarely, more serious reactions can occur including but not limited to aplastic anemia, agranulocytosis, methemoglobinemia, blood dyscrasias, liver or kidney failure, lung infiltrates or desquamative/blistering drug rashes. Topical Clindamycin Pregnancy And Lactation Text: This medication is Pregnancy Category B and is considered safe during pregnancy. It is unknown if it is excreted in breast milk. Minocycline Counseling: Patient advised regarding possible photosensitivity and discoloration of the teeth, skin, lips, tongue and gums.  Patient instructed to avoid sunlight, if possible.  When exposed to sunlight, patients should wear protective clothing, sunglasses, and sunscreen.  The patient was instructed to call the office immediately if the following severe adverse effects occur:  hearing changes, easy bruising/bleeding, severe headache, or vision changes.  The patient verbalized understanding of the proper use and possible adverse effects of minocycline.  All of the patient's questions and concerns were addressed. Topical Retinoid Pregnancy And Lactation Text: This medication is Pregnancy Category C. It is unknown if this medication is excreted in breast milk. Erythromycin Pregnancy And Lactation Text: This medication is Pregnancy Category B and is considered safe during pregnancy. It is also excreted in breast milk.

## 2024-03-14 NOTE — PROGRESS NOTE ADULT - CONVERSATION/DISCUSSION
Diagnosis/Treatment Options
Diagnosis/Prognosis/Treatment Options
Diagnosis/Prognosis/MOLST Discussed/Treatment Options

## 2024-03-14 NOTE — GOALS OF CARE CONVERSATION - ADVANCED CARE PLANNING - CONVERSATION DETAILS
[facility] continues to follow this case for ongoing support and GOC discussion in the setting of patient with advanced illness. LCSW, Dr. Delarosa of [facility], and Dr. Yovani Sol Fellow met with patient at bedside today with follow up call placed to patient's brother Maty after meeting today.     Team first introduced and reintroduced selves and roles in patient care. Patient verbalized understanding and was receptive to visit today. Team next inquired into patient understanding of current medical status and admission, and patient demonstrates some insight into condition and status. Patient states that he is scheduled for a procedure tomorrow but was unclear regarding details of procedure. Patient additionally notes being hopeful for d/c home soon, and team validated this goal with patient. Team provided overview of patient's current medical status today. Team discussed that patient with weak cardiac functioning as well as low EF, and provided detailed overview of ablation procedure today as well. Team discussed that patient's underlying heart functioning will not improve from this procedure, but noted that patient will likely feel better overall after. Team discussed concerns regarding d/c planning today, noting concerns for patient to return to rented room when he may require additional nursing and caregiver assistance for medication compliance, post-acute care compliance, as well as ongoing monitoring and care. Team noted recommendation for consideration of SNF placement with patient today, and overview of that process provided. Patient verbalized understanding and notes that whilst SNF placement is not his "favorite" option, he is open to considering this option for d/c and would prefer to be near his brother in the [place] or his cousin in [place] should he opt for placement. Team validated this with patient today and informed CM for follow up and to provide choice list in facilities.     Team next inquired into ACP, and first discussed HCP form today. Overview of HCP form provided and patient verbalized understanding and was receptive to completion today. Patient identifies brother Maty, [phone], as primary proxy with cousin "Lori" Elsa, [phone], as alternate. HCP completed with copy placed in chart and copy provided to patient for his records.    Team then discussed code status and what patient might find to be acceptable in the event his heart were to stop or respiratory status become compromised. Patient states he would be agreeable to an attempt at resuscitation and trial of intubation but notes that he would not wish for long-term ventilatory support including tracheostomy. Team again validated this with patient and education regarding CPR and intubation provided today, including the fact that these interventions will not fix patient's underlying, irreversible issues. Patient verbalized understanding.    Team next inquired into patient's coping with current medical status and hospitalization, and patient notes coping as well as possible. Patient notes feeling depressed at times as well as frustrated regarding status, and team validated patient in his long journey leading to this time. Team discussed option of starting antidepressant for patient to assist in this coping, and patient verbalized agreement in beginning medications that may assist. [facility] to follow up regarding medications.    LCSW lastly inquired into substance abuse resources for patient s/p d/c, and patient expressed interest in resources today. LCSW provided patient with NA, OP, and IOP resources in both [place] and the [place], and encouraged patient to consider these options after discharge as well. Patient verbalized appreciation.    LCSW later placed call to patient's brother Maty and informed him of aforementioned discussion, and Maty verbalized agreement and is without any questions or concerns today, noting that he is feeling similarly to the concerns broached by [facility] today and will additionally follow up with patient regrading next steps. Team assured ongoing availability and support as needed as well.     [facility] remains available to patient and family.

## 2024-03-14 NOTE — PROGRESS NOTE ADULT - SUBJECTIVE AND OBJECTIVE BOX
Date of Service: 03-14-24 @ 12:29    SUBJECTIVE AND OBJECTIVE:  Indication for Geriatrics and Palliative Care Services/INTERVAL HPI:    OVERNIGHT EVENTS:    DNR on chart:  Allergies    No Known Allergies    Intolerances    MEDICATIONS  (STANDING):  AQUAPHOR (petrolatum Ointment) 1 Application(s) Topical every 12 hours  atorvastatin 40 milliGRAM(s) Oral at bedtime  buMETAnide 1 milliGRAM(s) Oral every 12 hours  chlorhexidine 2% Cloths 1 Application(s) Topical <User Schedule>  clopidogrel Tablet 75 milliGRAM(s) Oral daily  digoxin     Tablet 62.5 MICROGram(s) Oral daily  folic acid 1 milliGRAM(s) Oral daily  heparin  Infusion 900 Unit(s)/Hr (9 mL/Hr) IV Continuous <Continuous>  insulin lispro (ADMELOG) corrective regimen sliding scale   SubCutaneous three times a day before meals  insulin lispro (ADMELOG) corrective regimen sliding scale   SubCutaneous at bedtime  levothyroxine 25 MICROGram(s) Oral daily  losartan 25 milliGRAM(s) Oral daily  spironolactone 25 milliGRAM(s) Oral every 12 hours  thiamine 100 milliGRAM(s) Oral daily    MEDICATIONS  (PRN):  sodium chloride 0.9% lock flush 10 milliLiter(s) IV Push every 1 hour PRN Pre/post blood products, medications, blood draw, and to maintain line patency      ITEMS UNCHECKED ARE NOT PRESENT    PRESENT SYMPTOMS: [ ]Unable to self-report - see [ ] CPOT [ ] PAINADS [ ] RDOS  Source if other than patient:  [ ]Family   [ ]Team     Pain:  [ ]yes [ ]no  QOL impact -   Location -                    Aggravating factors -  Quality -  Radiation -  Timing-  Severity (0-10 scale):  Minimal acceptable level (0-10 scale):     CPOT:    https://www.sccm.org/getattachment/[email or web address]-4q4w-9g1p-9832p6250k1w/Critical-Care-Pain-Observation-Tool-(CPOT)    PAINAD Score: See PAINAD tool and score below       Dyspnea:                           [ ]Mild [ ]Moderate [ ]Severe    RDOS: See RDOS tool and score below   0 to 2  minimal or no respiratory distress   3  mild distress  4 to 6 moderate distress  >7 severe distress      Anxiety:                             [ ]Mild [ ]Moderate [ ]Severe  Fatigue:                             [ ]Mild [ ]Moderate [ ]Severe  Nausea:                             [ ]Mild [ ]Moderate [ ]Severe  Loss of appetite:              [ ]Mild [ ]Moderate [ ]Severe  Constipation:                    [ ]Mild [ ]Moderate [ ]Severe    PCSSQ[Palliative Care Spiritual Screening Question]   Severity (0-10):  Score of 4 or > indicate consideration of Chaplaincy referral.  Chaplaincy Referral: [ ] yes [ ] refused [ ] following [ ] Deferred     Caregiver Aylett? : [ ] yes [ ] no [ ] Deferred [ ] Declined             Social work referral [ ] Patient & Family Centered Care Referral [ ]     Anticipatory Grief present?:  [ ] yes [ ] no  [ ] Deferred                  Social work referral [ ] Chaplaincy Referral [ ]    		  Other Symptoms:  [ ]All other review of systems negative     Palliative Performance Status Version 2:   See PPSv2 tool and score below         PHYSICAL EXAM:  Vital Signs Last 24 Hrs  T(C): 36.6 (14 Mar 2024 11:00), Max: 36.6 (14 Mar 2024 11:00)  T(F): 97.9 (14 Mar 2024 11:00), Max: 97.9 (14 Mar 2024 11:00)  HR: 100 (14 Mar 2024 11:00) (97 - 116)  BP: 93/62 (14 Mar 2024 11:00) (93/62 - 113/78)  BP(mean): --  RR: 18 (14 Mar 2024 11:00) (18 - 18)  SpO2: 94% (14 Mar 2024 11:00) (93% - 99%)    Parameters below as of 14 Mar 2024 11:00  Patient On (Oxygen Delivery Method): nasal cannula  O2 Flow (L/min): 2   I&O's Summary    13 Mar 2024 07:01  -  14 Mar 2024 07:00  --------------------------------------------------------  IN: 748 mL / OUT: 2100 mL / NET: -1352 mL    14 Mar 2024 07:01  -  14 Mar 2024 12:29  --------------------------------------------------------  IN: 0 mL / OUT: 1200 mL / NET: -1200 mL       GENERAL: [ ]Cachexia    [ ]Alert  [ ]Oriented x   [ ]Lethargic  [ ]Unarousable  [ ]Verbal  [ ]Non-Verbal  Behavioral:   [ ]Anxiety  [ ]Delirium [ ]Agitation [ ]Other  HEENT:  [ ]Normal   [ ]Dry mouth   [ ]ET Tube/Trach  [ ]Oral lesions  PULMONARY:   [ ]Clear [ ]Tachypnea  [ ]Audible excessive secretions   [ ]Rhonchi        [ ]Right [ ]Left [ ]Bilateral  [ ]Crackles        [ ]Right [ ]Left [ ]Bilateral  [ ]Wheezing     [ ]Right [ ]Left [ ]Bilateral  [ ]Diminished BS [ ] Right [ ]Left [ ]Bilateral  CARDIOVASCULAR:    [ ]Regular [ ]Irregular [ ]Tachy  [ ]Jonathon [ ]Murmur [ ]Other  GASTROINTESTINAL:  [ ]Soft  [ ]Distended   [ ]+BS  [ ]Non tender [ ]Tender  [ ]Other [ ]PEG [ ]OGT/ NGT   Last BM:   GENITOURINARY:  [ ]Normal [ ]Incontinent   [ ]Oliguria/Anuria   [ ]Choudhury  MUSCULOSKELETAL:   [ ]Normal   [ ]Weakness  [ ]Bed/Wheelchair bound [ ]Edema  NEUROLOGIC:   [ ]No focal deficits  [ ] Cognitive impairment  [ ] Dysphagia [ ]Dysarthria [ ] Paresis [ ]Other   SKIN:   [ ]Normal  [ ]Rash  [ ]Other  [ ]Pressure ulcer(s) [ ]y [ ]n present on admission    CRITICAL CARE:  [ ]Shock Present  [ ]Septic [ ]Cardiogenic [ ]Neurologic [ ]Hypovolemic  [ ]Vasopressors [ ]Inotropes  [ ]Respiratory failure present [ ]Mechanical Ventilation [ ]Non-invasive ventilatory support [ ]High-Flow   [ ]Acute  [ ]Chronic [ ]Hypoxic  [ ]Hypercarbic [ ]Other  [ ]Other organ failure     LABS:                        9.0    5.43  )-----------( 168      ( 13 Mar 2024 04:40 )             28.6   03-14    139  |  101  |  35<H>  ----------------------------<  62<L>  3.8   |  23  |  1.15    Ca    8.9      14 Mar 2024 07:21    TPro  6.7  /  Alb  3.0<L>  /  TBili  0.8  /  DBili  x   /  AST  45<H>  /  ALT  112<H>  /  AlkPhos  311<H>  03-14  PTT - ( 14 Mar 2024 10:46 )  PTT:86.2 sec    Urinalysis Basic - ( 14 Mar 2024 07:21 )    Color: x / Appearance: x / SG: x / pH: x  Gluc: 62 mg/dL / Ketone: x  / Bili: x / Urobili: x   Blood: x / Protein: x / Nitrite: x   Leuk Esterase: x / RBC: x / WBC x   Sq Epi: x / Non Sq Epi: x / Bacteria: x      RADIOLOGY & ADDITIONAL STUDIES:    Protein Calorie Malnutrition Present: [ ]mild [ ]moderate [ ]severe [ ]underweight [ ]morbid obesity  https://www.andeal.org/vault/2440/web/files/ONC/Table_Clinical%20Characteristics%20to%20Document%20Malnutrition-White%20JV%20et%20al%202012.pdf    Height (cm): 172.7 (03-04-24 @ 19:45)  Weight (kg): 51.5 (03-04-24 @ 20:54)  BMI (kg/m2): 17.3 (03-04-24 @ 20:54)    [ ]PPSV2 < or = 30%  [ ]significant weight loss [ ]poor nutritional intake [ ]anasarca[ ]Artificial Nutrition    Other REFERRALS:  [ ]Hospice  [ ]Child Life  [ ]Social Work  [ ]Case management [ ]Holistic Therapy     Goals of Care Document: Date of Service: 03-14-24 @ 12:29    SUBJECTIVE AND OBJECTIVE: The patient was seen and examined. He was slightly lethargic. He c/o frustration and probably depressed mood. He remains weak and with mild dyspnea at rest. His appetite is poor.   Indication for Geriatrics and Palliative Care Services/INTERVAL HPI: Goals of care and advanced care planning.     OVERNIGHT EVENTS: no new events.     DNR on chart:  Allergies    No Known Allergies    Intolerances    MEDICATIONS  (STANDING):  AQUAPHOR (petrolatum Ointment) 1 Application(s) Topical every 12 hours  atorvastatin 40 milliGRAM(s) Oral at bedtime  buMETAnide 1 milliGRAM(s) Oral every 12 hours  chlorhexidine 2% Cloths 1 Application(s) Topical <User Schedule>  clopidogrel Tablet 75 milliGRAM(s) Oral daily  digoxin     Tablet 62.5 MICROGram(s) Oral daily  folic acid 1 milliGRAM(s) Oral daily  heparin  Infusion 900 Unit(s)/Hr (9 mL/Hr) IV Continuous <Continuous>  insulin lispro (ADMELOG) corrective regimen sliding scale   SubCutaneous three times a day before meals  insulin lispro (ADMELOG) corrective regimen sliding scale   SubCutaneous at bedtime  levothyroxine 25 MICROGram(s) Oral daily  losartan 25 milliGRAM(s) Oral daily  spironolactone 25 milliGRAM(s) Oral every 12 hours  thiamine 100 milliGRAM(s) Oral daily    MEDICATIONS  (PRN):  sodium chloride 0.9% lock flush 10 milliLiter(s) IV Push every 1 hour PRN Pre/post blood products, medications, blood draw, and to maintain line patency      ITEMS UNCHECKED ARE NOT PRESENT    PRESENT SYMPTOMS: [ ]Unable to self-report - see [ ] CPOT [ ] PAINADS [ ] RDOS  Source if other than patient:  [ ]Family   [ ]Team     Pain:  [ ]yes [x ]no  QOL impact -   Location -                    Aggravating factors -  Quality -  Radiation -  Timing-  Severity (0-10 scale):  Minimal acceptable level (0-10 scale):     CPOT:    https://www.sccm.org/getattachment/kbs31z87-2j0n-2e4r-8m4u-5783x8771n7m/Critical-Care-Pain-Observation-Tool-(CPOT)    PAINAD Score: See PAINAD tool and score below       Dyspnea:                           [x ]Mild [ ]Moderate [ ]Severe    RDOS: See RDOS tool and score below   0 to 2  minimal or no respiratory distress   3  mild distress  4 to 6 moderate distress  >7 severe distress      Anxiety:                             [ ]Mild [ ]Moderate [ ]Severe  Fatigue:                             [ ]Mild [x ]Moderate [ ]Severe  Nausea:                             [ ]Mild [ ]Moderate [ ]Severe  Loss of appetite:              [x ]Mild [ ]Moderate [ ]Severe  Constipation:                    [ ]Mild [ ]Moderate [ ]Severe    PCSSQ[Palliative Care Spiritual Screening Question]   Severity (0-10):  Score of 4 or > indicate consideration of Chaplaincy referral.  Chaplaincy Referral: [x ] yes [ ] refused [ ] following [ ] Deferred     Caregiver Rillito? : [ ] yes [x ] no [ ] Deferred [ ] Declined             Social work referral [ ] Patient & Family Centered Care Referral [ ]     Anticipatory Grief present?:  [ ] yes [x ] no  [ ] Deferred                  Social work referral [ ] Chaplaincy Referral [ ]    		  Other Symptoms:  [ x]All other review of systems negative     Palliative Performance Status Version 2:   See PPSv2 tool and score below         PHYSICAL EXAM:  Vital Signs Last 24 Hrs  T(C): 36.6 (14 Mar 2024 11:00), Max: 36.6 (14 Mar 2024 11:00)  T(F): 97.9 (14 Mar 2024 11:00), Max: 97.9 (14 Mar 2024 11:00)  HR: 100 (14 Mar 2024 11:00) (97 - 116)  BP: 93/62 (14 Mar 2024 11:00) (93/62 - 113/78)  BP(mean): --  RR: 18 (14 Mar 2024 11:00) (18 - 18)  SpO2: 94% (14 Mar 2024 11:00) (93% - 99%)    Parameters below as of 14 Mar 2024 11:00  Patient On (Oxygen Delivery Method): nasal cannula  O2 Flow (L/min): 2   I&O's Summary    13 Mar 2024 07:01  -  14 Mar 2024 07:00  --------------------------------------------------------  IN: 748 mL / OUT: 2100 mL / NET: -1352 mL    14 Mar 2024 07:01  -  14 Mar 2024 12:29  --------------------------------------------------------  IN: 0 mL / OUT: 1200 mL / NET: -1200 mL       GENERAL: [x ]Cachexia    [ ]Alert  [x ]Oriented x 3   [x ]Lethargic  [ ]Unarousable  [x ]Verbal  [ ]Non-Verbal  Behavioral:   [ ]Anxiety  [ ]Delirium [ ]Agitation [ ]Other  HEENT:  [ ]Normal   [x ]Dry mouth   [ ]ET Tube/Trach  [ ]Oral lesions  PULMONARY:   [ ]Clear [ ]Tachypnea  [ ]Audible excessive secretions   [ ]Rhonchi        [ ]Right [ ]Left [ ]Bilateral  [ ]Crackles        [ ]Right [ ]Left [ ]Bilateral  [ ]Wheezing     [ ]Right [ ]Left [ ]Bilateral  [x ]Diminished BS [ ] Right [ ]Left [x]Bilateral  CARDIOVASCULAR:    [ ]Regular [ ]Irregular [x ]Tachy  [ ]Jonathon [ ]Murmur [ ]Other  GASTROINTESTINAL:  [ ]Soft  [ ]Distended   [ ]+BS  [ ]Non tender [ ]Tender  [ ]Other [ ]PEG [ ]OGT/ NGT   Last BM: 3/11  GENITOURINARY:  [ ]Normal [ ]Incontinent   [ ]Oliguria/Anuria   [ ]Choudhury  MUSCULOSKELETAL:   [ ]Normal   [X ]Weakness  [ ]Bed/Wheelchair bound [ ]Edema  NEUROLOGIC:   [ ]No focal deficits  [ ] Cognitive impairment  [ ] Dysphagia [ ]Dysarthria [ ] Paresis [ X]Other: Slightly lethargic but being able to let his needs known and being able to participate in a GOC discussion.    SKIN:   [ ]Normal  [ ]Rash  [ ]Other  [ ]Pressure ulcer(s) [ ]y [ ]n present on admission    CRITICAL CARE:  [ ]Shock Present  [ ]Septic [ ]Cardiogenic [ ]Neurologic [ ]Hypovolemic  [ ]Vasopressors [ ]Inotropes  [ ]Respiratory failure present [ ]Mechanical Ventilation [ ]Non-invasive ventilatory support [ ]High-Flow   [ ]Acute  [ ]Chronic [ ]Hypoxic  [ ]Hypercarbic [ ]Other  [ ]Other organ failure     LABS:                        9.0    5.43  )-----------( 168      ( 13 Mar 2024 04:40 )             28.6   03-14    139  |  101  |  35<H>  ----------------------------<  62<L>  3.8   |  23  |  1.15    Ca    8.9      14 Mar 2024 07:21    TPro  6.7  /  Alb  3.0<L>  /  TBili  0.8  /  DBili  x   /  AST  45<H>  /  ALT  112<H>  /  AlkPhos  311<H>  03-14  PTT - ( 14 Mar 2024 10:46 )  PTT:86.2 sec    Urinalysis Basic - ( 14 Mar 2024 07:21 )    Color: x / Appearance: x / SG: x / pH: x  Gluc: 62 mg/dL / Ketone: x  / Bili: x / Urobili: x   Blood: x / Protein: x / Nitrite: x   Leuk Esterase: x / RBC: x / WBC x   Sq Epi: x / Non Sq Epi: x / Bacteria: x      RADIOLOGY & ADDITIONAL STUDIES:  Reviewed.   Protein Calorie Malnutrition Present: [ ]mild [ ]moderate [ ]severe [ ]underweight [ ]morbid obesity  https://www.andeal.org/vault/2440/web/files/ONC/Table_Clinical%20Characteristics%20to%20Document%20Malnutrition-White%20JV%20et%20al%202012.pdf    Height (cm): 172.7 (03-04-24 @ 19:45)  Weight (kg): 51.5 (03-04-24 @ 20:54)  BMI (kg/m2): 17.3 (03-04-24 @ 20:54)    [ ]PPSV2 < or = 30%  [ ]significant weight loss [ ]poor nutritional intake [ ]anasarca[ ]Artificial Nutrition    Other REFERRALS:  [ ]Hospice  [ ]Child Life  [ ]Social Work  [ ]Case management [ ]Holistic Therapy     Goals of Care Document:

## 2024-03-14 NOTE — PROGRESS NOTE ADULT - ASSESSMENT
63 year old male with past medical history of CVA, HTN, HLD, prediabetes, A.fib, cocaine/heroin abuse, CAD w/ stent, CHF (EF  25-30% in 12/23),  RLE compartment syndrome s/p fasciotomy 12/23 and recent hospitalization for CHF exacerbation with bilateral pleural effusions requiring chest tube placement initially presenting to [facility] on 3/1 with chest pain and shortness of breath, transferred to [facility] for management of cardiogenic shock requiring inotrope support. Now transferred to medicine for further management

## 2024-03-14 NOTE — PROGRESS NOTE ADULT - PROBLEM SELECTOR PLAN 2
- laying flat, feels comfortable; saturating 94% on 2L  - TTE with LVEF 25%, severe RV dilation, moderate MR, severe TR, LV thrombus  - HF following, f/u recs  GDMT  - c/w bumex to 1 mg po BID  - c/w losartan 25mg daily  - c/w spironolactone 25mg daily  - HOLDING farxiga 10mg daily for ablation on 3/15  - BB on hold for now-- per dr lewis, do not resume   - Per cards, Given recent substance use, not an advanced therapies candidate  - strict Is/Os, daily weights

## 2024-03-14 NOTE — PROGRESS NOTE ADULT - ASSESSMENT
63 year old man with history of ICM (LVIDd 5.2 cm, LVEF 25-30%), CAD s/p PCI 12/2023, severe TR, HTN, AF, PAD c/b RLE compartment syndrome s/p fasciotomy (12/2023) active smoker, ETOH misuse (4 beers daily and half pint liquor 2-3x week) and cocaine use (last 1 month PTA) who was recently hospitalized for ADHF requiring chest tubes for effusions. Now presenting, initially to [facility] for recurrent ADHF. Developed cardiogenic shock prompting transfer to [facility] for further management. Initiated on inotropic support but has since been weaned with diuresis and introduction of afterload reducing agents. Echo with LV thrombus and EF ~25%. EP consulted for AFL w/ RVR for rhythm control management.     1. ICM/HFrEF EF 25%  2. LV apical thrombus on heparin gtt  3. AFL - typical  4. CAD s/p PCI 12/2023  5. PAD s/p prior RLE compartment syndrome s/p fasciotomy    - AFL rates ~100-130s, asymptomatic.   - TTE with EF 25%, LV apical thrombus, Severe TR, mod MR, small pericardial effusion  - Now on PO Digoxin and Bumex   - NPO after MN for EPS and possible atrial flutter ablation tomorrow if typical (CTI), DCCV if atypical  - Continue to hold Farxiga - last dose Monday 3/11  - LFTs downtrending.   - On AC with Heparin gtt - Pt last ECG in sinus rhythm 3/7 at 1714, Please continue and keep PTT >=60   - Hold Heparin on call to EP lab tomorrow  - Will eventually need transition to oral AC   - GDMT per cardiology, not AT candidate d/t recent substance use  - Close telemetry monitoring  - Maintain K>4 and Mg>2

## 2024-03-14 NOTE — PROGRESS NOTE ADULT - ASSESSMENT
63 year old man with history of ICM (LVIDd 5.2 cm, LVEF 25-30%), CAD s/p PCI, severe TR, HTN, AF, PAD c/b RLE compartment syndrome s/p fasciotomy (12/2023) active smoker, ETOH misuse (4 beers daily and half pint liquor 2-3x week) and cocaine use (last 1 month PTA) who was recently hospitalized for ADHF requiring chest tubes for effusions. Now presenting, initially to [facility] for recurrent ADHF and new LV thrombus. Developed cardiogenic shock prompting transfer to [facility] for further management. Initiated on inotropic support but has since been weaned with diuresis and introduction of afterload reducing agents. Prior to central line removal, off inotropic support, CO/CI was 3/1.9.     He appears to have improved with escalation of diuretics yesterday. He appears close to euvolemic on exam and his Cr has improved. His lactate has cleared. Hemodynamically stable, tolerating current GDMT.  Agree with plan for rhythm control strategy with plan for ablation later this week per discussion with EP.     Cardiac Studies  ·	3/5/24 TTE: LVIDd 5.2 cm, LVEF 25% with rounded and protruding LV thrombus in apex, severe RVE with severely reduced function (TAPSE 1.5 cm), mod NEY, trace AI, mod MR, severe TR, est PASP 46 mmHg    Bedside hemodynamics  3/5 Bedside swan numbers; CVP 15, PAP 45/21, wedge 20. MvO2 45 CO/CI 3.4/2.2 SVR 1482  3/6 Bedside swan numbers: CVP 6, PAP 34/15, wedge 15. MvO2 53 CO/CI 3.7/2.4 SVR 1383   3/7 CvO2 42.9, with CO/CI estimated 3/1.9

## 2024-03-14 NOTE — PROGRESS NOTE ADULT - SUBJECTIVE AND OBJECTIVE BOX
ADVANCED HEART FAILURE & TRANSPLANT  - PROGRESS NOTE  *To reach the NS2 Team from 8am to 5pm, please call [phone]   ___________________________________________________________________________  Subjective:  - lying in bed, has only sat at the side of the bed per the nurse, he's not walking around  - he states cough is unchanged  - lying flat comfortably    Medications:  AQUAPHOR (petrolatum Ointment) 1 Application(s) Topical every 12 hours  atorvastatin 40 milliGRAM(s) Oral at bedtime  buMETAnide 1 milliGRAM(s) Oral every 12 hours  chlorhexidine 2% Cloths 1 Application(s) Topical <User Schedule>  clopidogrel Tablet 75 milliGRAM(s) Oral daily  digoxin     Tablet 62.5 MICROGram(s) Oral daily  folic acid 1 milliGRAM(s) Oral daily  heparin  Infusion 900 Unit(s)/Hr IV Continuous <Continuous>  insulin lispro (ADMELOG) corrective regimen sliding scale   SubCutaneous three times a day before meals  insulin lispro (ADMELOG) corrective regimen sliding scale   SubCutaneous at bedtime  levothyroxine 25 MICROGram(s) Oral daily  losartan 25 milliGRAM(s) Oral daily  sodium chloride 0.9% lock flush 10 milliLiter(s) IV Push every 1 hour PRN  spironolactone 25 milliGRAM(s) Oral every 12 hours  thiamine 100 milliGRAM(s) Oral daily      Physical Exam:    Vitals:  Vital Signs Last 24 Hours  T(C): 36.6 (24 @ 11:00), Max: 36.6 (24 @ 11:00)  HR: 100 (24 @ 11:00) (97 - 116)  BP: 93/62 (24 @ 11:00) (93/62 - 113/78)  RR: 18 (24 @ 11:00) (18 - 18)  SpO2: 94% (24 @ 11:00) (93% - 99%)    Weight in k.7 ( @ 05:36)    I&O's Summary    13 Mar 2024 07:  -  14 Mar 2024 07:00  --------------------------------------------------------  IN: 748 mL / OUT: 2100 mL / NET: -1352 mL    14 Mar 2024 07:  -  14 Mar 2024 12:32  --------------------------------------------------------  IN: 0 mL / OUT: 1200 mL / NET: -1200 mL    Tele: afib/flutter    General: Frail, No acute distress. lying flat comfortably  HEENT: EOM intact.  Neck: Neck supple. JVP mildly elevated. No masses  Chest: dry cough, rhonchi  CV: tachycardic, irregular, no LE edema  Abdomen: Soft, non-distended, non-tender  Skin: No rashes or skin breakdown. warm peripherally  Neurology: Alert and oriented times three. Sensation intact  Psych: Affect normal      Labs:                        9.0    5.43  )-----------( 168      ( 13 Mar 2024 04:40 )             28.6         139  |  101  |  35<H>  ----------------------------<  62<L>  3.8   |  23  |  1.15    Ca    8.9      14 Mar 2024 07:21    TPro  6.7  /  Alb  3.0<L>  /  TBili  0.8  /  DBili  x   /  AST  45<H>  /  ALT  112<H>  /  AlkPhos  311<H>      PTT - ( 14 Mar 2024 10:46 )  PTT:86.2 sec    Lactate, Blood: 1.4 mmol/L ( @ 07:21)  Lactate, Blood: 1.6 mmol/L ( @ 04:40)  Lactate, Blood: 2.4 mmol/L ( @ 13:59)

## 2024-03-14 NOTE — PROGRESS NOTE ADULT - TIME BILLING
med mgmt, pt counseling, care coordination, lab/imaging interpretation
Total time spent including the following  [x] Physical chart review and documentation   An extensive review of the physical chart, electronic health record, and documentation was conducted to obtain collateral information including but not limited to:   - Current inpatient records (ED, H&P, primary team, and consultants [ heart failure])   - Inpatient values/results (CBC, CMP, Imaging)   - Current or proposed treatment plans   - Pharmacotherapy review   [x]care coordination  [x]discussion with the primary team (Dr. Davis)   [x]discussion with the patient, surrogate decision maker, or family  [x]Physical Exam and/or review of systems   [x]Formulation of assessment and plan         The _20_____ minutes spent in ACP ( See GOC note above.                 ) were independent to the time spent in time based billing
Total time spent including the following  [x] Physical chart review and documentation   An extensive review of the physical chart, electronic health record, and documentation was conducted to obtain collateral information including but not limited to:   - Current inpatient records (primary team, and consultants [Heart Failure])   - Inpatient values/results (CBC, CMP)   - Current or proposed treatment plans   - Pharmacotherapy review   [x]discussion with the patient's brother   [x]Formulation of assessment and plan
- Review of records, telemetry, vital signs and daily labs.   - General and cardiovascular physical examination.  - Generation of cardiovascular treatment plan.  - Coordination of care with primary team.
Total time spent including the following  [x] Physical chart review and documentation   An extensive review of the physical chart, electronic health record, and documentation was conducted to obtain collateral information including but not limited to:   - Current inpatient records (primary team, and consultants [Heart Failure   ])   - Inpatient values/results (CBC, CMP)   - Current or proposed treatment plans   - Pharmacotherapy review   [x]discussion with the patient.   [x]Physical Exam and/or review of systems   [x]Formulation of assessment and plan

## 2024-03-14 NOTE — PROGRESS NOTE ADULT - SUBJECTIVE AND OBJECTIVE BOX
no events overnight.    GENERAL: No fevers, no chills.  EYES: No blurry vision,  No photophobia  ENT: No sore throat.  No dysphagia  Cardiovascular: No chest pain, palpitations, orthopnea  Pulmonary: No cough, no wheezing. No shortness of breath  Gastrointestinal: No abdominal pain, no diarrhea, no constipation.    Musculoskeletal: No weakness.  No myalgias.  Dermatology:  No rashes.  Neuro: No Headache.  No vertigo.  No dizziness.  Psych: No anxiety, no depression.  Denies suicidal thoughts.    MEDICATIONS  (STANDING):  AQUAPHOR (petrolatum Ointment) 1 Application(s) Topical every 12 hours  atorvastatin 40 milliGRAM(s) Oral at bedtime  buMETAnide 1 milliGRAM(s) Oral every 12 hours  chlorhexidine 2% Cloths 1 Application(s) Topical <User Schedule>  clopidogrel Tablet 75 milliGRAM(s) Oral daily  digoxin     Tablet 62.5 MICROGram(s) Oral daily  folic acid 1 milliGRAM(s) Oral daily  heparin  Infusion 900 Unit(s)/Hr (9 mL/Hr) IV Continuous <Continuous>  insulin lispro (ADMELOG) corrective regimen sliding scale   SubCutaneous three times a day before meals  insulin lispro (ADMELOG) corrective regimen sliding scale   SubCutaneous at bedtime  levothyroxine 25 MICROGram(s) Oral daily  losartan 25 milliGRAM(s) Oral daily  spironolactone 25 milliGRAM(s) Oral every 12 hours  thiamine 100 milliGRAM(s) Oral daily    MEDICATIONS  (PRN):  sodium chloride 0.9% lock flush 10 milliLiter(s) IV Push every 1 hour PRN Pre/post blood products, medications, blood draw, and to maintain line patency    Vital Signs Last 24 Hrs  T(C): 36.6 (14 Mar 2024 11:00), Max: 36.6 (14 Mar 2024 11:00)  T(F): 97.9 (14 Mar 2024 11:00), Max: 97.9 (14 Mar 2024 11:00)  HR: 100 (14 Mar 2024 11:00) (97 - 116)  BP: 93/62 (14 Mar 2024 11:00) (93/62 - 113/78)  BP(mean): --  RR: 18 (14 Mar 2024 11:00) (18 - 18)  SpO2: 94% (14 Mar 2024 11:00) (93% - 99%)    Parameters below as of 14 Mar 2024 11:00  Patient On (Oxygen Delivery Method): nasal cannula  O2 Flow (L/min): 2    GENERAL: NAD, frail appearing  HEAD:  Atraumatic, Normocephalic  EYES: EOMI, PERRLA, conjunctiva and sclera clear  ENT: Pharynx not erythematous  PULMONARY: Clear to auscultation bilaterally; No wheeze  CARDIOVASCULAR: Regular rate and rhythm; No murmurs, rubs, or gallops  ABDOMEN: Soft, Nontender, Nondistended; Bowel sounds present  EXTREMITIES:  2+ Peripheral Pulses, No clubbing, cyanosis, or edema  MUSCULOSKELETAL: No calf tenderness  PSYCH: AAOx3, normal affect  SKIN: warm and dry, No rashes or lesions    .  LABS:                         9.0    5.43  )-----------( 168      ( 13 Mar 2024 04:40 )             28.6     03-14    139  |  101  |  35<H>  ----------------------------<  62<L>  3.8   |  23  |  1.15    Ca    8.9      14 Mar 2024 07:21    TPro  6.7  /  Alb  3.0<L>  /  TBili  0.8  /  DBili  x   /  AST  45<H>  /  ALT  112<H>  /  AlkPhos  311<H>  03-14    PTT - ( 14 Mar 2024 10:46 )  PTT:86.2 sec  Urinalysis Basic - ( 14 Mar 2024 07:21 )    Color: x / Appearance: x / SG: x / pH: x  Gluc: 62 mg/dL / Ketone: x  / Bili: x / Urobili: x   Blood: x / Protein: x / Nitrite: x   Leuk Esterase: x / RBC: x / WBC x   Sq Epi: x / Non Sq Epi: x / Bacteria: x        Lactate, Blood: 1.4 mmol/L (03-14 @ 07:21)      RADIOLOGY, EKG & ADDITIONAL TESTS: Reviewed.

## 2024-03-14 NOTE — PROGRESS NOTE ADULT - SUBJECTIVE AND OBJECTIVE BOX
24H hour events: No acute events    MEDICATIONS:  buMETAnide 1 milliGRAM(s) Oral every 12 hours  clopidogrel Tablet 75 milliGRAM(s) Oral daily  digoxin     Tablet 62.5 MICROGram(s) Oral daily  heparin  Infusion 900 Unit(s)/Hr IV Continuous <Continuous>  losartan 25 milliGRAM(s) Oral daily  spironolactone 25 milliGRAM(s) Oral daily  atorvastatin 40 milliGRAM(s) Oral at bedtime  insulin lispro (ADMELOG) corrective regimen sliding scale   SubCutaneous three times a day before meals  insulin lispro (ADMELOG) corrective regimen sliding scale   SubCutaneous at bedtime  levothyroxine 25 MICROGram(s) Oral daily  AQUAPHOR (petrolatum Ointment) 1 Application(s) Topical every 12 hours  chlorhexidine 2% Cloths 1 Application(s) Topical <User Schedule>  folic acid 1 milliGRAM(s) Oral daily  sodium chloride 0.9% lock flush 10 milliLiter(s) IV Push every 1 hour PRN  thiamine 100 milliGRAM(s) Oral daily      REVIEW OF SYSTEMS:  Complete 12 point ROS negative.    PHYSICAL EXAM:  T(C): 36.4 (03-14-24 @ 05:36), Max: 36.6 (03-13-24 @ 11:29)  HR: 97 (03-14-24 @ 05:36) (97 - 118)  BP: 113/78 (03-14-24 @ 05:36) (110/74 - 113/78)  RR: 18 (03-14-24 @ 05:36) (18 - 18)  SpO2: 93% (03-14-24 @ 05:36) (93% - 99%)  Wt(kg): --  I&O's Summary    13 Mar 2024 07:01  -  14 Mar 2024 07:00  --------------------------------------------------------  IN: 748 mL / OUT: 2100 mL / NET: -1352 mL    14 Mar 2024 07:01  -  14 Mar 2024 08:32  --------------------------------------------------------  IN: 0 mL / OUT: 1200 mL / NET: -1200 mL        Appearance: Normal	  HEENT:  PERRL, EOMI	  Cardiovascular: Normal S1 S2, tachy, No JVD, No murmurs, No edema  Respiratory: Lungs clear to auscultation	  Psychiatry: A & O x 3, Mood & affect appropriate  Gastrointestinal:  Soft, Non-tender, + BS	  Skin: No rashes, No ecchymoses, No cyanosis	  Neurologic: Non-focal  Extremities: No clubbing, cyanosis or edema  Vascular: Peripheral pulses palpable 2+ bilaterally        LABS:	 	    CBC Full  -  ( 13 Mar 2024 04:40 )  WBC Count : 5.43 K/uL  Hemoglobin : 9.0 g/dL  Hematocrit : 28.6 %  Platelet Count - Automated : 168 K/uL  Mean Cell Volume : 67.6 fl  Mean Cell Hemoglobin : 21.3 pg  Mean Cell Hemoglobin Concentration : 31.5 gm/dL  Auto Neutrophil # : x  Auto Lymphocyte # : x  Auto Monocyte # : x  Auto Eosinophil # : x  Auto Basophil # : x  Auto Neutrophil % : x  Auto Lymphocyte % : x  Auto Monocyte % : x  Auto Eosinophil % : x  Auto Basophil % : x    03-14    139  |  101  |  35<H>  ----------------------------<  62<L>  3.8   |  23  |  1.15  03-13    137  |  101  |  47<H>  ----------------------------<  101<H>  3.3<L>   |  23  |  1.43<H>    Ca    8.9      14 Mar 2024 07:21  Ca    8.5      13 Mar 2024 04:40    TPro  6.7  /  Alb  3.0<L>  /  TBili  0.8  /  DBili  x   /  AST  45<H>  /  ALT  112<H>  /  AlkPhos  311<H>  03-14  TPro  6.7  /  Alb  2.8<L>  /  TBili  0.7  /  DBili  x   /  AST  47<H>  /  ALT  131<H>  /  AlkPhos  342<H>  03-13      TELEMETRY: AFl 100-130s	      < from: TTE W or WO Ultrasound Enhancing Agent (03.05.24 @ 07:31) >  TRANSTHORACIC ECHOCARDIOGRAM REPORT  ________________________________________________________________________________                                      _______       Pt. Name:       CARMENCITA PAZ     Study Date:    3/5/2024  MRN:            [id number]          YOB: 1960  Accession #:    [id number]           Age:           63 years  Account#:       [id number]        Gender:        M  Heart Rate:     111 bpm             Height:        67.00 in (170.18 cm)  Rhythm:         atrial fibrillation Weight:        113.00 lb (51.26 kg)  Blood Pressure: 103/73 mmHg         BSA/BMI:       1.59 m² / 17.70 kg/m²  ________________________________________________________________________________________  Referring Physician:    [id number] Griffin  Interpreting Physician: Carmencita Davis MD  Primary Sonographer:    Lisbeth Fortune RDCS    CPT:                ECHO TTE WITH CON COMP W DOPP - .m;DEFINITY ECHO                      CONTRAST PER ML - .m;DEFINITY ECHO CONTRAST PER ML                      WASTED - .m  Indication(s):      Chronic systolic (congestive) heart failure - I50.22  Procedure:          Transthoracic echocardiogram with 2-D, M-mode and complete                      spectral and color flow Doppler.  Ordering Location:  [facility]  Admission Status:   Inpatient  Contrast Injection: Verbal consent was obtained for injection of Ultrasonic                      Enhancing Agent following a discussion of risks and                      benefits.      Endocardial visualization enhanced with Definity Ultrasound                      enhancing agent.    _______________________________________________________________________________________     CONCLUSIONS:      1. Left ventricular cavity is severely dilated. Left ventricular systolic function is severely decreased. Global left ventricular hypokinesis.   2. Severely enlarged right ventricular cavity size and severely reduced systolic function.   3. Moderate mitral regurgitation. Mechanism of mitral regurgitation: Andrei Type IIIb (restricted leaflet motion secondary to left atrial or left ventricular dilatation).   4. Severe tricuspid regurgitation.   5. Mild to moderate pulmonary hypertension.   6. 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   7. Small pericardial effusion with no evidence of hemodynamic compromise (or echocardiographic evidence of cardiac tamponade).    ________________________________________________________________________________________  FINDINGS:     Left Ventricle:  After obtaining consent, Definity ultrasound enhancing agent was given for enhanced left ventricular opacification and improved delineation of the left ventricular endocardial borders. The left ventricular cavity is severely dilated. Left ventricular systolic function is severely decreased with an ejection fraction visually estimated at 25%. There is global left ventricular hypokinesis. 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     Right Ventricle:  The right ventricular cavity is severely enlarged in size and severely reduced systolic function. Tricuspid annular plane systolic excursion (TAPSE) is 1.5 cm (normal >=1.7 cm).     Left Atrium:  The left atrium is moderately dilated with an indexed volume of 45.89 ml/m².     Right Atrium:  The right atrium is moderately dilated.     Aortic Valve:  Normal appearing aortic valve. There is trace aortic regurgitation.     Mitral Valve:  There is moderate mitral regurgitation. The mechanism of mitral regurgitation is due to restricted leaflet motion secondary to left atrial or left ventricular dilatation (Andrei Type IIIb).     Tricuspid Valve:  There is severe tricuspid regurgitation. Estimated pulmonary artery systolic pressure is 46 mmHg, consistent with mild to moderate pulmonary hypertension.     Pulmonic Valve:  There is mild to moderate pulmonic regurgitation.     Aorta:  The aortic annulus and aortic root appear normal in size.     Pericardium:  There is a small pericardial effusion with no evidence of hemodynamic compromise (or echocardiographic evidence of cardiac tamponade).     Pleura:  Bilateral pleural effusion noted.     Systemic Veins:  The inferior vena cava is dilated (dilated >2.1cm) with abnormal inspiratory collapse (abnormal <50%) consistent with elevated right atrial pressure (~15, range 10-20mmHg).  ____________________________________________________________________  QUANTITATIVE DATA:  Left Ventricle Measurements: (Indexed to BSA)     IVSd (2D):   0.8 cm  LVPWd (2D):  0.8 cm  LVIDd (2D):  5.2 cm  LVIDs (2D):  5.0 cm  LV Mass:     147 g  92.5 g/m²  Visualized LV EF%: 25%     e' lateral: 13.30 cm/s  e' medial:  8.16 cm/s    Aorta Measurements: (Normal range) (Indexedto BSA)     Sinuses of Valsalva: 3.20 cm (3.1 - 3.7 cm)       Left Atrium Measurements: (Indexed to BSA)  LA Diam 2D: 3.70 cm    Right Ventricle Measurements:     TAPSE:           1.5 cm  TV Babita. S':      6.09 cm/s  RV Base (RVID1): 5.9 cm  RV Mid (RVID2):  4.2 cm       LVOT / RVOT/ Qp/Qs Data: (Indexed to BSA)  LVOT Vmax: 0.50 m/s  LVOT VTI:  6.71 cm       MR Vmax:          3.13 m/s  MR VTI:           90.60 cm  MR Mean Gradient: 27.0 mmHg  MR Peak Gradient: 39.2 mmHg  MR PISA Radius:   0.60 cm  MR Aliasing Rj:  35.10 cm/s       Tricuspid Valve Measurements:     TR Vmax:          2.8 m/s  TR Peak Gradient: 30.9 mmHg  RA Pressure:      15 mmHg  PASP:             46 mmHg    < end of copied text >

## 2024-03-14 NOTE — PROGRESS NOTE ADULT - NSPROGADDITIONALINFOA_GEN_ALL_CORE
Dispo: ablation 3/15, then coumadin bridging- NH/JEFERSON once ready  discussed with MALLY Mitchell D.O.  Division of Hospital Medicine  Available on MS Teams

## 2024-03-14 NOTE — PROGRESS NOTE ADULT - PROBLEM SELECTOR PLAN 2
+/- Depression.   Gabrielle newell was already called.   d/w the patient about a trial of antidepressants and he agreed with it; however, his QTc is prolonged. Hence, would like for the primary team to consider Richy newell to define what is the best option on this case (Fluoxetine?)

## 2024-03-15 DIAGNOSIS — F43.20 ADJUSTMENT DISORDER, UNSPECIFIED: ICD-10-CM

## 2024-03-15 LAB
ADD ON TEST-SPECIMEN IN LAB: SIGNIFICANT CHANGE UP
ADD ON TEST-SPECIMEN IN LAB: SIGNIFICANT CHANGE UP
ALBUMIN SERPL ELPH-MCNC: 3 G/DL — LOW (ref 3.3–5)
ALP SERPL-CCNC: 275 U/L — HIGH (ref 40–120)
ALT FLD-CCNC: 97 U/L — HIGH (ref 10–45)
ANION GAP SERPL CALC-SCNC: 16 MMOL/L — SIGNIFICANT CHANGE UP (ref 5–17)
APTT BLD: 65.3 SEC — HIGH (ref 24.5–35.6)
AST SERPL-CCNC: 41 U/L — HIGH (ref 10–40)
BILIRUB SERPL-MCNC: 0.7 MG/DL — SIGNIFICANT CHANGE UP (ref 0.2–1.2)
BUN SERPL-MCNC: 32 MG/DL — HIGH (ref 7–23)
CALCIUM SERPL-MCNC: 8.4 MG/DL — SIGNIFICANT CHANGE UP (ref 8.4–10.5)
CHLORIDE SERPL-SCNC: 101 MMOL/L — SIGNIFICANT CHANGE UP (ref 96–108)
CO2 SERPL-SCNC: 26 MMOL/L — SIGNIFICANT CHANGE UP (ref 22–31)
CREAT SERPL-MCNC: 1.07 MG/DL — SIGNIFICANT CHANGE UP (ref 0.5–1.3)
EGFR: 78 ML/MIN/1.73M2 — SIGNIFICANT CHANGE UP
GLUCOSE BLDC GLUCOMTR-MCNC: 110 MG/DL — HIGH (ref 70–99)
GLUCOSE BLDC GLUCOMTR-MCNC: 122 MG/DL — HIGH (ref 70–99)
GLUCOSE BLDC GLUCOMTR-MCNC: 91 MG/DL — SIGNIFICANT CHANGE UP (ref 70–99)
GLUCOSE BLDC GLUCOMTR-MCNC: 92 MG/DL — SIGNIFICANT CHANGE UP (ref 70–99)
GLUCOSE SERPL-MCNC: 77 MG/DL — SIGNIFICANT CHANGE UP (ref 70–99)
HCT VFR BLD CALC: 29.1 % — LOW (ref 39–50)
HGB BLD-MCNC: 9.5 G/DL — LOW (ref 13–17)
INR BLD: 1.42 RATIO — HIGH (ref 0.85–1.18)
MAGNESIUM SERPL-MCNC: 1.6 MG/DL — SIGNIFICANT CHANGE UP (ref 1.6–2.6)
MCHC RBC-ENTMCNC: 22.1 PG — LOW (ref 27–34)
MCHC RBC-ENTMCNC: 32.6 GM/DL — SIGNIFICANT CHANGE UP (ref 32–36)
MCV RBC AUTO: 67.7 FL — LOW (ref 80–100)
NRBC # BLD: 0 /100 WBCS — SIGNIFICANT CHANGE UP (ref 0–0)
PLATELET # BLD AUTO: 185 K/UL — SIGNIFICANT CHANGE UP (ref 150–400)
POTASSIUM SERPL-MCNC: 3.5 MMOL/L — SIGNIFICANT CHANGE UP (ref 3.5–5.3)
POTASSIUM SERPL-SCNC: 3.5 MMOL/L — SIGNIFICANT CHANGE UP (ref 3.5–5.3)
PROT SERPL-MCNC: 6.7 G/DL — SIGNIFICANT CHANGE UP (ref 6–8.3)
PROTHROM AB SERPL-ACNC: 15.5 SEC — HIGH (ref 9.5–13)
RBC # BLD: 4.3 M/UL — SIGNIFICANT CHANGE UP (ref 4.2–5.8)
RBC # FLD: 18.3 % — HIGH (ref 10.3–14.5)
SODIUM SERPL-SCNC: 143 MMOL/L — SIGNIFICANT CHANGE UP (ref 135–145)
WBC # BLD: 5.64 K/UL — SIGNIFICANT CHANGE UP (ref 3.8–10.5)
WBC # FLD AUTO: 5.64 K/UL — SIGNIFICANT CHANGE UP (ref 3.8–10.5)

## 2024-03-15 PROCEDURE — 93653 COMPRE EP EVAL TX SVT: CPT

## 2024-03-15 PROCEDURE — 99232 SBSQ HOSP IP/OBS MODERATE 35: CPT

## 2024-03-15 PROCEDURE — 93010 ELECTROCARDIOGRAM REPORT: CPT

## 2024-03-15 RX ORDER — HEPARIN SODIUM 5000 [USP'U]/ML
800 INJECTION INTRAVENOUS; SUBCUTANEOUS
Qty: 25000 | Refills: 0 | Status: DISCONTINUED | OUTPATIENT
Start: 2024-03-15 | End: 2024-03-18

## 2024-03-15 RX ORDER — HEPARIN SODIUM 5000 [USP'U]/ML
800 INJECTION INTRAVENOUS; SUBCUTANEOUS
Qty: 25000 | Refills: 0 | Status: DISCONTINUED | OUTPATIENT
Start: 2024-03-15 | End: 2024-03-15

## 2024-03-15 RX ORDER — POTASSIUM CHLORIDE 20 MEQ
40 PACKET (EA) ORAL ONCE
Refills: 0 | Status: COMPLETED | OUTPATIENT
Start: 2024-03-15 | End: 2024-03-15

## 2024-03-15 RX ORDER — MAGNESIUM SULFATE 500 MG/ML
1 VIAL (ML) INJECTION ONCE
Refills: 0 | Status: COMPLETED | OUTPATIENT
Start: 2024-03-15 | End: 2024-03-15

## 2024-03-15 RX ORDER — WARFARIN SODIUM 2.5 MG/1
5 TABLET ORAL ONCE
Refills: 0 | Status: COMPLETED | OUTPATIENT
Start: 2024-03-15 | End: 2024-03-15

## 2024-03-15 RX ADMIN — SPIRONOLACTONE 25 MILLIGRAM(S): 25 TABLET, FILM COATED ORAL at 17:50

## 2024-03-15 RX ADMIN — Medication 62.5 MICROGRAM(S): at 13:04

## 2024-03-15 RX ADMIN — Medication 1 APPLICATION(S): at 17:51

## 2024-03-15 RX ADMIN — BUMETANIDE 1 MILLIGRAM(S): 0.25 INJECTION INTRAMUSCULAR; INTRAVENOUS at 05:23

## 2024-03-15 RX ADMIN — Medication 100 MILLIGRAM(S): at 13:04

## 2024-03-15 RX ADMIN — SPIRONOLACTONE 25 MILLIGRAM(S): 25 TABLET, FILM COATED ORAL at 05:23

## 2024-03-15 RX ADMIN — HEPARIN SODIUM 800 UNIT(S)/HR: 5000 INJECTION INTRAVENOUS; SUBCUTANEOUS at 22:16

## 2024-03-15 RX ADMIN — Medication 25 MICROGRAM(S): at 05:23

## 2024-03-15 RX ADMIN — Medication 100 GRAM(S): at 22:25

## 2024-03-15 RX ADMIN — Medication 1 MILLIGRAM(S): at 13:04

## 2024-03-15 RX ADMIN — CHLORHEXIDINE GLUCONATE 1 APPLICATION(S): 213 SOLUTION TOPICAL at 05:25

## 2024-03-15 RX ADMIN — Medication 40 MILLIEQUIVALENT(S): at 08:41

## 2024-03-15 RX ADMIN — BUMETANIDE 1 MILLIGRAM(S): 0.25 INJECTION INTRAMUSCULAR; INTRAVENOUS at 17:49

## 2024-03-15 RX ADMIN — WARFARIN SODIUM 5 MILLIGRAM(S): 2.5 TABLET ORAL at 22:28

## 2024-03-15 RX ADMIN — CLOPIDOGREL BISULFATE 75 MILLIGRAM(S): 75 TABLET, FILM COATED ORAL at 13:04

## 2024-03-15 RX ADMIN — ATORVASTATIN CALCIUM 40 MILLIGRAM(S): 80 TABLET, FILM COATED ORAL at 22:23

## 2024-03-15 RX ADMIN — Medication 1 APPLICATION(S): at 05:25

## 2024-03-15 RX ADMIN — LOSARTAN POTASSIUM 25 MILLIGRAM(S): 100 TABLET, FILM COATED ORAL at 08:45

## 2024-03-15 NOTE — PROGRESS NOTE ADULT - PROBLEM SELECTOR PLAN 3
- seen on TTE above  - cont hep gtt for now-- eventual transition to coumadin once cleared by EP to start

## 2024-03-15 NOTE — PROGRESS NOTE ADULT - ASSESSMENT
63 year old man with history of ICM (LVIDd 5.2 cm, LVEF 25-30%), CAD s/p PCI 12/2023, severe TR, HTN, AF, PAD c/b RLE compartment syndrome s/p fasciotomy (12/2023) active smoker, ETOH misuse (4 beers daily and half pint liquor 2-3x week) and cocaine use (last 1 month PTA) who was recently hospitalized for ADHF requiring chest tubes for effusions. Now presenting, initially to [facility] for recurrent ADHF. Developed cardiogenic shock prompting transfer to [facility] for further management. Initiated on inotropic support but has since been weaned with diuresis and introduction of afterload reducing agents. Echo with LV thrombus and EF ~25%. EP consulted for AFL w/ RVR for rhythm control management.     1. ICM/HFrEF EF 25%  2. LV apical thrombus on heparin gtt  3. AFL - typical  4. CAD s/p PCI 12/2023  5. PAD s/p prior RLE compartment syndrome s/p fasciotomy    - AFL rates ~80-120s, asymptomatic.   - TTE with EF 25%, LV apical thrombus, Severe TR, mod MR, small pericardial effusion  - Now on PO Digoxin and Bumex   - NPO EPS and possible atrial flutter ablation today if typical (CTI), DCCV if atypical  - Continue to hold Farxiga - last dose Monday 3/11  - On AC with Heparin gtt - Pt last ECG in sinus rhythm 3/7 at 1714, Please continue and keep PTT >=60  - Hold Heparin on call to EP lab today  - Plan to start Coumadin per team post procedure  - GDMT per cardiology, not AT candidate d/t recent substance use  - Close telemetry monitoring  - Maintain K>4 and Mg>2   63 year old man with history of ICM (LVIDd 5.2 cm, LVEF 25-30%), CAD s/p PCI 12/2023, severe TR, HTN, AF, PAD c/b RLE compartment syndrome s/p fasciotomy (12/2023) active smoker, ETOH misuse (4 beers daily and half pint liquor 2-3x week) and cocaine use (last 1 month PTA) who was recently hospitalized for ADHF requiring chest tubes for effusions. Now presenting, initially to [facility] for recurrent ADHF. Developed cardiogenic shock prompting transfer to [facility] for further management. Initiated on inotropic support but has since been weaned with diuresis and introduction of afterload reducing agents. Echo with LV thrombus and EF ~25%. EP consulted for AFL w/ RVR for rhythm control management.     1. ICM/HFrEF EF 25%  2. LV apical thrombus on heparin gtt  3. AFL - typical  4. CAD s/p PCI 12/2023  5. PAD s/p prior RLE compartment syndrome s/p fasciotomy    - AFL rates ~80-120s, asymptomatic.   - TTE with EF 25%, LV apical thrombus, Severe TR, mod MR, small pericardial effusion  - Now on PO Digoxin and Bumex   - NPO EPS and possible atrial flutter ablation today if typical (CTI), DCCV if atypical  - Continue to hold Farxiga - last dose Monday 3/11  - On AC with Heparin gtt - Pt last ECG in sinus rhythm 3/7 at 1714, Please continue and keep PTT >=60  - Hold Heparin on call to EP lab today  - Plan to start Coumadin per team post procedure  - GDMT per cardiology, not AT candidate d/t recent substance use  - Close telemetry monitoring  - Maintain K>4 and Mg>2    Addendum:  - s/p EPS/typical AFL ablation  - Bedrest x 6 hours - remove right groin suture at 10 PM tonight (CICU ACP aware)  - Resume Heparin gtt at previous rate, no bolus at 10 PM tonight  - Start Coumadin tonight  - Discussed with team

## 2024-03-15 NOTE — PROGRESS NOTE ADULT - SUBJECTIVE AND OBJECTIVE BOX
no complaints, feels comfortable off oxygen.     GENERAL: No fevers, no chills.  EYES: No blurry vision,  No photophobia  ENT: No sore throat.  No dysphagia  Cardiovascular: No chest pain, palpitations, orthopnea  Pulmonary: No cough, no wheezing. No shortness of breath  Gastrointestinal: No abdominal pain, no diarrhea, no constipation.    Musculoskeletal: No weakness.  No myalgias.  Dermatology:  No rashes.  Neuro: No Headache.  No vertigo.  No dizziness.  Psych: No anxiety, no depression.  Denies suicidal thoughts.    MEDICATIONS  (STANDING):  AQUAPHOR (petrolatum Ointment) 1 Application(s) Topical every 12 hours  atorvastatin 40 milliGRAM(s) Oral at bedtime  buMETAnide 1 milliGRAM(s) Oral every 12 hours  chlorhexidine 2% Cloths 1 Application(s) Topical <User Schedule>  clopidogrel Tablet 75 milliGRAM(s) Oral daily  digoxin     Tablet 62.5 MICROGram(s) Oral daily  folic acid 1 milliGRAM(s) Oral daily  heparin  Infusion 900 Unit(s)/Hr (8 mL/Hr) IV Continuous <Continuous>  insulin lispro (ADMELOG) corrective regimen sliding scale   SubCutaneous three times a day before meals  insulin lispro (ADMELOG) corrective regimen sliding scale   SubCutaneous at bedtime  levothyroxine 25 MICROGram(s) Oral daily  losartan 25 milliGRAM(s) Oral daily  magnesium sulfate  IVPB 1 Gram(s) IV Intermittent once  spironolactone 25 milliGRAM(s) Oral every 12 hours  thiamine 100 milliGRAM(s) Oral daily    MEDICATIONS  (PRN):  sodium chloride 0.9% lock flush 10 milliLiter(s) IV Push every 1 hour PRN Pre/post blood products, medications, blood draw, and to maintain line patency      Vital Signs Last 24 Hrs  T(C): 36.8 (15 Mar 2024 11:00), Max: 36.8 (15 Mar 2024 04:08)  T(F): 98.2 (15 Mar 2024 11:00), Max: 98.2 (15 Mar 2024 04:08)  HR: 100 (15 Mar 2024 11:00) (88 - 125)  BP: 95/64 (15 Mar 2024 11:00) (95/64 - 108/73)  BP(mean): --  RR: 18 (15 Mar 2024 11:00) (18 - 20)  SpO2: 95% (15 Mar 2024 11:00) (91% - 97%)    Parameters below as of 15 Mar 2024 11:00  Patient On (Oxygen Delivery Method): room air    GENERAL: NAD, frail appearing  HEAD:  Atraumatic, Normocephalic  EYES: EOMI, PERRLA, conjunctiva and sclera clear  ENT: Pharynx not erythematous  PULMONARY: Clear to auscultation bilaterally; No wheeze  CARDIOVASCULAR: Regular rate and rhythm; No murmurs, rubs, or gallops  ABDOMEN: Soft, Nontender, Nondistended; Bowel sounds present  EXTREMITIES:  2+ Peripheral Pulses, No clubbing, cyanosis, or edema  MUSCULOSKELETAL: No calf tenderness  PSYCH: AAOx3, normal affect  SKIN: warm and dry, No rashes or lesions    .  LABS:                         9.5    5.64  )-----------( 185      ( 15 Mar 2024 07:17 )             29.1     03-15    143  |  101  |  32<H>  ----------------------------<  77  3.5   |  26  |  1.07    Ca    8.4      15 Mar 2024 07:16  Mg     1.6     03-15    TPro  6.7  /  Alb  3.0<L>  /  TBili  0.7  /  DBili  x   /  AST  41<H>  /  ALT  97<H>  /  AlkPhos  275<H>  03-15    PTT - ( 15 Mar 2024 08:57 )  PTT:65.3 sec  Urinalysis Basic - ( 15 Mar 2024 07:16 )    Color: x / Appearance: x / SG: x / pH: x  Gluc: 77 mg/dL / Ketone: x  / Bili: x / Urobili: x   Blood: x / Protein: x / Nitrite: x   Leuk Esterase: x / RBC: x / WBC x   Sq Epi: x / Non Sq Epi: x / Bacteria: x            RADIOLOGY, EKG & ADDITIONAL TESTS: Reviewed.

## 2024-03-15 NOTE — CHART NOTE - NSCHARTNOTEFT_GEN_A_CORE
Pt s/p aflutter ablation. Per EP resident  - Refugio Lisa, Heparin to be resumed at 10pm at current rate of 8, start coumadin, maintain coumadin until INR is theraputic, bedrest untill 10pm.

## 2024-03-15 NOTE — CHART NOTE - NSCHARTNOTEFT_GEN_A_CORE
3/15 s/p Aflutter Ablation via RFV with suture stop cock RFA manual hold  to groin   Remove Suture tonight at 2200  ( spoke with pamela from EP made aware)   Bedrest x 6hr   Resume Heparin gtt tonight at 2200 NO BOLUS at 8cc/hr   Resume all other meds   EKG now and am   Continue to monitor closely     Daisy Sanchez NP  Cardiology   [phone]

## 2024-03-15 NOTE — PROGRESS NOTE ADULT - PROBLEM SELECTOR PLAN 4
- also with some depression and mood disorder-- discussed with pall care-- psych consult to be called   - Per pt, quit cocaine and ETOH one month ago  - SW following  - pall care following-- rec NH placement\

## 2024-03-15 NOTE — PROGRESS NOTE ADULT - ASSESSMENT
63 year old male with past medical history of CVA, HTN, HLD, prediabetes, A.fib, cocaine/heroin abuse, CAD w/ stent, CHF (EF  25-30% in 12/23),  RLE compartment syndrome s/p fasciotomy 12/23 and recent hospitalization for CHF exacerbation with bilateral pleural effusions requiring chest tube placement initially presenting to [facility] on 3/1 with chest pain and shortness of breath, transferred to [facility] for management of cardiogenic shock requiring inotrope support. Now transferred to medicine for further management

## 2024-03-15 NOTE — PRE-ANESTHESIA EVALUATION ADULT - NSANTHPMHFT_GEN_ALL_CORE
63 year old male with past medical history of CVA, HTN, HLD, prediabetes, A.fib, cocaine/heroin abuse, CAD w/ stent, CHF (EF  25-30% in 12/23),  RLE compartment syndrome s/p fasciotomy 12/23 and recent hospitalization for CHF exacerbation with bilateral pleural effusions requiring chest tube placement initially presenting to [facility] on 3/1 with chest pain and shortness of breath, transferred to [facility] for management of cardiogenic shock requiring inotrope support now off inotropes, stable on the floor

## 2024-03-15 NOTE — PRE-ANESTHESIA EVALUATION ADULT - NSANTHOSAYNRD_GEN_A_CORE
No. GAURANG screening performed.  STOP BANG Legend: 0-2 = LOW Risk; 3-4 = INTERMEDIATE Risk; 5-8 = HIGH Risk

## 2024-03-15 NOTE — PROGRESS NOTE ADULT - NSPROGADDITIONALINFOA_GEN_ALL_CORE
Dispo: ablation 3/15, then coumadin bridging; psych consult pending; NH/JEFERSON once ready  discussed with ACP  discussed with veronica Mitchell D.O.  Division of Hospital Medicine  Available on MS Teams

## 2024-03-15 NOTE — PROGRESS NOTE ADULT - PROBLEM SELECTOR PLAN 2
- appears euvolemic, on room air   - TTE with LVEF 25%, severe RV dilation, moderate MR, severe TR, LV thrombus  - HF following, f/u recs  GDMT  - c/w bumex to 1 mg po BID  - c/w losartan 25mg daily  - c/w spironolactone 25mg daily  - HOLDING farxiga 10mg daily for ablation on 3/15  - BB on hold for now-- per dr lewis, do not resume   - Per cards, Given recent substance use, not an advanced therapies candidate  - strict Is/Os, daily weights

## 2024-03-15 NOTE — PROGRESS NOTE ADULT - SUBJECTIVE AND OBJECTIVE BOX
24H hour events: No acute events    MEDICATIONS:  buMETAnide 1 milliGRAM(s) Oral every 12 hours  clopidogrel Tablet 75 milliGRAM(s) Oral daily  digoxin     Tablet 62.5 MICROGram(s) Oral daily  heparin  Infusion 900 Unit(s)/Hr IV Continuous <Continuous>  losartan 25 milliGRAM(s) Oral daily  spironolactone 25 milliGRAM(s) Oral every 12 hours  atorvastatin 40 milliGRAM(s) Oral at bedtime  insulin lispro (ADMELOG) corrective regimen sliding scale   SubCutaneous three times a day before meals  insulin lispro (ADMELOG) corrective regimen sliding scale   SubCutaneous at bedtime  levothyroxine 25 MICROGram(s) Oral daily  AQUAPHOR (petrolatum Ointment) 1 Application(s) Topical every 12 hours  chlorhexidine 2% Cloths 1 Application(s) Topical <User Schedule>  folic acid 1 milliGRAM(s) Oral daily  sodium chloride 0.9% lock flush 10 milliLiter(s) IV Push every 1 hour PRN  thiamine 100 milliGRAM(s) Oral daily      REVIEW OF SYSTEMS:  Complete 12 point ROS negative.    PHYSICAL EXAM:  T(C): 36.8 (03-15-24 @ 04:08), Max: 36.8 (03-15-24 @ 04:08)  HR: 101 (03-15-24 @ 04:08) (96 - 125)  BP: 107/62 (03-15-24 @ 04:08) (93/62 - 107/73)  RR: 20 (03-15-24 @ 04:08) (18 - 20)  SpO2: 96% (03-15-24 @ 04:08) (91% - 97%)  Wt(kg): --  I&O's Summary    14 Mar 2024 07:01  -  15 Mar 2024 07:00  --------------------------------------------------------  IN: 396 mL / OUT: 4100 mL / NET: -3704 mL        Appearance: Normal	  HEENT:  PERRL, EOMI	  Cardiovascular: Normal S1 S2, No JVD, No murmurs, No edema  Respiratory: Lungs clear to auscultation	  Psychiatry: A & O x 3, Mood & affect appropriate  Gastrointestinal:  Soft, Non-tender, + BS	  Skin: No rashes, No ecchymoses, No cyanosis	  Neurologic: Non-focal  Extremities: No clubbing, cyanosis or edema  Vascular: Peripheral pulses palpable 2+ bilaterally        LABS:	 	      03-14    139  |  101  |  35<H>  ----------------------------<  62<L>  3.8   |  23  |  1.15    Ca    8.9      14 Mar 2024 07:21    TPro  6.7  /  Alb  3.0<L>  /  TBili  0.8  /  DBili  x   /  AST  45<H>  /  ALT  112<H>  /  AlkPhos  311<H>  03-14      TELEMETRY: AFl 80-120s	      < from: TTE W or WO Ultrasound Enhancing Agent (03.05.24 @ 07:31) >  TRANSTHORACIC ECHOCARDIOGRAM REPORT  ________________________________________________________________________________                                      _______       Pt. Name:       CARMENCITA PAZ     Study Date:    3/5/2024  MRN:            [id number]          YOB: 1960  Accession #:    [id number]           Age:           63 years  Account#:       [id number]        Gender:        M  Heart Rate:     111 bpm             Height:        67.00 in (170.18 cm)  Rhythm:         atrial fibrillation Weight:        113.00 lb (51.26 kg)  Blood Pressure: 103/73 mmHg         BSA/BMI:       1.59 m² / 17.70 kg/m²  ________________________________________________________________________________________  Referring Physician:    [id number] Antonio Tello Physician: Carmencita Davis MD  Primary Sonographer:    Lisbeth Fortune [facility]    CPT:                ECHO TTE WITH CON COMP W DOPP - .m;DEFINITY ECHO                      CONTRAST PER ML - .m;DEFINITY ECHO CONTRAST PER ML                      WASTED - .m  Indication(s):      Chronic systolic (congestive) heart failure - I50.22  Procedure:          Transthoracic echocardiogram with 2-D, M-mode and complete                      spectral and color flow Doppler.  Ordering Location:  [facility]  Admission Status:   Inpatient  Contrast Injection: Verbal consent was obtained for injection of Ultrasonic                      Enhancing Agent following a discussion of risks and                      benefits.      Endocardial visualization enhanced with Definity Ultrasound                      enhancing agent.    _______________________________________________________________________________________     CONCLUSIONS:      1. Left ventricular cavity is severely dilated. Left ventricular systolic function is severely decreased. Global left ventricular hypokinesis.   2. Severely enlarged right ventricular cavity size and severely reduced systolic function.   3. Moderate mitral regurgitation. Mechanism of mitral regurgitation: Andrei Type IIIb (restricted leaflet motion secondary to left atrial or left ventricular dilatation).   4. Severe tricuspid regurgitation.   5. Mild to moderate pulmonary hypertension.   6. 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   7. Small pericardial effusion with no evidence of hemodynamic compromise (or echocardiographic evidence of cardiac tamponade).    ________________________________________________________________________________________  FINDINGS:     Left Ventricle:  After obtaining consent, Definity ultrasound enhancing agent was given for enhanced left ventricular opacification and improved delineation of the left ventricular endocardial borders. The left ventricular cavity is severely dilated. Left ventricular systolic function is severely decreased with an ejection fraction visually estimated at 25%. There is global left ventricular hypokinesis. 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     Right Ventricle:  The right ventricular cavity is severely enlarged in size and severely reduced systolic function. Tricuspid annular plane systolic excursion (TAPSE) is 1.5 cm (normal >=1.7 cm).     Left Atrium:  The left atrium is moderately dilated with an indexed volume of 45.89 ml/m².     Right Atrium:  The right atrium is moderately dilated.     Aortic Valve:  Normal appearing aortic valve. There is trace aortic regurgitation.     Mitral Valve:  There is moderate mitral regurgitation. The mechanism of mitral regurgitation is due to restricted leaflet motion secondary to left atrial or left ventricular dilatation (Andrei Type IIIb).     Tricuspid Valve:  There is severe tricuspid regurgitation. Estimated pulmonary artery systolic pressure is 46 mmHg, consistent with mild to moderate pulmonary hypertension.     Pulmonic Valve:  There is mild to moderate pulmonic regurgitation.     Aorta:  The aortic annulus and aortic root appear normal in size.     Pericardium:  There is a small pericardial effusion with no evidence of hemodynamic compromise (or echocardiographic evidence of cardiac tamponade).     Pleura:  Bilateral pleural effusion noted.     Systemic Veins:  The inferior vena cava is dilated (dilated >2.1cm) with abnormal inspiratory collapse (abnormal <50%) consistent with elevated right atrial pressure (~15, range 10-20mmHg).  ____________________________________________________________________  QUANTITATIVE DATA:  Left Ventricle Measurements: (Indexed to BSA)     IVSd (2D):   0.8 cm  LVPWd (2D):  0.8 cm  LVIDd (2D):  5.2 cm  LVIDs (2D):  5.0 cm  LV Mass:     147 g  92.5 g/m²  Visualized LV EF%: 25%     e' lateral: 13.30 cm/s  e' medial:  8.16 cm/s    Aorta Measurements: (Normal range) (Indexedto BSA)     Sinuses of Valsalva: 3.20 cm (3.1 - 3.7 cm)       Left Atrium Measurements: (Indexed to BSA)  LA Diam 2D: 3.70 cm    Right Ventricle Measurements:     TAPSE:           1.5 cm  TV Babita. S':      6.09 cm/s  RV Base (RVID1): 5.9 cm  RV Mid (RVID2):  4.2 cm       LVOT / RVOT/ Qp/Qs Data: (Indexed to BSA)  LVOT Vmax: 0.50 m/s  LVOT VTI:  6.71 cm       MR Vmax:          3.13 m/s  MR VTI:           90.60 cm  MR Mean Gradient: 27.0 mmHg  MR Peak Gradient: 39.2 mmHg  MR PISA Radius:   0.60 cm  MR Aliasing Rj:  35.10 cm/s       Tricuspid Valve Measurements:     TR Vmax:          2.8 m/s  TR Peak Gradient: 30.9 mmHg  RA Pressure:      15 mmHg  PASP:             46 mmHg    < end of copied text >

## 2024-03-16 LAB
ANION GAP SERPL CALC-SCNC: 13 MMOL/L — SIGNIFICANT CHANGE UP (ref 5–17)
APTT BLD: 53.7 SEC — HIGH (ref 24.5–35.6)
APTT BLD: 56.5 SEC — HIGH (ref 24.5–35.6)
BUN SERPL-MCNC: 31 MG/DL — HIGH (ref 7–23)
CALCIUM SERPL-MCNC: 8.6 MG/DL — SIGNIFICANT CHANGE UP (ref 8.4–10.5)
CHLORIDE SERPL-SCNC: 100 MMOL/L — SIGNIFICANT CHANGE UP (ref 96–108)
CO2 SERPL-SCNC: 24 MMOL/L — SIGNIFICANT CHANGE UP (ref 22–31)
CREAT SERPL-MCNC: 1.14 MG/DL — SIGNIFICANT CHANGE UP (ref 0.5–1.3)
EGFR: 72 ML/MIN/1.73M2 — SIGNIFICANT CHANGE UP
GLUCOSE BLDC GLUCOMTR-MCNC: 150 MG/DL — HIGH (ref 70–99)
GLUCOSE BLDC GLUCOMTR-MCNC: 165 MG/DL — HIGH (ref 70–99)
GLUCOSE BLDC GLUCOMTR-MCNC: 99 MG/DL — SIGNIFICANT CHANGE UP (ref 70–99)
GLUCOSE BLDC GLUCOMTR-MCNC: 99 MG/DL — SIGNIFICANT CHANGE UP (ref 70–99)
GLUCOSE SERPL-MCNC: 85 MG/DL — SIGNIFICANT CHANGE UP (ref 70–99)
HCT VFR BLD CALC: 32.7 % — LOW (ref 39–50)
HGB BLD-MCNC: 10.3 G/DL — LOW (ref 13–17)
INR BLD: 1.44 RATIO — HIGH (ref 0.85–1.18)
INR BLD: 1.51 RATIO — HIGH (ref 0.85–1.18)
MAGNESIUM SERPL-MCNC: 1.8 MG/DL — SIGNIFICANT CHANGE UP (ref 1.6–2.6)
MCHC RBC-ENTMCNC: 21.5 PG — LOW (ref 27–34)
MCHC RBC-ENTMCNC: 31.5 GM/DL — LOW (ref 32–36)
MCV RBC AUTO: 68.4 FL — LOW (ref 80–100)
NRBC # BLD: 0 /100 WBCS — SIGNIFICANT CHANGE UP (ref 0–0)
PLATELET # BLD AUTO: 175 K/UL — SIGNIFICANT CHANGE UP (ref 150–400)
POTASSIUM SERPL-MCNC: 4 MMOL/L — SIGNIFICANT CHANGE UP (ref 3.5–5.3)
POTASSIUM SERPL-SCNC: 4 MMOL/L — SIGNIFICANT CHANGE UP (ref 3.5–5.3)
PROTHROM AB SERPL-ACNC: 15 SEC — HIGH (ref 9.5–13)
PROTHROM AB SERPL-ACNC: 15.7 SEC — HIGH (ref 9.5–13)
RBC # BLD: 4.78 M/UL — SIGNIFICANT CHANGE UP (ref 4.2–5.8)
RBC # FLD: 18.5 % — HIGH (ref 10.3–14.5)
SODIUM SERPL-SCNC: 137 MMOL/L — SIGNIFICANT CHANGE UP (ref 135–145)
WBC # BLD: 6.47 K/UL — SIGNIFICANT CHANGE UP (ref 3.8–10.5)
WBC # FLD AUTO: 6.47 K/UL — SIGNIFICANT CHANGE UP (ref 3.8–10.5)

## 2024-03-16 PROCEDURE — 99232 SBSQ HOSP IP/OBS MODERATE 35: CPT

## 2024-03-16 PROCEDURE — 99231 SBSQ HOSP IP/OBS SF/LOW 25: CPT

## 2024-03-16 RX ORDER — DAPAGLIFLOZIN 10 MG/1
10 TABLET, FILM COATED ORAL DAILY
Refills: 0 | Status: DISCONTINUED | OUTPATIENT
Start: 2024-03-16 | End: 2024-03-19

## 2024-03-16 RX ORDER — WARFARIN SODIUM 2.5 MG/1
5 TABLET ORAL ONCE
Refills: 0 | Status: COMPLETED | OUTPATIENT
Start: 2024-03-16 | End: 2024-03-16

## 2024-03-16 RX ADMIN — HEPARIN SODIUM 1000 UNIT(S)/HR: 5000 INJECTION INTRAVENOUS; SUBCUTANEOUS at 22:00

## 2024-03-16 RX ADMIN — HEPARIN SODIUM 900 UNIT(S)/HR: 5000 INJECTION INTRAVENOUS; SUBCUTANEOUS at 13:49

## 2024-03-16 RX ADMIN — BUMETANIDE 1 MILLIGRAM(S): 0.25 INJECTION INTRAMUSCULAR; INTRAVENOUS at 17:55

## 2024-03-16 RX ADMIN — Medication 62.5 MICROGRAM(S): at 11:25

## 2024-03-16 RX ADMIN — WARFARIN SODIUM 5 MILLIGRAM(S): 2.5 TABLET ORAL at 22:01

## 2024-03-16 RX ADMIN — CHLORHEXIDINE GLUCONATE 1 APPLICATION(S): 213 SOLUTION TOPICAL at 05:42

## 2024-03-16 RX ADMIN — Medication 1 MILLIGRAM(S): at 11:28

## 2024-03-16 RX ADMIN — Medication 100 MILLIGRAM(S): at 11:27

## 2024-03-16 RX ADMIN — SPIRONOLACTONE 25 MILLIGRAM(S): 25 TABLET, FILM COATED ORAL at 05:41

## 2024-03-16 RX ADMIN — BUMETANIDE 1 MILLIGRAM(S): 0.25 INJECTION INTRAMUSCULAR; INTRAVENOUS at 05:41

## 2024-03-16 RX ADMIN — SPIRONOLACTONE 25 MILLIGRAM(S): 25 TABLET, FILM COATED ORAL at 17:55

## 2024-03-16 RX ADMIN — CLOPIDOGREL BISULFATE 75 MILLIGRAM(S): 75 TABLET, FILM COATED ORAL at 11:27

## 2024-03-16 RX ADMIN — Medication 25 MICROGRAM(S): at 05:41

## 2024-03-16 RX ADMIN — Medication 1 APPLICATION(S): at 17:30

## 2024-03-16 RX ADMIN — ATORVASTATIN CALCIUM 40 MILLIGRAM(S): 80 TABLET, FILM COATED ORAL at 22:01

## 2024-03-16 RX ADMIN — Medication 1 APPLICATION(S): at 05:42

## 2024-03-16 RX ADMIN — LOSARTAN POTASSIUM 25 MILLIGRAM(S): 100 TABLET, FILM COATED ORAL at 05:40

## 2024-03-16 NOTE — PROGRESS NOTE ADULT - NSPROGADDITIONALINFOA_GEN_ALL_CORE
Dispo: pending bridge to coumadin, psych consult pending- JEFERSON once ready  discussed with ACP      Carline Mitchell D.O.  Division of Hospital Medicine  Available on MS Teams

## 2024-03-16 NOTE — PROGRESS NOTE ADULT - SUBJECTIVE AND OBJECTIVE BOX
no complaints    GENERAL: No fevers, no chills.  EYES: No blurry vision,  No photophobia  ENT: No sore throat.  No dysphagia  Cardiovascular: No chest pain, palpitations, orthopnea  Pulmonary: No cough, no wheezing. No shortness of breath  Gastrointestinal: No abdominal pain, no diarrhea, no constipation.    Musculoskeletal: No weakness.  No myalgias.  Dermatology:  No rashes.  Neuro: No Headache.  No vertigo.  No dizziness.  Psych: No anxiety, no depression.  Denies suicidal thoughts.    MEDICATIONS  (STANDING):  AQUAPHOR (petrolatum Ointment) 1 Application(s) Topical every 12 hours  atorvastatin 40 milliGRAM(s) Oral at bedtime  buMETAnide 1 milliGRAM(s) Oral every 12 hours  chlorhexidine 2% Cloths 1 Application(s) Topical <User Schedule>  clopidogrel Tablet 75 milliGRAM(s) Oral daily  digoxin     Tablet 62.5 MICROGram(s) Oral daily  folic acid 1 milliGRAM(s) Oral daily  heparin  Infusion. 800 Unit(s)/Hr (8 mL/Hr) IV Continuous <Continuous>  insulin lispro (ADMELOG) corrective regimen sliding scale   SubCutaneous three times a day before meals  insulin lispro (ADMELOG) corrective regimen sliding scale   SubCutaneous at bedtime  levothyroxine 25 MICROGram(s) Oral daily  losartan 25 milliGRAM(s) Oral daily  spironolactone 25 milliGRAM(s) Oral every 12 hours  thiamine 100 milliGRAM(s) Oral daily  warfarin 5 milliGRAM(s) Oral once    MEDICATIONS  (PRN):  sodium chloride 0.9% lock flush 10 milliLiter(s) IV Push every 1 hour PRN Pre/post blood products, medications, blood draw, and to maintain line patency    Vital Signs Last 24 Hrs  T(C): 36.9 (16 Mar 2024 08:56), Max: 36.9 (16 Mar 2024 08:56)  T(F): 98.5 (16 Mar 2024 08:56), Max: 98.5 (16 Mar 2024 08:56)  HR: 87 (16 Mar 2024 08:56) (78 - 100)  BP: 102/68 (16 Mar 2024 08:56) (95/64 - 125/67)  BP(mean): 75 (15 Mar 2024 14:03) (75 - 75)  RR: 19 (16 Mar 2024 08:56) (16 - 19)  SpO2: 96% (16 Mar 2024 08:56) (94% - 99%)    Parameters below as of 16 Mar 2024 08:56  Patient On (Oxygen Delivery Method): room air    GENERAL: NAD, frail appearing  HEAD:  Atraumatic, Normocephalic  EYES: EOMI, PERRLA, conjunctiva and sclera clear  ENT: Pharynx not erythematous  PULMONARY: Clear to auscultation bilaterally; No wheeze  CARDIOVASCULAR: Regular rate and rhythm; No murmurs, rubs, or gallops  ABDOMEN: Soft, Nontender, Nondistended; Bowel sounds present  EXTREMITIES:  2+ Peripheral Pulses, No clubbing, cyanosis, or edema  MUSCULOSKELETAL: No calf tenderness  PSYCH: AAOx3, normal affect  SKIN: warm and dry, No rashes or lesions    .  LABS:                         10.3   6.47  )-----------( 175      ( 16 Mar 2024 05:00 )             32.7     03-16    137  |  100  |  31<H>  ----------------------------<  85  4.0   |  24  |  1.14    Ca    8.6      16 Mar 2024 05:00  Mg     1.8     03-16    TPro  6.7  /  Alb  3.0<L>  /  TBili  0.7  /  DBili  x   /  AST  41<H>  /  ALT  97<H>  /  AlkPhos  275<H>  03-15    PT/INR - ( 16 Mar 2024 11:13 )   PT: 15.0 sec;   INR: 1.44 ratio         PTT - ( 16 Mar 2024 11:13 )  PTT:56.5 sec  Urinalysis Basic - ( 16 Mar 2024 05:00 )    Color: x / Appearance: x / SG: x / pH: x  Gluc: 85 mg/dL / Ketone: x  / Bili: x / Urobili: x   Blood: x / Protein: x / Nitrite: x   Leuk Esterase: x / RBC: x / WBC x   Sq Epi: x / Non Sq Epi: x / Bacteria: x            RADIOLOGY, EKG & ADDITIONAL TESTS: Reviewed.

## 2024-03-16 NOTE — PROGRESS NOTE ADULT - SUBJECTIVE AND OBJECTIVE BOX
24H hour events: No acute events    MEDICATIONS:  buMETAnide 1 milliGRAM(s) Oral every 12 hours  clopidogrel Tablet 75 milliGRAM(s) Oral daily  digoxin     Tablet 62.5 MICROGram(s) Oral daily  heparin  Infusion. 800 Unit(s)/Hr IV Continuous <Continuous>  losartan 25 milliGRAM(s) Oral daily  spironolactone 25 milliGRAM(s) Oral every 12 hours  atorvastatin 40 milliGRAM(s) Oral at bedtime  insulin lispro (ADMELOG) corrective regimen sliding scale   SubCutaneous three times a day before meals  insulin lispro (ADMELOG) corrective regimen sliding scale   SubCutaneous at bedtime  levothyroxine 25 MICROGram(s) Oral daily  AQUAPHOR (petrolatum Ointment) 1 Application(s) Topical every 12 hours  chlorhexidine 2% Cloths 1 Application(s) Topical <User Schedule>  folic acid 1 milliGRAM(s) Oral daily  sodium chloride 0.9% lock flush 10 milliLiter(s) IV Push every 1 hour PRN  thiamine 100 milliGRAM(s) Oral daily      REVIEW OF SYSTEMS:  Complete 12 point ROS negative.    PHYSICAL EXAM:  T(C): 36.7 (03-16-24 @ 04:31), Max: 36.8 (03-15-24 @ 11:00)  HR: 86 (03-16-24 @ 04:31) (78 - 100)  BP: 104/74 (03-16-24 @ 04:31) (95/64 - 125/67)  RR: 18 (03-16-24 @ 04:31) (16 - 18)  SpO2: 96% (03-16-24 @ 04:31) (94% - 99%)  Wt(kg): --  I&O's Summary    15 Mar 2024 07:01  -  16 Mar 2024 07:00  --------------------------------------------------------  IN: 422 mL / OUT: 2300 mL / NET: -1878 mL        Appearance: Normal	  HEENT:  PERRL, EOMI	  Cardiovascular: Normal S1 S2, No JVD, No murmurs, No edema  Respiratory: Lungs clear to auscultation	  Psychiatry: A & O x 3, Mood & affect appropriate  Gastrointestinal:  Soft, Non-tender, + BS	  Skin: No rashes, No ecchymoses, No cyanosis	  Neurologic: Non-focal  Extremities: Right groin C/D/I, No hematoma  Vascular: Peripheral pulses palpable 2+ bilaterally        LABS:	 	    CBC Full  -  ( 16 Mar 2024 05:00 )  WBC Count : 6.47 K/uL  Hemoglobin : 10.3 g/dL  Hematocrit : 32.7 %  Platelet Count - Automated : 175 K/uL  Mean Cell Volume : 68.4 fl  Mean Cell Hemoglobin : 21.5 pg  Mean Cell Hemoglobin Concentration : 31.5 gm/dL  Auto Neutrophil # : x  Auto Lymphocyte # : x  Auto Monocyte # : x  Auto Eosinophil # : x  Auto Basophil # : x  Auto Neutrophil % : x  Auto Lymphocyte % : x  Auto Monocyte % : x  Auto Eosinophil % : x  Auto Basophil % : x    03-16    137  |  100  |  31<H>  ----------------------------<  85  4.0   |  24  |  1.14  03-15    143  |  101  |  32<H>  ----------------------------<  77  3.5   |  26  |  1.07    Ca    8.6      16 Mar 2024 05:00  Ca    8.4      15 Mar 2024 07:16  Mg     1.8     03-16  Mg     1.6     03-15    TPro  6.7  /  Alb  3.0<L>  /  TBili  0.7  /  DBili  x   /  AST  41<H>  /  ALT  97<H>  /  AlkPhos  275<H>  03-15      TELEMETRY: SR 80-90s	      < from: TTE W or WO Ultrasound Enhancing Agent (03.05.24 @ 07:31) >  TRANSTHORACIC ECHOCARDIOGRAM REPORT  ________________________________________________________________________________                                      _______       Pt. Name:       CARMENCITA PAZ     Study Date:    3/5/2024  MRN:            [id number]          YOB: 1960  Accession #:    [id number]           Age:           63 years  Account#:       [id number]        Gender:        M  Heart Rate:     111 bpm             Height:        67.00 in (170.18 cm)  Rhythm:         atrial fibrillation Weight:        113.00 lb (51.26 kg)  Blood Pressure: 103/73 mmHg         BSA/BMI:       1.59 m² / 17.70 kg/m²  ________________________________________________________________________________________  Referring Physician:    [id number] Griffin  Interpreting Physician: Carmencita Davis MD  Primary Sonographer:    Lisbeth Fortune RDCS    CPT:                ECHO TTE WITH CON COMP W DOPP - .m;DEFINITY ECHO                      CONTRAST PER ML - .m;DEFINITY ECHO CONTRAST PER ML                      WASTED - .m  Indication(s):      Chronic systolic (congestive) heart failure - I50.22  Procedure:          Transthoracic echocardiogram with 2-D, M-mode and complete                      spectral and color flow Doppler.  Ordering Location:  [facility]  Admission Status:   Inpatient  Contrast Injection: Verbal consent was obtained for injection of Ultrasonic                      Enhancing Agent following a discussion of risks and                      benefits.      Endocardial visualization enhanced with Definity Ultrasound                      enhancing agent.    _______________________________________________________________________________________     CONCLUSIONS:      1. Left ventricular cavity is severely dilated. Left ventricular systolic function is severely decreased. Global left ventricular hypokinesis.   2. Severely enlarged right ventricular cavity size and severely reduced systolic function.   3. Moderate mitral regurgitation. Mechanism of mitral regurgitation: Andrei Type IIIb (restricted leaflet motion secondary to left atrial or left ventricular dilatation).   4. Severe tricuspid regurgitation.   5. Mild to moderate pulmonary hypertension.   6. 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   7. Small pericardial effusion with no evidence of hemodynamic compromise (or echocardiographic evidence of cardiac tamponade).    ________________________________________________________________________________________  FINDINGS:     Left Ventricle:  After obtaining consent, Definity ultrasound enhancing agent was given for enhanced left ventricular opacification and improved delineation of the left ventricular endocardial borders. The left ventricular cavity is severely dilated. Left ventricular systolic function is severely decreased with an ejection fraction visually estimated at 25%. There is global left ventricular hypokinesis. 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     Right Ventricle:  The right ventricular cavity is severely enlarged in size and severely reduced systolic function. Tricuspid annular plane systolic excursion (TAPSE) is 1.5 cm (normal >=1.7 cm).     Left Atrium:  The left atrium is moderately dilated with an indexed volume of 45.89 ml/m².     Right Atrium:  The right atrium is moderately dilated.     Aortic Valve:  Normal appearing aortic valve. There is trace aortic regurgitation.     Mitral Valve:  There is moderate mitral regurgitation. The mechanism of mitral regurgitation is due to restricted leaflet motion secondary to left atrial or left ventricular dilatation (Andrei Type IIIb).     Tricuspid Valve:  There is severe tricuspid regurgitation. Estimated pulmonary artery systolic pressure is 46 mmHg, consistent with mild to moderate pulmonary hypertension.     Pulmonic Valve:  There is mild to moderate pulmonic regurgitation.     Aorta:  The aortic annulus and aortic root appear normal in size.     Pericardium:  There is a small pericardial effusion with no evidence of hemodynamic compromise (or echocardiographic evidence of cardiac tamponade).     Pleura:  Bilateral pleural effusion noted.     Systemic Veins:  The inferior vena cava is dilated (dilated >2.1cm) with abnormal inspiratory collapse (abnormal <50%) consistent with elevated right atrial pressure (~15, range 10-20mmHg).  ____________________________________________________________________  QUANTITATIVE DATA:  Left Ventricle Measurements: (Indexed to BSA)     IVSd (2D):   0.8 cm  LVPWd (2D):  0.8 cm  LVIDd (2D):  5.2 cm  LVIDs (2D):  5.0 cm  LV Mass:     147 g  92.5 g/m²  Visualized LV EF%: 25%     e' lateral: 13.30 cm/s  e' medial:  8.16 cm/s    Aorta Measurements: (Normal range) (Indexedto BSA)     Sinuses of Valsalva: 3.20 cm (3.1 - 3.7 cm)       Left Atrium Measurements: (Indexed to BSA)  LA Diam 2D: 3.70 cm    Right Ventricle Measurements:     TAPSE:           1.5 cm  TV Babita. S':      6.09 cm/s  RV Base (RVID1): 5.9 cm  RV Mid (RVID2):  4.2 cm       LVOT / RVOT/ Qp/Qs Data: (Indexed to BSA)  LVOT Vmax: 0.50 m/s  LVOT VTI:  6.71 cm       MR Vmax:          3.13 m/s  MR VTI:           90.60 cm  MR Mean Gradient: 27.0 mmHg  MR Peak Gradient: 39.2 mmHg  MR PISA Radius:   0.60 cm  MR Aliasing Rj:  35.10 cm/s       Tricuspid Valve Measurements:     TR Vmax:          2.8 m/s  TR Peak Gradient: 30.9 mmHg  RA Pressure:      15 mmHg  PASP:             46 mmHg    < end of copied text >

## 2024-03-16 NOTE — PROGRESS NOTE ADULT - PROBLEM SELECTOR PLAN 3
- seen on TTE above  - cont hep gtt bridge to coumadin  - INR for today pending  - give coumadin 5 mg (day 2)

## 2024-03-16 NOTE — PROGRESS NOTE ADULT - ASSESSMENT
63 year old man with history of ICM (LVIDd 5.2 cm, LVEF 25-30%), CAD s/p PCI 12/2023, severe TR, HTN, AF, PAD c/b RLE compartment syndrome s/p fasciotomy (12/2023) active smoker, ETOH misuse (4 beers daily and half pint liquor 2-3x week) and cocaine use (last 1 month PTA) who was recently hospitalized for ADHF requiring chest tubes for effusions. Now presenting, initially to [facility] for recurrent ADHF. Developed cardiogenic shock prompting transfer to [facility] for further management. Initiated on inotropic support but has since been weaned with diuresis and introduction of afterload reducing agents. Echo with LV thrombus and EF ~25%. EP consulted for AFL w/ RVR for rhythm control management.     1. ICM/HFrEF EF 25%  2. LV apical thrombus on heparin gtt  3. AFL - typical  4. CAD s/p PCI 12/2023  5. PAD s/p prior RLE compartment syndrome s/p fasciotomy    - POD #1 s/p typical AFL ablation, now SR 80-90s  - Continue Heparin gtt to Coumadin  - Post ablation teaching enforced with patient   - Fact sheet and follow up appointment card given to the patient  - Patient to follow up with Dr. Card as scheduled on 5/7/24 at 4:30 PM   - TTE with EF 25%, LV apical thrombus, Severe TR, mod MR, small pericardial effusion  - GDMT per cardiology, not AT candidate d/t recent substance use  - Close telemetry monitoring  - Maintain K>4 and Mg>2  - EP to sign off, reconsult as needed

## 2024-03-16 NOTE — PROGRESS NOTE ADULT - PROBLEM SELECTOR PLAN 4
- also with some depression and mood disorder-- discussed with [facility] care-- psych consult pending  - Per pt, quit cocaine and ETOH one month ago  - SW following  - pall care following-- rec NH placement?

## 2024-03-16 NOTE — PROGRESS NOTE ADULT - ASSESSMENT
63 year old male with past medical history of CVA, HTN, HLD, prediabetes, A.fib, cocaine/heroin abuse, CAD w/ stent, CHF (EF  25-30% in 12/23),  RLE compartment syndrome s/p fasciotomy 12/23 and recent hospitalization for CHF exacerbation with bilateral pleural effusions requiring chest tube placement initially presenting to [facility] on 3/1 with chest pain and shortness of breath, transferred to [facility] for management of cardiogenic shock requiring inotrope support. Now transferred to medicine for further management

## 2024-03-17 LAB
ADD ON TEST-SPECIMEN IN LAB: SIGNIFICANT CHANGE UP
ALBUMIN SERPL ELPH-MCNC: 3 G/DL — LOW (ref 3.3–5)
ALP SERPL-CCNC: 237 U/L — HIGH (ref 40–120)
ALT FLD-CCNC: 71 U/L — HIGH (ref 10–45)
ANION GAP SERPL CALC-SCNC: 7 MMOL/L — SIGNIFICANT CHANGE UP (ref 5–17)
ANION GAP SERPL CALC-SCNC: 9 MMOL/L — SIGNIFICANT CHANGE UP (ref 5–17)
APTT BLD: 104.6 SEC — HIGH (ref 24.5–35.6)
APTT BLD: 123.4 SEC — CRITICAL HIGH (ref 24.5–35.6)
APTT BLD: 84.6 SEC — HIGH (ref 24.5–35.6)
APTT BLD: 90 SEC — HIGH (ref 24.5–35.6)
AST SERPL-CCNC: 32 U/L — SIGNIFICANT CHANGE UP (ref 10–40)
BILIRUB SERPL-MCNC: 0.6 MG/DL — SIGNIFICANT CHANGE UP (ref 0.2–1.2)
BUN SERPL-MCNC: 25 MG/DL — HIGH (ref 7–23)
BUN SERPL-MCNC: 27 MG/DL — HIGH (ref 7–23)
CALCIUM SERPL-MCNC: 8.3 MG/DL — LOW (ref 8.4–10.5)
CALCIUM SERPL-MCNC: 8.3 MG/DL — LOW (ref 8.4–10.5)
CHLORIDE SERPL-SCNC: 101 MMOL/L — SIGNIFICANT CHANGE UP (ref 96–108)
CHLORIDE SERPL-SCNC: 102 MMOL/L — SIGNIFICANT CHANGE UP (ref 96–108)
CO2 SERPL-SCNC: 28 MMOL/L — SIGNIFICANT CHANGE UP (ref 22–31)
CO2 SERPL-SCNC: 31 MMOL/L — SIGNIFICANT CHANGE UP (ref 22–31)
CREAT SERPL-MCNC: 1 MG/DL — SIGNIFICANT CHANGE UP (ref 0.5–1.3)
CREAT SERPL-MCNC: 1.07 MG/DL — SIGNIFICANT CHANGE UP (ref 0.5–1.3)
EGFR: 78 ML/MIN/1.73M2 — SIGNIFICANT CHANGE UP
EGFR: 85 ML/MIN/1.73M2 — SIGNIFICANT CHANGE UP
GLUCOSE BLDC GLUCOMTR-MCNC: 120 MG/DL — HIGH (ref 70–99)
GLUCOSE BLDC GLUCOMTR-MCNC: 125 MG/DL — HIGH (ref 70–99)
GLUCOSE BLDC GLUCOMTR-MCNC: 144 MG/DL — HIGH (ref 70–99)
GLUCOSE BLDC GLUCOMTR-MCNC: 98 MG/DL — SIGNIFICANT CHANGE UP (ref 70–99)
GLUCOSE SERPL-MCNC: 112 MG/DL — HIGH (ref 70–99)
GLUCOSE SERPL-MCNC: 96 MG/DL — SIGNIFICANT CHANGE UP (ref 70–99)
HCT VFR BLD CALC: 30.6 % — LOW (ref 39–50)
HGB BLD-MCNC: 9.6 G/DL — LOW (ref 13–17)
INR BLD: 1.47 RATIO — HIGH (ref 0.85–1.18)
MAGNESIUM SERPL-MCNC: 1.7 MG/DL — SIGNIFICANT CHANGE UP (ref 1.6–2.6)
MAGNESIUM SERPL-MCNC: 2.2 MG/DL — SIGNIFICANT CHANGE UP (ref 1.6–2.6)
MCHC RBC-ENTMCNC: 21.3 PG — LOW (ref 27–34)
MCHC RBC-ENTMCNC: 31.4 GM/DL — LOW (ref 32–36)
MCV RBC AUTO: 68 FL — LOW (ref 80–100)
NRBC # BLD: 0 /100 WBCS — SIGNIFICANT CHANGE UP (ref 0–0)
PHOSPHATE SERPL-MCNC: 1.7 MG/DL — LOW (ref 2.5–4.5)
PLATELET # BLD AUTO: 172 K/UL — SIGNIFICANT CHANGE UP (ref 150–400)
POTASSIUM SERPL-MCNC: 3.5 MMOL/L — SIGNIFICANT CHANGE UP (ref 3.5–5.3)
POTASSIUM SERPL-MCNC: 3.6 MMOL/L — SIGNIFICANT CHANGE UP (ref 3.5–5.3)
POTASSIUM SERPL-SCNC: 3.5 MMOL/L — SIGNIFICANT CHANGE UP (ref 3.5–5.3)
POTASSIUM SERPL-SCNC: 3.6 MMOL/L — SIGNIFICANT CHANGE UP (ref 3.5–5.3)
PROT SERPL-MCNC: 6.8 G/DL — SIGNIFICANT CHANGE UP (ref 6–8.3)
PROTHROM AB SERPL-ACNC: 15.3 SEC — HIGH (ref 9.5–13)
RBC # BLD: 4.5 M/UL — SIGNIFICANT CHANGE UP (ref 4.2–5.8)
RBC # FLD: 18.4 % — HIGH (ref 10.3–14.5)
SODIUM SERPL-SCNC: 139 MMOL/L — SIGNIFICANT CHANGE UP (ref 135–145)
SODIUM SERPL-SCNC: 139 MMOL/L — SIGNIFICANT CHANGE UP (ref 135–145)
WBC # BLD: 5.75 K/UL — SIGNIFICANT CHANGE UP (ref 3.8–10.5)
WBC # FLD AUTO: 5.75 K/UL — SIGNIFICANT CHANGE UP (ref 3.8–10.5)

## 2024-03-17 PROCEDURE — 93010 ELECTROCARDIOGRAM REPORT: CPT | Mod: 77

## 2024-03-17 PROCEDURE — 99232 SBSQ HOSP IP/OBS MODERATE 35: CPT

## 2024-03-17 PROCEDURE — 93010 ELECTROCARDIOGRAM REPORT: CPT

## 2024-03-17 RX ORDER — MAGNESIUM SULFATE 500 MG/ML
1 VIAL (ML) INJECTION ONCE
Refills: 0 | Status: COMPLETED | OUTPATIENT
Start: 2024-03-17 | End: 2024-03-17

## 2024-03-17 RX ORDER — WARFARIN SODIUM 2.5 MG/1
5 TABLET ORAL ONCE
Refills: 0 | Status: COMPLETED | OUTPATIENT
Start: 2024-03-17 | End: 2024-03-17

## 2024-03-17 RX ORDER — POTASSIUM CHLORIDE 20 MEQ
40 PACKET (EA) ORAL ONCE
Refills: 0 | Status: COMPLETED | OUTPATIENT
Start: 2024-03-17 | End: 2024-03-17

## 2024-03-17 RX ADMIN — SPIRONOLACTONE 25 MILLIGRAM(S): 25 TABLET, FILM COATED ORAL at 05:57

## 2024-03-17 RX ADMIN — Medication 1 APPLICATION(S): at 17:21

## 2024-03-17 RX ADMIN — DAPAGLIFLOZIN 10 MILLIGRAM(S): 10 TABLET, FILM COATED ORAL at 11:13

## 2024-03-17 RX ADMIN — Medication 1 APPLICATION(S): at 06:01

## 2024-03-17 RX ADMIN — Medication 100 MILLIGRAM(S): at 11:13

## 2024-03-17 RX ADMIN — ATORVASTATIN CALCIUM 40 MILLIGRAM(S): 80 TABLET, FILM COATED ORAL at 22:08

## 2024-03-17 RX ADMIN — Medication 62.5 MICROGRAM(S): at 11:13

## 2024-03-17 RX ADMIN — WARFARIN SODIUM 5 MILLIGRAM(S): 2.5 TABLET ORAL at 13:15

## 2024-03-17 RX ADMIN — BUMETANIDE 1 MILLIGRAM(S): 0.25 INJECTION INTRAMUSCULAR; INTRAVENOUS at 05:59

## 2024-03-17 RX ADMIN — SPIRONOLACTONE 25 MILLIGRAM(S): 25 TABLET, FILM COATED ORAL at 17:38

## 2024-03-17 RX ADMIN — Medication 25 MICROGRAM(S): at 05:57

## 2024-03-17 RX ADMIN — Medication 100 GRAM(S): at 03:12

## 2024-03-17 RX ADMIN — HEPARIN SODIUM 1000 UNIT(S)/HR: 5000 INJECTION INTRAVENOUS; SUBCUTANEOUS at 06:03

## 2024-03-17 RX ADMIN — Medication 1 MILLIGRAM(S): at 11:13

## 2024-03-17 RX ADMIN — HEPARIN SODIUM 900 UNIT(S)/HR: 5000 INJECTION INTRAVENOUS; SUBCUTANEOUS at 21:22

## 2024-03-17 RX ADMIN — BUMETANIDE 1 MILLIGRAM(S): 0.25 INJECTION INTRAMUSCULAR; INTRAVENOUS at 17:38

## 2024-03-17 RX ADMIN — HEPARIN SODIUM 900 UNIT(S)/HR: 5000 INJECTION INTRAVENOUS; SUBCUTANEOUS at 13:14

## 2024-03-17 RX ADMIN — CHLORHEXIDINE GLUCONATE 1 APPLICATION(S): 213 SOLUTION TOPICAL at 06:00

## 2024-03-17 RX ADMIN — Medication 40 MILLIEQUIVALENT(S): at 03:13

## 2024-03-17 RX ADMIN — CLOPIDOGREL BISULFATE 75 MILLIGRAM(S): 75 TABLET, FILM COATED ORAL at 11:13

## 2024-03-17 RX ADMIN — LOSARTAN POTASSIUM 25 MILLIGRAM(S): 100 TABLET, FILM COATED ORAL at 05:57

## 2024-03-17 NOTE — PROGRESS NOTE ADULT - PROBLEM SELECTOR PLAN 3
- seen on TTE above  - cont hep gtt bridge to coumadin  - INR for today pending  - give coumadin 5 mg (day 2) - seen on TTE above  - cont hep gtt bridge to coumadin  - INR for today 1.47  - give coumadin 5 mg (day 3)

## 2024-03-17 NOTE — PROGRESS NOTE ADULT - SUBJECTIVE AND OBJECTIVE BOX
no complaints    GENERAL: No fevers, no chills.  EYES: No blurry vision,  No photophobia  ENT: No sore throat.  No dysphagia  Cardiovascular: No chest pain, palpitations, orthopnea  Pulmonary: No cough, no wheezing. No shortness of breath  Gastrointestinal: No abdominal pain, no diarrhea, no constipation.    Musculoskeletal: No weakness.  No myalgias.  Dermatology:  No rashes.  Neuro: No Headache.  No vertigo.  No dizziness.  Psych: No anxiety, no depression.  Denies suicidal thoughts.    MEDICATIONS  (STANDING):  AQUAPHOR (petrolatum Ointment) 1 Application(s) Topical every 12 hours  atorvastatin 40 milliGRAM(s) Oral at bedtime  buMETAnide 1 milliGRAM(s) Oral every 12 hours  chlorhexidine 2% Cloths 1 Application(s) Topical <User Schedule>  clopidogrel Tablet 75 milliGRAM(s) Oral daily  dapagliflozin 10 milliGRAM(s) Oral daily  digoxin     Tablet 62.5 MICROGram(s) Oral daily  folic acid 1 milliGRAM(s) Oral daily  heparin  Infusion. 800 Unit(s)/Hr (8 mL/Hr) IV Continuous <Continuous>  insulin lispro (ADMELOG) corrective regimen sliding scale   SubCutaneous three times a day before meals  insulin lispro (ADMELOG) corrective regimen sliding scale   SubCutaneous at bedtime  levothyroxine 25 MICROGram(s) Oral daily  losartan 25 milliGRAM(s) Oral daily  spironolactone 25 milliGRAM(s) Oral every 12 hours  thiamine 100 milliGRAM(s) Oral daily    MEDICATIONS  (PRN):  sodium chloride 0.9% lock flush 10 milliLiter(s) IV Push every 1 hour PRN Pre/post blood products, medications, blood draw, and to maintain line patency    Vital Signs Last 24 Hrs  T(C): 36.6 (17 Mar 2024 12:28), Max: 36.7 (17 Mar 2024 04:26)  T(F): 97.8 (17 Mar 2024 12:28), Max: 98.1 (17 Mar 2024 04:26)  HR: 82 (17 Mar 2024 12:28) (82 - 87)  BP: 101/63 (17 Mar 2024 12:28) (94/60 - 105/67)  BP(mean): --  RR: 18 (17 Mar 2024 12:28) (18 - 18)  SpO2: 97% (17 Mar 2024 12:28) (95% - 98%)    Parameters below as of 17 Mar 2024 12:28  Patient On (Oxygen Delivery Method): room air    GENERAL: NAD, frail appearing  HEAD:  Atraumatic, Normocephalic  EYES: EOMI, PERRLA, conjunctiva and sclera clear  ENT: Pharynx not erythematous  PULMONARY: Clear to auscultation bilaterally; No wheeze  CARDIOVASCULAR: Regular rate and rhythm; No murmurs, rubs, or gallops  ABDOMEN: Soft, Nontender, Nondistended; Bowel sounds present  EXTREMITIES:  2+ Peripheral Pulses, No clubbing, cyanosis, or edema  MUSCULOSKELETAL: No calf tenderness  PSYCH: AAOx3, normal affect  SKIN: warm and dry, No rashes or lesions    .  LABS:                         9.6    5.75  )-----------( 172      ( 17 Mar 2024 04:42 )             30.6     03-17    139  |  102  |  25<H>  ----------------------------<  112<H>  3.5   |  28  |  1.00    Ca    8.3<L>      17 Mar 2024 04:42  Phos  1.7     03-17  Mg     2.2     03-17    TPro  6.8  /  Alb  3.0<L>  /  TBili  0.6  /  DBili  x   /  AST  32  /  ALT  71<H>  /  AlkPhos  237<H>  03-17    PT/INR - ( 17 Mar 2024 04:42 )   PT: 15.3 sec;   INR: 1.47 ratio         PTT - ( 17 Mar 2024 12:11 )  PTT:123.4 sec  Urinalysis Basic - ( 17 Mar 2024 04:42 )    Color: x / Appearance: x / SG: x / pH: x  Gluc: 112 mg/dL / Ketone: x  / Bili: x / Urobili: x   Blood: x / Protein: x / Nitrite: x   Leuk Esterase: x / RBC: x / WBC x   Sq Epi: x / Non Sq Epi: x / Bacteria: x            RADIOLOGY, EKG & ADDITIONAL TESTS: Reviewed.

## 2024-03-17 NOTE — PROGRESS NOTE ADULT - NSPROGADDITIONALINFOA_GEN_ALL_CORE
Dispo: pending bridge to coumadin, psych consult pending- [facility] once ready  discussed with ACP      Carline Mitchell D.O.  Division of Hospital Medicine  Available on MS Teams Dispo: pending bridge to coumadin- [facility] once ready  discussed with ACP      Carline Mitchell D.O.  Division of Hospital Medicine  Available on MS Teams

## 2024-03-17 NOTE — PROGRESS NOTE ADULT - ASSESSMENT
63 year old male with past medical history of CVA, HTN, HLD, prediabetes, A.fib, cocaine/heroin abuse, CAD w/ stent, CHF (EF  25-30% in 12/23),  RLE compartment syndrome s/p fasciotomy 12/23 and recent hospitalization for CHF exacerbation with bilateral pleural effusions requiring chest tube placement initially presenting to [facility] on 3/1 with chest pain and shortness of breath, transferred to [facility] for management of cardiogenic shock requiring inotrope support. Now transferred to medicine for further management

## 2024-03-17 NOTE — PROGRESS NOTE ADULT - PROBLEM SELECTOR PLAN 4
- also with some depression and mood disorder-- discussed with [facility] care-- psych consult pending  - Per pt, quit cocaine and ETOH one month ago  - SW following  - pall care following-- rec NH placement? - also with some depression and mood disorder  - Per pt, quit cocaine and ETOH one month ago  - SW following  - [facility] care following

## 2024-03-17 NOTE — PROGRESS NOTE ADULT - PROBLEM SELECTOR PLAN 5
- congestive hepatopathy from chf, improving  - repeat lft daily - congestive hepatopathy from chf, improving

## 2024-03-17 NOTE — PROGRESS NOTE ADULT - PROBLEM SELECTOR PLAN 2
- appears euvolemic, on room air   - TTE with LVEF 25%, severe RV dilation, moderate MR, severe TR, LV thrombus  - HF following, f/u recs  GDMT  - c/w bumex to 1 mg po BID  - c/w losartan 25mg daily  - c/w spironolactone 25mg daily  - HOLDING farxiga 10mg daily for ablation on 3/15  - BB on hold for now-- per dr lewis, do not resume   - Per cards, Given recent substance use, not an advanced therapies candidate  - strict Is/Os, daily weights - appears euvolemic, on room air   - TTE with LVEF 25%, severe RV dilation, moderate MR, severe TR, LV thrombus  - HF following, f/u recs  GDMT  - c/w bumex to 1 mg po BID  - c/w losartan 25mg daily  - c/w spironolactone 25mg daily  - c/w farxiga  - BB on hold for now-- per dr lewis, do not resume   - Per cards, Given recent substance use, not an advanced therapies candidate  - strict Is/Os, daily weights

## 2024-03-18 DIAGNOSIS — I48.92 UNSPECIFIED ATRIAL FLUTTER: ICD-10-CM

## 2024-03-18 PROBLEM — Z00.00 ENCOUNTER FOR PREVENTIVE HEALTH EXAMINATION: Status: ACTIVE | Noted: 2024-03-18

## 2024-03-18 LAB
ANION GAP SERPL CALC-SCNC: 12 MMOL/L — SIGNIFICANT CHANGE UP (ref 5–17)
ANION GAP SERPL CALC-SCNC: 20 MMOL/L — HIGH (ref 5–17)
APTT BLD: 98.7 SEC — HIGH (ref 24.5–35.6)
APTT BLD: 99 SEC — HIGH (ref 24.5–35.6)
BUN SERPL-MCNC: 28 MG/DL — HIGH (ref 7–23)
BUN SERPL-MCNC: 32 MG/DL — HIGH (ref 7–23)
CALCIUM SERPL-MCNC: 8.3 MG/DL — LOW (ref 8.4–10.5)
CALCIUM SERPL-MCNC: 8.7 MG/DL — SIGNIFICANT CHANGE UP (ref 8.4–10.5)
CHLORIDE SERPL-SCNC: 100 MMOL/L — SIGNIFICANT CHANGE UP (ref 96–108)
CHLORIDE SERPL-SCNC: 101 MMOL/L — SIGNIFICANT CHANGE UP (ref 96–108)
CO2 SERPL-SCNC: 19 MMOL/L — LOW (ref 22–31)
CO2 SERPL-SCNC: 26 MMOL/L — SIGNIFICANT CHANGE UP (ref 22–31)
CREAT SERPL-MCNC: 1.02 MG/DL — SIGNIFICANT CHANGE UP (ref 0.5–1.3)
CREAT SERPL-MCNC: 1.28 MG/DL — SIGNIFICANT CHANGE UP (ref 0.5–1.3)
EGFR: 63 ML/MIN/1.73M2 — SIGNIFICANT CHANGE UP
EGFR: 83 ML/MIN/1.73M2 — SIGNIFICANT CHANGE UP
GLUCOSE BLDC GLUCOMTR-MCNC: 103 MG/DL — HIGH (ref 70–99)
GLUCOSE BLDC GLUCOMTR-MCNC: 154 MG/DL — HIGH (ref 70–99)
GLUCOSE BLDC GLUCOMTR-MCNC: 80 MG/DL — SIGNIFICANT CHANGE UP (ref 70–99)
GLUCOSE BLDC GLUCOMTR-MCNC: 94 MG/DL — SIGNIFICANT CHANGE UP (ref 70–99)
GLUCOSE SERPL-MCNC: 133 MG/DL — HIGH (ref 70–99)
GLUCOSE SERPL-MCNC: 75 MG/DL — SIGNIFICANT CHANGE UP (ref 70–99)
HCT VFR BLD CALC: 34.2 % — LOW (ref 39–50)
HGB BLD-MCNC: 10.2 G/DL — LOW (ref 13–17)
INR BLD: 2.4 RATIO — HIGH (ref 0.85–1.18)
MAGNESIUM SERPL-MCNC: 1.8 MG/DL — SIGNIFICANT CHANGE UP (ref 1.6–2.6)
MCHC RBC-ENTMCNC: 21.1 PG — LOW (ref 27–34)
MCHC RBC-ENTMCNC: 29.8 GM/DL — LOW (ref 32–36)
MCV RBC AUTO: 70.8 FL — LOW (ref 80–100)
NRBC # BLD: 0 /100 WBCS — SIGNIFICANT CHANGE UP (ref 0–0)
PHOSPHATE SERPL-MCNC: 1.5 MG/DL — LOW (ref 2.5–4.5)
PLATELET # BLD AUTO: 185 K/UL — SIGNIFICANT CHANGE UP (ref 150–400)
POTASSIUM SERPL-MCNC: 3.5 MMOL/L — SIGNIFICANT CHANGE UP (ref 3.5–5.3)
POTASSIUM SERPL-MCNC: 5.2 MMOL/L — SIGNIFICANT CHANGE UP (ref 3.5–5.3)
POTASSIUM SERPL-SCNC: 3.5 MMOL/L — SIGNIFICANT CHANGE UP (ref 3.5–5.3)
POTASSIUM SERPL-SCNC: 5.2 MMOL/L — SIGNIFICANT CHANGE UP (ref 3.5–5.3)
PROTHROM AB SERPL-ACNC: 24.6 SEC — HIGH (ref 9.5–13)
RBC # BLD: 4.83 M/UL — SIGNIFICANT CHANGE UP (ref 4.2–5.8)
RBC # FLD: 20.6 % — HIGH (ref 10.3–14.5)
SODIUM SERPL-SCNC: 138 MMOL/L — SIGNIFICANT CHANGE UP (ref 135–145)
SODIUM SERPL-SCNC: 140 MMOL/L — SIGNIFICANT CHANGE UP (ref 135–145)
WBC # BLD: 4.92 K/UL — SIGNIFICANT CHANGE UP (ref 3.8–10.5)
WBC # FLD AUTO: 4.92 K/UL — SIGNIFICANT CHANGE UP (ref 3.8–10.5)

## 2024-03-18 PROCEDURE — 99232 SBSQ HOSP IP/OBS MODERATE 35: CPT | Mod: GC

## 2024-03-18 PROCEDURE — 99233 SBSQ HOSP IP/OBS HIGH 50: CPT

## 2024-03-18 PROCEDURE — 93010 ELECTROCARDIOGRAM REPORT: CPT | Mod: 76

## 2024-03-18 PROCEDURE — 99232 SBSQ HOSP IP/OBS MODERATE 35: CPT

## 2024-03-18 RX ORDER — AMIODARONE HYDROCHLORIDE 400 MG/1
200 TABLET ORAL EVERY 8 HOURS
Refills: 0 | Status: COMPLETED | OUTPATIENT
Start: 2024-03-18 | End: 2024-03-22

## 2024-03-18 RX ORDER — AMIODARONE HYDROCHLORIDE 400 MG/1
TABLET ORAL
Refills: 0 | Status: DISCONTINUED | OUTPATIENT
Start: 2024-03-18 | End: 2024-03-26

## 2024-03-18 RX ORDER — BUMETANIDE 0.25 MG/ML
1 INJECTION INTRAMUSCULAR; INTRAVENOUS DAILY
Refills: 0 | Status: DISCONTINUED | OUTPATIENT
Start: 2024-03-19 | End: 2024-03-26

## 2024-03-18 RX ORDER — METOPROLOL TARTRATE 50 MG
12.5 TABLET ORAL DAILY
Refills: 0 | Status: DISCONTINUED | OUTPATIENT
Start: 2024-03-18 | End: 2024-03-18

## 2024-03-18 RX ORDER — POTASSIUM PHOSPHATE, MONOBASIC POTASSIUM PHOSPHATE, DIBASIC 236; 224 MG/ML; MG/ML
15 INJECTION, SOLUTION INTRAVENOUS ONCE
Refills: 0 | Status: COMPLETED | OUTPATIENT
Start: 2024-03-18 | End: 2024-03-18

## 2024-03-18 RX ORDER — AMIODARONE HYDROCHLORIDE 400 MG/1
200 TABLET ORAL DAILY
Refills: 0 | Status: DISCONTINUED | OUTPATIENT
Start: 2024-03-22 | End: 2024-03-26

## 2024-03-18 RX ORDER — WARFARIN SODIUM 2.5 MG/1
3 TABLET ORAL ONCE
Refills: 0 | Status: COMPLETED | OUTPATIENT
Start: 2024-03-18 | End: 2024-03-18

## 2024-03-18 RX ADMIN — LOSARTAN POTASSIUM 25 MILLIGRAM(S): 100 TABLET, FILM COATED ORAL at 05:31

## 2024-03-18 RX ADMIN — SPIRONOLACTONE 25 MILLIGRAM(S): 25 TABLET, FILM COATED ORAL at 05:31

## 2024-03-18 RX ADMIN — Medication 1 APPLICATION(S): at 05:33

## 2024-03-18 RX ADMIN — Medication 62.5 MICROGRAM(S): at 11:44

## 2024-03-18 RX ADMIN — DAPAGLIFLOZIN 10 MILLIGRAM(S): 10 TABLET, FILM COATED ORAL at 11:44

## 2024-03-18 RX ADMIN — SPIRONOLACTONE 25 MILLIGRAM(S): 25 TABLET, FILM COATED ORAL at 18:10

## 2024-03-18 RX ADMIN — AMIODARONE HYDROCHLORIDE 200 MILLIGRAM(S): 400 TABLET ORAL at 21:02

## 2024-03-18 RX ADMIN — CHLORHEXIDINE GLUCONATE 1 APPLICATION(S): 213 SOLUTION TOPICAL at 05:32

## 2024-03-18 RX ADMIN — Medication 1 APPLICATION(S): at 17:20

## 2024-03-18 RX ADMIN — Medication 1 MILLIGRAM(S): at 11:44

## 2024-03-18 RX ADMIN — Medication 100 MILLIGRAM(S): at 11:44

## 2024-03-18 RX ADMIN — WARFARIN SODIUM 3 MILLIGRAM(S): 2.5 TABLET ORAL at 21:02

## 2024-03-18 RX ADMIN — Medication 25 MICROGRAM(S): at 05:32

## 2024-03-18 RX ADMIN — POTASSIUM PHOSPHATE, MONOBASIC POTASSIUM PHOSPHATE, DIBASIC 62.5 MILLIMOLE(S): 236; 224 INJECTION, SOLUTION INTRAVENOUS at 03:14

## 2024-03-18 RX ADMIN — Medication 1: at 18:10

## 2024-03-18 RX ADMIN — HEPARIN SODIUM 900 UNIT(S)/HR: 5000 INJECTION INTRAVENOUS; SUBCUTANEOUS at 04:05

## 2024-03-18 RX ADMIN — ATORVASTATIN CALCIUM 40 MILLIGRAM(S): 80 TABLET, FILM COATED ORAL at 21:02

## 2024-03-18 RX ADMIN — Medication 12.5 MILLIGRAM(S): at 18:09

## 2024-03-18 RX ADMIN — CLOPIDOGREL BISULFATE 75 MILLIGRAM(S): 75 TABLET, FILM COATED ORAL at 11:44

## 2024-03-18 RX ADMIN — BUMETANIDE 1 MILLIGRAM(S): 0.25 INJECTION INTRAMUSCULAR; INTRAVENOUS at 05:32

## 2024-03-18 NOTE — PROGRESS NOTE ADULT - ATTENDING COMMENTS
Multiple flutters seen at EP study, but typical CTI flutter ablated.  Would NOT pursue repeat ablation. Favor amio.  Check liver sono to assess for fibrosis.

## 2024-03-18 NOTE — PROVIDER CONTACT NOTE (OTHER) - NAME OF MD/NP/PA/DO NOTIFIED:
SANA Priest
TI Collins
Sharon HOOD
stan rasheed
Kvng JOINER)
Sharon HOOD
Sharon Westbrook
Sharon HOOD

## 2024-03-18 NOTE — PROVIDER CONTACT NOTE (OTHER) - REASON
lars 45
converted to aflutter for 11 sec hr 140
pt sustaining aflutter 130s
HR sustaining 130s; asymptomatic
MAEVE 38-47, AFLUTTER
PAT 1.5s x 150 bpm, then PAT 3.9s x 140bpm; pt. asymptomatic
Aflutter on tele HR remains 120-127

## 2024-03-18 NOTE — PROVIDER CONTACT NOTE (OTHER) - SITUATION
HR down to 45 briefly
MAEVE 38-47, AFLUTTER
pt sustaining aflutter 130s. pt was working with PT when HR went up to 130s. now sustaining.
Aflutter on tele HR remains 120-127
HR 38-48 for 53 seconds x2 a flutter
PAT 1.5s x 150 bpm, then PAT 3.9s x 140bpm; pt. asymptomatic
converted to aflutter for 11 sec hr 140
HR sustaining 130s; asymptomatic  *HR sustaining 130s for about 30 minutes* 23:05-23:37

## 2024-03-18 NOTE — PROGRESS NOTE ADULT - PROBLEM SELECTOR PLAN 2
TTE with LVEF 25%, severe RV dilation, moderate MR, severe TR, LV thrombus  GDMT  - c/w bumex 1 mg po BID  - c/w losartan 25mg daily  - c/w spironolactone 25mg daily  - c/w farxiga  - BB on hold for now-- per dr lewis, do not resume   - Per cards, Given recent substance use, not an advanced therapies candidate  - strict Is/Os, daily weights  - appreciate ongoing HF recs

## 2024-03-18 NOTE — PROGRESS NOTE ADULT - NS ATTEND AMEND GEN_ALL_CORE FT
EPS with an aim at ablation 3/15
For EPS with an aim at ablation (if CTI flutter) or cardioversion (if atypical)
s/p AFl ablation, continue coumadin, groin ok.
Plan for EPS and possible CTI ablaiton this week; if NOT CTI flutter would cardiovert and defer on complex ablation given overall status.
62 y/o M with CAD, ischemic cardiomyopathy, polysubstance abuse, transferred from [facility] for ADHF and cardiogenic shock. Was supported with Dopamine then Dobutamine but was able to be weaned off with afterload reduction. Currently on HF medical therapy with Losartan, Aldactone, Farxiga. AFlutter with tachycardia. Appreciate EP recommendations, start Digoxin, plan for AFlutter ablation later this week. Hold Farxiga and increase Bumex to 1mg PO BID while off SGLT2i.   Would NOT start B-blocker due to marginal hemodynamics.   Not a candidate for advanced heart failure therapies due to recent substance use.
62 YO M with a history of ACC/AHA Stage C ICM (LV 5.2 cm, LVEF 25-30%), CAD s/p PCI, PAD, active EtOH/substance abuse who was admitted to [facility] with ADHF and also found to have LV thrombus, progressed to cardiogenic shock and transferred to [facility]. He is now euvolemic/dry and has been weaned from inotropes, and now tolerating some neurohormonal therapy. He underwent Aflutter ablation 3/16 however appears to be back in atrial flutter.    Please re-consult EP, appears to be in typical atrial flutter on telemetry. Continue a/c with heparin->coumadin given LV thrombus.    Continue GDMT as above, will add low dose metoprolol succinate as he is euvolemic.    Decrease bumex to 1 mg daily from BID to allow for eventual uptitration of GDMT as he does appear hypovolemic.    As he is euvolemic on 4 pillars of HF medical therapy, HF team will follow peripherally at this time. Will ultimately provide followup in the clinic before discharge.
64 y/o M with CAD, ischemic cardiomyopathy, polysubstance abuse, transferred from [facility] for ADHF and cardiogenic shock. Was supported with Dopamine then Dobutamine but was able to be weaned off with afterload reduction. Currently on HF medical therapy with Losartan, Aldactone, Farxiga. AFlutter with tachycardia. Appreciate EP recommendations, start Digoxin, plan for AFlutter ablation later this week. Hold Farxiga and increased dose of Bumex 1mg PO BID while off SGLT2i. Increase Aldactone to 25mg BID due to hypokalemia.  Worsening cough, check CXR and RVP. Can give an additional dose of Bumex.  Would NOT start B-blocker due to marginal hemodynamics.   Not a candidate for advanced heart failure therapies due to recent substance use.
62 y/o M with CAD, ischemic cardiomyopathy, polysubstance abuse, transferred from [facility] for ADHF and cardiogenic shock. Was supported with Dopamine then Dobutamine but was able to be weaned off with afterload reduction. Currently on HF medical therapy with Losartan, Aldactone, Farxiga. AFlutter with tachycardia. Appreciate EP recommendations, continue Digoxin, plan for AFlutter ablation tomorrow. Hold Farxiga and increased dose of Bumex 1mg PO BID while off SGLT2i. Increase Aldactone to 25mg BID due to hypokalemia. After ablation, resume Farxiga.  Would NOT start B-blocker due to marginal hemodynamics.   Not a candidate for advanced heart failure therapies due to recent substance use.
64 y/o M with CAD, ischemic cardiomyopathy, polysubstance abuse, transferred from [facility] for ADHF and cardiogenic shock. Was supported with Dopamine then Dobutamine but was able to be weaned off with afterload reduction. Currently on HF medical therapy with Losartan, Aldactone, Farxiga. Euvolemic on Bumex 1mg PO daily. Remains in AFib with RVR. Recommend EP consult. Would NOT start B-blocker due to marginal hemodynamics.   Not a candidate for advanced heart failure therapies due to recent substance use.

## 2024-03-18 NOTE — PROGRESS NOTE ADULT - ASSESSMENT
63 year old male with past medical history of CVA, HTN, HLD, prediabetes, A.fib, cocaine/heroin abuse, CAD w/ stent, CHF (EF  25-30% in 12/23),  RLE compartment syndrome s/p fasciotomy 12/23 and recent hospitalization for CHF exacerbation with bilateral pleural effusions requiring chest tube placement initially presenting to [facility] on 3/1 with chest pain and shortness of breath, transferred to [facility] for management of cardiogenic shock requiring inotrope support. Now transferred to medicine for further management

## 2024-03-18 NOTE — PROGRESS NOTE ADULT - NSPROGADDITIONALINFOA_GEN_ALL_CORE
Dispo: DCP to [facility] pending final EP recs    51 minutes spent on total encounter. The necessity of the time spent during the encounter on this date of service was due to:     - preparing to see the patient (eg, review of tests, documents)  - performing a medically appropriate examination and/or evaluation  - referring and communicating with other health care professionals  - documenting clinical information in the electronic or other health record  - care coordination. Detail Level: Zone Detail Level: Detailed

## 2024-03-18 NOTE — CHART NOTE - NSCHARTNOTEFT_GEN_A_CORE
Nutrition Follow Up Note  Patient seen for: first malnutrition follow up    Chart reviewed, events noted. Pt is a 62 y/o M with PMH: "CVA, HTN, HLD, prediabetes, A.fib, cocaine/heroin abuse, CAD w/ stent, CHF (EF  25-30% in ),  RLE compartment syndrome s/p fasciotomy  and recent hospitalization for CHF exacerbation with bilateral pleural effusions requiring chest tube placement initially presenting to [facility] on 3/1 with chest pain and shortness of breath, transferred to [facility] for management of cardiogenic shock requiring inotrope support. Now transferred to medicine for further management." Pt s/p aflutter ablation 3/15.    Source: [x] Patient       [x] EMR        [] RN        [] Family at bedside       [] Other:    -If unable to interview patient: [] Trach/Vent/BiPAP  [] Disoriented/confused/inappropriate to interview    Diet Order:   Diet, DASH/TLC:   Sodium & Cholesterol Restricted  Consistent Carbohydrate {No Snacks} (CSTCHO)  No Pork (24)    - Is current order appropriate/adequate? [] Yes  [x]  No: too restrictive iso severe malnutrition and BMI <19    - PO intake :   [] >75%  Adequate    [x] 50-75%  Fair       [] <50%  Poor    - Nutrition-related concerns:      -Pt reports good appetite, "I eat what I can"      -food preferences reviewed - prefers cold cereal with breakfast and asking for double cheeseburger/french fries/Pepsi      -Pt amenable to Glucerna shakes to supplement diet      -labs reviewed, noted with hypophosphatemia and fingersticks well controlled on ISS    GI: Pt denies any N/V/D. Reports no BM x 3 days but had a BM 3/17. Last BM documented on RN flowsheets was 3/15.   Bowel Regimen? [] Yes   [x] No; Pt amenable to prunes    Weights: dosing wt 51.5 kg (3/15) *noted with wt loss since admission, likely related to diuretics  Daily Weight in k.3 (-), Weight in k.5 (-), Weight in k.5 (-15), Weight in k.7 (-), Weight in k.3 (), Weight in k.2 (), 50.8 kg (3/6)    Nutritionally Pertinent MEDICATIONS  (STANDING):  atorvastatin  buMETAnide  dapagliflozin  digoxin     Tablet  folic acid  insulin lispro (ADMELOG) corrective regimen sliding scale  insulin lispro (ADMELOG) corrective regimen sliding scale  levothyroxine  losartan  spironolactone  thiamine    Pertinent Labs:  @ 08:32: Na 140, BUN 28<H>, Cr 1.02, BG 75, K+ 5.2, Phos --, Mg --, Alk Phos --, ALT/SGPT --, AST/SGOT --, HbA1c --   @ 23:51: Na 138, BUN 32<H>, Cr 1.28, <H>, K+ 3.5, Phos 1.5<L>, Mg 1.8, Alk Phos --, ALT/SGPT --, AST/SGOT --, HbA1c --    A1C with Estimated Average Glucose Result: 6.2 % (24 @ 20:42)    Finger Sticks:  POCT Blood Glucose.: 80 mg/dL ( @ 08:43)  POCT Blood Glucose.: 125 mg/dL ( @ 21:41)  POCT Blood Glucose.: 120 mg/dL ( @ 17:11)  POCT Blood Glucose.: 98 mg/dL ( @ 13:10)    Skin per nursing documentation: no pressure injuries documented; Pt with right leg wound  Edema: no edema per RN flowsheets    Estimated Needs: based on dosing wt 51.5 kg  Estimated Energy Needs: 8265-9482.5 kcal/day (30-35 kcal/kg)  Estimated Protein Needs: 56.65-67 g Pro/day (1.1-1.3 g Pro/kg)  Estimated Fluid Needs: defer to MD team  [x] no change since previous assessment  [] recalculated:     Previous Nutrition Diagnosis: Severe Chronic Malnutrition/Underweight  Nutrition Diagnosis is: [x] ongoing  [] resolved [] not applicable     New Nutrition Diagnosis: [x] Not applicable    Nutrition Care Plan:  [x] In Progress  [] Achieved  [] Not applicable    Nutrition Interventions:     Education Provided:       [x] Yes: adequate protein/calorie intake; heart healthy diet principles; oral nutrition supplements [] No:        Recommendations:         [] Continue current diet order            [] Add oral nutrition supplement:     [x] Discontinue current diet order. Recommend: low sodium, regular diet + Glucerna shakes 2x/day (220 kcal, 10 g Pro/8oz)     [x] Add micronutrient supplementation: MVI daily     [x] Continue current micronutrient supplementation: thiamine and folic acid     [x] Other: obtain standing wt to accurately assess wt trend    Monitoring and Evaluation:   Continue to monitor nutritional intake, tolerance to diet prescription, weights, labs, skin integrity    RD remains available upon request and will follow up per protocol  Ilene Hand MPH, RD, CDN - TEAMS

## 2024-03-18 NOTE — PROGRESS NOTE ADULT - ASSESSMENT
63 year old man with history of ICM (LVIDd 5.2 cm, LVEF 25-30%), CAD s/p PCI 12/2023, severe TR, HTN, AF, PAD c/b RLE compartment syndrome s/p fasciotomy (12/2023), active smoker, ETOH misuse (4 beers daily and half pint liquor 2-3x week) and cocaine use (last 1 month PTA), who was recently hospitalized for ADHF requiring chest tubes for effusions. Now presented, initially to [facility] for recurrent ADHF. Developed cardiogenic shock prompting transfer to [facility] for further management on 3/4/24. Initiated on inotropic support but has since been weaned with diuresis and introduction of afterload reducing agents. Echo with LV thrombus and EF ~25%. Downgraded to floor. EP was previously following for atrial flutter w/ RVR, now s/p CTI ablation on 3/15/24. EP re-consulted on 3/18/24 for recurrent atrial flutter.     1. ICM/HFrEF EF 25%  2. LV apical thrombus on heparin gtt  3. AFL  4. CAD s/p PCI 12/2023  5. PAD s/p prior RLE compartment syndrome s/p fasciotomy    - as above s/p CTI ablation 3/15/24  - on 3/17 noted to have recurrent atrial flutter that was transient, on 3/18 approx 1pm most recently back in atrial flutter rates 130's  - remains asymptomatic from atrial flutter  - TTE with EF 25%, LV apical thrombus, Severe TR, mod MR, small pericardial effusion  - c/w digoxin 62.5mcg daily  - c/w metoprolol succinate 12.5mg daily  - On AC warfarin given LV thrombus  - GDMT per cardiology, not AT candidate d/t recent substance use  - Close telemetry monitoring  - Maintain K>4 and Mg>2    NOTE and RECS INCOMPLETE    Oz Cade MD  Cardiology Fellow - PGY5    Please check [email or web address]: [email or web address] for cardiology service schedule and contact information.    63 year old man with history of ICM (LVIDd 5.2 cm, LVEF 25-30%), CAD s/p PCI 12/2023, severe TR, HTN, AF, PAD c/b RLE compartment syndrome s/p fasciotomy (12/2023), active smoker, ETOH misuse (4 beers daily and half pint liquor 2-3x week) and cocaine use (last 1 month PTA), who was recently hospitalized for ADHF requiring chest tubes for effusions. Now presented, initially to [facility] for recurrent ADHF. Developed cardiogenic shock prompting transfer to [facility] for further management on 3/4/24. Initiated on inotropic support but has since been weaned with diuresis and introduction of afterload reducing agents. Echo with LV thrombus and EF ~25%. Downgraded to floor. EP was previously following for atrial flutter w/ RVR, now s/p CTI ablation on 3/15/24. EP re-consulted on 3/18/24 for recurrent atrial flutter.     1. ICM/HFrEF EF 25%  2. LV apical thrombus on heparin gtt  3. Typical and atypical atrial flutter  4. CAD s/p PCI 12/2023  5. PAD s/p prior RLE compartment syndrome s/p fasciotomy    - as above s/p CTI ablation 3/15/24  - on 3/17 noted to have recurrent atrial flutter that was transient, on 3/18 approx 1pm most recently back in atrial flutter rates 130's  - EKG reviewed suggesting different morphology atypical atrial flutter  - remains asymptomatic from atrial flutter  - TTE with EF 25%, LV apical thrombus, Severe TR, mod MR, small pericardial effusion  - c/w digoxin 62.5mcg daily  - recommend stopping metoprolol succinate 12.5mg daily  - recommend starting amiodarone 200mg q8hr (half dose given concomitant digoxin)  - NPO at midnight for cardioversion tomorrow 3/19/24  - On AC warfarin given LV thrombus  - Close telemetry monitoring  - Maintain K>4 and Mg>2      Oz Cade MD  Cardiology Fellow - PGY5    Please check [email or web address]: [email or web address] for cardiology service schedule and contact information.    63 year old man with history of ICM (LVIDd 5.2 cm, LVEF 25-30%), CAD s/p PCI 12/2023, severe TR, HTN, AF, PAD c/b RLE compartment syndrome s/p fasciotomy (12/2023), active smoker, ETOH misuse (4 beers daily and half pint liquor 2-3x week) and cocaine use (last 1 month PTA), who was recently hospitalized for ADHF requiring chest tubes for effusions. Now presented, initially to [facility] for recurrent ADHF. Developed cardiogenic shock prompting transfer to [facility] for further management on 3/4/24. Initiated on inotropic support but has since been weaned with diuresis and introduction of afterload reducing agents. Echo with LV thrombus and EF ~25%. Downgraded to floor. EP was previously following for atrial flutter w/ RVR, now s/p CTI ablation on 3/15/24. EP re-consulted on 3/18/24 for recurrent atrial flutter.     1. ICM/HFrEF EF 25%  2. LV apical thrombus on warfarin  3. Typical and atypical atrial flutter  4. CAD s/p PCI 12/2023  5. PAD s/p prior RLE compartment syndrome s/p fasciotomy    - as above s/p CTI ablation 3/15/24  - on 3/17 noted to have recurrent atrial flutter that was transient, on 3/18 approx 1pm most recently back in atrial flutter rates 130's  - EKG reviewed suggesting different morphology atypical atrial flutter  - remains asymptomatic from atrial flutter  - TTE with EF 25%, LV apical thrombus, Severe TR, mod MR, small pericardial effusion  - c/w digoxin 62.5mcg daily  - recommend stopping metoprolol succinate 12.5mg daily  - recommend starting amiodarone 200mg q8hr (half dose given concomitant digoxin)  - NPO at midnight for cardioversion tomorrow 3/19/24  - continue AC warfarin given LV thrombus  - Close telemetry monitoring  - Maintain K>4 and Mg>2      Oz Cade MD  Cardiology Fellow - PGY5    Please check [email or web address]: [email or web address] for cardiology service schedule and contact information.

## 2024-03-18 NOTE — PROVIDER CONTACT NOTE (OTHER) - BACKGROUND
Dx: AoCHF, Cardiogenic Shock
Admitted with heart failure
patient admitted for chest pain
patient admitted for chest pain hx of afib
patient admitted for chest pain
pt admitted for chest pain, s/p ablation
pt admitted for afib, ablation 3/15
Dx: AoCHF, Cardiogenic shock

## 2024-03-18 NOTE — PROVIDER CONTACT NOTE (OTHER) - ASSESSMENT
Pt. asymptomatic; denies chest pain, sob, and palpitations.   Vitals: BP 93/60, , SpO2 97%, rectal temp 98.6

## 2024-03-18 NOTE — PROGRESS NOTE ADULT - SUBJECTIVE AND OBJECTIVE BOX
ADVANCED HEART FAILURE & TRANSPLANT  - PROGRESS NOTE  *To reach the NS2 Team from 8am to 5pm, please call [phone]   ___________________________________________________________________________  Subjective:  - back in aflutter  - he declined to work with PT today he tells me. denies orthopnea, SOB at rest, palpitations, lightheadedness. Appetite fair.    Medications:  AQUAPHOR (petrolatum Ointment) 1 Application(s) Topical every 12 hours  atorvastatin 40 milliGRAM(s) Oral at bedtime  buMETAnide 1 milliGRAM(s) Oral every 12 hours  chlorhexidine 2% Cloths 1 Application(s) Topical <User Schedule>  clopidogrel Tablet 75 milliGRAM(s) Oral daily  dapagliflozin 10 milliGRAM(s) Oral daily  digoxin     Tablet 62.5 MICROGram(s) Oral daily  folic acid 1 milliGRAM(s) Oral daily  heparin  Infusion. 800 Unit(s)/Hr IV Continuous <Continuous>  insulin lispro (ADMELOG) corrective regimen sliding scale   SubCutaneous three times a day before meals  insulin lispro (ADMELOG) corrective regimen sliding scale   SubCutaneous at bedtime  levothyroxine 25 MICROGram(s) Oral daily  losartan 25 milliGRAM(s) Oral daily  sodium chloride 0.9% lock flush 10 milliLiter(s) IV Push every 1 hour PRN  spironolactone 25 milliGRAM(s) Oral every 12 hours  thiamine 100 milliGRAM(s) Oral daily      Physical Exam:    Vitals:  Vital Signs Last 24 Hours  T(C): 36.8 (03-18-24 @ 10:11), Max: 37 (03-17-24 @ 23:27)  HR: 86 (03-18-24 @ 10:11) (86 - 137)  BP: 104/58 (03-18-24 @ 10:11) (91/62 - 105/68)  RR: 18 (03-18-24 @ 10:11) (18 - 18)  SpO2: 98% (03-18-24 @ 10:11) (95% - 98%)    I&O's Summary    17 Mar 2024 07:01  -  18 Mar 2024 07:00  --------------------------------------------------------  IN: 500 mL / OUT: 850 mL / NET: -350 mL    18 Mar 2024 07:01  -  18 Mar 2024 14:58  --------------------------------------------------------  IN: 0 mL / OUT: 540 mL / NET: -540 mL      Tele: aflutter    General: Frail. No distress. Comfortable.  HEENT: EOM intact.  Neck: Neck supple. JVP not elevated. No masses  Chest: Clear to auscultation bilaterally  CV: Regular. Normal S1 and S2. No murmurs, rub, or gallops. Radial pulses normal. No LE edema.   Abdomen: Soft, non-distended, non-tender  Skin: No rashes or skin breakdown. Warm periphreally  Neurology: Alert and oriented times three. Sensation intact  Psych: Affect normal    Labs:                        10.2   4.92  )-----------( 185      ( 18 Mar 2024 08:32 )             34.2     03-18    140  |  101  |  28<H>  ----------------------------<  75  5.2   |  19<L>  |  1.02    Ca    8.7      18 Mar 2024 08:32  Phos  1.5     03-17  Mg     1.8     03-17    TPro  6.8  /  Alb  3.0<L>  /  TBili  0.6  /  DBili  x   /  AST  32  /  ALT  71<H>  /  AlkPhos  237<H>  03-17    PT/INR - ( 18 Mar 2024 11:20 )   PT: 24.6 sec;   INR: 2.40 ratio       PTT - ( 18 Mar 2024 11:20 )  PTT:99.0 sec

## 2024-03-18 NOTE — PROGRESS NOTE ADULT - PROBLEM SELECTOR PLAN 5
- CXR 3/13 with worsening PVC and pleural effusion  - cough appears to have improved with escalation of diuretics, continue HF management as above  - RPAN negative

## 2024-03-18 NOTE — PROGRESS NOTE ADULT - SUBJECTIVE AND OBJECTIVE BOX
[facility] Division of Hospital Medicine  Krystal Bhandari MD  AVailable on TEAMS 8a-8p    Patient is a 63y old  Male who presents with a chief complaint of cardiogenic shock (17 Mar 2024 13:56)      SUBJECTIVE / OVERNIGHT EVENTS: No acute events. Back in AF  ADDITIONAL REVIEW OF SYSTEMS: Negative    MEDICATIONS  (STANDING):  AQUAPHOR (petrolatum Ointment) 1 Application(s) Topical every 12 hours  atorvastatin 40 milliGRAM(s) Oral at bedtime  buMETAnide 1 milliGRAM(s) Oral every 12 hours  chlorhexidine 2% Cloths 1 Application(s) Topical <User Schedule>  clopidogrel Tablet 75 milliGRAM(s) Oral daily  dapagliflozin 10 milliGRAM(s) Oral daily  digoxin     Tablet 62.5 MICROGram(s) Oral daily  folic acid 1 milliGRAM(s) Oral daily  insulin lispro (ADMELOG) corrective regimen sliding scale   SubCutaneous three times a day before meals  insulin lispro (ADMELOG) corrective regimen sliding scale   SubCutaneous at bedtime  levothyroxine 25 MICROGram(s) Oral daily  losartan 25 milliGRAM(s) Oral daily  spironolactone 25 milliGRAM(s) Oral every 12 hours  thiamine 100 milliGRAM(s) Oral daily    MEDICATIONS  (PRN):  sodium chloride 0.9% lock flush 10 milliLiter(s) IV Push every 1 hour PRN Pre/post blood products, medications, blood draw, and to maintain line patency      CAPILLARY BLOOD GLUCOSE      POCT Blood Glucose.: 94 mg/dL (18 Mar 2024 12:53)  POCT Blood Glucose.: 80 mg/dL (18 Mar 2024 08:43)  POCT Blood Glucose.: 125 mg/dL (17 Mar 2024 21:41)  POCT Blood Glucose.: 120 mg/dL (17 Mar 2024 17:11)    I&O's Summary    17 Mar 2024 07:01  -  18 Mar 2024 07:00  --------------------------------------------------------  IN: 500 mL / OUT: 850 mL / NET: -350 mL    18 Mar 2024 07:01  -  18 Mar 2024 15:14  --------------------------------------------------------  IN: 0 mL / OUT: 540 mL / NET: -540 mL        PHYSICAL EXAM:  Vital Signs Last 24 Hrs  T(C): 36.8 (18 Mar 2024 10:11), Max: 37 (17 Mar 2024 23:27)  T(F): 98.2 (18 Mar 2024 10:11), Max: 98.6 (17 Mar 2024 23:27)  HR: 86 (18 Mar 2024 10:11) (86 - 137)  BP: 104/58 (18 Mar 2024 10:11) (91/62 - 105/68)  BP(mean): 73 (18 Mar 2024 10:11) (73 - 73)  RR: 18 (18 Mar 2024 10:11) (18 - 18)  SpO2: 98% (18 Mar 2024 10:11) (95% - 98%)    Parameters below as of 18 Mar 2024 05:00  Patient On (Oxygen Delivery Method): room air    CONSTITUTIONAL: NAD, frail appearing  EYES: PERRLA; conjunctiva and sclera clear  ENMT: Moist oral mucosa, no pharyngeal injection or exudates  NECK: Supple, no palpable masses; no thyromegaly  RESPIRATORY: Normal respiratory effort; lungs are clear to auscultation bilaterally  CARDIOVASCULAR: Tachycardic, AF, no murmur/rub/gallop; No lower extremity edema  ABDOMEN: Nontender to palpation, normoactive bowel sounds, no rebound/guarding  MUSCULOSKELETAL:  No clubbing or cyanosis of digits; no joint swelling or tenderness to palpation  PSYCH: A+O to person, place, and time; affect appropriate  NEUROLOGY: CN 2-12 are intact and symmetric; no gross sensory deficits   SKIN: No rashes; no palpable lesions    LABS: reviewed                        10.2   4.92  )-----------( 185      ( 18 Mar 2024 08:32 )             34.2     03-18    140  |  101  |  28<H>  ----------------------------<  75  5.2   |  19<L>  |  1.02    Ca    8.7      18 Mar 2024 08:32  Phos  1.5     03-17  Mg     1.8     03-17    TPro  6.8  /  Alb  3.0<L>  /  TBili  0.6  /  DBili  x   /  AST  32  /  ALT  71<H>  /  AlkPhos  237<H>  03-17    PT/INR - ( 18 Mar 2024 11:20 )   PT: 24.6 sec;   INR: 2.40 ratio         PTT - ( 18 Mar 2024 11:20 )  PTT:99.0 sec      Urinalysis Basic - ( 18 Mar 2024 08:32 )    Color: x / Appearance: x / SG: x / pH: x  Gluc: 75 mg/dL / Ketone: x  / Bili: x / Urobili: x   Blood: x / Protein: x / Nitrite: x   Leuk Esterase: x / RBC: x / WBC x   Sq Epi: x / Non Sq Epi: x / Bacteria: x

## 2024-03-18 NOTE — PROGRESS NOTE ADULT - PROBLEM SELECTOR PLAN 3
- seen on TTE above  - s/p hep gtt bridge to coumadin  - INR now therapeutic  - can dose 3mg coum tonight

## 2024-03-18 NOTE — PROVIDER CONTACT NOTE (OTHER) - DATE AND TIME:
08-Mar-2024 03:15
17-Mar-2024 00:48
17-Mar-2024 23:20
08-Mar-2024 00:52
08-Mar-2024 06:10
16-Mar-2024 13:30
18-Mar-2024 18:58
11-Mar-2024 05:00

## 2024-03-18 NOTE — PROGRESS NOTE ADULT - ASSESSMENT
63 year old man with history of ICM (LVIDd 5.2 cm, LVEF 25-30%), CAD s/p PCI, severe TR, HTN, AF, PAD c/b RLE compartment syndrome s/p fasciotomy (12/2023) active smoker, ETOH misuse (4 beers daily and half pint liquor 2-3x week) and cocaine use (last 1 month PTA) who was recently hospitalized for ADHF requiring chest tubes for effusions. Now presenting, initially to [facility] for recurrent ADHF and new LV thrombus. Developed cardiogenic shock prompting transfer to [facility] for further management. Initiated on inotropic support but has since been weaned with diuresis and introduction of afterload reducing agents. Prior to central line removal, off inotropic support, CO/CI was 3/1.9.     He has improved with IV diuretics. He underwent aflutter ablation on 3/15 and was in SR, however converted back into typical aflutter. He's currently euvolemic and low normotensive tolerating low dose GDMT with normal renal function.     Cardiac Studies  ·	3/5/24 TTE: LVIDd 5.2 cm, LVEF 25% with rounded and protruding LV thrombus in apex, severe RVE with severely reduced function (TAPSE 1.5 cm), mod NEY, trace AI, mod MR, severe TR, est PASP 46 mmHg    Bedside hemodynamics  3/5 Bedside swan numbers; CVP 15, PAP 45/21, wedge 20. MvO2 45 CO/CI 3.4/2.2 SVR 1482  3/6 Bedside swan numbers: CVP 6, PAP 34/15, wedge 15. MvO2 53 CO/CI 3.7/2.4 SVR 1383   3/7 CvO2 42.9, with CO/CI estimated 3/1.9

## 2024-03-18 NOTE — PROGRESS NOTE ADULT - PROBLEM SELECTOR PLAN 4
- also with some depression and mood disorder  - Per pt, quit cocaine and ETOH one month ago  - SW following  - [facility] care following

## 2024-03-19 LAB
ANION GAP SERPL CALC-SCNC: 12 MMOL/L — SIGNIFICANT CHANGE UP (ref 5–17)
APTT BLD: 36 SEC — HIGH (ref 24.5–35.6)
BUN SERPL-MCNC: 21 MG/DL — SIGNIFICANT CHANGE UP (ref 7–23)
CALCIUM SERPL-MCNC: 8.2 MG/DL — LOW (ref 8.4–10.5)
CHLORIDE SERPL-SCNC: 104 MMOL/L — SIGNIFICANT CHANGE UP (ref 96–108)
CO2 SERPL-SCNC: 24 MMOL/L — SIGNIFICANT CHANGE UP (ref 22–31)
CREAT SERPL-MCNC: 0.86 MG/DL — SIGNIFICANT CHANGE UP (ref 0.5–1.3)
EGFR: 97 ML/MIN/1.73M2 — SIGNIFICANT CHANGE UP
GLUCOSE BLDC GLUCOMTR-MCNC: 101 MG/DL — HIGH (ref 70–99)
GLUCOSE BLDC GLUCOMTR-MCNC: 102 MG/DL — HIGH (ref 70–99)
GLUCOSE BLDC GLUCOMTR-MCNC: 102 MG/DL — HIGH (ref 70–99)
GLUCOSE BLDC GLUCOMTR-MCNC: 203 MG/DL — HIGH (ref 70–99)
GLUCOSE BLDC GLUCOMTR-MCNC: 68 MG/DL — LOW (ref 70–99)
GLUCOSE SERPL-MCNC: 89 MG/DL — SIGNIFICANT CHANGE UP (ref 70–99)
HCT VFR BLD CALC: 32.7 % — LOW (ref 39–50)
HGB BLD-MCNC: 10.1 G/DL — LOW (ref 13–17)
INR BLD: 2.51 RATIO — HIGH (ref 0.85–1.18)
MCHC RBC-ENTMCNC: 21 PG — LOW (ref 27–34)
MCHC RBC-ENTMCNC: 30.9 GM/DL — LOW (ref 32–36)
MCV RBC AUTO: 68.1 FL — LOW (ref 80–100)
NRBC # BLD: 0 /100 WBCS — SIGNIFICANT CHANGE UP (ref 0–0)
PLATELET # BLD AUTO: 183 K/UL — SIGNIFICANT CHANGE UP (ref 150–400)
POTASSIUM SERPL-MCNC: 3.7 MMOL/L — SIGNIFICANT CHANGE UP (ref 3.5–5.3)
POTASSIUM SERPL-SCNC: 3.7 MMOL/L — SIGNIFICANT CHANGE UP (ref 3.5–5.3)
PROTHROM AB SERPL-ACNC: 26.9 SEC — HIGH (ref 9.5–13)
RBC # BLD: 4.8 M/UL — SIGNIFICANT CHANGE UP (ref 4.2–5.8)
RBC # FLD: 19 % — HIGH (ref 10.3–14.5)
SODIUM SERPL-SCNC: 140 MMOL/L — SIGNIFICANT CHANGE UP (ref 135–145)
WBC # BLD: 5.4 K/UL — SIGNIFICANT CHANGE UP (ref 3.8–10.5)
WBC # FLD AUTO: 5.4 K/UL — SIGNIFICANT CHANGE UP (ref 3.8–10.5)

## 2024-03-19 PROCEDURE — 76705 ECHO EXAM OF ABDOMEN: CPT | Mod: 26

## 2024-03-19 PROCEDURE — 99233 SBSQ HOSP IP/OBS HIGH 50: CPT

## 2024-03-19 RX ORDER — WARFARIN SODIUM 2.5 MG/1
3 TABLET ORAL ONCE
Refills: 0 | Status: COMPLETED | OUTPATIENT
Start: 2024-03-19 | End: 2024-03-19

## 2024-03-19 RX ADMIN — ATORVASTATIN CALCIUM 40 MILLIGRAM(S): 80 TABLET, FILM COATED ORAL at 21:20

## 2024-03-19 RX ADMIN — CLOPIDOGREL BISULFATE 75 MILLIGRAM(S): 75 TABLET, FILM COATED ORAL at 12:28

## 2024-03-19 RX ADMIN — BUMETANIDE 1 MILLIGRAM(S): 0.25 INJECTION INTRAMUSCULAR; INTRAVENOUS at 05:14

## 2024-03-19 RX ADMIN — SPIRONOLACTONE 25 MILLIGRAM(S): 25 TABLET, FILM COATED ORAL at 17:47

## 2024-03-19 RX ADMIN — Medication 62.5 MICROGRAM(S): at 12:28

## 2024-03-19 RX ADMIN — SPIRONOLACTONE 25 MILLIGRAM(S): 25 TABLET, FILM COATED ORAL at 05:14

## 2024-03-19 RX ADMIN — WARFARIN SODIUM 3 MILLIGRAM(S): 2.5 TABLET ORAL at 21:20

## 2024-03-19 RX ADMIN — Medication 1 MILLIGRAM(S): at 12:29

## 2024-03-19 RX ADMIN — Medication 1 APPLICATION(S): at 17:49

## 2024-03-19 RX ADMIN — LOSARTAN POTASSIUM 25 MILLIGRAM(S): 100 TABLET, FILM COATED ORAL at 05:14

## 2024-03-19 RX ADMIN — Medication 25 MICROGRAM(S): at 05:14

## 2024-03-19 RX ADMIN — Medication 1 APPLICATION(S): at 05:16

## 2024-03-19 RX ADMIN — Medication 100 MILLIGRAM(S): at 12:28

## 2024-03-19 RX ADMIN — CHLORHEXIDINE GLUCONATE 1 APPLICATION(S): 213 SOLUTION TOPICAL at 05:16

## 2024-03-19 RX ADMIN — AMIODARONE HYDROCHLORIDE 200 MILLIGRAM(S): 400 TABLET ORAL at 21:20

## 2024-03-19 RX ADMIN — AMIODARONE HYDROCHLORIDE 200 MILLIGRAM(S): 400 TABLET ORAL at 15:34

## 2024-03-19 RX ADMIN — AMIODARONE HYDROCHLORIDE 200 MILLIGRAM(S): 400 TABLET ORAL at 05:14

## 2024-03-19 NOTE — CHART NOTE - NSCHARTNOTEFT_GEN_A_CORE
Pt is scheduled for follow up in heart failure clinic on [address] [facility] at [facility] on 4/4 at 10:30 AM with HF NP. Ph [phone]

## 2024-03-19 NOTE — PROGRESS NOTE ADULT - PROBLEM SELECTOR PLAN 4
- also with some depression and mood disorder  - Per pt, quit cocaine and ETOH one month ago  - SW following  - [facility] care following

## 2024-03-19 NOTE — PROGRESS NOTE ADULT - NSPROGADDITIONALINFOA_GEN_ALL_CORE
Dispo: DCP to [facility] pending final EP recs    51 minutes spent on total encounter. The necessity of the time spent during the encounter on this date of service was due to:     - preparing to see the patient (eg, review of tests, documents)  - performing a medically appropriate examination and/or evaluation  - referring and communicating with other health care professionals  - documenting clinical information in the electronic or other health record  - care coordination.

## 2024-03-19 NOTE — PROGRESS NOTE ADULT - ASSESSMENT
63 year old male with past medical history of CVA, HTN, HLD, prediabetes, A.fib, cocaine/heroin abuse, CAD w/ stent, CHF (EF  25-30% in 12/23),  RLE compartment syndrome s/p fasciotomy 12/23 and recent hospitalization for CHF exacerbation with bilateral pleural effusions requiring chest tube placement initially presenting to [facility] on 3/1 with chest pain and shortness of breath, transferred to [facility] for management of cardiogenic shock requiring inotrope support. Now transferred to medicine for further management

## 2024-03-19 NOTE — PROGRESS NOTE ADULT - ASSESSMENT
63 year old man with history of ICM (LVIDd 5.2 cm, LVEF 25-30%), CAD s/p PCI 12/2023, severe TR, HTN, AF, PAD c/b RLE compartment syndrome s/p fasciotomy (12/2023), active smoker, ETOH misuse (4 beers daily and half pint liquor 2-3x week) and cocaine use (last 1 month PTA), who was recently hospitalized for ADHF requiring chest tubes for effusions. Now presented, initially to [facility] for recurrent ADHF. Developed cardiogenic shock prompting transfer to [facility] for further management on 3/4/24. Initiated on inotropic support but has since been weaned with diuresis and introduction of afterload reducing agents. Echo with LV thrombus and EF ~25%. Downgraded to floor. EP was previously following for atrial flutter w/ RVR, now s/p CTI ablation on 3/15/24. EP re-consulted on 3/18/24 for recurrent atrial flutter.     1. ICM/HFrEF EF 25%  2. LV apical thrombus on warfarin  3. Typical and atypical atrial flutter  4. CAD s/p PCI 12/2023  5. PAD s/p prior RLE compartment syndrome s/p fasciotomy    Recommendations:  - during 3/15/24 EP study noted to have multiple atrial flutters, received CTI ablation  - on 3/17 with recurrent atrial flutter that was transient, on 3/18 approx 1pm most recently back in atrial flutter rates 130's  - EKG reviewed showing atypical atrial flutter  - remains asymptomatic at this time  - TTE with EF 25%, LV apical thrombus, Severe TR, mod MR, small pericardial effusion  - c/w digoxin 62.5mcg daily  - c/w amiodarone 200mg q8hr (half dose given concomitant digoxin)  - obtain liver ultrasound to evaluate for fibrosis and safety of long-term amiodarone  - will consider cardioversion later this week after more amiodarone on board  - continue AC warfarin given LV thrombus  - Close telemetry monitoring  - Maintain K>4 and Mg>2      Oz Cade MD  Cardiology Fellow - PGY5    Please check [email or web address] for cardiology service schedule and contact information.

## 2024-03-19 NOTE — PROGRESS NOTE ADULT - SUBJECTIVE AND OBJECTIVE BOX
Electrophysiology Progress Note  ------------------------------------------------------------------------------------------  SUBJECTIVE:   - Tele: aflutter rates 80's-90's overnight  - No events overnight. This morning denies CP, SOB or Palpitations.     -------------------------------------------------------------------------------------------  VS:  T(F): 97.9 (03-19), Max: 98.3 (03-18)  HR: 86 (03-19) (86 - 130)  BP: 105/67 (03-19) (96/62 - 108/67)  RR: 18 (03-19)  SpO2: 99% (03-19)  I&O's Summary    18 Mar 2024 07:01  -  19 Mar 2024 07:00  --------------------------------------------------------  IN: 242 mL / OUT: 1740 mL / NET: -1498 mL      PHYSICAL EXAM:  GENERAL: NAD  HEAD: Atraumatic, Normocephalic.  ENT: Moist mucous membranes.  NECK: Supple, No JVD.  CHEST/LUNG: Decreased breath sounds in R > L  HEART: Regular rate and rhythm; No murmurs, rubs, or gallops.  ABDOMEN: Bowel sounds present; Soft, Nontender, Nondistended.   EXTREMITIES: No edema. 2+ Peripheral Pulses, brisk capillary refill. No clubbing or cyanosis.     -------------------------------------------------------------------------------------------  LABS:                          10.1   5.40  )-----------( 183      ( 19 Mar 2024 07:30 )             32.7     03-19    140  |  104  |  21  ----------------------------<  89  3.7   |  24  |  0.86    Ca    8.2<L>      19 Mar 2024 07:30  Phos  1.5     03-17  Mg     1.8     03-17      PT/INR - ( 19 Mar 2024 07:30 )   PT: 26.9 sec;   INR: 2.51 ratio         PTT - ( 19 Mar 2024 07:30 )  PTT:36.0 sec            -------------------------------------------------------------------------------------------  Meds:  aMIOdarone    Tablet 200 milliGRAM(s) Oral every 8 hours  aMIOdarone    Tablet   Oral   AQUAPHOR (petrolatum Ointment) 1 Application(s) Topical every 12 hours  atorvastatin 40 milliGRAM(s) Oral at bedtime  buMETAnide 1 milliGRAM(s) Oral daily  chlorhexidine 2% Cloths 1 Application(s) Topical <User Schedule>  clopidogrel Tablet 75 milliGRAM(s) Oral daily  dapagliflozin 10 milliGRAM(s) Oral daily  digoxin     Tablet 62.5 MICROGram(s) Oral daily  folic acid 1 milliGRAM(s) Oral daily  insulin lispro (ADMELOG) corrective regimen sliding scale   SubCutaneous three times a day before meals  insulin lispro (ADMELOG) corrective regimen sliding scale   SubCutaneous at bedtime  levothyroxine 25 MICROGram(s) Oral daily  losartan 25 milliGRAM(s) Oral daily  sodium chloride 0.9% lock flush 10 milliLiter(s) IV Push every 1 hour PRN  spironolactone 25 milliGRAM(s) Oral every 12 hours  thiamine 100 milliGRAM(s) Oral daily    -------------------------------------------------------------------------------------------  EP procedure 3/15/24:  Conclusions   Successful CTI ablation for typical flutter     TTE 3/5/24:  CONCLUSIONS:   1. Left ventricular cavity is severely dilated. Left ventricular systolic function is severely decreased. Global left ventricular hypokinesis.   2. Severely enlarged right ventricular cavity size and severely reduced systolic function.   3. Moderate mitral regurgitation. Mechanism of mitral regurgitation: Andrei Type IIIb (restricted leaflet motion secondary to left atrial or left ventricular dilatation).   4. Severe tricuspid regurgitation.   5. Mild to moderate pulmonary hypertension.   6. 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   7. Small pericardial effusion with no evidence of hemodynamic compromise (or echocardiographic evidence of cardiac tamponade).  -------------------------------------------------------------------------------------------

## 2024-03-19 NOTE — PROVIDER CONTACT NOTE (HYPOGLYCEMIA EVENT) - NS PROVIDER CONTACT BACKGROUND-HYPO
Age: 63y    Gender: Male    POCT Blood Glucose:  102 mg/dL (03-19-24 @ 09:29)  68 mg/dL (03-19-24 @ 08:52)  101 mg/dL (03-19-24 @ 06:35)  103 mg/dL (03-18-24 @ 21:39)  154 mg/dL (03-18-24 @ 17:48)  94 mg/dL (03-18-24 @ 12:53)      eMAR:atorvastatin   40 milliGRAM(s) Oral (03-18-24 @ 21:02)    dapagliflozin   10 milliGRAM(s) Oral (03-18-24 @ 11:44)    insulin lispro (ADMELOG) corrective regimen sliding scale   1 Unit(s) SubCutaneous (03-18-24 @ 18:10)    levothyroxine   25 MICROGram(s) Oral (03-19-24 @ 05:14)

## 2024-03-19 NOTE — PROGRESS NOTE ADULT - SUBJECTIVE AND OBJECTIVE BOX
[facility] Division of Hospital Medicine  Krystal Bhandari MD  Available on TEAMS 8a-8p    Patient is a 63y old  Male who presents with a chief complaint of cardiogenic shock      SUBJECTIVE / OVERNIGHT EVENTS: No acute events. Back in AF, now pending ablation  ADDITIONAL REVIEW OF SYSTEMS: Negative    MEDICATIONS  (STANDING):  AQUAPHOR (petrolatum Ointment) 1 Application(s) Topical every 12 hours  atorvastatin 40 milliGRAM(s) Oral at bedtime  buMETAnide 1 milliGRAM(s) Oral every 12 hours  chlorhexidine 2% Cloths 1 Application(s) Topical <User Schedule>  clopidogrel Tablet 75 milliGRAM(s) Oral daily  dapagliflozin 10 milliGRAM(s) Oral daily  digoxin     Tablet 62.5 MICROGram(s) Oral daily  folic acid 1 milliGRAM(s) Oral daily  insulin lispro (ADMELOG) corrective regimen sliding scale   SubCutaneous three times a day before meals  insulin lispro (ADMELOG) corrective regimen sliding scale   SubCutaneous at bedtime  levothyroxine 25 MICROGram(s) Oral daily  losartan 25 milliGRAM(s) Oral daily  spironolactone 25 milliGRAM(s) Oral every 12 hours  thiamine 100 milliGRAM(s) Oral daily    MEDICATIONS  (PRN):  sodium chloride 0.9% lock flush 10 milliLiter(s) IV Push every 1 hour PRN Pre/post blood products, medications, blood draw, and to maintain line patency    I&O's Summary    18 Mar 2024 07:01  -  19 Mar 2024 07:00  --------------------------------------------------------  IN: 242 mL / OUT: 1740 mL / NET: -1498 mL    19 Mar 2024 07:01  -  19 Mar 2024 14:38  --------------------------------------------------------  IN: 0 mL / OUT: 360 mL / NET: -360 mL      PHYSICAL EXAM:  Vital Signs Last 24 Hrs  T(C): 36.6 (19 Mar 2024 09:28), Max: 36.8 (18 Mar 2024 20:15)  T(F): 97.8 (19 Mar 2024 09:28), Max: 98.3 (18 Mar 2024 20:15)  HR: 72 (19 Mar 2024 09:28) (72 - 130)  BP: 104/52 (19 Mar 2024 09:28) (96/62 - 108/67)  BP(mean): 69 (19 Mar 2024 09:28) (69 - 130)  RR: 18 (19 Mar 2024 09:28) (16 - 18)  SpO2: 95% (19 Mar 2024 09:28) (95% - 99%)    Parameters below as of 19 Mar 2024 04:46  Patient On (Oxygen Delivery Method): room air    CONSTITUTIONAL: NAD, frail appearing  EYES: PERRLA; conjunctiva and sclera clear  ENMT: Moist oral mucosa, no pharyngeal injection or exudates  NECK: Supple, no palpable masses; no thyromegaly  RESPIRATORY: Normal respiratory effort; lungs are clear to auscultation bilaterally  CARDIOVASCULAR: Tachycardic, AF, no murmur/rub/gallop; No lower extremity edema  ABDOMEN: Nontender to palpation, normoactive bowel sounds, no rebound/guarding  MUSCULOSKELETAL:  No clubbing or cyanosis of digits; no joint swelling or tenderness to palpation  PSYCH: A+O to person, place, and time; affect appropriate  NEUROLOGY: CN 2-12 are intact and symmetric; no gross sensory deficits   SKIN: No rashes; no palpable lesions    LABS: reviewed                                      10.1   5.40  )-----------( 183      ( 19 Mar 2024 07:30 )             32.7       03-19    140  |  104  |  21  ----------------------------<  89  3.7   |  24  |  0.86    Ca    8.2<L>      19 Mar 2024 07:30  Phos  1.5     03-17  Mg     1.8     03-17                Urinalysis Basic - ( 19 Mar 2024 07:30 )    Color: x / Appearance: x / SG: x / pH: x  Gluc: 89 mg/dL / Ketone: x  / Bili: x / Urobili: x   Blood: x / Protein: x / Nitrite: x   Leuk Esterase: x / RBC: x / WBC x   Sq Epi: x / Non Sq Epi: x / Bacteria: x        PT/INR - ( 19 Mar 2024 07:30 )   PT: 26.9 sec;   INR: 2.51 ratio         PTT - ( 19 Mar 2024 07:30 )  PTT:36.0 sec          CAPILLARY BLOOD GLUCOSE      POCT Blood Glucose.: 102 mg/dL (19 Mar 2024 09:29)

## 2024-03-20 DIAGNOSIS — E03.9 HYPOTHYROIDISM, UNSPECIFIED: ICD-10-CM

## 2024-03-20 LAB
APTT BLD: 39.4 SEC — HIGH (ref 24.5–35.6)
GLUCOSE BLDC GLUCOMTR-MCNC: 110 MG/DL — HIGH (ref 70–99)
GLUCOSE BLDC GLUCOMTR-MCNC: 129 MG/DL — HIGH (ref 70–99)
GLUCOSE BLDC GLUCOMTR-MCNC: 145 MG/DL — HIGH (ref 70–99)
GLUCOSE BLDC GLUCOMTR-MCNC: 91 MG/DL — SIGNIFICANT CHANGE UP (ref 70–99)
HCT VFR BLD CALC: 31.7 % — LOW (ref 39–50)
HGB BLD-MCNC: 9.9 G/DL — LOW (ref 13–17)
INR BLD: 2.86 RATIO — HIGH (ref 0.85–1.18)
MCHC RBC-ENTMCNC: 21.2 PG — LOW (ref 27–34)
MCHC RBC-ENTMCNC: 31.2 GM/DL — LOW (ref 32–36)
MCV RBC AUTO: 67.9 FL — LOW (ref 80–100)
NRBC # BLD: 0 /100 WBCS — SIGNIFICANT CHANGE UP (ref 0–0)
PLATELET # BLD AUTO: 190 K/UL — SIGNIFICANT CHANGE UP (ref 150–400)
PROTHROM AB SERPL-ACNC: 29.1 SEC — HIGH (ref 9.5–13)
RBC # BLD: 4.67 M/UL — SIGNIFICANT CHANGE UP (ref 4.2–5.8)
RBC # FLD: 19.4 % — HIGH (ref 10.3–14.5)
T3 SERPL-MCNC: 65 NG/DL — LOW (ref 80–200)
T4 AB SER-ACNC: 8.2 UG/DL — SIGNIFICANT CHANGE UP (ref 4.6–12)
T4 FREE SERPL-MCNC: 1.6 NG/DL — SIGNIFICANT CHANGE UP (ref 0.9–1.8)
TSH SERPL-MCNC: 10.4 UIU/ML — HIGH (ref 0.27–4.2)
WBC # BLD: 5.28 K/UL — SIGNIFICANT CHANGE UP (ref 3.8–10.5)
WBC # FLD AUTO: 5.28 K/UL — SIGNIFICANT CHANGE UP (ref 3.8–10.5)

## 2024-03-20 PROCEDURE — 93010 ELECTROCARDIOGRAM REPORT: CPT

## 2024-03-20 PROCEDURE — 99232 SBSQ HOSP IP/OBS MODERATE 35: CPT

## 2024-03-20 RX ORDER — WARFARIN SODIUM 2.5 MG/1
3 TABLET ORAL ONCE
Refills: 0 | Status: COMPLETED | OUTPATIENT
Start: 2024-03-20 | End: 2024-03-20

## 2024-03-20 RX ORDER — FERROUS SULFATE 325(65) MG
325 TABLET ORAL DAILY
Refills: 0 | Status: DISCONTINUED | OUTPATIENT
Start: 2024-03-20 | End: 2024-03-26

## 2024-03-20 RX ORDER — DAPAGLIFLOZIN 10 MG/1
10 TABLET, FILM COATED ORAL DAILY
Refills: 0 | Status: DISCONTINUED | OUTPATIENT
Start: 2024-03-20 | End: 2024-03-26

## 2024-03-20 RX ADMIN — AMIODARONE HYDROCHLORIDE 200 MILLIGRAM(S): 400 TABLET ORAL at 21:48

## 2024-03-20 RX ADMIN — ATORVASTATIN CALCIUM 40 MILLIGRAM(S): 80 TABLET, FILM COATED ORAL at 21:48

## 2024-03-20 RX ADMIN — Medication 62.5 MICROGRAM(S): at 10:28

## 2024-03-20 RX ADMIN — CHLORHEXIDINE GLUCONATE 1 APPLICATION(S): 213 SOLUTION TOPICAL at 05:27

## 2024-03-20 RX ADMIN — Medication 1 MILLIGRAM(S): at 13:04

## 2024-03-20 RX ADMIN — Medication 25 MICROGRAM(S): at 05:27

## 2024-03-20 RX ADMIN — BUMETANIDE 1 MILLIGRAM(S): 0.25 INJECTION INTRAMUSCULAR; INTRAVENOUS at 05:27

## 2024-03-20 RX ADMIN — WARFARIN SODIUM 3 MILLIGRAM(S): 2.5 TABLET ORAL at 21:48

## 2024-03-20 RX ADMIN — Medication 325 MILLIGRAM(S): at 13:04

## 2024-03-20 RX ADMIN — SPIRONOLACTONE 25 MILLIGRAM(S): 25 TABLET, FILM COATED ORAL at 18:04

## 2024-03-20 RX ADMIN — AMIODARONE HYDROCHLORIDE 200 MILLIGRAM(S): 400 TABLET ORAL at 05:27

## 2024-03-20 RX ADMIN — CLOPIDOGREL BISULFATE 75 MILLIGRAM(S): 75 TABLET, FILM COATED ORAL at 13:04

## 2024-03-20 RX ADMIN — Medication 1 APPLICATION(S): at 17:00

## 2024-03-20 RX ADMIN — AMIODARONE HYDROCHLORIDE 200 MILLIGRAM(S): 400 TABLET ORAL at 13:04

## 2024-03-20 RX ADMIN — LOSARTAN POTASSIUM 25 MILLIGRAM(S): 100 TABLET, FILM COATED ORAL at 05:27

## 2024-03-20 RX ADMIN — SPIRONOLACTONE 25 MILLIGRAM(S): 25 TABLET, FILM COATED ORAL at 05:29

## 2024-03-20 RX ADMIN — Medication 1 APPLICATION(S): at 05:28

## 2024-03-20 RX ADMIN — Medication 100 MILLIGRAM(S): at 13:04

## 2024-03-20 NOTE — PROGRESS NOTE ADULT - ASSESSMENT
63 year old male with past medical history of CVA, HTN, HLD, prediabetes, A.fib, cocaine/heroin abuse, CAD w/ stent, CHF (EF  25-30% in 12/23),  RLE compartment syndrome s/p fasciotomy 12/23 and recent hospitalization for CHF exacerbation with bilateral pleural effusions requiring chest tube placement initially presenting to [facility] on 3/1 with chest pain and shortness of breath, transferred to [facility]U for management of cardiogenic shock requiring inotrope support. Now transferred to medicine for further management

## 2024-03-20 NOTE — PROGRESS NOTE ADULT - SUBJECTIVE AND OBJECTIVE BOX
24H hour events:     MEDICATIONS:  aMIOdarone    Tablet 200 milliGRAM(s) Oral every 8 hours  aMIOdarone    Tablet   Oral   buMETAnide 1 milliGRAM(s) Oral daily  clopidogrel Tablet 75 milliGRAM(s) Oral daily  digoxin     Tablet 62.5 MICROGram(s) Oral daily  losartan 25 milliGRAM(s) Oral daily  spironolactone 25 milliGRAM(s) Oral every 12 hours  warfarin 3 milliGRAM(s) Oral once            atorvastatin 40 milliGRAM(s) Oral at bedtime  insulin lispro (ADMELOG) corrective regimen sliding scale   SubCutaneous three times a day before meals  insulin lispro (ADMELOG) corrective regimen sliding scale   SubCutaneous at bedtime  levothyroxine 25 MICROGram(s) Oral daily    AQUAPHOR (petrolatum Ointment) 1 Application(s) Topical every 12 hours  chlorhexidine 2% Cloths 1 Application(s) Topical <User Schedule>  ferrous    sulfate 325 milliGRAM(s) Oral daily  folic acid 1 milliGRAM(s) Oral daily  sodium chloride 0.9% lock flush 10 milliLiter(s) IV Push every 1 hour PRN  thiamine 100 milliGRAM(s) Oral daily      REVIEW OF SYSTEMS:  Complete 12point ROS negative.    PHYSICAL EXAM:  T(C): 36.8 (03-20-24 @ 04:47), Max: 37.1 (03-19-24 @ 16:30)  HR: 83 (03-20-24 @ 04:47) (72 - 86)  BP: 99/58 (03-20-24 @ 04:47) (94/57 - 104/52)  RR: 18 (03-20-24 @ 04:47) (18 - 18)  SpO2: 99% (03-20-24 @ 04:47) (95% - 99%)  Wt(kg): --  I&O's Summary    19 Mar 2024 07:01  -  20 Mar 2024 07:00  --------------------------------------------------------  IN: 240 mL / OUT: 710 mL / NET: -470 mL        Appearance: Normal	  HEENT:   Normal oral mucosa, PERRL, EOMI	  Lymphatic: No lymphadenopathy  Cardiovascular: Normal S1 S2, No JVD, No murmurs, No edema  Respiratory: Lungs clear to auscultation	  Psychiatry: A & O x 3, Mood & affect appropriate  Gastrointestinal:  Soft, Non-tender, + BS	  Skin: No rashes, No ecchymoses, No cyanosis	  Neurologic: Non-focal  Extremities: Normal range of motion, No clubbing, cyanosis or edema  Vascular: Peripheral pulses palpable 2+ bilaterally        LABS:	 	    CBC Full  -  ( 20 Mar 2024 07:12 )  WBC Count : 5.28 K/uL  Hemoglobin : 9.9 g/dL  Hematocrit : 31.7 %  Platelet Count - Automated : 190 K/uL  Mean Cell Volume : 67.9 fl  Mean Cell Hemoglobin : 21.2 pg  Mean Cell Hemoglobin Concentration : 31.2 gm/dL  Auto Neutrophil # : x  Auto Lymphocyte # : x  Auto Monocyte # : x  Auto Eosinophil # : x  Auto Basophil # : x  Auto Neutrophil % : x  Auto Lymphocyte % : x  Auto Monocyte % : x  Auto Eosinophil % : x  Auto Basophil % : x    03-19    140  |  104  |  21  ----------------------------<  89  3.7   |  24  |  0.86    Ca    8.2<L>      19 Mar 2024 07:30        proBNP:   Lipid Profile:   HgA1c:   TSH:       CARDIAC MARKERS:          TELEMETRY: 	    ECG:  	  RADIOLOGY:  OTHER: 	    PREVIOUS DIAGNOSTIC TESTING:    [ ] Echocardiogram:    [ ]  Catheterization:  [ ] Stress Test:  	  	  ASSESSMENT/PLAN: 	     24H hour events: tele with SR rates ~70-80s with intermittent PVCs. Pt converted from AFL to SR last night ~0212AM    MEDICATIONS:  aMIOdarone    Tablet 200 milliGRAM(s) Oral every 8 hours  aMIOdarone    Tablet   Oral   buMETAnide 1 milliGRAM(s) Oral daily  clopidogrel Tablet 75 milliGRAM(s) Oral daily  digoxin     Tablet 62.5 MICROGram(s) Oral daily  losartan 25 milliGRAM(s) Oral daily  spironolactone 25 milliGRAM(s) Oral every 12 hours  warfarin 3 milliGRAM(s) Oral once  atorvastatin 40 milliGRAM(s) Oral at bedtime  insulin lispro (ADMELOG) corrective regimen sliding scale   SubCutaneous three times a day before meals  insulin lispro (ADMELOG) corrective regimen sliding scale   SubCutaneous at bedtime  levothyroxine 25 MICROGram(s) Oral daily  AQUAPHOR (petrolatum Ointment) 1 Application(s) Topical every 12 hours  chlorhexidine 2% Cloths 1 Application(s) Topical <User Schedule>  ferrous    sulfate 325 milliGRAM(s) Oral daily  folic acid 1 milliGRAM(s) Oral daily  sodium chloride 0.9% lock flush 10 milliLiter(s) IV Push every 1 hour PRN  thiamine 100 milliGRAM(s) Oral daily    REVIEW OF SYSTEMS:  Complete 12point ROS negative.    PHYSICAL EXAM:  T(C): 36.8 (03-20-24 @ 04:47), Max: 37.1 (03-19-24 @ 16:30)  HR: 83 (03-20-24 @ 04:47) (72 - 86)  BP: 99/58 (03-20-24 @ 04:47) (94/57 - 104/52)  RR: 18 (03-20-24 @ 04:47) (18 - 18)  SpO2: 99% (03-20-24 @ 04:47) (95% - 99%)  Wt(kg): --  I&O's Summary    19 Mar 2024 07:01  -  20 Mar 2024 07:00  --------------------------------------------------------  IN: 240 mL / OUT: 710 mL / NET: -470 mL        Appearance: Normal	  HEENT: NC/AT  Cardiovascular: Normal S1 S2  Respiratory: Lungs clear to auscultation	  Psychiatry: A & O x 3, Mood & affect appropriate	  Neurologic: Non-focal  Extremities: No BLE edema      LABS:	 	    CBC Full  -  ( 20 Mar 2024 07:12 )  WBC Count : 5.28 K/uL  Hemoglobin : 9.9 g/dL  Hematocrit : 31.7 %  Platelet Count - Automated : 190 K/uL  Mean Cell Volume : 67.9 fl  Mean Cell Hemoglobin : 21.2 pg  Mean Cell Hemoglobin Concentration : 31.2 gm/dL    03-19    140  |  104  |  21  ----------------------------<  89  3.7   |  24  |  0.86    Ca    8.2<L>      19 Mar 2024 07:30      proBNP:  Pro-Brain Natriuretic Peptide (03.11.24 @ 13:59)    Pro-Brain Natriuretic Peptide: 4171 pg/mL      Lipid Profile:   HgA1c:   TSH:       CARDIAC MARKERS:          TELEMETRY: 	    ECG:  	  RADIOLOGY:  OTHER: 	    PREVIOUS DIAGNOSTIC TESTING:    [ ] Echocardiogram:    [ ]  Catheterization:  [ ] Stress Test:  	  	  ASSESSMENT/PLAN: 	     24H hour events: tele with SR rates ~70-80s with intermittent PVCs. Pt converted from AFL to SR last night ~0212AM    MEDICATIONS:  aMIOdarone    Tablet 200 milliGRAM(s) Oral every 8 hours  aMIOdarone    Tablet   Oral   buMETAnide 1 milliGRAM(s) Oral daily  clopidogrel Tablet 75 milliGRAM(s) Oral daily  digoxin     Tablet 62.5 MICROGram(s) Oral daily  losartan 25 milliGRAM(s) Oral daily  spironolactone 25 milliGRAM(s) Oral every 12 hours  warfarin 3 milliGRAM(s) Oral once  atorvastatin 40 milliGRAM(s) Oral at bedtime  insulin lispro (ADMELOG) corrective regimen sliding scale   SubCutaneous three times a day before meals  insulin lispro (ADMELOG) corrective regimen sliding scale   SubCutaneous at bedtime  levothyroxine 25 MICROGram(s) Oral daily  AQUAPHOR (petrolatum Ointment) 1 Application(s) Topical every 12 hours  chlorhexidine 2% Cloths 1 Application(s) Topical <User Schedule>  ferrous    sulfate 325 milliGRAM(s) Oral daily  folic acid 1 milliGRAM(s) Oral daily  sodium chloride 0.9% lock flush 10 milliLiter(s) IV Push every 1 hour PRN  thiamine 100 milliGRAM(s) Oral daily    REVIEW OF SYSTEMS:  Complete 12point ROS negative.    PHYSICAL EXAM:  T(C): 36.8 (03-20-24 @ 04:47), Max: 37.1 (03-19-24 @ 16:30)  HR: 83 (03-20-24 @ 04:47) (72 - 86)  BP: 99/58 (03-20-24 @ 04:47) (94/57 - 104/52)  RR: 18 (03-20-24 @ 04:47) (18 - 18)  SpO2: 99% (03-20-24 @ 04:47) (95% - 99%)  Wt(kg): --  I&O's Summary    19 Mar 2024 07:01  -  20 Mar 2024 07:00  --------------------------------------------------------  IN: 240 mL / OUT: 710 mL / NET: -470 mL        Appearance: Normal	  HEENT: NC/AT  Cardiovascular: Normal S1 S2  Respiratory: Lungs clear to auscultation	  Psychiatry: A & O x 3, Mood & affect appropriate	  Neurologic: Non-focal  Extremities: No BLE edema      LABS:	 	    CBC Full  -  ( 20 Mar 2024 07:12 )  WBC Count : 5.28 K/uL  Hemoglobin : 9.9 g/dL  Hematocrit : 31.7 %  Platelet Count - Automated : 190 K/uL  Mean Cell Volume : 67.9 fl  Mean Cell Hemoglobin : 21.2 pg  Mean Cell Hemoglobin Concentration : 31.2 gm/dL    03-19    140  |  104  |  21  ----------------------------<  89  3.7   |  24  |  0.86    Ca    8.2<L>      19 Mar 2024 07:30      proBNP:  Pro-Brain Natriuretic Peptide (03.11.24 @ 13:59)    Pro-Brain Natriuretic Peptide: 4171 pg/mL    TSH: Free Thyroxine, Serum in AM (03.10.24 @ 11:09)    Free Thyroxine, Serum: 1.2 ng/dL    Thyroid Stimulating Hormone, Serum in AM (03.10.24 @ 11:09)    Thyroid Stimulating Hormone, Serum: 13.50 uIU/mL    CARDIAC MARKERS:Troponin T, High Sensitivity (03.04.24 @ 22:07)    Troponin T, High Sensitivity Result: 93: *  *  Rapid upward or downward changes in high-sensitivity troponin levels  suggest acute myocardial injury. Renal impairment may cause sustained  troponin elevations.  Normal: <6 - 14 ng/L  Indeterminate: 15-51 ng/L  Elevated: > 51 ng/L  See [email or web address]/test/[email or web address] on the [facility] intranet for more  information ng/L    RADIOLOGY: < from: Xray Chest 1 View- PORTABLE-Urgent (Xray Chest 1 View- PORTABLE-Urgent .) (03.13.24 @ 13:40) >  FINDINGS:  No focal consolidation.  Increased interstitial perihilar lung markings.  Increase in size of right pleural effusion with adjacent passive   atelectasis.  Small left effusion.  There is no pneumothorax.  The heart is not well evaluated in this position.  The visualized osseous structures demonstrate no acute pathology.    IMPRESSION:  Pulmonary venous congestion.  Increase in size of right pleural effusion with adjacent passive   atelectasis.  Small left effusion    < end of copied text >    PREVIOUS DIAGNOSTIC TESTING:    [ ] Echocardiogram: < from: TTE W or WO Ultrasound Enhancing Agent (03.05.24 @ 07:31) >  CONCLUSIONS:      1. Left ventricular cavity is severely dilated. Left ventricular systolic function is severely decreased. Global left ventricular hypokinesis.   2. Severely enlarged right ventricular cavity size and severely reduced systolic function.   3. Moderate mitral regurgitation. Mechanism of mitral regurgitation: Andrei Type IIIb (restricted leaflet motion secondary to left atrial or left ventricular dilatation).   4. Severe tricuspid regurgitation.   5. Mild to moderate pulmonary hypertension.   6. 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   7. Small pericardial effusion with no evidence of hemodynamic compromise (or echocardiographic evidence of cardiac tamponade).      < end of copied text >

## 2024-03-20 NOTE — PROGRESS NOTE ADULT - SUBJECTIVE AND OBJECTIVE BOX
[facility] Division of Hospital Medicine  Krystal Bhandari MD  Available on TEAMS 8a-8p    Patient is a 63y old  Male who presents with a chief complaint of cardiogenic shock      SUBJECTIVE / OVERNIGHT EVENTS: No acute events. Now in NSR  ADDITIONAL REVIEW OF SYSTEMS: Negative    MEDICATIONS  (STANDING):  aMIOdarone    Tablet   Oral   aMIOdarone    Tablet 200 milliGRAM(s) Oral every 8 hours  AQUAPHOR (petrolatum Ointment) 1 Application(s) Topical every 12 hours  atorvastatin 40 milliGRAM(s) Oral at bedtime  buMETAnide 1 milliGRAM(s) Oral daily  chlorhexidine 2% Cloths 1 Application(s) Topical <User Schedule>  clopidogrel Tablet 75 milliGRAM(s) Oral daily  dapagliflozin 10 milliGRAM(s) Oral daily  digoxin     Tablet 62.5 MICROGram(s) Oral daily  ferrous    sulfate 325 milliGRAM(s) Oral daily  folic acid 1 milliGRAM(s) Oral daily  insulin lispro (ADMELOG) corrective regimen sliding scale   SubCutaneous three times a day before meals  insulin lispro (ADMELOG) corrective regimen sliding scale   SubCutaneous at bedtime  levothyroxine 25 MICROGram(s) Oral daily  losartan 25 milliGRAM(s) Oral daily  spironolactone 25 milliGRAM(s) Oral every 12 hours  thiamine 100 milliGRAM(s) Oral daily  warfarin 3 milliGRAM(s) Oral once    MEDICATIONS  (PRN):  sodium chloride 0.9% lock flush 10 milliLiter(s) IV Push every 1 hour PRN Pre/post blood products, medications, blood draw, and to maintain line patency      I&O's Summary    19 Mar 2024 07:01  -  20 Mar 2024 07:00  --------------------------------------------------------  IN: 240 mL / OUT: 710 mL / NET: -470 mL    20 Mar 2024 07:01  -  20 Mar 2024 14:52  --------------------------------------------------------  IN: 0 mL / OUT: 600 mL / NET: -600 mL      PHYSICAL EXAM:  Vital Signs Last 24 Hrs  T(C): 36.9 (20 Mar 2024 09:25), Max: 37.1 (19 Mar 2024 16:30)  T(F): 98.5 (20 Mar 2024 09:25), Max: 98.7 (19 Mar 2024 16:30)  HR: 76 (20 Mar 2024 09:25) (76 - 86)  BP: 114/79 (20 Mar 2024 09:25) (94/57 - 114/79)  BP(mean): 85 (19 Mar 2024 16:30) (85 - 85)  RR: 19 (20 Mar 2024 09:25) (18 - 19)  SpO2: 100% (20 Mar 2024 09:25) (95% - 100%)    Parameters below as of 20 Mar 2024 09:25  Patient On (Oxygen Delivery Method): room air    CONSTITUTIONAL: NAD, frail appearing  EYES: PERRLA; conjunctiva and sclera clear  ENMT: Moist oral mucosa, no pharyngeal injection or exudates  NECK: Supple, no palpable masses; no thyromegaly  RESPIRATORY: Normal respiratory effort; lungs are clear to auscultation bilaterally  CARDIOVASCULAR: Tachycardic, AF, no murmur/rub/gallop; No lower extremity edema  ABDOMEN: Nontender to palpation, normoactive bowel sounds, no rebound/guarding  MUSCULOSKELETAL:  No clubbing or cyanosis of digits; no joint swelling or tenderness to palpation  PSYCH: A+O to person, place, and time; affect appropriate  NEUROLOGY: CN 2-12 are intact and symmetric; no gross sensory deficits   SKIN: No rashes; no palpable lesions    LABS: reviewed                                        9.9    5.28  )-----------( 190      ( 20 Mar 2024 07:12 )             31.7       03-19    140  |  104  |  21  ----------------------------<  89  3.7   |  24  |  0.86    Ca    8.2<L>      19 Mar 2024 07:30                Urinalysis Basic - ( 19 Mar 2024 07:30 )    Color: x / Appearance: x / SG: x / pH: x  Gluc: 89 mg/dL / Ketone: x  / Bili: x / Urobili: x   Blood: x / Protein: x / Nitrite: x   Leuk Esterase: x / RBC: x / WBC x   Sq Epi: x / Non Sq Epi: x / Bacteria: x        PT/INR - ( 20 Mar 2024 07:13 )   PT: 29.1 sec;   INR: 2.86 ratio         PTT - ( 20 Mar 2024 07:13 )  PTT:39.4 sec          CAPILLARY BLOOD GLUCOSE      POCT Blood Glucose.: 145 mg/dL (20 Mar 2024 12:38)

## 2024-03-20 NOTE — PROGRESS NOTE ADULT - ASSESSMENT
A/P: 63 year old male with past medical history of HIV, CVA, HTN, HLD, prediabetes, A.fib, cocaine/heroin abuse, CAD w/ stent, CHF (EF  25-30% in 12/23, now 10 - 15 % as of TTE on 3/1),  RLE compartment syndrome s/p fasciotomy 12/23 presented to outside hospital with chest pain and shortness of breath, admitted for CHF exacerbation, transferred to [facility] for cardiogenic shock management     Wound Consult requested to assist w/ management of:  RLE wounds  BLE PAD  xerosis    RLE - Adaptic dressing QD  BLE elevation & Compression  Arterial US BLE noted  Abx per Medicine/ ID  Moisturize intact skin w/ Aquaphor cream BID  Nutrition Consult for optimization        encourage high quality protein, jovana/ prosource, MVI & Vit C to promote wound healing  Continue turning and positioning w/ offloading assistive devices as per protocol  Buttocks/ Sacrum Hilda BID and prn soiling        Continue w/ attends under pads and Pericare w/ mitchell cath maintenance as per protocol  Waffle Cushion to chair when oob to chair  Continue w/ low air loss pressure redistribution bed surface   Care as per medicine, will follow w/ you  Upon discharge f/u as outpatient at [facility] [address] [facility] [phone]  D/w team & RN &attng  Bebe Bradford PA-C CWS [id number]  Nights/ Weekends/ Holidays please call:  General Surgery Consult pager [phone]) for emergencies  Wound PT for multilayer leg wrapping or VAC issues (x [phone])   I spent 35 minutes face to face w/ this pt of which more than 50% of the time was spent counseling & coordinating care of this pt.

## 2024-03-20 NOTE — PROGRESS NOTE ADULT - PROBLEM SELECTOR PLAN 4
- also with some depression and mood disorder  - Per pt, quit cocaine and ETOH one month ago  - SW following  - [facility] care following

## 2024-03-20 NOTE — PROGRESS NOTE ADULT - NSPROGADDITIONALINFOA_GEN_ALL_CORE
DCP to JEFERSON    40 minutes spent on total encounter. The necessity of the time spent during the encounter on this date of service was due to:     - preparing to see the patient (eg, review of tests, documents)  - performing a medically appropriate examination and/or evaluation  - referring and communicating with other health care professionals  - documenting clinical information in the electronic or other health record  - care coordination.

## 2024-03-20 NOTE — PROGRESS NOTE ADULT - PROBLEM SELECTOR PLAN 2
Euvolemic  TTE with LVEF 25%, severe RV dilation, moderate MR, severe TR, LV thrombus  GDMT  - c/w bumex 1 mg po BID  - c/w losartan 25mg daily  - c/w spironolactone 25mg daily  - c/w farxiga  - BB on hold for now-- per dr lewis, do not resume   - Per cards, Given recent substance use, not an advanced therapies candidate  - strict Is/Os, daily weights  - appreciate ongoing HF recs

## 2024-03-20 NOTE — PROGRESS NOTE ADULT - SUBJECTIVE AND OBJECTIVE BOX
[facility]-- WOUND TEAM -- FOLLOW UP NOTE  --------------------------------------------------------------------------------    24 hour events/subjective:          Diet:  Diet, Regular:   Consistent Carbohydrate No Snacks (CSTCHO)  Low Sodium  No Pork  Supplement Feeding Modality:  Oral  Glucerna Shake Cans or Servings Per Day:  1       Frequency:  Two Times a day (03-18-24 @ 13:28)      ROS: General/ SKIN/ MSK/ GI see HPI  all other systems negative      ALLERGIES & MEDICATIONS  --------------------------------------------------------------------------------    No Known Allergies      STANDING INPATIENT MEDICATIONS  aMIOdarone    Tablet 200 milliGRAM(s) Oral every 8 hours  AQUAPHOR (petrolatum Ointment) 1 Application(s) Topical every 12 hours  atorvastatin 40 milliGRAM(s) Oral at bedtime  buMETAnide 1 milliGRAM(s) Oral daily  chlorhexidine 2% Cloths 1 Application(s) Topical <User Schedule>  clopidogrel Tablet 75 milliGRAM(s) Oral daily  dapagliflozin 10 milliGRAM(s) Oral daily  digoxin     Tablet 62.5 MICROGram(s) Oral daily  ferrous    sulfate 325 milliGRAM(s) Oral daily  folic acid 1 milliGRAM(s) Oral daily  insulin lispro (ADMELOG) corrective regimen sliding scale   SubCutaneous three times a day before meals  insulin lispro (ADMELOG) corrective regimen sliding scale   SubCutaneous at bedtime  levothyroxine 25 MICROGram(s) Oral daily  losartan 25 milliGRAM(s) Oral daily  spironolactone 25 milliGRAM(s) Oral every 12 hours  thiamine 100 milliGRAM(s) Oral daily  warfarin 3 milliGRAM(s) Oral once      PRN INPATIENT MEDICATION  sodium chloride 0.9% lock flush 10 milliLiter(s) IV Push every 1 hour PRN        VITALS/PHYSICAL EXAM  --------------------------------------------------------------------------------  T(C): 36.9 (03-20-24 @ 09:25), Max: 37.1 (03-19-24 @ 16:30)  HR: 76 (03-20-24 @ 09:25) (76 - 86)  BP: 114/79 (03-20-24 @ 09:25) (94/57 - 114/79)  RR: 19 (03-20-24 @ 09:25) (18 - 19)  SpO2: 100% (03-20-24 @ 09:25) (95% - 100%)  Wt(kg): --              LABS/ CULTURES/ RADIOLOGY:              9.9    5.28  >-----------<  190      [03-20-24 @ 07:12]              31.7     140  |  104  |  21  ----------------------------<  89      [03-19-24 @ 07:30]  3.7   |  24  |  0.86        Ca     8.2     [03-19-24 @ 07:30]      PT/INR: PT 29.1 , INR 2.86       [03-20-24 @ 07:13]  PTT: 39.4       [03-20-24 @ 07:13]      CAPILLARY BLOOD GLUCOSE  POCT Blood Glucose.: 145 mg/dL (20 Mar 2024 12:38)  POCT Blood Glucose.: 91 mg/dL (20 Mar 2024 09:07)  POCT Blood Glucose.: 203 mg/dL (19 Mar 2024 21:42)  POCT Blood Glucose.: 102 mg/dL (19 Mar 2024 17:08)    A1C with Estimated Average Glucose Result: 6.2 % (03-04-24 @ 20:42)   [facility]-- WOUND TEAM -- FOLLOW UP NOTE  --------------------------------------------------------------------------------    24 hour events/subjective:  afebrile  tolerating po w/o n/v  moving around a bit more  no swelling  no pain odor or excess drainage    Diet:  Diet, Regular:   Consistent Carbohydrate No Snacks (CSTCHO)  Low Sodium  No Pork  Supplement Feeding Modality:  Oral  Glucerna Shake Cans or Servings Per Day:  1       Frequency:  Two Times a day (03-18-24 @ 13:28)      ROS: General/ SKIN/ MSK/ GI see HPI  all other systems negative      ALLERGIES & MEDICATIONS  --------------------------------------------------------------------------------    No Known Allergies      STANDING INPATIENT MEDICATIONS  aMIOdarone    Tablet 200 milliGRAM(s) Oral every 8 hours  AQUAPHOR (petrolatum Ointment) 1 Application(s) Topical every 12 hours  atorvastatin 40 milliGRAM(s) Oral at bedtime  buMETAnide 1 milliGRAM(s) Oral daily  chlorhexidine 2% Cloths 1 Application(s) Topical <User Schedule>  clopidogrel Tablet 75 milliGRAM(s) Oral daily  dapagliflozin 10 milliGRAM(s) Oral daily  digoxin Tablet 62.5 MICROGram(s) Oral daily  ferrous sulfate 325 milliGRAM(s) Oral daily  folic acid 1 milliGRAM(s) Oral daily  insulin lispro (ADMELOG) corrective regimen sliding scale   SubCutaneous three times a day before meals  insulin lispro (ADMELOG) corrective regimen sliding scale   SubCutaneous at bedtime  levothyroxine 25 MICROGram(s) Oral daily  losartan 25 milliGRAM(s) Oral daily  spironolactone 25 milliGRAM(s) Oral every 12 hours  thiamine 100 milliGRAM(s) Oral daily  warfarin 3 milliGRAM(s) Oral once      PRN INPATIENT MEDICATION  sodium chloride 0.9% lock flush 10 milliLiter(s) IV Push every 1 hour PRN        VITALS/PHYSICAL EXAM  --------------------------------------------------------------------------------  T(C): 36.9 (03-20-24 @ 09:25), Max: 37.1 (03-19-24 @ 16:30)  HR: 76 (03-20-24 @ 09:25) (76 - 86)  BP: 114/79 (03-20-24 @ 09:25) (94/57 - 114/79)  RR: 19 (03-20-24 @ 09:25) (18 - 19)  SpO2: 100% (03-20-24 @ 09:25) (95% - 100%)  Wt(kg): --    NAD  A&Ox3, thin,, frail,  WD/ WN/ WG,    [facility] p500 bed  HEENT:  NC/AT,, EOMI, sclera clear, mucosa moist, throat clear, trachea midline, neck supple  Neurology:  weakened strength & sensation grossly intact,  Psych: calm/ appropriate  Musculoskeletal: FROM, no deformities/ contractures  Vascular: BLE equally warm,  no cyanosis, clubbing, edema nor acute ischemia         BLE edema equal         LLE DP pulse palpable         RLE DP pulse non palpable  RLE w/ medial & lateral compartments w/  granular wounds  no blistering    (+) scant serosanguinous drainage  No odor, erythema, increased warmth, tenderness, induration, fluctuance, nor crepitus  Skin: dry flakey             LABS/ CULTURES/ RADIOLOGY:              9.9    5.28  >-----------<  190      [03-20-24 @ 07:12]              31.7     140  |  104  |  21  ----------------------------<  89      [03-19-24 @ 07:30]  3.7   |  24  |  0.86        Ca     8.2     [03-19-24 @ 07:30]      PT/INR: PT 29.1 , INR 2.86       [03-20-24 @ 07:13]  PTT: 39.4       [03-20-24 @ 07:13]      CAPILLARY BLOOD GLUCOSE  POCT Blood Glucose.: 145 mg/dL (20 Mar 2024 12:38)  POCT Blood Glucose.: 91 mg/dL (20 Mar 2024 09:07)  POCT Blood Glucose.: 203 mg/dL (19 Mar 2024 21:42)  POCT Blood Glucose.: 102 mg/dL (19 Mar 2024 17:08)    A1C with Estimated Average Glucose Result: 6.2 % (03-04-24 @ 20:42)

## 2024-03-20 NOTE — PROGRESS NOTE ADULT - ASSESSMENT
63 year old man with history of ICM (LVIDd 5.2 cm, LVEF 25-30%), CAD s/p PCI 12/2023, severe TR, HTN, AF, PAD c/b RLE compartment syndrome s/p fasciotomy (12/2023), active smoker, ETOH misuse (4 beers daily and half pint liquor 2-3x week) and cocaine use (last 1 month PTA), who was recently hospitalized for ADHF requiring chest tubes for effusions. Now presented, initially to [facility] for recurrent ADHF. Developed cardiogenic shock prompting transfer to [facility] for further management on 3/4/24. Initiated on inotropic support but has since been weaned with diuresis and introduction of afterload reducing agents. Echo with LV thrombus and EF ~25%. Downgraded to floor. EP was previously following for atrial flutter w/ RVR, now s/p CTI ablation on 3/15/24. EP re-consulted on 3/18/24 for recurrent atrial flutter.     1. ICM/HFrEF EF 25%  2. LV apical thrombus on warfarin  3. Typical and atypical atrial flutter  4. CAD s/p PCI 12/2023  5. PAD s/p prior RLE compartment syndrome s/p fasciotomy    Recommendations:  - during 3/15/24 EP study noted to have multiple atrial flutters, received CTI ablation  - on 3/17 with recurrent atrial flutter that was transient, on 3/18 approx 1pm most recently back in atrial flutter rates 130's  - Pt overnight w/ AFL however converted to SR ~0212AM, no conversion pause. Currently SR rates ~70-80s with PVCs. Pt feeling well, asymptomatic throughout.   - TTE with EF 25%, LV apical thrombus, Severe TR, mod MR, small pericardial effusion  - c/w digoxin 62.5mcg daily  - c/w amiodarone 200mg q8hr (half dose given concomitant digoxin) x 12 doses, then reduce to 200mg once daily thereafter.   -- Discussed with patient needing routine TFTs/LFTs/PFTs/opthalmology eval while on Amiodarone therapy, discussed Black Box Warnings.  - liver ultrasound to evaluate for fibrosis and safety of long-term amiodarone - with normal liver size and echogenicity   - continue AC warfarin given LV thrombus; INR therapeutic  - Close telemetry monitoring  - Maintain K>4 and Mg>2  - Pt has f/u with Dr. Card on 5/7/24 @ 430PM  - Discussed with EP attending and primary team.   - Discharge planning per primary team.  63 year old man with history of ICM (LVIDd 5.2 cm, LVEF 25-30%), CAD s/p PCI 12/2023, severe TR, HTN, AF, PAD c/b RLE compartment syndrome s/p fasciotomy (12/2023), active smoker, ETOH misuse (4 beers daily and half pint liquor 2-3x week) and cocaine use (last 1 month PTA), who was recently hospitalized for ADHF requiring chest tubes for effusions. Now presented, initially to [facility] for recurrent ADHF. Developed cardiogenic shock prompting transfer to [facility] for further management on 3/4/24. Initiated on inotropic support but has since been weaned with diuresis and introduction of afterload reducing agents. Echo with LV thrombus and EF ~25%. Downgraded to floor. EP was previously following for atrial flutter w/ RVR, now s/p CTI ablation on 3/15/24. EP re-consulted on 3/18/24 for recurrent atrial flutter.     1. ICM/HFrEF EF 25%  2. LV apical thrombus on warfarin  3. Typical and atypical atrial flutter  4. CAD s/p PCI 12/2023  5. PAD s/p prior RLE compartment syndrome s/p fasciotomy    Recommendations:  - during 3/15/24 EP study noted to have multiple atrial flutters, received CTI ablation  - on 3/17 with recurrent atrial flutter that was transient, on 3/18 approx 1pm most recently back in atrial flutter rates 130's  - Pt overnight w/ AFL however converted to SR ~0212AM, no conversion pause. Currently SR rates ~70-80s with PVCs. Pt feeling well, asymptomatic throughout.   - TTE with EF 25%, LV apical thrombus, Severe TR, mod MR, small pericardial effusion  - c/w digoxin 62.5mcg daily  - c/w amiodarone 200mg q8hr (half dose given concomitant digoxin) x 12 doses, then reduce to 200mg once daily thereafter.   -- Discussed with patient needing routine TFTs/LFTs/PFTs/opthalmology eval while on Amiodarone therapy, discussed Black Box Warnings.  - liver ultrasound to evaluate for fibrosis and safety of long-term amiodarone - with normal liver size and echogenicity   - continue AC warfarin given LV thrombus; INR therapeutic  - Close telemetry monitoring  - Maintain K>4 and Mg>2  - Pt has f/u with Dr. Card on 5/7/24 @ 430PM  - Discussed with EP attending and primary team. EP signing off  - Discharge planning per primary team.

## 2024-03-21 LAB
APTT BLD: 38.4 SEC — HIGH (ref 24.5–35.6)
GLUCOSE BLDC GLUCOMTR-MCNC: 115 MG/DL — HIGH (ref 70–99)
GLUCOSE BLDC GLUCOMTR-MCNC: 210 MG/DL — HIGH (ref 70–99)
GLUCOSE BLDC GLUCOMTR-MCNC: 90 MG/DL — SIGNIFICANT CHANGE UP (ref 70–99)
GLUCOSE BLDC GLUCOMTR-MCNC: 92 MG/DL — SIGNIFICANT CHANGE UP (ref 70–99)
HCT VFR BLD CALC: 30.9 % — LOW (ref 39–50)
HGB BLD-MCNC: 9.6 G/DL — LOW (ref 13–17)
INR BLD: 2.44 RATIO — HIGH (ref 0.85–1.18)
MCHC RBC-ENTMCNC: 21.5 PG — LOW (ref 27–34)
MCHC RBC-ENTMCNC: 31.1 GM/DL — LOW (ref 32–36)
MCV RBC AUTO: 69.3 FL — LOW (ref 80–100)
NRBC # BLD: 0 /100 WBCS — SIGNIFICANT CHANGE UP (ref 0–0)
PLATELET # BLD AUTO: 209 K/UL — SIGNIFICANT CHANGE UP (ref 150–400)
PROTHROM AB SERPL-ACNC: 26.2 SEC — HIGH (ref 9.5–13)
RBC # BLD: 4.46 M/UL — SIGNIFICANT CHANGE UP (ref 4.2–5.8)
RBC # FLD: 19.1 % — HIGH (ref 10.3–14.5)
WBC # BLD: 6.22 K/UL — SIGNIFICANT CHANGE UP (ref 3.8–10.5)
WBC # FLD AUTO: 6.22 K/UL — SIGNIFICANT CHANGE UP (ref 3.8–10.5)

## 2024-03-21 PROCEDURE — 99232 SBSQ HOSP IP/OBS MODERATE 35: CPT

## 2024-03-21 RX ORDER — WARFARIN SODIUM 2.5 MG/1
3 TABLET ORAL ONCE
Refills: 0 | Status: COMPLETED | OUTPATIENT
Start: 2024-03-21 | End: 2024-03-21

## 2024-03-21 RX ORDER — LANOLIN ALCOHOL/MO/W.PET/CERES
3 CREAM (GRAM) TOPICAL AT BEDTIME
Refills: 0 | Status: DISCONTINUED | OUTPATIENT
Start: 2024-03-21 | End: 2024-03-26

## 2024-03-21 RX ADMIN — SPIRONOLACTONE 25 MILLIGRAM(S): 25 TABLET, FILM COATED ORAL at 17:10

## 2024-03-21 RX ADMIN — ATORVASTATIN CALCIUM 40 MILLIGRAM(S): 80 TABLET, FILM COATED ORAL at 21:17

## 2024-03-21 RX ADMIN — Medication 62.5 MICROGRAM(S): at 10:45

## 2024-03-21 RX ADMIN — LOSARTAN POTASSIUM 25 MILLIGRAM(S): 100 TABLET, FILM COATED ORAL at 06:30

## 2024-03-21 RX ADMIN — DAPAGLIFLOZIN 10 MILLIGRAM(S): 10 TABLET, FILM COATED ORAL at 10:46

## 2024-03-21 RX ADMIN — SPIRONOLACTONE 25 MILLIGRAM(S): 25 TABLET, FILM COATED ORAL at 06:30

## 2024-03-21 RX ADMIN — Medication 100 MILLIGRAM(S): at 10:45

## 2024-03-21 RX ADMIN — BUMETANIDE 1 MILLIGRAM(S): 0.25 INJECTION INTRAMUSCULAR; INTRAVENOUS at 06:30

## 2024-03-21 RX ADMIN — AMIODARONE HYDROCHLORIDE 200 MILLIGRAM(S): 400 TABLET ORAL at 06:30

## 2024-03-21 RX ADMIN — WARFARIN SODIUM 3 MILLIGRAM(S): 2.5 TABLET ORAL at 21:17

## 2024-03-21 RX ADMIN — CHLORHEXIDINE GLUCONATE 1 APPLICATION(S): 213 SOLUTION TOPICAL at 06:30

## 2024-03-21 RX ADMIN — Medication 3 MILLIGRAM(S): at 22:01

## 2024-03-21 RX ADMIN — AMIODARONE HYDROCHLORIDE 200 MILLIGRAM(S): 400 TABLET ORAL at 13:39

## 2024-03-21 RX ADMIN — Medication 1 APPLICATION(S): at 06:31

## 2024-03-21 RX ADMIN — AMIODARONE HYDROCHLORIDE 200 MILLIGRAM(S): 400 TABLET ORAL at 21:20

## 2024-03-21 RX ADMIN — Medication 325 MILLIGRAM(S): at 10:45

## 2024-03-21 RX ADMIN — CLOPIDOGREL BISULFATE 75 MILLIGRAM(S): 75 TABLET, FILM COATED ORAL at 10:45

## 2024-03-21 RX ADMIN — Medication 1 MILLIGRAM(S): at 10:46

## 2024-03-21 RX ADMIN — Medication 25 MICROGRAM(S): at 06:30

## 2024-03-21 NOTE — PROGRESS NOTE ADULT - SUBJECTIVE AND OBJECTIVE BOX
[facility] Division of Hospital Medicine  Krystal Bhandari MD  Available on TEAMS 8a-8p    Patient is a 63y old  Male who presents with a chief complaint of cardiogenic shock      SUBJECTIVE / OVERNIGHT EVENTS: No acute events.   ADDITIONAL REVIEW OF SYSTEMS: Negative    MEDICATIONS  (STANDING):  aMIOdarone    Tablet   Oral   aMIOdarone    Tablet 200 milliGRAM(s) Oral every 8 hours  AQUAPHOR (petrolatum Ointment) 1 Application(s) Topical every 12 hours  atorvastatin 40 milliGRAM(s) Oral at bedtime  buMETAnide 1 milliGRAM(s) Oral daily  chlorhexidine 2% Cloths 1 Application(s) Topical <User Schedule>  clopidogrel Tablet 75 milliGRAM(s) Oral daily  dapagliflozin 10 milliGRAM(s) Oral daily  digoxin     Tablet 62.5 MICROGram(s) Oral daily  ferrous    sulfate 325 milliGRAM(s) Oral daily  folic acid 1 milliGRAM(s) Oral daily  insulin lispro (ADMELOG) corrective regimen sliding scale   SubCutaneous three times a day before meals  insulin lispro (ADMELOG) corrective regimen sliding scale   SubCutaneous at bedtime  levothyroxine 25 MICROGram(s) Oral daily  losartan 25 milliGRAM(s) Oral daily  spironolactone 25 milliGRAM(s) Oral every 12 hours  thiamine 100 milliGRAM(s) Oral daily  warfarin 3 milliGRAM(s) Oral once    MEDICATIONS  (PRN):  sodium chloride 0.9% lock flush 10 milliLiter(s) IV Push every 1 hour PRN Pre/post blood products, medications, blood draw, and to maintain line patency    I&O's Summary    20 Mar 2024 07:01  -  21 Mar 2024 07:00  --------------------------------------------------------  IN: 740 mL / OUT: 900 mL / NET: -160 mL      PHYSICAL EXAM:  Vital Signs Last 24 Hrs  T(C): 36.8 (21 Mar 2024 11:40), Max: 36.9 (20 Mar 2024 20:17)  T(F): 98.2 (21 Mar 2024 11:40), Max: 98.5 (20 Mar 2024 20:17)  HR: 67 (21 Mar 2024 11:40) (67 - 82)  BP: 98/61 (21 Mar 2024 11:40) (96/51 - 106/68)  BP(mean): 73 (21 Mar 2024 11:40) (73 - 73)  RR: 18 (21 Mar 2024 11:40) (18 - 18)  SpO2: 94% (21 Mar 2024 11:40) (94% - 100%)    Parameters below as of 21 Mar 2024 11:40  Patient On (Oxygen Delivery Method): room air    CONSTITUTIONAL: NAD, frail appearing  EYES: PERRLA; conjunctiva and sclera clear  ENMT: Moist oral mucosa, no pharyngeal injection or exudates  NECK: Supple, no palpable masses; no thyromegaly  RESPIRATORY: Normal respiratory effort; lungs are clear to auscultation bilaterally  CARDIOVASCULAR: Tachycardic, AF, no murmur/rub/gallop; No lower extremity edema  ABDOMEN: Nontender to palpation, normoactive bowel sounds, no rebound/guarding  MUSCULOSKELETAL:  No clubbing or cyanosis of digits; no joint swelling or tenderness to palpation  PSYCH: A+O to person, place, and time; affect appropriate  NEUROLOGY: CN 2-12 are intact and symmetric; no gross sensory deficits   SKIN: No rashes; no palpable lesions    LABS: reviewed                                         9.6    6.22  )-----------( 209      ( 21 Mar 2024 07:25 )             30.9           PT/INR - ( 21 Mar 2024 07:26 )   PT: 26.2 sec;   INR: 2.44 ratio         PTT - ( 21 Mar 2024 07:26 )  PTT:38.4 sec          CAPILLARY BLOOD GLUCOSE      POCT Blood Glucose.: 90 mg/dL (21 Mar 2024 13:17)

## 2024-03-21 NOTE — PROGRESS NOTE ADULT - ASSESSMENT
63 year old male with past medical history of CVA, HTN, HLD, prediabetes, A.fib, cocaine/heroin abuse, CAD w/ stent, CHF (EF  25-30% in 12/23),  RLE compartment syndrome s/p fasciotomy 12/23 and recent hospitalization for CHF exacerbation with bilateral pleural effusions requiring chest tube placement initially presenting to [facility] on 3/1 with chest pain and shortness of breath, transferred to [facility] for management of cardiogenic shock requiring inotrope support. Now transferred to medicine for further management

## 2024-03-21 NOTE — PROGRESS NOTE ADULT - NSPROGADDITIONALINFOA_GEN_ALL_CORE
JOSEPH planning to JEFERSON    40 minutes spent on total encounter. The necessity of the time spent during the encounter on this date of service was due to:     - preparing to see the patient (eg, review of tests, documents)  - performing a medically appropriate examination and/or evaluation  - referring and communicating with other health care professionals  - documenting clinical information in the electronic or other health record  - care coordination.

## 2024-03-21 NOTE — PROGRESS NOTE ADULT - PROBLEM SELECTOR PLAN 4
- also with some depression and mood disorder  - Per pt, quit cocaine and ETOH one month ago  - SW following  - [facility] care following

## 2024-03-22 LAB
APTT BLD: 41.7 SEC — HIGH (ref 24.5–35.6)
GLUCOSE BLDC GLUCOMTR-MCNC: 104 MG/DL — HIGH (ref 70–99)
GLUCOSE BLDC GLUCOMTR-MCNC: 109 MG/DL — HIGH (ref 70–99)
GLUCOSE BLDC GLUCOMTR-MCNC: 141 MG/DL — HIGH (ref 70–99)
GLUCOSE BLDC GLUCOMTR-MCNC: 86 MG/DL — SIGNIFICANT CHANGE UP (ref 70–99)
HCT VFR BLD CALC: 30.2 % — LOW (ref 39–50)
HGB BLD-MCNC: 9.5 G/DL — LOW (ref 13–17)
INR BLD: 2.88 RATIO — HIGH (ref 0.85–1.18)
MCHC RBC-ENTMCNC: 21.8 PG — LOW (ref 27–34)
MCHC RBC-ENTMCNC: 31.5 GM/DL — LOW (ref 32–36)
MCV RBC AUTO: 69.3 FL — LOW (ref 80–100)
NRBC # BLD: 0 /100 WBCS — SIGNIFICANT CHANGE UP (ref 0–0)
PLATELET # BLD AUTO: 218 K/UL — SIGNIFICANT CHANGE UP (ref 150–400)
PROTHROM AB SERPL-ACNC: 29.3 SEC — HIGH (ref 9.5–13)
RBC # BLD: 4.36 M/UL — SIGNIFICANT CHANGE UP (ref 4.2–5.8)
RBC # FLD: 19.4 % — HIGH (ref 10.3–14.5)
WBC # BLD: 5.23 K/UL — SIGNIFICANT CHANGE UP (ref 3.8–10.5)
WBC # FLD AUTO: 5.23 K/UL — SIGNIFICANT CHANGE UP (ref 3.8–10.5)

## 2024-03-22 PROCEDURE — 99232 SBSQ HOSP IP/OBS MODERATE 35: CPT

## 2024-03-22 RX ORDER — WARFARIN SODIUM 2.5 MG/1
3 TABLET ORAL ONCE
Refills: 0 | Status: COMPLETED | OUTPATIENT
Start: 2024-03-22 | End: 2024-03-22

## 2024-03-22 RX ORDER — LANOLIN ALCOHOL/MO/W.PET/CERES
2 CREAM (GRAM) TOPICAL ONCE
Refills: 0 | Status: COMPLETED | OUTPATIENT
Start: 2024-03-22 | End: 2024-03-22

## 2024-03-22 RX ADMIN — ATORVASTATIN CALCIUM 40 MILLIGRAM(S): 80 TABLET, FILM COATED ORAL at 21:18

## 2024-03-22 RX ADMIN — Medication 1 APPLICATION(S): at 05:43

## 2024-03-22 RX ADMIN — Medication 25 MICROGRAM(S): at 05:43

## 2024-03-22 RX ADMIN — AMIODARONE HYDROCHLORIDE 200 MILLIGRAM(S): 400 TABLET ORAL at 13:18

## 2024-03-22 RX ADMIN — SPIRONOLACTONE 25 MILLIGRAM(S): 25 TABLET, FILM COATED ORAL at 17:08

## 2024-03-22 RX ADMIN — CLOPIDOGREL BISULFATE 75 MILLIGRAM(S): 75 TABLET, FILM COATED ORAL at 11:30

## 2024-03-22 RX ADMIN — Medication 1 APPLICATION(S): at 17:08

## 2024-03-22 RX ADMIN — Medication 3 MILLIGRAM(S): at 21:18

## 2024-03-22 RX ADMIN — Medication 325 MILLIGRAM(S): at 11:29

## 2024-03-22 RX ADMIN — WARFARIN SODIUM 3 MILLIGRAM(S): 2.5 TABLET ORAL at 21:18

## 2024-03-22 RX ADMIN — AMIODARONE HYDROCHLORIDE 200 MILLIGRAM(S): 400 TABLET ORAL at 05:44

## 2024-03-22 RX ADMIN — CHLORHEXIDINE GLUCONATE 1 APPLICATION(S): 213 SOLUTION TOPICAL at 05:45

## 2024-03-22 RX ADMIN — SPIRONOLACTONE 25 MILLIGRAM(S): 25 TABLET, FILM COATED ORAL at 05:43

## 2024-03-22 RX ADMIN — Medication 100 MILLIGRAM(S): at 11:30

## 2024-03-22 RX ADMIN — Medication 62.5 MICROGRAM(S): at 11:30

## 2024-03-22 RX ADMIN — DAPAGLIFLOZIN 10 MILLIGRAM(S): 10 TABLET, FILM COATED ORAL at 11:29

## 2024-03-22 RX ADMIN — LOSARTAN POTASSIUM 25 MILLIGRAM(S): 100 TABLET, FILM COATED ORAL at 05:43

## 2024-03-22 RX ADMIN — Medication 1 MILLIGRAM(S): at 11:30

## 2024-03-22 RX ADMIN — BUMETANIDE 1 MILLIGRAM(S): 0.25 INJECTION INTRAMUSCULAR; INTRAVENOUS at 05:43

## 2024-03-22 NOTE — PROGRESS NOTE ADULT - NSPROGADDITIONALINFOA_GEN_ALL_CORE
JOSEPHP to AR    40 minutes spent on total encounter. The necessity of the time spent during the encounter on this date of service was due to:     - preparing to see the patient (eg, review of tests, documents)  - performing a medically appropriate examination and/or evaluation  - referring and communicating with other health care professionals  - documenting clinical information in the electronic or other health record  - care coordination.

## 2024-03-22 NOTE — PROGRESS NOTE ADULT - ASSESSMENT
63 year old male with past medical history of CVA, HTN, HLD, prediabetes, A.fib, cocaine/heroin abuse, CAD w/ stent, CHF (EF  25-30% in 12/23),  RLE compartment syndrome s/p fasciotomy 12/23 and recent hospitalization for CHF exacerbation with bilateral pleural effusions requiring chest tube placement initially presenting to [facility] on 3/1 with chest pain and shortness of breath, transferred to [facility] for management of cardiogenic shock requiring inotrope support. Now transferred to medicine for further management

## 2024-03-22 NOTE — PROGRESS NOTE ADULT - PROBLEM SELECTOR PLAN 5
PREOPERATIVE DIAGNOSIS:  right carpal tunnel syndrome    POSTOPERATIVE DIAGNOSIS:  right carpal tunnel syndrome    OPERATIVE PROCEDURE:  right carpal tunnel release    SURGEON:  Davide Boyer MD    FIRST ASSISTANT:  ABHAY Mora    ANESTHESIA:  Local sedation    COMPLICATIONS:  None.    ESTIMATED BLOOD LOSS:  Less than 5 cc    SURGICAL FINDINGS:  Adhesions were noted within the carpal tunnel  There were mild erythematous changes to the median nerve.    INDICATIONS:  The patient has had persistent symptoms of carpal tunnel syndrome and has failed conservative treatment. An EMG was performed preoperatively which showed moderate carpal tunnel syndrome.  She did well after carpal tunnel release on the left performed in July.    DESCRIPTIONS OF PROCEDURE:  The patient was seen in the preoperative hold area, the consent was signed, and the surgical limb was marked by the surgeon with a marker.    The patient was brought to the operating room and placed on the table in the supine position. A tourniquet was placed high on the arm.  The extremity was then prepped and draped in the usual sterile manner. A timeout was called. Incision was marked on the skin directly over the carpal tunnel and ulnar to the thenar crease starting just distal to the transverse wrist crease and extending for approximately 2.5-3 cm in length. The patient was given IV sedation. The area was infiltrated with 1% lidocaine plain. Incision was then made after inflation of the tourniquet to 250 mm of mercury pressure and carried just through the skin into the subcutaneous tissue. Dissection was carried out with 3.5 magnification loupes.   Sharp dissection was carried directly down in line with skin incision to the transverse carpal ligament which was released under direct vision. The distal volar forearm fascia was released also in line with the skin incision and just ulnar to the palmaris longus tendon and released under direct vision. The course  of the median nerve was confirmed to be completely released along its course by digital palpation. The carpal tunnel contents were then inspected. The appearance of the median nerve is as described above. The wound was then irrigated and closed. The skin was closed with interrupted horizontal mattress nylon sutures. A sterile dressing was then applied with Ace wrap. The patient was then awakened by reversal of sedation and taken back to ambulatory surgery having tolerated the procedure well   There were no complications.     - congestive hepatopathy from chf, improving

## 2024-03-22 NOTE — PROGRESS NOTE ADULT - SUBJECTIVE AND OBJECTIVE BOX
[facility] Division of Hospital Medicine  Krystal Bhandari MD  Available on TEAMS 8a-8p    Patient is a 63y old  Male who presents with a chief complaint of cardiogenic shock      SUBJECTIVE / OVERNIGHT EVENTS: No acute events.   ADDITIONAL REVIEW OF SYSTEMS: Negative    MEDICATIONS  (STANDING):  aMIOdarone    Tablet   Oral   aMIOdarone    Tablet 200 milliGRAM(s) Oral every 8 hours  AQUAPHOR (petrolatum Ointment) 1 Application(s) Topical every 12 hours  atorvastatin 40 milliGRAM(s) Oral at bedtime  buMETAnide 1 milliGRAM(s) Oral daily  chlorhexidine 2% Cloths 1 Application(s) Topical <User Schedule>  clopidogrel Tablet 75 milliGRAM(s) Oral daily  dapagliflozin 10 milliGRAM(s) Oral daily  digoxin     Tablet 62.5 MICROGram(s) Oral daily  ferrous    sulfate 325 milliGRAM(s) Oral daily  folic acid 1 milliGRAM(s) Oral daily  insulin lispro (ADMELOG) corrective regimen sliding scale   SubCutaneous three times a day before meals  insulin lispro (ADMELOG) corrective regimen sliding scale   SubCutaneous at bedtime  levothyroxine 25 MICROGram(s) Oral daily  losartan 25 milliGRAM(s) Oral daily  spironolactone 25 milliGRAM(s) Oral every 12 hours  thiamine 100 milliGRAM(s) Oral daily  warfarin 3 milliGRAM(s) Oral once    MEDICATIONS  (PRN):  sodium chloride 0.9% lock flush 10 milliLiter(s) IV Push every 1 hour PRN Pre/post blood products, medications, blood draw, and to maintain line patency    I&O's Summary    21 Mar 2024 07:01  -  22 Mar 2024 07:00  --------------------------------------------------------  IN: 240 mL / OUT: 1300 mL / NET: -1060 mL    22 Mar 2024 07:01  -  22 Mar 2024 13:08  --------------------------------------------------------  IN: 240 mL / OUT: 1400 mL / NET: -1160 mL      PHYSICAL EXAM:  Vital Signs Last 24 Hrs  T(C): 36.4 (22 Mar 2024 11:24), Max: 36.8 (21 Mar 2024 20:20)  T(F): 97.6 (22 Mar 2024 11:24), Max: 98.3 (21 Mar 2024 20:20)  HR: 62 (22 Mar 2024 11:24) (62 - 68)  BP: 95/56 (22 Mar 2024 11:24) (95/56 - 105/72)  BP(mean): --  RR: 18 (22 Mar 2024 11:24) (18 - 18)  SpO2: 98% (22 Mar 2024 11:24) (98% - 100%)    Parameters below as of 22 Mar 2024 11:24  Patient On (Oxygen Delivery Method): room air    CONSTITUTIONAL: NAD, frail appearing  EYES: PERRLA; conjunctiva and sclera clear  ENMT: Moist oral mucosa, no pharyngeal injection or exudates  NECK: Supple, no palpable masses; no thyromegaly  RESPIRATORY: Normal respiratory effort; lungs are clear to auscultation bilaterally  CARDIOVASCULAR: Tachycardic, AF, no murmur/rub/gallop; No lower extremity edema  ABDOMEN: Nontender to palpation, normoactive bowel sounds, no rebound/guarding  MUSCULOSKELETAL:  No clubbing or cyanosis of digits; no joint swelling or tenderness to palpation  PSYCH: A+O to person, place, and time; affect appropriate  NEUROLOGY: CN 2-12 are intact and symmetric; no gross sensory deficits   SKIN: No rashes; no palpable lesions    LABS: reviewed                                          9.5    5.23  )-----------( 218      ( 22 Mar 2024 07:29 )             30.2         PT/INR - ( 22 Mar 2024 07:29 )   PT: 29.3 sec;   INR: 2.88 ratio         PTT - ( 22 Mar 2024 07:29 )  PTT:41.7 sec          CAPILLARY BLOOD GLUCOSE      POCT Blood Glucose.: 141 mg/dL (22 Mar 2024 12:35)

## 2024-03-22 NOTE — PROGRESS NOTE ADULT - PROBLEM SELECTOR PLAN 4
- also with some depression and mood disorder  - Per pt, quit cocaine and ETOH one month ago  - SW following  - [facility] care following

## 2024-03-23 LAB
GLUCOSE BLDC GLUCOMTR-MCNC: 103 MG/DL — HIGH (ref 70–99)
GLUCOSE BLDC GLUCOMTR-MCNC: 114 MG/DL — HIGH (ref 70–99)
GLUCOSE BLDC GLUCOMTR-MCNC: 115 MG/DL — HIGH (ref 70–99)
HCT VFR BLD CALC: 30.1 % — LOW (ref 39–50)
HGB BLD-MCNC: 9.3 G/DL — LOW (ref 13–17)
INR BLD: 2.94 RATIO — HIGH (ref 0.85–1.18)
MCHC RBC-ENTMCNC: 21.3 PG — LOW (ref 27–34)
MCHC RBC-ENTMCNC: 30.9 GM/DL — LOW (ref 32–36)
MCV RBC AUTO: 68.9 FL — LOW (ref 80–100)
NRBC # BLD: 0 /100 WBCS — SIGNIFICANT CHANGE UP (ref 0–0)
PLATELET # BLD AUTO: 199 K/UL — SIGNIFICANT CHANGE UP (ref 150–400)
PROTHROM AB SERPL-ACNC: 29.9 SEC — HIGH (ref 9.5–13)
RBC # BLD: 4.37 M/UL — SIGNIFICANT CHANGE UP (ref 4.2–5.8)
RBC # FLD: 19.6 % — HIGH (ref 10.3–14.5)
WBC # BLD: 5.09 K/UL — SIGNIFICANT CHANGE UP (ref 3.8–10.5)
WBC # FLD AUTO: 5.09 K/UL — SIGNIFICANT CHANGE UP (ref 3.8–10.5)

## 2024-03-23 PROCEDURE — 99232 SBSQ HOSP IP/OBS MODERATE 35: CPT

## 2024-03-23 RX ORDER — WARFARIN SODIUM 2.5 MG/1
3 TABLET ORAL ONCE
Refills: 0 | Status: COMPLETED | OUTPATIENT
Start: 2024-03-23 | End: 2024-03-23

## 2024-03-23 RX ORDER — LANOLIN ALCOHOL/MO/W.PET/CERES
5 CREAM (GRAM) TOPICAL ONCE
Refills: 0 | Status: COMPLETED | OUTPATIENT
Start: 2024-03-23 | End: 2024-03-23

## 2024-03-23 RX ADMIN — AMIODARONE HYDROCHLORIDE 200 MILLIGRAM(S): 400 TABLET ORAL at 06:35

## 2024-03-23 RX ADMIN — Medication 325 MILLIGRAM(S): at 13:21

## 2024-03-23 RX ADMIN — Medication 25 MICROGRAM(S): at 06:38

## 2024-03-23 RX ADMIN — CLOPIDOGREL BISULFATE 75 MILLIGRAM(S): 75 TABLET, FILM COATED ORAL at 13:21

## 2024-03-23 RX ADMIN — Medication 1 APPLICATION(S): at 06:40

## 2024-03-23 RX ADMIN — Medication 1 APPLICATION(S): at 17:49

## 2024-03-23 RX ADMIN — Medication 5 MILLIGRAM(S): at 21:46

## 2024-03-23 RX ADMIN — DAPAGLIFLOZIN 10 MILLIGRAM(S): 10 TABLET, FILM COATED ORAL at 13:23

## 2024-03-23 RX ADMIN — Medication 100 MILLIGRAM(S): at 13:21

## 2024-03-23 RX ADMIN — LOSARTAN POTASSIUM 25 MILLIGRAM(S): 100 TABLET, FILM COATED ORAL at 06:35

## 2024-03-23 RX ADMIN — BUMETANIDE 1 MILLIGRAM(S): 0.25 INJECTION INTRAMUSCULAR; INTRAVENOUS at 06:35

## 2024-03-23 RX ADMIN — Medication 62.5 MICROGRAM(S): at 09:55

## 2024-03-23 RX ADMIN — WARFARIN SODIUM 3 MILLIGRAM(S): 2.5 TABLET ORAL at 21:46

## 2024-03-23 RX ADMIN — SPIRONOLACTONE 25 MILLIGRAM(S): 25 TABLET, FILM COATED ORAL at 06:35

## 2024-03-23 RX ADMIN — CHLORHEXIDINE GLUCONATE 1 APPLICATION(S): 213 SOLUTION TOPICAL at 06:40

## 2024-03-23 RX ADMIN — SPIRONOLACTONE 25 MILLIGRAM(S): 25 TABLET, FILM COATED ORAL at 17:50

## 2024-03-23 RX ADMIN — Medication 1 MILLIGRAM(S): at 13:20

## 2024-03-23 RX ADMIN — ATORVASTATIN CALCIUM 40 MILLIGRAM(S): 80 TABLET, FILM COATED ORAL at 21:46

## 2024-03-23 NOTE — PROGRESS NOTE ADULT - PROBLEM SELECTOR PLAN 4
- also with some depression and mood disorder  - Per pt, quit cocaine and ETOH one month ago  - SW following  - [facility] care following

## 2024-03-23 NOTE — PROGRESS NOTE ADULT - SUBJECTIVE AND OBJECTIVE BOX
[facility] Division of Hospital Medicine  Krystal Bhandari MD  Available on TEAMS 8a-8p    Patient is a 63y old  Male who presents with a chief complaint of cardiogenic shock      SUBJECTIVE / OVERNIGHT EVENTS: No acute events.   ADDITIONAL REVIEW OF SYSTEMS: Negative    MEDICATIONS  (STANDING):  aMIOdarone    Tablet   Oral   aMIOdarone    Tablet 200 milliGRAM(s) Oral every 8 hours  AQUAPHOR (petrolatum Ointment) 1 Application(s) Topical every 12 hours  atorvastatin 40 milliGRAM(s) Oral at bedtime  buMETAnide 1 milliGRAM(s) Oral daily  chlorhexidine 2% Cloths 1 Application(s) Topical <User Schedule>  clopidogrel Tablet 75 milliGRAM(s) Oral daily  dapagliflozin 10 milliGRAM(s) Oral daily  digoxin     Tablet 62.5 MICROGram(s) Oral daily  ferrous    sulfate 325 milliGRAM(s) Oral daily  folic acid 1 milliGRAM(s) Oral daily  insulin lispro (ADMELOG) corrective regimen sliding scale   SubCutaneous three times a day before meals  insulin lispro (ADMELOG) corrective regimen sliding scale   SubCutaneous at bedtime  levothyroxine 25 MICROGram(s) Oral daily  losartan 25 milliGRAM(s) Oral daily  spironolactone 25 milliGRAM(s) Oral every 12 hours  thiamine 100 milliGRAM(s) Oral daily  warfarin 3 milliGRAM(s) Oral once    MEDICATIONS  (PRN):  sodium chloride 0.9% lock flush 10 milliLiter(s) IV Push every 1 hour PRN Pre/post blood products, medications, blood draw, and to maintain line patency    I&O's Summary    22 Mar 2024 07:01  -  23 Mar 2024 07:00  --------------------------------------------------------  IN: 480 mL / OUT: 1950 mL / NET: -1470 mL    23 Mar 2024 07:01  -  23 Mar 2024 15:35  --------------------------------------------------------  IN: 0 mL / OUT: 1100 mL / NET: -1100 mL      PHYSICAL EXAM:  Vital Signs Last 24 Hrs  T(C): 36.6 (23 Mar 2024 11:48), Max: 36.6 (22 Mar 2024 21:54)  T(F): 97.8 (23 Mar 2024 11:48), Max: 97.9 (22 Mar 2024 21:54)  HR: 67 (23 Mar 2024 11:48) (61 - 67)  BP: 111/65 (23 Mar 2024 11:48) (102/69 - 111/65)  BP(mean): 80 (23 Mar 2024 11:48) (80 - 80)  RR: 18 (23 Mar 2024 11:48) (18 - 18)  SpO2: 98% (23 Mar 2024 11:48) (98% - 100%)    Parameters below as of 23 Mar 2024 11:48  Patient On (Oxygen Delivery Method): room air        CONSTITUTIONAL: NAD, frail appearing  EYES: PERRLA; conjunctiva and sclera clear  ENMT: Moist oral mucosa, no pharyngeal injection or exudates  NECK: Supple, no palpable masses; no thyromegaly  RESPIRATORY: Normal respiratory effort; lungs are clear to auscultation bilaterally  CARDIOVASCULAR: Tachycardic, AF, no murmur/rub/gallop; No lower extremity edema  ABDOMEN: Nontender to palpation, normoactive bowel sounds, no rebound/guarding  MUSCULOSKELETAL:  No clubbing or cyanosis of digits; no joint swelling or tenderness to palpation  PSYCH: A+O to person, place, and time; affect appropriate  NEUROLOGY: CN 2-12 are intact and symmetric; no gross sensory deficits   SKIN: No rashes; no palpable lesions    LABS: reviewed                        9.3    5.09  )-----------( 199      ( 23 Mar 2024 06:52 )             30.1                           PT/INR - ( 23 Mar 2024 06:52 )   PT: 29.9 sec;   INR: 2.94 ratio         PTT - ( 22 Mar 2024 07:29 )  PTT:41.7 sec          CAPILLARY BLOOD GLUCOSE      POCT Blood Glucose.: 115 mg/dL (23 Mar 2024 12:55)

## 2024-03-24 LAB
GLUCOSE BLDC GLUCOMTR-MCNC: 108 MG/DL — HIGH (ref 70–99)
GLUCOSE BLDC GLUCOMTR-MCNC: 127 MG/DL — HIGH (ref 70–99)
GLUCOSE BLDC GLUCOMTR-MCNC: 166 MG/DL — HIGH (ref 70–99)
GLUCOSE BLDC GLUCOMTR-MCNC: 94 MG/DL — SIGNIFICANT CHANGE UP (ref 70–99)
HCT VFR BLD CALC: 30.7 % — LOW (ref 39–50)
HGB BLD-MCNC: 9.5 G/DL — LOW (ref 13–17)
INR BLD: 2.59 RATIO — HIGH (ref 0.85–1.18)
MCHC RBC-ENTMCNC: 21.3 PG — LOW (ref 27–34)
MCHC RBC-ENTMCNC: 30.9 GM/DL — LOW (ref 32–36)
MCV RBC AUTO: 68.7 FL — LOW (ref 80–100)
NRBC # BLD: 0 /100 WBCS — SIGNIFICANT CHANGE UP (ref 0–0)
PLATELET # BLD AUTO: 199 K/UL — SIGNIFICANT CHANGE UP (ref 150–400)
PROTHROM AB SERPL-ACNC: 26.5 SEC — HIGH (ref 9.5–13)
RBC # BLD: 4.47 M/UL — SIGNIFICANT CHANGE UP (ref 4.2–5.8)
RBC # FLD: 19.7 % — HIGH (ref 10.3–14.5)
WBC # BLD: 5.27 K/UL — SIGNIFICANT CHANGE UP (ref 3.8–10.5)
WBC # FLD AUTO: 5.27 K/UL — SIGNIFICANT CHANGE UP (ref 3.8–10.5)

## 2024-03-24 PROCEDURE — 99232 SBSQ HOSP IP/OBS MODERATE 35: CPT

## 2024-03-24 RX ORDER — WARFARIN SODIUM 2.5 MG/1
3 TABLET ORAL ONCE
Refills: 0 | Status: COMPLETED | OUTPATIENT
Start: 2024-03-24 | End: 2024-03-24

## 2024-03-24 RX ADMIN — Medication 100 MILLIGRAM(S): at 13:12

## 2024-03-24 RX ADMIN — SPIRONOLACTONE 25 MILLIGRAM(S): 25 TABLET, FILM COATED ORAL at 05:41

## 2024-03-24 RX ADMIN — ATORVASTATIN CALCIUM 40 MILLIGRAM(S): 80 TABLET, FILM COATED ORAL at 21:13

## 2024-03-24 RX ADMIN — Medication 1: at 18:15

## 2024-03-24 RX ADMIN — SPIRONOLACTONE 25 MILLIGRAM(S): 25 TABLET, FILM COATED ORAL at 18:15

## 2024-03-24 RX ADMIN — WARFARIN SODIUM 3 MILLIGRAM(S): 2.5 TABLET ORAL at 21:14

## 2024-03-24 RX ADMIN — DAPAGLIFLOZIN 10 MILLIGRAM(S): 10 TABLET, FILM COATED ORAL at 13:13

## 2024-03-24 RX ADMIN — BUMETANIDE 1 MILLIGRAM(S): 0.25 INJECTION INTRAMUSCULAR; INTRAVENOUS at 05:41

## 2024-03-24 RX ADMIN — Medication 62.5 MICROGRAM(S): at 10:00

## 2024-03-24 RX ADMIN — Medication 1 APPLICATION(S): at 18:19

## 2024-03-24 RX ADMIN — Medication 25 MICROGRAM(S): at 05:42

## 2024-03-24 RX ADMIN — CLOPIDOGREL BISULFATE 75 MILLIGRAM(S): 75 TABLET, FILM COATED ORAL at 13:12

## 2024-03-24 RX ADMIN — Medication 3 MILLIGRAM(S): at 21:15

## 2024-03-24 RX ADMIN — Medication 325 MILLIGRAM(S): at 13:12

## 2024-03-24 RX ADMIN — LOSARTAN POTASSIUM 25 MILLIGRAM(S): 100 TABLET, FILM COATED ORAL at 05:41

## 2024-03-24 RX ADMIN — AMIODARONE HYDROCHLORIDE 200 MILLIGRAM(S): 400 TABLET ORAL at 05:41

## 2024-03-24 RX ADMIN — Medication 1 MILLIGRAM(S): at 13:11

## 2024-03-24 NOTE — PROGRESS NOTE ADULT - ASSESSMENT
63 year old male with past medical history of CVA, HTN, HLD, prediabetes, A.fib, cocaine/heroin abuse, CAD w/ stent, CHF (EF  25-30% in 12/23),  RLE compartment syndrome s/p fasciotomy 12/23 and recent hospitalization for CHF exacerbation with bilateral pleural effusions requiring chest tube placement initially presenting to [facility] on 3/1 with chest pain and shortness of breath, transferred to [facility] for management of cardiogenic shock requiring inotrope support. Now transferred to medicine for further management

## 2024-03-24 NOTE — PROGRESS NOTE ADULT - SUBJECTIVE AND OBJECTIVE BOX
[facility] Division of Hospital Medicine  Krystal Bhandari MD  Available on TEAMS 8a-8p    Patient is a 63y old  Male who presents with a chief complaint of cardiogenic shock      SUBJECTIVE / OVERNIGHT EVENTS: No acute events.   ADDITIONAL REVIEW OF SYSTEMS: Negative    MEDICATIONS  (STANDING):  aMIOdarone    Tablet   Oral   aMIOdarone    Tablet 200 milliGRAM(s) Oral every 8 hours  AQUAPHOR (petrolatum Ointment) 1 Application(s) Topical every 12 hours  atorvastatin 40 milliGRAM(s) Oral at bedtime  buMETAnide 1 milliGRAM(s) Oral daily  chlorhexidine 2% Cloths 1 Application(s) Topical <User Schedule>  clopidogrel Tablet 75 milliGRAM(s) Oral daily  dapagliflozin 10 milliGRAM(s) Oral daily  digoxin     Tablet 62.5 MICROGram(s) Oral daily  ferrous    sulfate 325 milliGRAM(s) Oral daily  folic acid 1 milliGRAM(s) Oral daily  insulin lispro (ADMELOG) corrective regimen sliding scale   SubCutaneous three times a day before meals  insulin lispro (ADMELOG) corrective regimen sliding scale   SubCutaneous at bedtime  levothyroxine 25 MICROGram(s) Oral daily  losartan 25 milliGRAM(s) Oral daily  spironolactone 25 milliGRAM(s) Oral every 12 hours  thiamine 100 milliGRAM(s) Oral daily  warfarin 3 milliGRAM(s) Oral once    MEDICATIONS  (PRN):  sodium chloride 0.9% lock flush 10 milliLiter(s) IV Push every 1 hour PRN Pre/post blood products, medications, blood draw, and to maintain line patency    I&O's Summary    23 Mar 2024 07:01  -  24 Mar 2024 07:00  --------------------------------------------------------  IN: 540 mL / OUT: 2000 mL / NET: -1460 mL    24 Mar 2024 07:01  -  24 Mar 2024 13:13  --------------------------------------------------------  IN: 360 mL / OUT: 1150 mL / NET: -790 mL      PHYSICAL EXAM:  Vital Signs Last 24 Hrs  T(C): 37.2 (24 Mar 2024 11:51), Max: 37.2 (24 Mar 2024 11:51)  T(F): 99 (24 Mar 2024 11:51), Max: 99 (24 Mar 2024 11:51)  HR: 67 (24 Mar 2024 11:51) (62 - 71)  BP: 123/63 (24 Mar 2024 11:51) (102/61 - 123/63)  BP(mean): --  RR: 18 (24 Mar 2024 11:51) (18 - 18)  SpO2: 97% (24 Mar 2024 11:51) (97% - 100%)    Parameters below as of 24 Mar 2024 11:51  Patient On (Oxygen Delivery Method): room air            CONSTITUTIONAL: NAD, frail appearing  EYES: PERRLA; conjunctiva and sclera clear  ENMT: Moist oral mucosa, no pharyngeal injection or exudates  NECK: Supple, no palpable masses; no thyromegaly  RESPIRATORY: Normal respiratory effort; lungs are clear to auscultation bilaterally  CARDIOVASCULAR: Tachycardic, AF, no murmur/rub/gallop; No lower extremity edema  ABDOMEN: Nontender to palpation, normoactive bowel sounds, no rebound/guarding  MUSCULOSKELETAL:  No clubbing or cyanosis of digits; no joint swelling or tenderness to palpation  PSYCH: A+O to person, place, and time; affect appropriate  NEUROLOGY: CN 2-12 are intact and symmetric; no gross sensory deficits   SKIN: No rashes; no palpable lesions    LABS: reviewed                                          9.5    5.27  )-----------( 199      ( 24 Mar 2024 07:15 )             30.7                           PT/INR - ( 24 Mar 2024 07:15 )   PT: 26.5 sec;   INR: 2.59 ratio                   CAPILLARY BLOOD GLUCOSE      POCT Blood Glucose.: 108 mg/dL (24 Mar 2024 12:41)

## 2024-03-25 LAB
ALBUMIN SERPL ELPH-MCNC: 3 G/DL — LOW (ref 3.3–5)
ALP SERPL-CCNC: 159 U/L — HIGH (ref 40–120)
ALT FLD-CCNC: 33 U/L — SIGNIFICANT CHANGE UP (ref 10–45)
ANION GAP SERPL CALC-SCNC: 13 MMOL/L — SIGNIFICANT CHANGE UP (ref 5–17)
APTT BLD: 39.6 SEC — HIGH (ref 24.5–35.6)
AST SERPL-CCNC: 25 U/L — SIGNIFICANT CHANGE UP (ref 10–40)
BILIRUB DIRECT SERPL-MCNC: 0.1 MG/DL — SIGNIFICANT CHANGE UP (ref 0–0.3)
BILIRUB INDIRECT FLD-MCNC: 0.2 MG/DL — SIGNIFICANT CHANGE UP (ref 0.2–1)
BILIRUB SERPL-MCNC: 0.3 MG/DL — SIGNIFICANT CHANGE UP (ref 0.2–1.2)
BUN SERPL-MCNC: 22 MG/DL — SIGNIFICANT CHANGE UP (ref 7–23)
CALCIUM SERPL-MCNC: 8.9 MG/DL — SIGNIFICANT CHANGE UP (ref 8.4–10.5)
CHLORIDE SERPL-SCNC: 102 MMOL/L — SIGNIFICANT CHANGE UP (ref 96–108)
CO2 SERPL-SCNC: 23 MMOL/L — SIGNIFICANT CHANGE UP (ref 22–31)
CREAT SERPL-MCNC: 0.92 MG/DL — SIGNIFICANT CHANGE UP (ref 0.5–1.3)
EGFR: 93 ML/MIN/1.73M2 — SIGNIFICANT CHANGE UP
GLUCOSE BLDC GLUCOMTR-MCNC: 119 MG/DL — HIGH (ref 70–99)
GLUCOSE BLDC GLUCOMTR-MCNC: 160 MG/DL — HIGH (ref 70–99)
GLUCOSE BLDC GLUCOMTR-MCNC: 170 MG/DL — HIGH (ref 70–99)
GLUCOSE BLDC GLUCOMTR-MCNC: 73 MG/DL — SIGNIFICANT CHANGE UP (ref 70–99)
GLUCOSE SERPL-MCNC: 109 MG/DL — HIGH (ref 70–99)
HCT VFR BLD CALC: 32.5 % — LOW (ref 39–50)
HGB BLD-MCNC: 10.1 G/DL — LOW (ref 13–17)
INR BLD: 2.6 RATIO — HIGH (ref 0.85–1.18)
MAGNESIUM SERPL-MCNC: 2 MG/DL — SIGNIFICANT CHANGE UP (ref 1.6–2.6)
MCHC RBC-ENTMCNC: 21.3 PG — LOW (ref 27–34)
MCHC RBC-ENTMCNC: 31.1 GM/DL — LOW (ref 32–36)
MCV RBC AUTO: 68.6 FL — LOW (ref 80–100)
NRBC # BLD: 0 /100 WBCS — SIGNIFICANT CHANGE UP (ref 0–0)
PLATELET # BLD AUTO: 200 K/UL — SIGNIFICANT CHANGE UP (ref 150–400)
POTASSIUM SERPL-MCNC: 4.5 MMOL/L — SIGNIFICANT CHANGE UP (ref 3.5–5.3)
POTASSIUM SERPL-SCNC: 4.5 MMOL/L — SIGNIFICANT CHANGE UP (ref 3.5–5.3)
PROT SERPL-MCNC: 7.2 G/DL — SIGNIFICANT CHANGE UP (ref 6–8.3)
PROTHROM AB SERPL-ACNC: 27.8 SEC — HIGH (ref 9.5–13)
RBC # BLD: 4.74 M/UL — SIGNIFICANT CHANGE UP (ref 4.2–5.8)
RBC # FLD: 19.9 % — HIGH (ref 10.3–14.5)
SODIUM SERPL-SCNC: 138 MMOL/L — SIGNIFICANT CHANGE UP (ref 135–145)
WBC # BLD: 4.59 K/UL — SIGNIFICANT CHANGE UP (ref 3.8–10.5)
WBC # FLD AUTO: 4.59 K/UL — SIGNIFICANT CHANGE UP (ref 3.8–10.5)

## 2024-03-25 PROCEDURE — 99232 SBSQ HOSP IP/OBS MODERATE 35: CPT

## 2024-03-25 RX ORDER — WARFARIN SODIUM 2.5 MG/1
3 TABLET ORAL ONCE
Refills: 0 | Status: COMPLETED | OUTPATIENT
Start: 2024-03-25 | End: 2024-03-25

## 2024-03-25 RX ORDER — DIGOXIN 250 MCG
1 TABLET ORAL
Qty: 30 | Refills: 0
Start: 2024-03-25 | End: 2024-04-23

## 2024-03-25 RX ORDER — BUMETANIDE 0.25 MG/ML
1 INJECTION INTRAMUSCULAR; INTRAVENOUS
Qty: 30 | Refills: 0
Start: 2024-03-25 | End: 2024-04-23

## 2024-03-25 RX ORDER — AMIODARONE HYDROCHLORIDE 400 MG/1
1 TABLET ORAL
Qty: 30 | Refills: 0
Start: 2024-03-25 | End: 2024-04-23

## 2024-03-25 RX ORDER — SPIRONOLACTONE 25 MG/1
1 TABLET, FILM COATED ORAL
Refills: 0 | DISCHARGE

## 2024-03-25 RX ORDER — FOLIC ACID 0.8 MG
1 TABLET ORAL
Qty: 30 | Refills: 0
Start: 2024-03-25 | End: 2024-04-23

## 2024-03-25 RX ORDER — ASPIRIN/CALCIUM CARB/MAGNESIUM 324 MG
1 TABLET ORAL
Refills: 0 | DISCHARGE

## 2024-03-25 RX ORDER — LEVOTHYROXINE SODIUM 125 MCG
1 TABLET ORAL
Qty: 30 | Refills: 0
Start: 2024-03-25 | End: 2024-04-23

## 2024-03-25 RX ORDER — THIAMINE MONONITRATE (VIT B1) 100 MG
1 TABLET ORAL
Qty: 30 | Refills: 0
Start: 2024-03-25 | End: 2024-04-23

## 2024-03-25 RX ORDER — DAPAGLIFLOZIN 10 MG/1
1 TABLET, FILM COATED ORAL
Qty: 30 | Refills: 0
Start: 2024-03-25 | End: 2024-04-23

## 2024-03-25 RX ORDER — LOSARTAN POTASSIUM 100 MG/1
1 TABLET, FILM COATED ORAL
Qty: 30 | Refills: 0
Start: 2024-03-25 | End: 2024-04-23

## 2024-03-25 RX ORDER — WARFARIN SODIUM 2.5 MG/1
3 TABLET ORAL
Qty: 90 | Refills: 0
Start: 2024-03-25 | End: 2024-04-23

## 2024-03-25 RX ORDER — DIGOXIN 250 MCG
0.5 TABLET ORAL
Qty: 15 | Refills: 0
Start: 2024-03-25 | End: 2024-04-23

## 2024-03-25 RX ORDER — SPIRONOLACTONE 25 MG/1
1 TABLET, FILM COATED ORAL
Qty: 60 | Refills: 0
Start: 2024-03-25 | End: 2024-04-23

## 2024-03-25 RX ADMIN — CLOPIDOGREL BISULFATE 75 MILLIGRAM(S): 75 TABLET, FILM COATED ORAL at 11:43

## 2024-03-25 RX ADMIN — AMIODARONE HYDROCHLORIDE 200 MILLIGRAM(S): 400 TABLET ORAL at 05:55

## 2024-03-25 RX ADMIN — WARFARIN SODIUM 3 MILLIGRAM(S): 2.5 TABLET ORAL at 21:24

## 2024-03-25 RX ADMIN — Medication 100 MILLIGRAM(S): at 11:43

## 2024-03-25 RX ADMIN — BUMETANIDE 1 MILLIGRAM(S): 0.25 INJECTION INTRAMUSCULAR; INTRAVENOUS at 05:55

## 2024-03-25 RX ADMIN — ATORVASTATIN CALCIUM 40 MILLIGRAM(S): 80 TABLET, FILM COATED ORAL at 21:24

## 2024-03-25 RX ADMIN — LOSARTAN POTASSIUM 25 MILLIGRAM(S): 100 TABLET, FILM COATED ORAL at 05:55

## 2024-03-25 RX ADMIN — Medication 325 MILLIGRAM(S): at 11:44

## 2024-03-25 RX ADMIN — SPIRONOLACTONE 25 MILLIGRAM(S): 25 TABLET, FILM COATED ORAL at 21:24

## 2024-03-25 RX ADMIN — Medication 1 MILLIGRAM(S): at 11:43

## 2024-03-25 RX ADMIN — Medication 1 APPLICATION(S): at 05:56

## 2024-03-25 RX ADMIN — SPIRONOLACTONE 25 MILLIGRAM(S): 25 TABLET, FILM COATED ORAL at 05:55

## 2024-03-25 RX ADMIN — Medication 25 MICROGRAM(S): at 05:55

## 2024-03-25 RX ADMIN — DAPAGLIFLOZIN 10 MILLIGRAM(S): 10 TABLET, FILM COATED ORAL at 11:44

## 2024-03-25 RX ADMIN — CHLORHEXIDINE GLUCONATE 1 APPLICATION(S): 213 SOLUTION TOPICAL at 05:56

## 2024-03-25 RX ADMIN — Medication 3 MILLIGRAM(S): at 21:27

## 2024-03-25 RX ADMIN — Medication 62.5 MICROGRAM(S): at 09:41

## 2024-03-25 NOTE — PROGRESS NOTE ADULT - SUBJECTIVE AND OBJECTIVE BOX
Zeb Hensley MD  Division of Hospital Medicine  Available on MS teams until 7pm  If no response or off-hours, page [phone]  -------------------------------------    Patient is a 63y old  Male who presents with a chief complaint of cardiogenic shock (24 Mar 2024 13:11)      SUBJECTIVE / OVERNIGHT EVENTS: none acute  ADDITIONAL REVIEW OF SYSTEMS: pt feels well, no acute complaints. Says he plans to go to a friends home and have his brother check in on him periodically after discharge.     MEDICATIONS  (STANDING):  aMIOdarone    Tablet   Oral   aMIOdarone    Tablet 200 milliGRAM(s) Oral daily  AQUAPHOR (petrolatum Ointment) 1 Application(s) Topical every 12 hours  atorvastatin 40 milliGRAM(s) Oral at bedtime  buMETAnide 1 milliGRAM(s) Oral daily  chlorhexidine 2% Cloths 1 Application(s) Topical <User Schedule>  clopidogrel Tablet 75 milliGRAM(s) Oral daily  dapagliflozin 10 milliGRAM(s) Oral daily  digoxin     Tablet 62.5 MICROGram(s) Oral daily  ferrous    sulfate 325 milliGRAM(s) Oral daily  folic acid 1 milliGRAM(s) Oral daily  insulin lispro (ADMELOG) corrective regimen sliding scale   SubCutaneous three times a day before meals  insulin lispro (ADMELOG) corrective regimen sliding scale   SubCutaneous at bedtime  levothyroxine 25 MICROGram(s) Oral daily  losartan 25 milliGRAM(s) Oral daily  spironolactone 25 milliGRAM(s) Oral every 12 hours  thiamine 100 milliGRAM(s) Oral daily    MEDICATIONS  (PRN):  melatonin 3 milliGRAM(s) Oral at bedtime PRN Sleep  sodium chloride 0.9% lock flush 10 milliLiter(s) IV Push every 1 hour PRN Pre/post blood products, medications, blood draw, and to maintain line patency      CAPILLARY BLOOD GLUCOSE      POCT Blood Glucose.: 119 mg/dL (25 Mar 2024 12:41)  POCT Blood Glucose.: 73 mg/dL (25 Mar 2024 08:44)  POCT Blood Glucose.: 94 mg/dL (24 Mar 2024 21:48)  POCT Blood Glucose.: 166 mg/dL (24 Mar 2024 17:23)    I&O's Summary    24 Mar 2024 07:01  -  25 Mar 2024 07:00  --------------------------------------------------------  IN: 920 mL / OUT: 2650 mL / NET: -1730 mL    25 Mar 2024 07:01  -  25 Mar 2024 15:22  --------------------------------------------------------  IN: 240 mL / OUT: 1500 mL / NET: -1260 mL        PHYSICAL EXAM:  Vital Signs Last 24 Hrs  T(C): 36.6 (25 Mar 2024 12:09), Max: 36.9 (24 Mar 2024 20:44)  T(F): 97.8 (25 Mar 2024 12:09), Max: 98.4 (24 Mar 2024 20:44)  HR: 61 (25 Mar 2024 12:09) (59 - 66)  BP: 107/67 (25 Mar 2024 12:09) (98/60 - 117/73)  BP(mean): --  RR: 18 (25 Mar 2024 12:09) (18 - 18)  SpO2: 99% (25 Mar 2024 12:09) (93% - 99%)    Parameters below as of 25 Mar 2024 12:09  Patient On (Oxygen Delivery Method): room air      CONSTITUTIONAL: NAD  EYES: PERRLA; conjunctiva and sclera clear  ENMT: MMM  NECK: Supple  RESPIRATORY: Normal respiratory effort; CTAB  CARDIOVASCULAR: RRR, no JVD, no peripheral edema   ABDOMEN: Nontender to palpation, normoactive BS, no guarding/rigidity  MUSCLOSKELETAL:  no clubbing/cyanosis, no joint swelling or tenderness to palpation  PSYCH: A+O x 3, affect normal  NEUROLOGY: CN 2-12 are intact and symmetric; no gross sensory or motor deficits  SKIN: No rashes; no palpable lesions    LABS:                        10.1   4.59  )-----------( 200      ( 25 Mar 2024 07:32 )             32.5     03-25    138  |  102  |  22  ----------------------------<  109<H>  4.5   |  23  |  0.92    Ca    8.9      25 Mar 2024 07:31  Mg     2.0     03-25    TPro  7.2  /  Alb  3.0<L>  /  TBili  0.3  /  DBili  0.1  /  AST  25  /  ALT  33  /  AlkPhos  159<H>  03-25    PT/INR - ( 25 Mar 2024 07:34 )   PT: 27.8 sec;   INR: 2.60 ratio         PTT - ( 25 Mar 2024 07:34 )  PTT:39.6 sec      Urinalysis Basic - ( 25 Mar 2024 07:31 )    Color: x / Appearance: x / SG: x / pH: x  Gluc: 109 mg/dL / Ketone: x  / Bili: x / Urobili: x   Blood: x / Protein: x / Nitrite: x   Leuk Esterase: x / RBC: x / WBC x   Sq Epi: x / Non Sq Epi: x / Bacteria: x          RADIOLOGY & ADDITIONAL TESTS:  Results Reviewed:   Imaging Personally Reviewed:  Electrocardiogram Personally Reviewed:    COORDINATION OF CARE:  Care Discussed with Consultants/Other Providers [Y/N]:  Prior or Outpatient Records Reviewed [Y/N]:

## 2024-03-25 NOTE — PROGRESS NOTE ADULT - NSPROGADDITIONALINFOA_GEN_ALL_CORE
The necessity of the time spent during the encounter on this date of service was due to:   - Ordering, reviewing, and interpreting labs, testing, and imaging.  - Independently obtaining a review of systems and performing a physical exam  - Reviewing prior hospitalization and where necessary, outpatient records.  - Counselling and educating patient and family regarding interpretation of aforementioned items and plan of care.    Time-based billing (NON-critical care). Total minutes spent: 44

## 2024-03-25 NOTE — PROGRESS NOTE ADULT - ASSESSMENT
63 year old male with past medical history of CVA, HTN, HLD, prediabetes, A.fib, cocaine/heroin abuse, CAD w/ stent, CHF (EF  25-30% in 12/23),  RLE compartment syndrome s/p fasciotomy 12/23 and recent hospitalization for CHF exacerbation with bilateral pleural effusions requiring chest tube placement initially presenting to [facility] on 3/1 with chest pain and shortness of breath, transferred to [facility] for management of cardiogenic shock requiring inotrope support. Now transferred to medicine for further management

## 2024-03-25 NOTE — PROGRESS NOTE ADULT - PROBLEM SELECTOR PLAN 4
- also with some depression and mood disorder  - Per pt, quit cocaine and ETOH one month ago  - SW following  - hospitals care following

## 2024-03-26 ENCOUNTER — TRANSCRIPTION ENCOUNTER (OUTPATIENT)
Age: 64
End: 2024-03-26

## 2024-03-26 VITALS
SYSTOLIC BLOOD PRESSURE: 90 MMHG | TEMPERATURE: 98 F | HEART RATE: 82 BPM | OXYGEN SATURATION: 92 % | DIASTOLIC BLOOD PRESSURE: 58 MMHG | RESPIRATION RATE: 18 BRPM

## 2024-03-26 LAB
GLUCOSE BLDC GLUCOMTR-MCNC: 85 MG/DL — SIGNIFICANT CHANGE UP (ref 70–99)
HCT VFR BLD CALC: 35.5 % — LOW (ref 39–50)
HGB BLD-MCNC: 11.3 G/DL — LOW (ref 13–17)
INR BLD: 2.98 RATIO — HIGH (ref 0.85–1.18)
MCHC RBC-ENTMCNC: 21.6 PG — LOW (ref 27–34)
MCHC RBC-ENTMCNC: 31.8 GM/DL — LOW (ref 32–36)
MCV RBC AUTO: 67.9 FL — LOW (ref 80–100)
NRBC # BLD: 0 /100 WBCS — SIGNIFICANT CHANGE UP (ref 0–0)
PLATELET # BLD AUTO: 243 K/UL — SIGNIFICANT CHANGE UP (ref 150–400)
PROTHROM AB SERPL-ACNC: 30.3 SEC — HIGH (ref 9.5–13)
RBC # BLD: 5.23 M/UL — SIGNIFICANT CHANGE UP (ref 4.2–5.8)
RBC # FLD: 20.2 % — HIGH (ref 10.3–14.5)
WBC # BLD: 4.94 K/UL — SIGNIFICANT CHANGE UP (ref 3.8–10.5)
WBC # FLD AUTO: 4.94 K/UL — SIGNIFICANT CHANGE UP (ref 3.8–10.5)

## 2024-03-26 PROCEDURE — 86900 BLOOD TYPING SEROLOGIC ABO: CPT

## 2024-03-26 PROCEDURE — 87536 HIV-1 QUANT&REVRSE TRNSCRPJ: CPT

## 2024-03-26 PROCEDURE — 80162 ASSAY OF DIGOXIN TOTAL: CPT

## 2024-03-26 PROCEDURE — 97116 GAIT TRAINING THERAPY: CPT

## 2024-03-26 PROCEDURE — C1732: CPT

## 2024-03-26 PROCEDURE — 84295 ASSAY OF SERUM SODIUM: CPT

## 2024-03-26 PROCEDURE — 85014 HEMATOCRIT: CPT

## 2024-03-26 PROCEDURE — 80076 HEPATIC FUNCTION PANEL: CPT

## 2024-03-26 PROCEDURE — C1731: CPT

## 2024-03-26 PROCEDURE — 85018 HEMOGLOBIN: CPT

## 2024-03-26 PROCEDURE — 84466 ASSAY OF TRANSFERRIN: CPT

## 2024-03-26 PROCEDURE — 82962 GLUCOSE BLOOD TEST: CPT

## 2024-03-26 PROCEDURE — 80048 BASIC METABOLIC PNL TOTAL CA: CPT

## 2024-03-26 PROCEDURE — 84480 ASSAY TRIIODOTHYRONINE (T3): CPT

## 2024-03-26 PROCEDURE — 97161 PT EVAL LOW COMPLEX 20 MIN: CPT

## 2024-03-26 PROCEDURE — 85730 THROMBOPLASTIN TIME PARTIAL: CPT

## 2024-03-26 PROCEDURE — 84100 ASSAY OF PHOSPHORUS: CPT

## 2024-03-26 PROCEDURE — 82435 ASSAY OF BLOOD CHLORIDE: CPT

## 2024-03-26 PROCEDURE — 87637 SARSCOV2&INF A&B&RSV AMP PRB: CPT

## 2024-03-26 PROCEDURE — 84439 ASSAY OF FREE THYROXINE: CPT

## 2024-03-26 PROCEDURE — 83880 ASSAY OF NATRIURETIC PEPTIDE: CPT

## 2024-03-26 PROCEDURE — 76705 ECHO EXAM OF ABDOMEN: CPT

## 2024-03-26 PROCEDURE — 93005 ELECTROCARDIOGRAM TRACING: CPT

## 2024-03-26 PROCEDURE — 36415 COLL VENOUS BLD VENIPUNCTURE: CPT

## 2024-03-26 PROCEDURE — 83605 ASSAY OF LACTIC ACID: CPT

## 2024-03-26 PROCEDURE — C1894: CPT

## 2024-03-26 PROCEDURE — 86803 HEPATITIS C AB TEST: CPT

## 2024-03-26 PROCEDURE — 93925 LOWER EXTREMITY STUDY: CPT

## 2024-03-26 PROCEDURE — 83540 ASSAY OF IRON: CPT

## 2024-03-26 PROCEDURE — 85027 COMPLETE CBC AUTOMATED: CPT

## 2024-03-26 PROCEDURE — 93653 COMPRE EP EVAL TX SVT: CPT

## 2024-03-26 PROCEDURE — 86850 RBC ANTIBODY SCREEN: CPT

## 2024-03-26 PROCEDURE — 84436 ASSAY OF TOTAL THYROXINE: CPT

## 2024-03-26 PROCEDURE — 80061 LIPID PANEL: CPT

## 2024-03-26 PROCEDURE — 85396 CLOTTING ASSAY WHOLE BLOOD: CPT

## 2024-03-26 PROCEDURE — 85610 PROTHROMBIN TIME: CPT

## 2024-03-26 PROCEDURE — 84132 ASSAY OF SERUM POTASSIUM: CPT

## 2024-03-26 PROCEDURE — 82803 BLOOD GASES ANY COMBINATION: CPT

## 2024-03-26 PROCEDURE — 83036 HEMOGLOBIN GLYCOSYLATED A1C: CPT

## 2024-03-26 PROCEDURE — 87641 MR-STAPH DNA AMP PROBE: CPT

## 2024-03-26 PROCEDURE — 82728 ASSAY OF FERRITIN: CPT

## 2024-03-26 PROCEDURE — 80053 COMPREHEN METABOLIC PANEL: CPT

## 2024-03-26 PROCEDURE — 97530 THERAPEUTIC ACTIVITIES: CPT

## 2024-03-26 PROCEDURE — C8929: CPT

## 2024-03-26 PROCEDURE — 86901 BLOOD TYPING SEROLOGIC RH(D): CPT

## 2024-03-26 PROCEDURE — 85520 HEPARIN ASSAY: CPT

## 2024-03-26 PROCEDURE — 93923 UPR/LXTR ART STDY 3+ LVLS: CPT

## 2024-03-26 PROCEDURE — 83550 IRON BINDING TEST: CPT

## 2024-03-26 PROCEDURE — 84443 ASSAY THYROID STIM HORMONE: CPT

## 2024-03-26 PROCEDURE — 80307 DRUG TEST PRSMV CHEM ANLYZR: CPT

## 2024-03-26 PROCEDURE — 84484 ASSAY OF TROPONIN QUANT: CPT

## 2024-03-26 PROCEDURE — 99239 HOSP IP/OBS DSCHRG MGMT >30: CPT

## 2024-03-26 PROCEDURE — 71045 X-RAY EXAM CHEST 1 VIEW: CPT

## 2024-03-26 PROCEDURE — 83735 ASSAY OF MAGNESIUM: CPT

## 2024-03-26 PROCEDURE — 85025 COMPLETE CBC W/AUTO DIFF WBC: CPT

## 2024-03-26 PROCEDURE — 87640 STAPH A DNA AMP PROBE: CPT

## 2024-03-26 PROCEDURE — 82947 ASSAY GLUCOSE BLOOD QUANT: CPT

## 2024-03-26 PROCEDURE — 82330 ASSAY OF CALCIUM: CPT

## 2024-03-26 PROCEDURE — C1730: CPT

## 2024-03-26 PROCEDURE — C1766: CPT

## 2024-03-26 RX ORDER — WARFARIN SODIUM 2.5 MG/1
3 TABLET ORAL ONCE
Refills: 0 | Status: DISCONTINUED | OUTPATIENT
Start: 2024-03-26 | End: 2024-03-26

## 2024-03-26 RX ADMIN — Medication 25 MICROGRAM(S): at 06:37

## 2024-03-26 RX ADMIN — LOSARTAN POTASSIUM 25 MILLIGRAM(S): 100 TABLET, FILM COATED ORAL at 05:53

## 2024-03-26 RX ADMIN — SPIRONOLACTONE 25 MILLIGRAM(S): 25 TABLET, FILM COATED ORAL at 05:53

## 2024-03-26 RX ADMIN — BUMETANIDE 1 MILLIGRAM(S): 0.25 INJECTION INTRAMUSCULAR; INTRAVENOUS at 06:36

## 2024-03-26 RX ADMIN — AMIODARONE HYDROCHLORIDE 200 MILLIGRAM(S): 400 TABLET ORAL at 06:36

## 2024-03-26 RX ADMIN — Medication 62.5 MICROGRAM(S): at 09:35

## 2024-03-26 NOTE — DISCHARGE NOTE PROVIDER - CARE PROVIDER_API CALL
Shannon Jain  Internal Medicine  [address], [address]  [place] [address]  Phone: [phone]  Fax: [phone]  Follow Up Time: 1-3 days   Shannon Jain  Internal Medicine  [address], [address]  [place] [address]  Phone: [phone]  Fax: [phone]  Scheduled Appointment: 03/28/2024 02:20 PM

## 2024-03-26 NOTE — DISCHARGE NOTE NURSING/CASE MANAGEMENT/SOCIAL WORK - NSDCPEFALRISK_GEN_ALL_CORE
For information on Fall & Injury Prevention, visit: [email or web address] OR  [email or web address] OR  https://www.cdc.gov/steadi/patient.html

## 2024-03-26 NOTE — DISCHARGE NOTE NURSING/CASE MANAGEMENT/SOCIAL WORK - NSDCFUADDAPPT_GEN_ALL_CORE_FT
APPTS ARE READY TO BE MADE: [x ] YES    Best Family or Patient Contact (if needed):    Additional Information about above appointments (if needed):    1: Follow up in coumadin clinic this week  2:   3:     Other comments or requests:    heart failure clinic on [address] at [facility] on 4/4 at 10:30 AM with HF NP.  [phone].

## 2024-03-26 NOTE — PHARMACOTHERAPY INTERVENTION NOTE - COMMENTS
Heart Failure Medication Education      HFrEF EF=25% on 3/5/24     Guideline directed medical therapy as below (name/dose/frequency):   -Diuretic: Bumetanide 1mg daily  -BB: held  -ARNI/ACE-I/ARB: Losartan 25mg daily  -MRA: Spironolactone 25mg daily  -SGLT2i: Farxiga 10mg daily    Counseled patient/caregiver on above medication names (brand/generic), indication, and possible side effects and provided medication cards.     Additional medications counseled on:     Counseled patient on warfarin indication, monitoring for possible side effects (bleeding), drug/food interactions (reviewed foods containing Vitamin K and to maintain a steady consistent diet), and drug-drug interactions. Explained the meaning of INR, INR goal, importance of adherence and outpatient provider follow- up. Counseled patient on taking acetaminophen over the counter if needed and to avoid ibuprofen. Patient was provided with a medication card for their new medication. "Taking Warfarin Safely" Booklet provided. Patient questions and concerns were answered and addressed. Patient demonstrated understanding. Patient understood importance of compliance and to follow up with cardiologist once discharged.    Counseled patient to observe and obtain daily weights and to notify doctor if >2-3 lbs/day or >5lbs/week weight gain, increased short of breath or using more pillows at nighttime. Answered all of the patient’s questions to the best of my ability. Patient exhibited understanding of heart failure medication regimen and management. Patient understood importance of compliance and to follow up with cardiologist outpatient after discharge.     -Was the patient offered Meds to Beds? Yes  -Was medication coverage confirmed for any new medications? Yes    Polo Riley PharmD  Transitions of Care Pharmacist  Available on [employer] Teams  Spectra #[phone]

## 2024-03-26 NOTE — PROGRESS NOTE ADULT - REASON FOR ADMISSION
cardiogenic shock

## 2024-03-26 NOTE — DISCHARGE NOTE PROVIDER - NSDCMRMEDTOKEN_GEN_ALL_CORE_FT
amiodarone 200 mg oral tablet: 1 tab(s) orally once a day  bumetanide 1 mg oral tablet: 1 tab(s) orally once a day  dapagliflozin 10 mg oral tablet: 1 tab(s) orally once a day  digoxin 125 mcg (0.125 mg) oral tablet: 0.5 tab(s) orally once a day  ferrous sulfate 325 mg (65 mg elemental iron) oral tablet: 1 tab(s) orally once a day  folic acid 1 mg oral tablet: 1 tab(s) orally once a day  levothyroxine 25 mcg (0.025 mg) oral tablet: 1 tab(s) orally once a day  losartan 25 mg oral tablet: 1 tab(s) orally once a day  Plavix 75 mg oral tablet: 1 tab(s) orally once a day  rosuvastatin 10 mg oral capsule: 1 cap(s) orally once a day (at bedtime)  spironolactone 25 mg oral tablet: 1 tab(s) orally every 12 hours  thiamine 100 mg oral tablet: 1 tab(s) orally once a day  warfarin 1 mg oral tablet: 3 tab(s) orally once a day

## 2024-03-26 NOTE — DISCHARGE NOTE PROVIDER - NSDCCPCAREPLAN_GEN_ALL_CORE_FT
PRINCIPAL DISCHARGE DIAGNOSIS  Diagnosis: Acute on chronic HFrEF (heart failure with reduced ejection fraction)  Assessment and Plan of Treatment: Weigh yourself daily.  If you gain 3lbs in 3 days, or 5lbs in a week call your Health Care Provider.  Do not eat or drink foods containing more than 2000mg of salt (sodium) in your diet every day.  Call your Health Care Provider if you have any swelling or increased swelling in your feet, ankles, and/or stomach.  Take all of your medication as directed.  If you become dizzy call your Health Care Provider.        SECONDARY DISCHARGE DIAGNOSES  Diagnosis: ELIE (acute kidney injury)  Assessment and Plan of Treatment: Resolved    Diagnosis: LV (left ventricular) mural thrombus  Assessment and Plan of Treatment: continue coumadin  Follow up with pcp for INR checking in 1-2 days    Diagnosis: Iron deficiency anemia  Assessment and Plan of Treatment: you were treated with IV iron    Diagnosis: Atrial flutter  Assessment and Plan of Treatment: you had ablation done  continue current medications  Follow up with EPS as scheduled    Diagnosis: Cardiogenic shock  Assessment and Plan of Treatment: you were treated with pressor medications in the ICU     PRINCIPAL DISCHARGE DIAGNOSIS  Diagnosis: Acute on chronic HFrEF (heart failure with reduced ejection fraction)  Assessment and Plan of Treatment: Weigh yourself daily.  If you gain 3lbs in 3 days, or 5lbs in a week call your Health Care Provider.  Do not eat or drink foods containing more than 2000mg of salt (sodium) in your diet every day.  Call your Health Care Provider if you have any swelling or increased swelling in your feet, ankles, and/or stomach.  Take all of your medication as directed.  If you become dizzy call your Health Care Provider.        SECONDARY DISCHARGE DIAGNOSES  Diagnosis: ELIE (acute kidney injury)  Assessment and Plan of Treatment: Resolved    Diagnosis: LV (left ventricular) mural thrombus  Assessment and Plan of Treatment: continue coumadin  Follow up with pcp for INR checking in 1-2 days    Diagnosis: Iron deficiency anemia  Assessment and Plan of Treatment: you were treated with IV iron    Diagnosis: Atrial flutter  Assessment and Plan of Treatment: you had ablation done  continue current medications  Follow up with EPS as scheduled    Diagnosis: Cardiogenic shock  Assessment and Plan of Treatment: you were treated with pressor medications in the ICU    Diagnosis: Wound of right lower extremity  Assessment and Plan of Treatment: Wound Consult requested to assist w/ management of:  RLE wounds  RLE - Adaptic dressing QD  BLE elevation & Compression  Upon discharge f/u as outpatient at [facility] [address] [place] [address] [phone]

## 2024-03-26 NOTE — PROGRESS NOTE ADULT - PROBLEM SELECTOR PROBLEM 6
Iron deficiency anemia
ELIE (acute kidney injury)
Iron deficiency anemia
ELIE (acute kidney injury)
ELIE (acute kidney injury)
Iron deficiency anemia
Advanced care planning/counseling discussion
Atrial flutter
Iron deficiency anemia
ELIE (acute kidney injury)
Iron deficiency anemia
Cough
Cough
ELIE (acute kidney injury)
Iron deficiency anemia
ELIE (acute kidney injury)
Iron deficiency anemia
Iron deficiency anemia
ELIE (acute kidney injury)
Lack of social support
Iron deficiency anemia
Iron deficiency anemia
Advanced care planning/counseling discussion

## 2024-03-26 NOTE — PROGRESS NOTE ADULT - PROBLEM SELECTOR PLAN 6
S/p venofer X 5 days  - cont FeSO4
- s/p aflutter ablation on 3/15  - currently back in typical aflutter on tele  - appreciate EP follow up for consideration of repeat ablation  - INR currently therapeutic with INR 2.4 on coumadin with heparin bridge.
- hb stable
Likely renal vascular congestion from CRS, plateau  - Avoid nephrotoxins  - Trend daily
Likely renal vascular congestion from CRS, improving  - Avoid nephrotoxins  - Trend daily
Likely renal vascular congestion from CRS, plateau  - Avoid nephrotoxins  - Trend daily
Likely renal vascular congestion from CRS, improving  - Avoid nephrotoxins  - Trend daily
- hb stable
See my GOC note above.
- please check CXR and RVP  - increased diuretics as above  - recommend Tessalon pearls
- RESOLVED, Likely renal vascular congestion from CRS  - Avoid nephrotoxins  - Trend daily
S/p venofer X 5 days  - cont FeSO4
See my GOC note above.
- CXR 3/13 with worsening PVC and pleural effusion  - cough appears to have improved with escalation of diuretics, continue HF management as above  - RPAN negative  - Karon fox PRN
- hb stable
Likely renal vascular congestion from CRS, plateau  - Avoid nephrotoxins  - Trend daily
S/p venofer X 5 days  - cont FeSO4
- hb stable
- hb stable
S/p venofer X 5 days  - cont FeSO4
Likely renal vascular congestion from CRS, improving  - Avoid nephrotoxins  - Trend daily
S/p venofer X 5 days  - cont FeSO4
I am concerned that as I confirmed with the patient's brother (see [facility] note above) with the patient's poor social support,  advanced illness, poor nutritional status, h/o polysubstance abuse, weak state, and limited medical literacy, without appropriate supervision he may not do well if sent home without appropriate services. [facility] may be an option; however, this will only be a temporary solution. I feel that NH may be a better fit for this patient.   On 3/13, I will reach out to his floor SW to see if the idea of NH or [facility] to NH can be re-visited.

## 2024-03-26 NOTE — DISCHARGE NOTE NURSING/CASE MANAGEMENT/SOCIAL WORK - PATIENT PORTAL LINK FT
You can access the FollowMyHealth Patient Portal offered by [facility] by registering at the following website: [email or web address]. By joining [employer]’s FollowMyHealth portal, you will also be able to view your health information using other applications (apps) compatible with our system.

## 2024-03-26 NOTE — PROGRESS NOTE ADULT - NUTRITIONAL ASSESSMENT
This patient has been assessed with a concern for Malnutrition and has been determined to have a diagnosis/diagnoses of Severe protein-calorie malnutrition and Underweight (BMI < 19).    This patient is being managed with:   Diet DASH/TLC-  Sodium & Cholesterol Restricted  Consistent Carbohydrate {No Snacks} (CSTCHO)  No Pork  Entered: Mar  6 2024  6:30AM  
This patient has been assessed with a concern for Malnutrition and has been determined to have a diagnosis/diagnoses of Severe protein-calorie malnutrition and Underweight (BMI < 19).    This patient is being managed with:   Diet Regular-  Consistent Carbohydrate {No Snacks} (CSTCHO)  Low Sodium  No Pork  Supplement Feeding Modality:  Oral  Glucerna Shake Cans or Servings Per Day:  1       Frequency:  Two Times a day  Entered: Mar 18 2024  1:28PM  
This patient has been assessed with a concern for Malnutrition and has been determined to have a diagnosis/diagnoses of Severe protein-calorie malnutrition and Underweight (BMI < 19).    This patient is being managed with:   Diet DASH/TLC-  Sodium & Cholesterol Restricted  Consistent Carbohydrate {No Snacks} (CSTCHO)  No Pork  Entered: Mar  6 2024  6:30AM  
This patient has been assessed with a concern for Malnutrition and has been determined to have a diagnosis/diagnoses of Severe protein-calorie malnutrition and Underweight (BMI < 19).    This patient is being managed with:   Diet DASH/TLC-  Sodium & Cholesterol Restricted  Consistent Carbohydrate {No Snacks} (CSTCHO)  No Pork  Entered: Mar  6 2024  6:30AM  
This patient has been assessed with a concern for Malnutrition and has been determined to have a diagnosis/diagnoses of Severe protein-calorie malnutrition and Underweight (BMI < 19).    This patient is being managed with:   Diet DASH/TLC-  Sodium & Cholesterol Restricted  Consistent Carbohydrate {No Snacks} (CSTCHO)  No Pork  Entered: Mar  6 2024  6:30AM    This patient has been assessed with a concern for Malnutrition and has been determined to have a diagnosis/diagnoses of Severe protein-calorie malnutrition and Underweight (BMI < 19).    This patient is being managed with:   Diet DASH/TLC-  Sodium & Cholesterol Restricted  Consistent Carbohydrate {No Snacks} (CSTCHO)  No Pork  Entered: Mar  6 2024  6:30AM  
This patient has been assessed with a concern for Malnutrition and has been determined to have a diagnosis/diagnoses of Severe protein-calorie malnutrition and Underweight (BMI < 19).    This patient is being managed with:   Diet Regular-  Consistent Carbohydrate {No Snacks} (CSTCHO)  Low Sodium  No Pork  Supplement Feeding Modality:  Oral  Glucerna Shake Cans or Servings Per Day:  1       Frequency:  Two Times a day  Entered: Mar 18 2024  1:28PM  
This patient has been assessed with a concern for Malnutrition and has been determined to have a diagnosis/diagnoses of Severe protein-calorie malnutrition and Underweight (BMI < 19).    This patient is being managed with:   Diet DASH/TLC-  Sodium & Cholesterol Restricted  Consistent Carbohydrate {No Snacks} (CSTCHO)  No Pork  Entered: Mar  6 2024  6:30AM    This patient has been assessed with a concern for Malnutrition and has been determined to have a diagnosis/diagnoses of Severe protein-calorie malnutrition and Underweight (BMI < 19).    This patient is being managed with:   Diet DASH/TLC-  Sodium & Cholesterol Restricted  Consistent Carbohydrate {No Snacks} (CSTCHO)  No Pork  Entered: Mar  6 2024  6:30AM  
This patient has been assessed with a concern for Malnutrition and has been determined to have a diagnosis/diagnoses of Severe protein-calorie malnutrition and Underweight (BMI < 19).    This patient is being managed with:   Diet DASH/TLC-  Sodium & Cholesterol Restricted  Consistent Carbohydrate {No Snacks} (CSTCHO)  No Pork  Entered: Mar  6 2024  6:30AM    This patient has been assessed with a concern for Malnutrition and has been determined to have a diagnosis/diagnoses of Severe protein-calorie malnutrition and Underweight (BMI < 19).    This patient is being managed with:   Diet DASH/TLC-  Sodium & Cholesterol Restricted  Consistent Carbohydrate {No Snacks} (CSTCHO)  No Pork  Entered: Mar  6 2024  6:30AM  
This patient has been assessed with a concern for Malnutrition and has been determined to have a diagnosis/diagnoses of Severe protein-calorie malnutrition and Underweight (BMI < 19).    This patient is being managed with:   Diet DASH/TLC-  Sodium & Cholesterol Restricted  Consistent Carbohydrate {No Snacks} (CSTCHO)  No Pork  Entered: Mar  6 2024  6:30AM  
This patient has been assessed with a concern for Malnutrition and has been determined to have a diagnosis/diagnoses of Severe protein-calorie malnutrition and Underweight (BMI < 19).    This patient is being managed with:   Diet DASH/TLC-  Sodium & Cholesterol Restricted  Consistent Carbohydrate {No Snacks} (CSTCHO)  No Pork  Entered: Mar  6 2024  6:30AM    This patient has been assessed with a concern for Malnutrition and has been determined to have a diagnosis/diagnoses of Severe protein-calorie malnutrition and Underweight (BMI < 19).    This patient is being managed with:   Diet DASH/TLC-  Sodium & Cholesterol Restricted  Consistent Carbohydrate {No Snacks} (CSTCHO)  No Pork  Entered: Mar  6 2024  6:30AM  
This patient has been assessed with a concern for Malnutrition and has been determined to have a diagnosis/diagnoses of Severe protein-calorie malnutrition and Underweight (BMI < 19).    This patient is being managed with:   Diet Regular-  Consistent Carbohydrate {No Snacks} (CSTCHO)  Low Sodium  No Pork  Supplement Feeding Modality:  Oral  Glucerna Shake Cans or Servings Per Day:  1       Frequency:  Two Times a day  Entered: Mar 18 2024  1:28PM  
This patient has been assessed with a concern for Malnutrition and has been determined to have a diagnosis/diagnoses of Severe protein-calorie malnutrition and Underweight (BMI < 19).    This patient is being managed with:   Diet DASH/TLC-  Sodium & Cholesterol Restricted  Consistent Carbohydrate {No Snacks} (CSTCHO)  No Pork  Entered: Mar  6 2024  6:30AM  
This patient has been assessed with a concern for Malnutrition and has been determined to have a diagnosis/diagnoses of Severe protein-calorie malnutrition and Underweight (BMI < 19).    This patient is being managed with:   Diet NPO after Midnight-     NPO Start Date: 14-Mar-2024   NPO Start Time: 23:59  Entered: Mar 14 2024  9:29AM    Diet DASH/TLC-  Sodium & Cholesterol Restricted  Consistent Carbohydrate {No Snacks} (CSTCHO)  No Pork  Entered: Mar  6 2024  6:30AM  
This patient has been assessed with a concern for Malnutrition and has been determined to have a diagnosis/diagnoses of Severe protein-calorie malnutrition and Underweight (BMI < 19).    This patient is being managed with:   Diet Regular-  Consistent Carbohydrate {No Snacks} (CSTCHO)  Low Sodium  No Pork  Supplement Feeding Modality:  Oral  Glucerna Shake Cans or Servings Per Day:  1       Frequency:  Two Times a day  Entered: Mar 18 2024  1:28PM  
This patient has been assessed with a concern for Malnutrition and has been determined to have a diagnosis/diagnoses of Severe protein-calorie malnutrition and Underweight (BMI < 19).    This patient is being managed with:   Diet DASH/TLC-  Sodium & Cholesterol Restricted  Consistent Carbohydrate {No Snacks} (CSTCHO)  No Pork  Entered: Mar  6 2024  6:30AM    This patient has been assessed with a concern for Malnutrition and has been determined to have a diagnosis/diagnoses of Severe protein-calorie malnutrition and Underweight (BMI < 19).    This patient is being managed with:   Diet DASH/TLC-  Sodium & Cholesterol Restricted  Consistent Carbohydrate {No Snacks} (CSTCHO)  No Pork  Entered: Mar  6 2024  6:30AM  
This patient has been assessed with a concern for Malnutrition and has been determined to have a diagnosis/diagnoses of Severe protein-calorie malnutrition and Underweight (BMI < 19).    This patient is being managed with:   Diet DASH/TLC-  Sodium & Cholesterol Restricted  Consistent Carbohydrate {No Snacks} (CSTCHO)  No Pork  Entered: Mar  6 2024  6:30AM    This patient has been assessed with a concern for Malnutrition and has been determined to have a diagnosis/diagnoses of Severe protein-calorie malnutrition and Underweight (BMI < 19).    This patient is being managed with:   Diet DASH/TLC-  Sodium & Cholesterol Restricted  Consistent Carbohydrate {No Snacks} (CSTCHO)  No Pork  Entered: Mar  6 2024  6:30AM  
This patient has been assessed with a concern for Malnutrition and has been determined to have a diagnosis/diagnoses of Severe protein-calorie malnutrition and Underweight (BMI < 19).    This patient is being managed with:   Diet DASH/TLC-  Sodium & Cholesterol Restricted  Consistent Carbohydrate {No Snacks} (CSTCHO)  No Pork  Entered: Mar  6 2024  6:30AM  
This patient has been assessed with a concern for Malnutrition and has been determined to have a diagnosis/diagnoses of Severe protein-calorie malnutrition and Underweight (BMI < 19).    This patient is being managed with:   Diet Regular-  Consistent Carbohydrate {No Snacks} (CSTCHO)  Low Sodium  No Pork  Supplement Feeding Modality:  Oral  Glucerna Shake Cans or Servings Per Day:  1       Frequency:  Two Times a day  Entered: Mar 18 2024  1:28PM  
This patient has been assessed with a concern for Malnutrition and has been determined to have a diagnosis/diagnoses of Severe protein-calorie malnutrition and Underweight (BMI < 19).    This patient is being managed with:   Diet DASH/TLC-  Sodium & Cholesterol Restricted  Consistent Carbohydrate {No Snacks} (CSTCHO)  No Pork  Entered: Mar  6 2024  6:30AM  
This patient has been assessed with a concern for Malnutrition and has been determined to have a diagnosis/diagnoses of Severe protein-calorie malnutrition and Underweight (BMI < 19).    This patient is being managed with:   Diet DASH/TLC-  Sodium & Cholesterol Restricted  Consistent Carbohydrate {No Snacks} (CSTCHO)  No Pork  Entered: Mar  6 2024  6:30AM  
This patient has been assessed with a concern for Malnutrition and has been determined to have a diagnosis/diagnoses of Severe protein-calorie malnutrition and Underweight (BMI < 19).    This patient is being managed with:   Diet Regular-  Consistent Carbohydrate {No Snacks} (CSTCHO)  Low Sodium  No Pork  Supplement Feeding Modality:  Oral  Glucerna Shake Cans or Servings Per Day:  1       Frequency:  Two Times a day  Entered: Mar 18 2024  1:28PM  
This patient has been assessed with a concern for Malnutrition and has been determined to have a diagnosis/diagnoses of Severe protein-calorie malnutrition and Underweight (BMI < 19).    This patient is being managed with:   Diet DASH/TLC-  Sodium & Cholesterol Restricted  Consistent Carbohydrate {No Snacks} (CSTCHO)  No Pork  Entered: Mar  6 2024  6:30AM  
This patient has been assessed with a concern for Malnutrition and has been determined to have a diagnosis/diagnoses of Severe protein-calorie malnutrition and Underweight (BMI < 19).    This patient is being managed with:   Diet Regular-  Consistent Carbohydrate {No Snacks} (CSTCHO)  Low Sodium  No Pork  Supplement Feeding Modality:  Oral  Glucerna Shake Cans or Servings Per Day:  1       Frequency:  Two Times a day  Entered: Mar 18 2024  1:28PM  
This patient has been assessed with a concern for Malnutrition and has been determined to have a diagnosis/diagnoses of Severe protein-calorie malnutrition and Underweight (BMI < 19).    This patient is being managed with:   Diet DASH/TLC-  Sodium & Cholesterol Restricted  Consistent Carbohydrate {No Snacks} (CSTCHO)  No Pork  Entered: Mar  6 2024  6:30AM  
This patient has been assessed with a concern for Malnutrition and has been determined to have a diagnosis/diagnoses of Severe protein-calorie malnutrition and Underweight (BMI < 19).    This patient is being managed with:   Diet NPO after Midnight-     NPO Start Date: 14-Mar-2024   NPO Start Time: 23:59  Entered: Mar 14 2024  9:29AM    Diet DASH/TLC-  Sodium & Cholesterol Restricted  Consistent Carbohydrate {No Snacks} (CSTCHO)  No Pork  Entered: Mar  6 2024  6:30AM  
This patient has been assessed with a concern for Malnutrition and has been determined to have a diagnosis/diagnoses of Severe protein-calorie malnutrition and Underweight (BMI < 19).    This patient is being managed with:   Diet DASH/TLC-  Sodium & Cholesterol Restricted  Consistent Carbohydrate {No Snacks} (CSTCHO)  No Pork  Entered: Mar  6 2024  6:30AM  

## 2024-03-26 NOTE — DISCHARGE NOTE PROVIDER - NSDCFUADDAPPT_GEN_ALL_CORE_FT
in heart failure clinic on 4 Stephanie at [facility] on 4/4 at 10:30 AM with HF NP. Ph [phone].   APPTS ARE READY TO BE MADE: [x ] YES    Best Family or Patient Contact (if needed):    Additional Information about above appointments (if needed):    1: Follow up in coumadin clinic this week  2:   3:     Other comments or requests:    heart failure clinic on 4 [place] at [facility] on 4/4 at 10:30 AM with HF NP.  [phone].   APPTS ARE READY TO BE MADE: [x ] YES    Best Family or Patient Contact (if needed):    Additional Information about above appointments (if needed):    1:  you will have INR check at DR edwards office on 3/28 at 2:20 PM  2: Wound Center [address] [place] [address] [phone] follow up in 2 weeks  3:     Other comments or requests:    heart failure clinic on 4 [place] at [facility] on 4/4 at 10:30 AM with HF NP. Ph [phone].   APPTS ARE READY TO BE MADE: [x ] YES    Best Family or Patient Contact (if needed):    Additional Information about above appointments (if needed):    1:  you will have INR check at DR jain office on 3/28 at 2:20 PM  2: Wound Center [address] [place] [address] [phone] follow up in 2 weeks  3:     Other comments or requests:    heart failure clinic on 4 [place]itt at [facility] on 4/4 at 10:30 AM with HF NP.  [phone].    Prior to outreaching the patient, it was visible that the patient has secured a follow up appointment which was not scheduled by our team. Patient was scheduled to see Dr. Jain on 3/28 but did not show up to the appointment.    APPTS ARE READY TO BE MADE: [x ] YES    Best Family or Patient Contact (if needed):    Additional Information about above appointments (if needed):    1:  you will have INR check at DR jain office on 3/28 at 2:20 PM  2: Wound Center [address] [facility] [phone] follow up in 2 weeks  3:     Other comments or requests:    heart failure clinic on 4 [place] at [facility] on 4/4 at 10:30 AM with HF NP.  [phone].    Prior to outreaching the patient, it was visible that the patient has secured a follow up appointment which was not scheduled by our team. Patient was scheduled to see Dr. Jain on 3/28 but did not show up to the appointment.     Patient was outreached but did not answer. A voicemail was left for the patient to return our call.

## 2024-03-26 NOTE — DISCHARGE NOTE PROVIDER - CARE PROVIDERS DIRECT ADDRESSES
[email or web address]

## 2024-03-26 NOTE — DISCHARGE NOTE PROVIDER - NSDCQMERRANDS_GEN_ALL_CORE
No OB Hx:   2x FT     Gyn Hx: Denies h/o abnormal pap, STI, ovarian cysts, or fibroids OB Hx:   2x FT , largest 8lb, no complications  SAB x 1 no D&C    Gyn Hx: Denies h/o abnormal pap, STI, ovarian cysts, or fibroids

## 2024-03-26 NOTE — DISCHARGE NOTE PROVIDER - PROVIDER TOKENS
PROVIDER:[TOKEN:[[id number]],FOLLOWUP:[1-3 days]] PROVIDER:[TOKEN:[[id number]],SCHEDULEDAPPT:[03/28/2024],SCHEDULEDAPPTTIME:[02:20 PM]]

## 2024-03-26 NOTE — DISCHARGE NOTE PROVIDER - HOSPITAL COURSE
HPI:  63 year old male with past medical history of HIV, CVA, HTN, HLD, prediabetes, A.fib, cocaine/heroin abuse, CAD w/ stent, CHF (EF  25-30% in 12/23),  RLE compartment syndrome s/p fasciotomy 12/23 and recent hospitalization for CHF exacerbation with bilateral pleural effusions requiring chest tube placement initially presenting to [facility] on 3/1 with chest pain and shortness of breath. He states having intermittent 8/10 midsternal chest pain for the past few months since his leg operation. He was admitted to the outside hospital for management of CHF exacerbation, found to have a further reduced EF of 10 - 15% on 3/1. While admitted, he was hypotensive requiring dopamine infusion with worsening perfusion indices and ELIE. He was transferred to [facility] for cardiogenic shock management.     On admission to CICU, he is A&O x3.  sinus tach, / 78, saturating well on 4L nasal cannula with dopamine gtt infusing at 10 mcg/kg/min. He denies chest pain, shortness of breath, nausea and vomiting on admission. (04 Mar 2024 20:16)    Hospital Course:  On admission to CICU, he is A&O x3.  sinus tach, / 78, saturating well on 4L nasal cannula with dopamine gtt infusing at 10 mcg/kg/min. He denies chest pain, shortness of breath, nausea and vomiting on admission.    CICU COURSE  While in the CICU, pt was clinically volume overloaded with signs of poor perfusion. Patient was started on Dobutamine of 5, swan was placed with elevated CVP and low MVO2, concerning for cardiogenic shock. Bumex was given 2mg BID x 2 days, and continued on  2mg bumex qd. Heart failure was following for medication optimization. Afterload reduction was added (hydralazine, losartan, spirolactone) which the patient tolerated. Dobutamine was able to be weaned off on 3/6, with CI > 2.0. Heart failure noted that the patient is not a candidate for AT. Wound Care was consulted for RLE wound (leg was wrapped when patient arrived from [place]).   Pt transferred to telemetry floor for further mgt.  He improved with IV diuretics. He underwent aflutter ablation on 3/15 and was in SR, however converted back into typical aflutter. EP re-consulted for recurrent atrial flutter.   Advised to  c/w digoxin 62.5mcg daily and  c/w amiodarone 200mg q8hr (half dose given concomitant digoxin) x 12 doses, then reduce to 200mg once daily thereafter.     Heart failure followed along for Acute on chronic systolic chf. started on GDMT and slowly titrated up.  Completed course of venofer. Ferritin was 102 and TSAT 5%.  Given recent substance use, not an advanced therapies candidate. He's currently euvolemic and low normotensive tolerating low dose GDMT with normal renal function. CHF will determine need for primary prevention ICD on max tolerated GDMT    Important Medication Changes and Reason:    Active or Pending Issues Requiring Follow-up:  >   Advanced Directives:   [x] Full code  [ ] DNR  [ ] Hospice    Discharge Diagnoses:    1. ICM/HFrEF EF 25%  2. LV apical thrombus on warfarin  3. Typical and atypical atrial flutter  4. CAD s/p PCI 12/2023  5. PAD s/p prior RLE compartment syndrome s/p fasciotomy       HPI:  63 year old male with past medical history of HIV, CVA, HTN, HLD, prediabetes, A.fib, cocaine/heroin abuse, CAD w/ stent, CHF (EF  25-30% in 12/23),  RLE compartment syndrome s/p fasciotomy 12/23 and recent hospitalization for CHF exacerbation with bilateral pleural effusions requiring chest tube placement initially presenting to [facility] on 3/1 with chest pain and shortness of breath. He states having intermittent 8/10 midsternal chest pain for the past few months since his leg operation. He was admitted to the outside hospital for management of CHF exacerbation, found to have a further reduced EF of 10 - 15% on 3/1. While admitted, he was hypotensive requiring dopamine infusion with worsening perfusion indices and ELIE. He was transferred to [facility] for cardiogenic shock management.     On admission to CICU, he is A&O x3.  sinus tach, / 78, saturating well on 4L nasal cannula with dopamine gtt infusing at 10 mcg/kg/min. He denies chest pain, shortness of breath, nausea and vomiting on admission. (04 Mar 2024 20:16)    Hospital Course:  On admission to CICU, he is A&O x3.  sinus tach, / 78, saturating well on 4L nasal cannula with dopamine gtt infusing at 10 mcg/kg/min. He denies chest pain, shortness of breath, nausea and vomiting on admission.    CICU COURSE  While in the CICU, pt was clinically volume overloaded with signs of poor perfusion. Patient was started on Dobutamine of 5, swan was placed with elevated CVP and low MVO2, concerning for cardiogenic shock. Bumex was given 2mg BID x 2 days, and continued on  2mg bumex qd. Heart failure was following for medication optimization. Afterload reduction was added (hydralazine, losartan, spirolactone) which the patient tolerated. Dobutamine was able to be weaned off on 3/6, with CI > 2.0. Heart failure noted that the patient is not a candidate for AT. Wound Care was consulted for RLE wound (leg was wrapped when patient arrived from [place]).   Pt transferred to telemetry floor for further mgt.  He improved with IV diuretics. He underwent aflutter ablation on 3/15 and was in SR, however converted back into typical aflutter. EP re-consulted for recurrent atrial flutter.   Advised to  c/w digoxin 62.5mcg daily and  c/w amiodarone 200mg q8hr (half dose given concomitant digoxin) x 12 doses, then reduce to 200mg once daily thereafter.     Heart failure followed along for Acute on chronic systolic chf. started on GDMT and slowly titrated up.  Completed course of venofer. Ferritin was 102 and TSAT 5%.  Given recent substance use, not an advanced therapies candidate. He's currently euvolemic and low normotensive tolerating low dose GDMT with normal renal function. CHF will determine need for primary prevention ICD on max tolerated GDMT    Important Medication Changes and Reason:    Active or Pending Issues Requiring Follow-up:  > chf follow up  . eps follow up  . pcp follow up  Advanced Directives:   [x] Full code  [ ] DNR  [ ] Hospice    Discharge Diagnoses:    1. ICM/HFrEF EF 25%  2. LV apical thrombus on warfarin  3. Typical and atypical atrial flutter  4. CAD s/p PCI 12/2023  5. PAD s/p prior RLE compartment syndrome s/p fasciotomy

## 2024-03-26 NOTE — PROGRESS NOTE ADULT - ASSESSMENT
63 year old male with past medical history of CVA, HTN, HLD, prediabetes, A.fib, cocaine/heroin abuse, CAD w/ stent, CHF (EF  25-30% in 12/23),  RLE compartment syndrome s/p fasciotomy 12/23 and recent hospitalization for CHF exacerbation with bilateral pleural effusions requiring chest tube placement initially presenting to [facility] on 3/1 with chest pain and shortness of breath, transferred to [facility] for management of cardiogenic shock requiring inotrope support. Now transferred to medicine for further management

## 2024-03-26 NOTE — PROGRESS NOTE ADULT - PROBLEM SELECTOR PLAN 1
s/p unsucessful ablation on 3/15, now back in AF  - c/w digoxin  - [id number] 4+  - Hold on BB for now, no CCB- per dr lewis  - On AC  - Reach out to EP for further recs
·  Problem: Acute on chronic HFrEF (heart failure with reduced ejection fraction).   ·  Recommendation: TTE: severe RV dilation, moderate MR (3b), severe TR, LV thrombus, small effusion. EF 25%, LVID 5.2  Congested nephropathy and hepatopathy  Etiology:   - Likely polysubstance abuse contributing (etoh, cocaine, active use as of 1 month ago). Utox neg  - Collateral for ischemic eval   - TSH high, T3 55, f/u fT4  - May be arrhythmic contributing  - Note that patient was documented to be HIV+ but it has been tested and it was negative. Erroneous chart documentation upon transfer  Inotropes/GDMT  - Weaned off inotropes  - Start bumex 1mg PO starting tomorrow   - Losartan 25mg daily   - Spironolactone 25mg daily   - Add farxiga 10mg daily  - CvO2 42.9 with CI 1.9 prior to central access removal, would not add beta blocker   - iron sat is low. Would recommend IV iron sucrose x 5 days  AT:  - Given recent substance use, not an advanced therapies candidate  - patient confirms at bedside that he would like to be FULL CODE.
- Etiology: likely polysubstance abuse contributing to ischemic component, recent PCI few months prior  - GDMT: continue Losartan 25 mg QD (hold for SBP <90) and Spironolactone 25 mg QD. Deferring BB given severity of CMP and recently documented marginal CI. Farxiga on hold with plan for ablation later this week, would plan to resume when eating well post procedure  - Diuretics: continue bumex 1mg BID  - Device: will determine need for primary prevention ICD on max tolerated GDMT  - Completed course of venofer. Ferritin was 102 and TSAT 5%  - Given recent substance use, not an advanced therapies candidate  - Encourage ambulation. PT
s/p ablation on 3/15, reverted back to AF but now back in NSR on amio load  - Appreciate EP recs: c/w digoxin and amio load  - [id number] 4+  - Hold on BB for now, no CCB- per dr lewis  - On AC  - Tele  - Baseline TFTs/LFTs/PFTs/ophtho eval
s/p unsucessful ablation on 3/15, now back in AF  - c/w digoxin  - Nthwv9dexd 4+  - Hold on BB for now, no CCB- per dr lewis  - On AC  - Reach out to EP for further recs
- appears euvolemic, however hypotensive today-- will send LA, pro bnp  - TTE with LVEF 25%, severe RV dilation, moderate MR, severe TR, LV thrombus  - HF following, f/u recs  GDMT  - Cont losartan 25mg daily  - Cont spironolactone 25mg daily  - cont farxiga 10mg daily  - BB on hold for now  - Per cards, Given recent substance use, not an advanced therapies candidate  - strict Is/Os, daily weights
Likely 2/2 to cardiac issues   On Bumex, Losartan, Digoxin, and Aldactone.   EP already saw and it appears the patient was agreeable with ablation  on AC
·  Problem: Acute on chronic HFrEF (heart failure with reduced ejection fraction).   ·  Recommendation: TTE: severe RV dilation, moderate MR (3b), severe TR, LV thrombus, small effusion. EF 25%, LVID 5.2  Congested nephropathy and hepatopathy  Etiology:   - Likely polysubstance abuse contributing (etoh, cocaine, active use as of 1 month ago). Utox neg  - Collateral for ischemic eval   - TSH high, T3 55, f/u fT4  - Note that patient was documented to be HIV+ but it has been tested and it was negative. Erroneous chart documentation upon transfer  Inotropes/GDMT  - Weaned off inotropes  - Still volume overloaded, dose bumex 2mg IVP x1   - Increase losartan to 25mg daily  - Continue spironolactone 25mg daily  - Add farxiga 10mg daily  - CvO2 42.9 with CI 1.9, would not add beta blocker   - iron sat is low. Would recommend IV iron sucrose x 5 days  AT:  - Given recent substance use, not an advanced therapies candidate  - patient confirms at bedside that he would like to be FULL CODE.
·  Problem: Acute on chronic HFrEF (heart failure with reduced ejection fraction).   ·  Recommendation: TTE: severe RV dilation, moderate MR (3b), severe TR, LV thrombus, small effusion. EF 25%, LVID 5.2  Congested nephropathy and hepatopathy  Etiology:   - Likely polysubstance abuse contributing (etoh, cocaine, active use as of 1 month ago). Utox neg  - Collateral for ischemic eval   - TSH high, T3 55, f/u fT4  Inotropes/GDMT  - Off  2.5.  - Swap hydral for losartan 12.5mg [place] 25mg. Transition to entresto tomorrow   - CVP at bedside is normal 5-6, Received 2mg IVP bumex this morning. Net neg 1-1.5L goal today, can likely start maintenance PO diuretic tomorrow.   - f/u afternoon mixed venous sat, if stable/improving would discontinue swan ronaldo  - iron sat is low. Would dose IV iron sucrose x 5 days  AT:  - Given recent substance use, not an advanced therapies candidate  - patient confirms at bedside that he would like to be FULL CODE.
- rates uncontrolled  - start digoxin  - Utkfv3oeqk 4+  - Hold on BB for now, no CCB- per dr lewis  - Hep gtt as above  - EP following-- plan for ablation on 3/14
Complicated by cardiogenic shock requiring inotropes and CCU stay  - TTE with LVEF 25%, severe RV dilation, moderate MR, severe TR, LV thrombus  - HF following, f/u recs  GDMT  - Cont losartan 25mg daily  - Cont spironolactone 25mg daily  - Add farxiga 10mg daily  - BB on hold for now  - Per cards, Given recent substance use, not an advanced therapies candidate  - strict Is/Os, daily weights  - Eventual EP c/s for possible ICD  - Elevated TSH, low T3 - f/u FT4; endo c/s
- Etiology: likely polysubstance abuse contributing to ischemic component, recent PCI few months prior  - GDMT: continue Losartan 25 mg QD (hold for SBP <90) and Spironolactone 25 mg QD. Deferring BB given severity of CMP and recently documented marginal CI. Farxiga on hold with plan for ablation later this week, would plan to resume when eating well post procedure  - Diuretics: please give bumex 2mg x 1 this afternoon then resume bumex 1mg BID  - Device: will determine need for primary prevention ICD on max tolerated GDMT  - Completed course of venofer. Ferritin was 102 and TSAT 5%  - Given recent substance use, not an advanced therapies candidate
- Etiology: likely polysubstance abuse contributing to ischemic component, recent PCI few months prior  - GDMT: continue Losartan 25 mg QD (hold for SBP <90), Farxiga 10 mg QD and Spironolactone 25 mg QD. Deferring BB given severity of CMP and recently documented marginal CI.   - Diuretics: continue Bumex 1 mg PO QD for maintenance of euvolemia  - Device: will determine need for primary prevention ICD on max tolerated GDMT  - Continue with IV Venofre. Ferritin was 102 and TSAT 5%  - Given recent substance use, not an advanced therapies candidate
- rates uncontrolled  - c/w digoxin  - Xnzcp1pcoe 4+  - Hold on BB for now, no CCB- per dr lewis  - Hep gtt as above  - EP following-- plan for ablation on 3/15
s/p ablation on 3/15, reverted back to AF but now back in NSR on amio load  - Appreciate EP recs: c/w digoxin and amio load  - Poitk1dkbn 4+  - Hold on BB for now, no CCB- per dr lewis  - On AC  - Tele  - Baseline TFTs/LFTs/PFTs/ophtho eval
- Etiology: likely polysubstance abuse contributing to ischemic component, recent PCI few months prior  - GDMT: Please start Toprol XL 12.5mg daily and continue Losartan 25 mg QD (hold for SBP <90), farxiga 10mg daily, and Spironolactone 25 mg QD.  - Diuretics: Decrease bumex to 1mg PO daily  - Device: will determine need for primary prevention ICD on max tolerated GDMT  - Completed course of venofer. Ferritin was 102 and TSAT 5%  - Given recent substance use, not an advanced therapies candidate  - Encourage ambulation. PT
s/p ablation on 3/15, reverted back to AF but now back in NSR on amio load  - Appreciate EP recs: c/w digoxin and amio load  - [id number] 4+  - Hold on BB for now, no CCB- per dr lewis  - On AC  - Tele  - Baseline TFTs/LFTs/PFTs/ophtho eval
- rate control improved  - c/w digoxin  - Gkwcu9zdni 4+  - Hold on BB for now, no CCB- per dr lewis  - Hep gtt as above  - EP following-- plan for ablation on 3/15
- rate control improved  - s/p ablation on 3/15  - c/w digoxin  - Ijenp4dfxe 4+  - Hold on BB for now, no CCB- per dr lewis  - Hep gtt as above  - EP following
- rates uncontrolled  - c/w digoxin  - Bplag6aswm 4+  - Hold on BB for now, no CCB- per dr lewis  - Hep gtt as above  - EP following-- plan for ablation on 3/15
Complicated by cardiogenic shock requiring inotropes and CCU stay  - TTE with LVEF 25%, severe RV dilation, moderate MR, severe TR, LV thrombus  - HF following, f/u recs  GDMT  - Cont losartan 25mg daily  - Cont spironolactone 25mg daily  - Add farxiga 10mg daily  - BB on hold for now  - Per cards, Given recent substance use, not an advanced therapies candidate  - strict Is/Os, daily weights  - Eventual EP c/s for possible ICD  - Elevated TSH, low T3 - f/u FT4; endo c/s
Likely 2/2 to cardiac issues   On Bumex, Losartan, Digoxin, and Aldactone.   EP already saw the actual plan is for ablation.   on AC
- Etiology: likely polysubstance abuse contributing to ischemic component, recent PCI few months prior  - GDMT: continue Losartan 25 mg QD (hold for SBP <90) and Spironolactone 25 mg QD. Deferring BB given severity of CMP and recently documented marginal CI. Farxiga on hold with plan for ablation later this week, would plan to resume when eating well post procedure  - please recheck lactate with AM labs 3/13  - Diuretics: while he's off farxiga would increase Bumex to 1 mg PO BID for maintenance of euvolemia  - Device: will determine need for primary prevention ICD on max tolerated GDMT  - Completed course of venofer. Ferritin was 102 and TSAT 5%  - Given recent substance use, not an advanced therapies candidate
s/p ablation on 3/15, reverted back to AF but now back in NSR on amio load  - Appreciate EP recs: c/w digoxin and amio load  - Nmprt1gzct 4+  - Hold on BB for now, no CCB- per dr lewis  - On AC  - Tele  - Baseline TFTs/LFTs/PFTs/ophtho eval
- rate control improved  - s/p ablation on 3/15  - c/w digoxin  - Jpsfo8nevv 4+  - Hold on BB for now, no CCB- per dr lewis  - Hep gtt as above  - EP following
s/p ablation on 3/15, reverted back to AF but now back in NSR on amio load  - Appreciate EP recs: c/w digoxin and amio load  - Nwqvu8vwxq 4+  - Hold on BB for now, no CCB- per dr lewis  - On AC  - Tele  - Baseline TFTs/LFTs/PFTs/ophtho eval
s/p ablation on 3/15, reverted back to AF but now back in NSR on amio load  - Appreciate EP recs: c/w digoxin and amio load  - Uldfz7wwxp 4+  - Hold on BB for now, no CCB- per dr lewis  - On AC  - Tele  - Baseline TFTs/LFTs/PFTs/ophtho eval
s/p ablation on 3/15, reverted back to AF but now back in NSR on amio load  - Appreciate EP recs: c/w digoxin and amio load  - Xuxsa3tytk 4+  - Hold on BB for now, no CCB- per dr lewis  - On AC  - Tele  - Baseline TFTs/LFTs/PFTs/ophtho eval
Complicated by cardiogenic shock requiring inotropes and CCU stay  - TTE with LVEF 25%, severe RV dilation, moderate MR, severe TR, LV thrombus  - HF following, f/u recs  GDMT  - Cont losartan 25mg daily  - Cont spironolactone 25mg daily  - Add farxiga 10mg daily  - BB on hold for now  - Per cards, Given recent substance use, not an advanced therapies candidate  - strict Is/Os, daily weights  - Eventual EP c/s for possible ICD  - Elevated TSH, low T3 - f/u FT4; consider endo c/s
Likely 2/2 to cardiac issues   On Bumex, Losartan, and Aldactone.   EP already saw and it appears the patient was agreeable with ablation  on AC

## 2024-03-26 NOTE — PROGRESS NOTE ADULT - PROBLEM SELECTOR PROBLEM 5
Lack of social support
Paroxysmal atrial fibrillation
Transaminitis
Polysubstance abuse
Transaminitis
Encounter for HIV (human immunodeficiency virus) test
Polysubstance abuse
Transaminitis
Transaminitis
Iron deficiency anemia
Transaminitis
Polysubstance abuse
Polysubstance abuse
Cough
Paroxysmal atrial fibrillation
Paroxysmal atrial fibrillation
Lack of social support
Transaminitis
Paroxysmal atrial fibrillation
Transaminitis
Transaminitis
Iron deficiency anemia
Polysubstance abuse
Transaminitis
Iron deficiency anemia
Transaminitis

## 2024-03-26 NOTE — PROGRESS NOTE ADULT - PROBLEM SELECTOR PLAN 4
- also with some depression and mood disorder  - Per pt, quit cocaine and ETOH one month ago  - SW following  - [facility] care following

## 2024-03-26 NOTE — PROGRESS NOTE ADULT - PROVIDER SPECIALTY LIST ADULT
Electrophysiology
ODILIA
Palliative Care
Wound Care
Wound Care
Electrophysiology
Hospitalist
ODILIA
Electrophysiology
Electrophysiology
Heart Failure
Hospitalist
Hospitalist
Palliative Care
Heart Failure
Hospitalist
ODILIA
Heart Failure
ODILIA
ODILIA
Heart Failure
Heart Failure
Hospitalist
Heart Failure
Hospitalist
Heart Failure
Heart Failure
Palliative Care
Hospitalist

## 2024-03-26 NOTE — PROGRESS NOTE ADULT - PROBLEM SELECTOR PLAN 7
Elevated TSH  - Appreciate endo recs, started on synthroid 25mcg daily
Elevated TSH  - Appreciate endo recs, started on synthroid 25mcg daily  - f/u full TFTs
Will continue to f/u for GOC and ACP.         Stan Delarosa MD  Associate Chief Geriatrics and Palliative Care (GaP) [facility]   GaP Consult Service   , [employer] [facility]      Please contact me via Teams from Monday through Friday between 9am-5pm. If not answering, please call the palliative care pager [phone]    After 5pm and on weekends, please see the contact information below:    In the event of newly developing, evolving, or worsening symptoms, please contact the Palliative Medicine team via pager (if the patient is at [facility] #[phone] or if the patient is at [facility] #[phone]) The Geriatric and Palliative Medicine service has coverage 24 hours a day/ 7 days a week to provide medical recommendations regarding symptom management needs via telephone
Elevated TSH  - Appreciate endo recs, started on synthroid 25mcg daily    dispo: pending home care set up vs. placement per CM, hopefully today    total discharge time: 46 minutes.
Elevated TSH  - Appreciate endo recs, started on synthroid 25mcg daily
- Per pt, quit cocaine and ETOH one month ago  - SW following  - pall care following-- rec NH placement
Elevated TSH  - Appreciate endo recs, started on synthroid 25mcg daily
Elevated TSH  - Appreciate endo recs, started on synthroid 25mcg daily  - f/u full TFTs
Per pt, quit cocaine and ETOH one month ago  - LISSETH newell
Elevated TSH  - Appreciate endo recs, started on synthroid 25mcg daily    dispo: pending home care set up vs. placement per CM, hopefully in the next day or so
Per pt, quit cocaine and ETOH one month ago  - LISSETH newell
- Per pt, quit cocaine and ETOH one month ago  - SW following  - pall care following-- rec NH placement
Per pt, quit cocaine and ETOH one month ago  - LISSETH newell
Per pt, quit cocaine and ETOH one month ago  - LISSETH newell
See my GOC note above and, for further details, please see [facility] GOC note.
Will continue to f/u for GOC and ACP.         Stan Delarosa MD  Associate Chief Geriatrics and Palliative Care (GaP) [facility]   GaP Consult Service   , [employer] [facility]      Please contact me via Teams from Monday through Friday between 9am-5pm. If not answering, please call the palliative care pager [phone]    After 5pm and on weekends, please see the contact information below:    In the event of newly developing, evolving, or worsening symptoms, please contact the Palliative Medicine team via pager (if the patient is at [facility] #[phone] or if the patient is at [facility] #[phone]) The Geriatric and Palliative Medicine service has coverage 24 hours a day/ 7 days a week to provide medical recommendations regarding symptom management needs via telephone
- Per pt, quit cocaine and ETOH one month ago  - SW following  - pall care following-- rec NH placement

## 2024-03-26 NOTE — PROGRESS NOTE ADULT - PROBLEM SELECTOR PROBLEM 1
Acute on chronic HFrEF (heart failure with reduced ejection fraction)
Paroxysmal atrial fibrillation
Acute on chronic HFrEF (heart failure with reduced ejection fraction)
Paroxysmal atrial fibrillation
Dyspnea
Paroxysmal atrial fibrillation
Paroxysmal atrial fibrillation
Acute on chronic HFrEF (heart failure with reduced ejection fraction)
Paroxysmal atrial fibrillation
Acute on chronic HFrEF (heart failure with reduced ejection fraction)
Paroxysmal atrial fibrillation
Acute on chronic HFrEF (heart failure with reduced ejection fraction)
Paroxysmal atrial fibrillation
Paroxysmal atrial fibrillation
Dyspnea
Acute on chronic HFrEF (heart failure with reduced ejection fraction)
Paroxysmal atrial fibrillation
Paroxysmal atrial fibrillation
Acute on chronic HFrEF (heart failure with reduced ejection fraction)
Dyspnea

## 2024-03-26 NOTE — DISCHARGE NOTE PROVIDER - NSDCFUADDINST_GEN_ALL_CORE_FT
needing routine TFTs/LFTs/PFTs/opthalmology eval while on Amiodarone therapy, discussed Black Box Warnings.  - liver ultrasound to evaluate for fibrosis and safety of long-term amiodarone - with normal liver size and echogenicity   - continue AC warfarin given LV thrombus; INR therapeutic    - Pt has f/u with Dr. Card on 5/7/24 @ 430PM   you will need  routine TFTs/LFTs/PFTs/opthalmology eval while on Amiodarone therapy, > follow up with PCP  - continue AC warfarin given LV thrombus; INR therapeutic

## 2024-03-26 NOTE — PROGRESS NOTE ADULT - PROBLEM SELECTOR PROBLEM 7
Hypothyroidism
Palliative care encounter
Hypothyroidism
Polysubstance abuse
Advanced care planning/counseling discussion
Hypothyroidism
Polysubstance abuse
Palliative care encounter

## 2024-03-26 NOTE — PROGRESS NOTE ADULT - SUBJECTIVE AND OBJECTIVE BOX
Zeb Hensley MD  Division of Hospital Medicine  Available on MS teams until 7pm  If no response or off-hours, page [phone]  -------------------------------------    Patient is a 63y old  Male who presents with a chief complaint of cardiogenic shock (26 Mar 2024 08:25)      SUBJECTIVE / OVERNIGHT EVENTS: none acute  ADDITIONAL REVIEW OF SYSTEMS: no new complaints, pt feels well, amenable to discharge home to friends [facility].    MEDICATIONS  (STANDING):  aMIOdarone    Tablet 200 milliGRAM(s) Oral daily  aMIOdarone    Tablet   Oral   AQUAPHOR (petrolatum Ointment) 1 Application(s) Topical every 12 hours  atorvastatin 40 milliGRAM(s) Oral at bedtime  buMETAnide 1 milliGRAM(s) Oral daily  chlorhexidine 2% Cloths 1 Application(s) Topical <User Schedule>  clopidogrel Tablet 75 milliGRAM(s) Oral daily  dapagliflozin 10 milliGRAM(s) Oral daily  digoxin     Tablet 62.5 MICROGram(s) Oral daily  ferrous    sulfate 325 milliGRAM(s) Oral daily  folic acid 1 milliGRAM(s) Oral daily  insulin lispro (ADMELOG) corrective regimen sliding scale   SubCutaneous three times a day before meals  insulin lispro (ADMELOG) corrective regimen sliding scale   SubCutaneous at bedtime  levothyroxine 25 MICROGram(s) Oral daily  losartan 25 milliGRAM(s) Oral daily  spironolactone 25 milliGRAM(s) Oral every 12 hours  thiamine 100 milliGRAM(s) Oral daily    MEDICATIONS  (PRN):  melatonin 3 milliGRAM(s) Oral at bedtime PRN Sleep  sodium chloride 0.9% lock flush 10 milliLiter(s) IV Push every 1 hour PRN Pre/post blood products, medications, blood draw, and to maintain line patency      CAPILLARY BLOOD GLUCOSE      POCT Blood Glucose.: 85 mg/dL (26 Mar 2024 09:05)  POCT Blood Glucose.: 160 mg/dL (25 Mar 2024 21:29)  POCT Blood Glucose.: 170 mg/dL (25 Mar 2024 17:34)    I&O's Summary    25 Mar 2024 07:01  -  26 Mar 2024 07:00  --------------------------------------------------------  IN: 900 mL / OUT: 2225 mL / NET: -1325 mL    26 Mar 2024 07:01  -  26 Mar 2024 12:46  --------------------------------------------------------  IN: 360 mL / OUT: 500 mL / NET: -140 mL        PHYSICAL EXAM:  Vital Signs Last 24 Hrs  T(C): 36.7 (26 Mar 2024 11:40), Max: 36.7 (26 Mar 2024 04:14)  T(F): 98 (26 Mar 2024 11:40), Max: 98 (26 Mar 2024 04:14)  HR: 82 (26 Mar 2024 11:40) (60 - 82)  BP: 90/58 (26 Mar 2024 11:40) (90/58 - 108/61)  BP(mean): 60 (26 Mar 2024 11:40) (60 - 60)  RR: 18 (26 Mar 2024 11:40) (18 - 18)  SpO2: 92% (26 Mar 2024 11:40) (92% - 100%)    Parameters below as of 26 Mar 2024 04:14  Patient On (Oxygen Delivery Method): room air      CONSTITUTIONAL: NAD, thin  EYES: PERRLA; conjunctiva and sclera clear  ENMT: MMM  NECK: Supple  RESPIRATORY: Normal respiratory effort; CTAB  CARDIOVASCULAR: RRR, no JVD, no peripheral edema   ABDOMEN: Nontender to palpation, normoactive BS, no guarding/rigidity  MUSCLOSKELETAL:  no clubbing/cyanosis, no joint swelling or tenderness to palpation  PSYCH: A+O x 3, affect normal  NEUROLOGY: CN 2-12 are intact and symmetric; no gross sensory or motor deficits  SKIN: No rashes; no palpable lesions    LABS:                        11.3   4.94  )-----------( 243      ( 26 Mar 2024 10:54 )             35.5     03-25    138  |  102  |  22  ----------------------------<  109<H>  4.5   |  23  |  0.92    Ca    8.9      25 Mar 2024 07:31  Mg     2.0     03-25    TPro  7.2  /  Alb  3.0<L>  /  TBili  0.3  /  DBili  0.1  /  AST  25  /  ALT  33  /  AlkPhos  159<H>  03-25    PT/INR - ( 26 Mar 2024 10:54 )   PT: 30.3 sec;   INR: 2.98 ratio         PTT - ( 25 Mar 2024 07:34 )  PTT:39.6 sec      Urinalysis Basic - ( 25 Mar 2024 07:31 )    Color: x / Appearance: x / SG: x / pH: x  Gluc: 109 mg/dL / Ketone: x  / Bili: x / Urobili: x   Blood: x / Protein: x / Nitrite: x   Leuk Esterase: x / RBC: x / WBC x   Sq Epi: x / Non Sq Epi: x / Bacteria: x          RADIOLOGY & ADDITIONAL TESTS:  Results Reviewed:   Imaging Personally Reviewed:  Electrocardiogram Personally Reviewed:    COORDINATION OF CARE:  Care Discussed with Consultants/Other Providers [Y/N]:  Prior or Outpatient Records Reviewed [Y/N]:

## 2024-03-26 NOTE — DISCHARGE NOTE PROVIDER - NSDCFUSCHEDAPPT_GEN_ALL_CORE_FT
[facility] [address]  Scheduled Appointment: 04/04/2024    Twin Card  [facility] [address]  Scheduled Appointment: 05/07/2024     Shannon Jain  [facility]  INTMED [address]  Scheduled Appointment: 03/28/2024    [facility]  HEARTFAIL [address]  Scheduled Appointment: 04/04/2024    Twin Card  [facility]  ELECTROPH [address]  Scheduled Appointment: 05/07/2024     [facility] [address]  Scheduled Appointment: 04/04/2024    Twin Card  [facility] [address]  Scheduled Appointment: 05/07/2024

## 2024-03-28 ENCOUNTER — APPOINTMENT (OUTPATIENT)
Dept: INTERNAL MEDICINE | Facility: CLINIC | Age: 64
End: 2024-03-28

## 2024-03-28 DIAGNOSIS — Z13.228 ENCOUNTER FOR SCREENING FOR OTHER METABOLIC DISORDERS: ICD-10-CM

## 2024-03-28 DIAGNOSIS — Z12.9 ENCOUNTER FOR SCREENING FOR MALIGNANT NEOPLASM, SITE UNSPECIFIED: ICD-10-CM

## 2024-03-28 DIAGNOSIS — Z13.6 ENCOUNTER FOR SCREENING FOR CARDIOVASCULAR DISORDERS: ICD-10-CM

## 2024-03-28 NOTE — HEALTH RISK ASSESSMENT
[0] : 2) Feeling down, depressed, or hopeless: Not at all (0) [PHQ-2 Negative - No further assessment needed] : PHQ-2 Negative - No further assessment needed [[id number]] : 0 [Never] : Never

## 2024-03-28 NOTE — HISTORY OF PRESENT ILLNESS
[Post-hospitalization from ___ Hospital] : Post-hospitalization from [unfilled] Hospital [Discharge Summary] : discharge summary [Pertinent Labs] : pertinent labs [Radiology Findings] : radiology findings [Discharge Med List] : discharge medication list [Patient Contacted By: ____] : and contacted by [unfilled] [Med Reconciliation] : medication reconciliation has been completed

## 2024-03-28 NOTE — PHYSICAL EXAM
[No Acute Distress] : no acute distress [Well Nourished] : well nourished [Well Developed] : well developed [Well-Appearing] : well-appearing [Normal Sclera/Conjunctiva] : normal sclera/conjunctiva [Normal Outer Ear/Nose] : the outer ears and nose were normal in appearance [Normal Oropharynx] : the oropharynx was normal [No JVD] : no jugular venous distention [No Lymphadenopathy] : no lymphadenopathy [Supple] : supple [No Respiratory Distress] : no respiratory distress  [No Accessory Muscle Use] : no accessory muscle use [Clear to Auscultation] : lungs were clear to auscultation bilaterally [Normal Rate] : normal rate  [Regular Rhythm] : with a regular rhythm [Normal S1, S2] : normal S1 and S2 [No Murmur] : no murmur heard [No Carotid Bruits] : no carotid bruits [No Varicosities] : no varicosities [Pedal Pulses Present] : the pedal pulses are present [No Edema] : there was no peripheral edema [No Extremity Clubbing/Cyanosis] : no extremity clubbing/cyanosis [Soft] : abdomen soft [Non-distended] : non-distended [Non Tender] : non-tender [Normal Bowel Sounds] : normal bowel sounds [Normal Anterior Cervical Nodes] : no anterior cervical lymphadenopathy [No CVA Tenderness] : no CVA  tenderness [No Joint Swelling] : no joint swelling [No Spinal Tenderness] : no spinal tenderness [No Rash] : no rash [Grossly Normal Strength/Tone] : grossly normal strength/tone [Coordination Grossly Intact] : coordination grossly intact [No Focal Deficits] : no focal deficits [Normal Gait] : normal gait [Alert and Oriented x3] : oriented to person, place, and time

## 2024-04-01 NOTE — CHART NOTE - NSCHARTNOTESELECT_GEN_ALL_CORE
Event Note
HF Post Discharge Follow Up/Event Note
Nutrition Services
Transfer Note
d/c note/Event Note
d/c note/Event Note
Endocrinology/Event Note
ectopy/Event Note
post atrial flutter ablation/Event Note
s/p Ablation / AC Resumption/Event Note

## 2024-04-03 RX ORDER — AMIODARONE HYDROCHLORIDE 200 MG/1
200 TABLET ORAL DAILY
Refills: 0 | Status: ACTIVE | COMMUNITY
Start: 2024-04-03

## 2024-04-03 RX ORDER — WARFARIN 1 MG/1
1 TABLET ORAL
Refills: 0 | Status: ACTIVE | COMMUNITY
Start: 2024-04-03

## 2024-04-03 RX ORDER — LEVOTHYROXINE SODIUM 0.03 MG/1
25 TABLET ORAL DAILY
Refills: 0 | Status: ACTIVE | COMMUNITY
Start: 2024-04-03

## 2024-04-03 RX ORDER — LOSARTAN POTASSIUM 25 MG/1
25 TABLET, FILM COATED ORAL DAILY
Refills: 0 | Status: ACTIVE | COMMUNITY
Start: 2024-04-03

## 2024-04-03 RX ORDER — CHLORHEXIDINE GLUCONATE 4 %
325 (65 FE) LIQUID (ML) TOPICAL DAILY
Refills: 0 | Status: ACTIVE | COMMUNITY
Start: 2024-04-03

## 2024-04-03 RX ORDER — BUMETANIDE 1 MG/1
1 TABLET ORAL DAILY
Refills: 0 | Status: ACTIVE | COMMUNITY
Start: 2024-04-03

## 2024-04-03 RX ORDER — FOLIC ACID 1 MG/1
1 TABLET ORAL DAILY
Refills: 0 | Status: ACTIVE | COMMUNITY
Start: 2024-04-03

## 2024-04-03 RX ORDER — CLOPIDOGREL BISULFATE 75 MG/1
75 TABLET, FILM COATED ORAL DAILY
Refills: 0 | Status: ACTIVE | COMMUNITY
Start: 2024-04-03

## 2024-04-03 RX ORDER — DAPAGLIFLOZIN 10 MG/1
10 TABLET, FILM COATED ORAL
Qty: 1 | Refills: 0 | Status: ACTIVE | COMMUNITY
Start: 2024-04-03

## 2024-04-03 RX ORDER — DIGOXIN 125 UG/1
125 TABLET ORAL DAILY
Refills: 0 | Status: ACTIVE | COMMUNITY
Start: 2024-04-03

## 2024-04-03 RX ORDER — ROSUVASTATIN CALCIUM 10 MG/1
10 TABLET, FILM COATED ORAL DAILY
Refills: 0 | Status: ACTIVE | COMMUNITY
Start: 2024-04-03

## 2024-04-03 RX ORDER — SPIRONOLACTONE 25 MG/1
25 TABLET ORAL DAILY
Qty: 30 | Refills: 3 | Status: ACTIVE | COMMUNITY
Start: 2024-04-03

## 2024-04-04 ENCOUNTER — APPOINTMENT (OUTPATIENT)
Dept: HEART FAILURE | Facility: CLINIC | Age: 64
End: 2024-04-04

## 2024-04-04 NOTE — ASSESSMENT
[FreeTextEntry1] : 63 year old man with history of ICM (LVIDd 5.2 cm, LVEF 25-30%), CAD s/p PCI, severe TR, HTN, AF, PAD c/b RLE compartment syndrome s/p fasciotomy (12/2023) active smoker, ETOH misuse (4 beers daily and half pint liquor 2-3x week) and cocaine use (last 1 month PTA). He is ACC/AHA Stage C HF, endorsing NYHA Class symptoms.  #HFrEF -Etiology: likely polysubstance abuse contributing to ischemic component, recent PCI few months prior - GDMT: Current regimen Toprol XL 12.5mg daily and continue Losartan 25 mg QD (hold for SBP <90), farxiga 10mg daily, and Spironolactone 25 mg QD. - Diuretics: Decrease bumex to 1mg PO daily - Device: will determine need for primary prevention ICD on max tolerated GDMT - Completed course of venofer. Ferritin was 102 and TSAT 5% - Given recent substance use, not an advanced therapies candidate   #ELIE  - Unclear prior baseline but Cr on admission was 2.1 - Cr now normal, continue to monitor.  # LV (left ventricular) mural thrombus.    Plan: - On AC with heparin infusion.  # Atrial flutter.    - s/p aflutter ablation on 3/15 - currently back in typical aflutter on tele - appreciate EP follow up for consideration of repeat ablation - INR currently therapeutic with INR 2.4 on coumadin with heparin bridge.  RTC

## 2024-04-04 NOTE — CARDIOLOGY SUMMARY
[de-identified] :  3/5/24 TTE: LVIDd 5.2 cm, LVEF 25% with rounded and protruding LV thrombus in apex, severe RVE with severely reduced function (TAPSE 1.5 cm), mod NEY, trace AI, mod MR, severe TR, est PASP 46 mmHg

## 2024-04-04 NOTE — HISTORY OF PRESENT ILLNESS
[FreeTextEntry1] : Mrs. Hunter is a 63 year old man with history of ICM (LVIDd 5.2 cm, LVEF 25-30%), CAD s/p PCI, severe TR, HTN, AF, PAD c/b RLE compartment syndrome s/p fasciotomy (12/2023) active smoker, ETOH misuse (4 beers daily and half pint liquor 2-3x week) and cocaine use (last 1 month PTA),  who presents for routine follow-up of his cardiomyopathy   He was recently hospitalized for ADHF requiring chest tubes for effusions. Now presenting, initially to [facility] for recurrent ADHF and new LV thrombus. Developed cardiogenic shock prompting transfer to [facility] for further management. Initiated on inotropic support but has since been weaned with diuresis and introduction of afterload reducing agents. Prior to central line removal, off inotropic support, CO/CI was 3/1.9. He has improved with IV diuretics. He underwent aflutter ablation on 3/15 and was in SR, however converted back into typical aflutter. He's currently euvolemic and low normotensive tolerating low dose GDMT with normal renal function.

## 2024-04-10 ENCOUNTER — APPOINTMENT (OUTPATIENT)
Dept: HEART FAILURE | Facility: CLINIC | Age: 64
End: 2024-04-10

## 2024-04-10 NOTE — CARDIOLOGY SUMMARY
[de-identified] : 3/5/24 TTE: LVIDd 5.2 cm, LVEF 25% with rounded and protruding LV thrombus in apex, severe RVE with severely reduced function (TAPSE 1.5 cm), mod NEY, trace AI, mod MR, severe TR, est PASP 46 mmHg

## 2024-04-10 NOTE — HISTORY OF PRESENT ILLNESS
[FreeTextEntry1] : Mr. Hunter is a 63 year old man with ACC/AHA Stage C ICM (LV 5.2 cm, LVEF 25-30%), CAD s/p PCI, PAD c/b RLE compartment syndrome s/p fasciotomy (12/2023), severe TR, Aflu s/p ablation 3/15/24 that reverted back to aflutter, LV thrombus (on Coumadin, Dx 3/2024) and active smoker/EtOH/substance abuse (cocaine) who presents for hospital follow-up.   He has had recurrent admissions for ADHF, most recent in March 2024 where he initially presented to [place] with ADHF, found ot have new LV thrombus. Progressed to cardiogenic shock and was transferred to [facility] 3/4/24. Initiated on inotropic support and diuresed, ultimately tolerated wean off inotrope with introduction of neurohormonal therapy. CI off inotrope via PAC 1.9. Also underwent Aflutter ablation 3/16 but shortly after reverted back to having paroxsysms of Aflutter. He was discharged 3/26 on Bumex 1 mg QD, Losartan 25 mg QD, Toprol XL 12.5 mg QD, Farxiga 10 mg QD, Spironolactone 25 mg BID, Digoxin 1/2 tab of 125 mcg QD, Amio 200 QD and Coumadin.    Plan: If there is HR room, hope to increase BB to 25  Labs today and if stable and there is BP room, will increase Losartan Continue Farxiga and [place]  Follow-up with Stephie, Chapitorepeat ablation Who is managing Coumadin Follow-up with Rahul

## 2024-05-07 ENCOUNTER — APPOINTMENT (OUTPATIENT)
Dept: ELECTROPHYSIOLOGY | Facility: CLINIC | Age: 64
End: 2024-05-07

## 2024-06-03 NOTE — CARDIOLOGY SUMMARY
[de-identified] : 3/5/2024  1. Left ventricular cavity is severely dilated. Left ventricular systolic function is severely decreased. Global left ventricular hypokinesis.  2. Severely enlarged right ventricular cavity size and severely reduced systolic function.  3. Moderate mitral regurgitation. Mechanism of mitral regurgitation: Andrei Type IIIb (restricted leaflet motion secondary to left atrial or left ventricular dilatation).  4. Severe tricuspid regurgitation.  5. Mild to moderate pulmonary hypertension.  6. 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  7. Small pericardial effusion with no evidence of hemodynamic compromise (or echocardiographic evidence of cardiac tamponade). [de-identified] : 3/15/2024 Cavotricuspid isthmus ablation

## 2024-06-03 NOTE — HISTORY OF PRESENT ILLNESS
[FreeTextEntry1] : 63 year old man with history of ICM (LVIDd 5.2 cm, LVEF 25-30%), CAD s/p PCI 12/2023, severe TR, HTN, AF, PAD c/b RLE compartment syndrome s/p fasciotomy (12/2023), active smoker, ETOH misuse (4 beers daily and half pint liquor 2-3x week) and prior cocaine use. He has demonstrated multiple atrial flutters at EPS done for suspected CTI flutter.  That is, while he did present in typical flutter he had other flutter during the procedure and recurrent arrhythmia post procedure.  He was treated with amio (abdominal sono without fibrosis).

## 2024-06-04 ENCOUNTER — APPOINTMENT (OUTPATIENT)
Dept: ELECTROPHYSIOLOGY | Facility: CLINIC | Age: 64
End: 2024-06-04

## 2025-05-15 NOTE — PROCEDURE NOTE - NSPROCDETAILS_GEN_ALL_CORE
Male patient calling concerns about scrotum or testicular discomfort/pain or swelling  Called patient and he stated the pain is located on the left side, it goes from the tailbone up possibly to the sciatica, wraps around to the scrotum/groin area and is a pulling type discomfort. The pain started a week and a half ago. The severity can range from a 7 or 8 to a 1 or 2. The pain has been the same and it comes and goes. Pt states there is no swelling or discoloration. He is taking tylenol but it is not helping. There was no Recent injury to the area, he has no fever or abdominal pain.    Patient said he wanted to see his chiropractor this morning and would send a message with an update if that helped him and did not want a message sent to the provider at this time.          
Patient called and stated the chiropractor did a small adjustment.  The patient will like a nurse to call back to discuss care options.    Please advise.  
Patient is scheduled for annual follow up w/ at the end of July but sent a request asking if he could be seen earlier.     Patient said that he has continued pain w/testicle on left side that goes down his leg. Patient is unsure if this is related to back pain.    Patient is seeing his chiropractor today, and will be out of town next week and that is why he was hoping to be seen after 5/27/2025.    Please advise and thank you.  
Raven Crawford PA-C [facility], [facility] Nurse Msg Pool  Caller: Unspecified (Today,  8:04 AM)  Unfortunately we don't have any sooner appointments. He can try elevating the scrotum, take tylenol and ibuprofen. If pain worsens he should be evaluated in UC/ED or by his PCP. Tailbone/sciatic pain should be addressed with PCP.         Informed patient of ZAC Crawford's note. Patient verbalized understanding and had no questions or concerns at this time.     
Spoke with pt regarding TE from this morning, patient said his symptoms are the same as they were this morning and is wondering if he should be seen  Sent to PA for advisement  
guidewire recovered/lumen(s) aspirated and flushed/sterile dressing applied/sterile technique, catheter placed/ultrasound guidance with use of sterile gel and probe cove
location identified, draped/prepped, sterile technique used/sterile dressing applied/sterile technique, catheter placed/supine position/ultrasound guidance

## 2025-07-18 NOTE — BH CONSULTATION LIAISON ASSESSMENT NOTE - CURRENT ACTIVE IDEATION:
[] : no respiratory distress [Auscultation Breath Sounds / Voice Sounds] : lungs were clear to auscultation bilaterally [Heart Rate And Rhythm] : heart rate was normal and rhythm regular [Heart Sounds] : normal S1 and S2 [Heart Sounds Gallop] : no gallops [Murmurs] : no murmurs [Heart Sounds Pericardial Friction Rub] : no pericardial rub [Examination Of The Chest] : the chest was normal in appearance [Chest Visual Inspection Thoracic Asymmetry] : no chest asymmetry [Diminished Respiratory Excursion] : normal chest expansion None known